# Patient Record
Sex: FEMALE | Race: WHITE | NOT HISPANIC OR LATINO | Employment: OTHER | URBAN - METROPOLITAN AREA
[De-identification: names, ages, dates, MRNs, and addresses within clinical notes are randomized per-mention and may not be internally consistent; named-entity substitution may affect disease eponyms.]

---

## 2018-03-06 ENCOUNTER — HOSPITAL ENCOUNTER (EMERGENCY)
Facility: HOSPITAL | Age: 58
Discharge: HOME | End: 2018-03-07
Attending: EMERGENCY MEDICINE
Payer: COMMERCIAL

## 2018-03-06 DIAGNOSIS — R13.10 DYSPHAGIA, UNSPECIFIED TYPE: Primary | ICD-10-CM

## 2018-03-06 PROCEDURE — 25000003 PHARM REV CODE 250: Performed by: EMERGENCY MEDICINE

## 2018-03-06 PROCEDURE — 99283 EMERGENCY DEPT VISIT LOW MDM: CPT

## 2018-03-06 RX ORDER — DIPHENHYDRAMINE HCL 50 MG
50 CAPSULE ORAL EVERY 6 HOURS PRN
COMMUNITY
End: 2022-02-03

## 2018-03-06 RX ADMIN — LIDOCAINE HYDROCHLORIDE 50 ML: 20 SOLUTION ORAL; TOPICAL at 11:03

## 2018-03-07 VITALS
SYSTOLIC BLOOD PRESSURE: 134 MMHG | RESPIRATION RATE: 20 BRPM | BODY MASS INDEX: 19.39 KG/M2 | OXYGEN SATURATION: 99 % | DIASTOLIC BLOOD PRESSURE: 85 MMHG | TEMPERATURE: 99 F | WEIGHT: 109.44 LBS | HEIGHT: 63 IN | HEART RATE: 65 BPM

## 2018-03-07 PROCEDURE — 25000003 PHARM REV CODE 250: Performed by: EMERGENCY MEDICINE

## 2018-03-07 RX ORDER — OXYMETAZOLINE HCL 0.05 %
1 SPRAY, NON-AEROSOL (ML) NASAL
Status: COMPLETED | OUTPATIENT
Start: 2018-03-07 | End: 2018-03-07

## 2018-03-07 RX ADMIN — OXYMETAZOLINE HYDROCHLORIDE 1 SPRAY: 5 SPRAY NASAL at 12:03

## 2018-03-07 NOTE — ED PROVIDER NOTES
SCRIBE #1 NOTE: I, Tara Cara, am scribing for, and in the presence of, Girish Mcqueen MD. I have scribed the entire note.      History      Chief Complaint   Patient presents with    Dysphagia     onset 2030. also reports dizziness and tremors.       Review of patient's allergies indicates:  Allergies not on file     HPI   HPI    3/6/2018, 10:45 PM   History obtained from the patient      History of Present Illness: Yvonne Schmidt is a 57 y.o. female patient who presents to the Emergency Department for dysphagia which onset suddenly at 2100 this PM. Pt states she was eating a tootsie roll and began choking. She states she tried to drink water after this and was unable to swallow it.  Symptoms are constant and moderate in severity. No mitigating or exacerbating factors reported. No other associated sxs reported. Patient denies any SOB, tongue swelling, cough, emesis, sore throat, voice change, and all other sxs at this time. No further complaints or concerns at this time.       Arrival mode: Personal vehicle      PCP: Merrick Palencia MD       Past Medical History:  Nothing remarkable except that she has always had some difficulty swallowing    Past Surgical History:  History reviewed. No pertinent surgical history.    Family History:  History reviewed. No pertinent family history.    Social History:  Social History     Social History Main Topics    Smoking status: unknown    Smokeless tobacco: unknown    Alcohol use unknown    Drug use: Unknown    Sexual activity: unknown       ROS   Review of Systems   Constitutional: Negative for fever.   HENT: Positive for trouble swallowing. Negative for sore throat and voice change.    Respiratory: Positive for choking. Negative for cough and shortness of breath.    Cardiovascular: Negative for chest pain.   Gastrointestinal: Negative for nausea and vomiting.   Genitourinary: Negative for dysuria.   Musculoskeletal: Negative for back pain.   Skin: Negative  "for rash.   Neurological: Negative for weakness.   Hematological: Does not bruise/bleed easily.   All other systems reviewed and are negative.    Physical Exam      Initial Vitals [03/06/18 2237]   BP Pulse Resp Temp SpO2   (!) 190/84 65 18 99.2 °F (37.3 °C) 98 %      MAP       119.33          Physical Exam  Nursing Notes and Vital Signs Reviewed.  Constitutional: Patient is in no acute distress. Well-developed and well-nourished.  Head: Atraumatic. Normocephalic.  Eyes: PERRL. EOM intact. Conjunctivae are not pale. No scleral icterus.  Ears: Right TM normal. Left TM normal. No erythema. No bulging. No effusion or air-fluid levels. No perforation.   Nose: Patent nares. Turbinates are normal. No drainage.   Throat: Moist mucous membranes. Posterior oropharynx is symmetric without erythema. Tonsillar exudate is not present. No trismus. Pt is handling secretions. No stridor. No oral swelling.   Neck: Supple. Full ROM. No lymphadenopathy. No swelling.   Cardiovascular: Regular rate. Regular rhythm. No murmurs, rubs, or gallops. Distal pulses are 2+ and symmetric.  Pulmonary/Chest: No respiratory distress. Clear to auscultation bilaterally. No wheezing or rales. No stridor.   Musculoskeletal: Moves all extremities. No obvious deformities. No edema.  Skin: Warm and dry.  Neurological:  Alert, awake, and appropriate.  Normal speech.  No acute focal neurological deficits are appreciated.  Psychiatric: Normal affect. Good eye contact. Appropriate in content.    ED Course    Procedures  ED Vital Signs:  Vitals:    03/06/18 2237 03/06/18 2253 03/06/18 2332 03/07/18 0131   BP: (!) 190/84 (!) 171/80 (!) 162/85 134/85   Pulse: 65 67 64 65   Resp: 18 17 20 20   Temp: 99.2 °F (37.3 °C)      TempSrc: Oral      SpO2: 98% 96% 100% 99%   Weight: 49.6 kg (109 lb 7.3 oz)      Height: 5' 3" (1.6 m)                 The Emergency Provider reviewed the vital signs and test results, which are outlined above.    ED Discussion     12:50 AM: Per " RN, pt is half way finished with the GI cocktail. She states pt is also c/o nasal congestion.    1:04 AM: Per RN, pt was able to finish GI cocktail at this time.    1:36 AM: Reassessed pt at this time. Pt was able to tolerate water and pudding PO. Pt reports no difficulty swallowing after the GI cocktail. Pt states her condition has improved at this time. Discussed with pt all pertinent ED information and results. Discussed pt dx and plan of tx. Advised pt to f/u with GI for an EGD. Gave pt all f/u and return to the ED instructions. All questions and concerns were addressed at this time. Pt expresses understanding of information and instructions, and is comfortable with plan to discharge. Pt is stable for discharge.        ED Medication(s):  Medications   GI cocktail (mylanta 30 mL, lidocaine 2 % viscous 10 mL, dicyclomine 10 mL) 50 mL (50 mLs Oral Given 3/6/18 5017)   oxymetazoline 0.05 % nasal spray 1 spray (1 spray Each Nare Given 3/7/18 0056)       Discharge Medication List as of 3/7/2018  1:47 AM          Follow-up Information     Merrick Palencia MD. Schedule an appointment as soon as possible for a visit in 2 days.    Specialty:  Family Medicine  Why:  Can return to the ER, If symptoms worsen.  Need to get set up with Gastroenterology for an upper GI scope  Contact information:  [address] [facility] [place] [address]  [phone]                     Medical Decision Making              Scribe Attestation:   Scribe #1: I performed the above scribed service and the documentation accurately describes the services I performed. I attest to the accuracy of the note.    Attending:   Physician Attestation Statement for Scribe #1: I, Girish Mcqueen MD, personally performed the services described in this documentation, as scribed by Tara Marroquin, in my presence, and it is both accurate and complete.          Clinical Impression       ICD-10-CM ICD-9-CM   1. Dysphagia, unspecified type R13.10 787.20        Disposition:   Disposition: Discharged  Condition: Stable         Girish Mcqueen MD  03/07/18 0208

## 2018-03-07 NOTE — ED NOTES
"Patient not drinking GI cocktail, keeps holding it in hand and putting bottle to lips without taking any gulps.  Multiple attempts made to encourage patient to drink, without success.  Patient keeps stating "I'm trying" while holding bottle in lap.  Dr. Mcqueen notified.  "

## 2018-03-07 NOTE — ED NOTES
Patient has tolerated a few bites of pudding and sips of water.  No vomiting noted.  Patient states she is able to control her secretions better.

## 2018-03-07 NOTE — ED NOTES
"Patient has completed GI cocktail.  Dr. Mcqueen states to PO challenge patient.  Patient given ice water and pudding.  Patient states she's "too full to eat or drink anything".  Instructed patient that she does not have to eat or drink much, only a small amount to test if she is able to swallow.  Patient states she will try later and that "she needs some time to rest".  Encouraged patient to eat/drink without success.  MD notified.  "

## 2019-09-10 ENCOUNTER — HOSPITAL ENCOUNTER (EMERGENCY)
Facility: HOSPITAL | Age: 59
Discharge: HOME | End: 2019-09-10
Attending: EMERGENCY MEDICINE
Payer: COMMERCIAL

## 2019-09-10 VITALS
DIASTOLIC BLOOD PRESSURE: 69 MMHG | RESPIRATION RATE: 18 BRPM | HEIGHT: 63 IN | HEART RATE: 54 BPM | WEIGHT: 87.38 LBS | TEMPERATURE: 99 F | OXYGEN SATURATION: 99 % | BODY MASS INDEX: 15.48 KG/M2 | SYSTOLIC BLOOD PRESSURE: 109 MMHG

## 2019-09-10 DIAGNOSIS — R07.89 CHEST DISCOMFORT: Primary | ICD-10-CM

## 2019-09-10 DIAGNOSIS — K59.00 CONSTIPATION, UNSPECIFIED CONSTIPATION TYPE: ICD-10-CM

## 2019-09-10 DIAGNOSIS — K59.00 CONSTIPATION: ICD-10-CM

## 2019-09-10 DIAGNOSIS — R00.1 SINUS BRADYCARDIA: ICD-10-CM

## 2019-09-10 DIAGNOSIS — Z72.0 TOBACCO ABUSE DISORDER: ICD-10-CM

## 2019-09-10 DIAGNOSIS — R07.9 CHEST PAIN: ICD-10-CM

## 2019-09-10 LAB
ALBUMIN SERPL BCP-MCNC: 4.1 G/DL (ref 3.5–5.2)
ALP SERPL-CCNC: 49 U/L (ref 55–135)
ALT SERPL W/O P-5'-P-CCNC: 11 U/L (ref 10–44)
ANION GAP SERPL CALC-SCNC: 9 MMOL/L (ref 8–16)
APTT BLDCRRT: 27.4 SEC (ref 21–32)
AST SERPL-CCNC: 15 U/L (ref 10–40)
BASOPHILS # BLD AUTO: 0.02 K/UL (ref 0–0.2)
BASOPHILS NFR BLD: 0.3 % (ref 0–1.9)
BILIRUB SERPL-MCNC: 0.5 MG/DL (ref 0.1–1)
BUN SERPL-MCNC: 14 MG/DL (ref 6–20)
CALCIUM SERPL-MCNC: 9.5 MG/DL (ref 8.7–10.5)
CHLORIDE SERPL-SCNC: 106 MMOL/L (ref 95–110)
CO2 SERPL-SCNC: 26 MMOL/L (ref 23–29)
CREAT SERPL-MCNC: 0.9 MG/DL (ref 0.5–1.4)
DIFFERENTIAL METHOD: ABNORMAL
EOSINOPHIL # BLD AUTO: 0.1 K/UL (ref 0–0.5)
EOSINOPHIL NFR BLD: 0.8 % (ref 0–8)
ERYTHROCYTE [DISTWIDTH] IN BLOOD BY AUTOMATED COUNT: 12.7 % (ref 11.5–14.5)
EST. GFR  (AFRICAN AMERICAN): >60 ML/MIN/1.73 M^2
EST. GFR  (NON AFRICAN AMERICAN): >60 ML/MIN/1.73 M^2
GLUCOSE SERPL-MCNC: 115 MG/DL (ref 70–110)
HCT VFR BLD AUTO: 38 % (ref 37–48.5)
HGB BLD-MCNC: 12.9 G/DL (ref 12–16)
INR PPP: 1.1 (ref 0.8–1.2)
LIPASE SERPL-CCNC: 17 U/L (ref 4–60)
LYMPHOCYTES # BLD AUTO: 1.2 K/UL (ref 1–4.8)
LYMPHOCYTES NFR BLD: 14.9 % (ref 18–48)
MCH RBC QN AUTO: 29.9 PG (ref 27–31)
MCHC RBC AUTO-ENTMCNC: 33.9 G/DL (ref 32–36)
MCV RBC AUTO: 88 FL (ref 82–98)
MONOCYTES # BLD AUTO: 0.7 K/UL (ref 0.3–1)
MONOCYTES NFR BLD: 8.6 % (ref 4–15)
NEUTROPHILS # BLD AUTO: 5.9 K/UL (ref 1.8–7.7)
NEUTROPHILS NFR BLD: 75.5 % (ref 38–73)
PLATELET # BLD AUTO: 210 K/UL (ref 150–350)
PMV BLD AUTO: 10.8 FL (ref 9.2–12.9)
POTASSIUM SERPL-SCNC: 3.9 MMOL/L (ref 3.5–5.1)
PROT SERPL-MCNC: 7.2 G/DL (ref 6–8.4)
PROTHROMBIN TIME: 11.4 SEC (ref 9–12.5)
RBC # BLD AUTO: 4.32 M/UL (ref 4–5.4)
SODIUM SERPL-SCNC: 141 MMOL/L (ref 136–145)
TROPONIN I SERPL DL<=0.01 NG/ML-MCNC: 0.02 NG/ML (ref 0–0.03)
TROPONIN I SERPL DL<=0.01 NG/ML-MCNC: 0.03 NG/ML (ref 0–0.03)
WBC # BLD AUTO: 7.76 K/UL (ref 3.9–12.7)

## 2019-09-10 PROCEDURE — 83690 ASSAY OF LIPASE: CPT

## 2019-09-10 PROCEDURE — 84484 ASSAY OF TROPONIN QUANT: CPT

## 2019-09-10 PROCEDURE — 93005 ELECTROCARDIOGRAM TRACING: CPT

## 2019-09-10 PROCEDURE — 99285 EMERGENCY DEPT VISIT HI MDM: CPT | Mod: 25

## 2019-09-10 PROCEDURE — 36415 COLL VENOUS BLD VENIPUNCTURE: CPT

## 2019-09-10 PROCEDURE — 80053 COMPREHEN METABOLIC PANEL: CPT

## 2019-09-10 PROCEDURE — 25000003 PHARM REV CODE 250: Performed by: EMERGENCY MEDICINE

## 2019-09-10 PROCEDURE — 63600175 PHARM REV CODE 636 W HCPCS: Performed by: EMERGENCY MEDICINE

## 2019-09-10 PROCEDURE — 93010 EKG 12-LEAD: ICD-10-PCS | Mod: ,,, | Performed by: INTERNAL MEDICINE

## 2019-09-10 PROCEDURE — 85730 THROMBOPLASTIN TIME PARTIAL: CPT

## 2019-09-10 PROCEDURE — 93010 ELECTROCARDIOGRAM REPORT: CPT | Mod: ,,, | Performed by: INTERNAL MEDICINE

## 2019-09-10 PROCEDURE — 85025 COMPLETE CBC W/AUTO DIFF WBC: CPT

## 2019-09-10 PROCEDURE — 85610 PROTHROMBIN TIME: CPT

## 2019-09-10 RX ORDER — SUCRALFATE 1 G/10ML
1 SUSPENSION ORAL
Status: COMPLETED | OUTPATIENT
Start: 2019-09-10 | End: 2019-09-10

## 2019-09-10 RX ORDER — ALPRAZOLAM 0.5 MG/1
0.5 TABLET ORAL 2 TIMES DAILY PRN
COMMUNITY
Start: 2019-08-16 | End: 2022-02-03 | Stop reason: SDUPTHER

## 2019-09-10 RX ORDER — ASPIRIN 325 MG
325 TABLET ORAL
Status: DISCONTINUED | OUTPATIENT
Start: 2019-09-10 | End: 2019-09-10

## 2019-09-10 RX ORDER — ESOMEPRAZOLE MAGNESIUM 10 MG/1
GRANULE, FOR SUSPENSION, EXTENDED RELEASE ORAL
Status: ON HOLD | COMMUNITY
End: 2019-09-22 | Stop reason: HOSPADM

## 2019-09-10 RX ORDER — OXYMETAZOLINE HCL 0.05 %
2 SPRAY, NON-AEROSOL (ML) NASAL 2 TIMES DAILY
Status: ON HOLD | COMMUNITY
End: 2019-09-22 | Stop reason: HOSPADM

## 2019-09-10 RX ORDER — NITROGLYCERIN 0.4 MG/1
0.4 TABLET SUBLINGUAL
Status: COMPLETED | OUTPATIENT
Start: 2019-09-10 | End: 2019-09-10

## 2019-09-10 RX ORDER — MULTIVITAMIN
1 TABLET ORAL DAILY
COMMUNITY

## 2019-09-10 RX ADMIN — SUCRALFATE 1 G: 1 SUSPENSION ORAL at 12:09

## 2019-09-10 RX ADMIN — NITROGLYCERIN 0.4 MG: 0.4 TABLET, ORALLY DISINTEGRATING SUBLINGUAL at 01:09

## 2019-09-10 NOTE — ED PROVIDER NOTES
"SCRIBE #1 NOTE: I, Jaswant Akbar/Corinne Mack, am scribing for, and in the presence of, Dianna Mcqueen MD. I have scribed the entire note.       History     Chief Complaint   Patient presents with    Chest Pain     "crushing" CP from urgent care/ +nausea +anxiety. received ASA  @     Review of patient's allergies indicates:   Allergen Reactions    Penicillins          History of Present Illness     HPI    9/10/2019, 12:12 PM  History obtained from the patient      History of Present Illness: Yvonne Schmidt is a 58 y.o. female patient with a PMHx of anxiety who presents to the Emergency Department for evaluation of CP which onset gradually 5 days ago. Pt presented to a clinic today for her CP and was referred to the ED for further evaluation. Pt states that she was instructed to start Nexium yesterday for heartburn, but her sxs have persisted. Symptoms are constant and moderate in severity. No mitigating or exacerbating factors reported. Associated sxs include SOB, anxiety, nausea, and constipation. Pt states she was instructed to take mag citrate yesterday for her constipation, but has not done so yet. Patient denies any fever, chills, diaphoresis, V/D, abd pain, back pain, neck pain, HA, dizziness, and all other sxs at this time. Prior Tx includes Zofran given en route with some relief. No further complaints or concerns at this time.       Arrival mode: Ambulatory service    PCP: Merrick Palencia MD        Past Medical History:  Past Medical History:   Diagnosis Date    Anxiety     GERD (gastroesophageal reflux disease)        Past Surgical History:  History reviewed. No pertinent surgical history.      Family History:  History reviewed. No pertinent family history.    Social History:  Social History     Tobacco Use    Smoking status: Current Every Day Smoker     Packs/day: 0.50     Types: Cigarettes   Substance and Sexual Activity    Alcohol use: Never     Frequency: Never    Drug use: " Never    Sexual activity: Unknown        Review of Systems     Review of Systems   Constitutional: Negative for chills, diaphoresis and fever.   Respiratory: Positive for shortness of breath. Negative for cough.    Cardiovascular: Positive for chest pain. Negative for leg swelling.   Gastrointestinal: Positive for constipation and nausea. Negative for abdominal pain, diarrhea and vomiting.   Musculoskeletal: Negative for back pain, neck pain and neck stiffness.   Skin: Negative for rash and wound.   Neurological: Negative for dizziness, light-headedness, numbness and headaches.   Psychiatric/Behavioral: Negative for confusion. The patient is nervous/anxious.    All other systems reviewed and are negative.     Physical Exam     Initial Vitals   BP Pulse Resp Temp SpO2   09/10/19 1206 09/10/19 1206 09/10/19 1206 09/10/19 1211 09/10/19 1206   134/81 (!) 48 18 98.5 °F (36.9 °C) 99 %      MAP       --                 Physical Exam  Nursing Notes and Vital Signs Reviewed.  Constitutional: Patient is in no acute distress. Well-developed and well-nourished.  Head: Atraumatic. Normocephalic.  Eyes: PERRL. EOM intact. Conjunctivae are not pale. No scleral icterus.  ENT: Mucous membranes are moist. Oropharynx is clear and symmetric.    Neck: Supple. Full ROM. No lymphadenopathy.  Cardiovascular: Bradycardic. Regular rhythm. No murmurs, rubs, or gallops. Distal pulses are 2+ and symmetric.  Pulmonary/Chest: No respiratory distress. Clear to auscultation bilaterally. No wheezing or rales.  Abdominal: Soft and non-distended.  There is no tenderness.  No rebound, guarding, or rigidity.   Musculoskeletal: Moves all extremities. No obvious deformities. No edema.   Skin: Warm and dry.  Neurological:  Alert, awake, and appropriate.  Normal speech.  No acute focal neurological deficits are appreciated.  Psychiatric: Normal affect. Good eye contact. Appropriate in content.     ED Course   Procedures  ED Vital Signs:  Vitals:     "09/10/19 1206 09/10/19 1211 09/10/19 1224 09/10/19 1226   BP: 134/81 (!) 155/73 131/80 135/85   Pulse: (!) 48 (!) 54 (!) 51 (!) 54   Resp: 18 16 18 18   Temp:  98.5 °F (36.9 °C)     TempSrc:  Oral     SpO2: 99% 100% 100% 100%   Weight:  39.5 kg (87 lb)     Height:  5' 3" (1.6 m)      09/10/19 1227 09/10/19 1301 09/10/19 1400 09/10/19 1432   BP: 138/85 137/79 123/75 121/60   Pulse: (!) 55 (!) 48 (!) 55 (!) 52   Resp: 18 18 18 18   Temp:       TempSrc:       SpO2: 99% 98% 100% 100%   Weight: 39.6 kg (87 lb 6.4 oz)      Height:        09/10/19 1501 09/10/19 1601   BP: 101/69 109/69   Pulse: (!) 57 (!) 54   Resp: 18 18   Temp:     TempSrc:     SpO2: 98% 99%   Weight:     Height:         Abnormal Lab Results:  Labs Reviewed   CBC W/ AUTO DIFFERENTIAL - Abnormal; Notable for the following components:       Result Value    Gran% 75.5 (*)     Lymph% 14.9 (*)     All other components within normal limits   COMPREHENSIVE METABOLIC PANEL - Abnormal; Notable for the following components:    Glucose 115 (*)     Alkaline Phosphatase 49 (*)     All other components within normal limits   TROPONIN I - Abnormal; Notable for the following components:    Troponin I 0.034 (*)     All other components within normal limits   PROTIME-INR   APTT   LIPASE   TROPONIN I        All Lab Results:  Results for orders placed or performed during the hospital encounter of 09/10/19   CBC auto differential   Result Value Ref Range    WBC 7.76 3.90 - 12.70 K/uL    RBC 4.32 4.00 - 5.40 M/uL    Hemoglobin 12.9 12.0 - 16.0 g/dL    Hematocrit 38.0 37.0 - 48.5 %    Mean Corpuscular Volume 88 82 - 98 fL    Mean Corpuscular Hemoglobin 29.9 27.0 - 31.0 pg    Mean Corpuscular Hemoglobin Conc 33.9 32.0 - 36.0 g/dL    RDW 12.7 11.5 - 14.5 %    Platelets 210 150 - 350 K/uL    MPV 10.8 9.2 - 12.9 fL    Gran # (ANC) 5.9 1.8 - 7.7 K/uL    Lymph # 1.2 1.0 - 4.8 K/uL    Mono # 0.7 0.3 - 1.0 K/uL    Eos # 0.1 0.0 - 0.5 K/uL    Baso # 0.02 0.00 - 0.20 K/uL    Gran% 75.5 " (H) 38.0 - 73.0 %    Lymph% 14.9 (L) 18.0 - 48.0 %    Mono% 8.6 4.0 - 15.0 %    Eosinophil% 0.8 0.0 - 8.0 %    Basophil% 0.3 0.0 - 1.9 %    Differential Method Automated    Comprehensive metabolic panel   Result Value Ref Range    Sodium 141 136 - 145 mmol/L    Potassium 3.9 3.5 - 5.1 mmol/L    Chloride 106 95 - 110 mmol/L    CO2 26 23 - 29 mmol/L    Glucose 115 (H) 70 - 110 mg/dL    BUN, Bld 14 6 - 20 mg/dL    Creatinine 0.9 0.5 - 1.4 mg/dL    Calcium 9.5 8.7 - 10.5 mg/dL    Total Protein 7.2 6.0 - 8.4 g/dL    Albumin 4.1 3.5 - 5.2 g/dL    Total Bilirubin 0.5 0.1 - 1.0 mg/dL    Alkaline Phosphatase 49 (L) 55 - 135 U/L    AST 15 10 - 40 U/L    ALT 11 10 - 44 U/L    Anion Gap 9 8 - 16 mmol/L    eGFR if African American >60 >60 mL/min/1.73 m^2    eGFR if non African American >60 >60 mL/min/1.73 m^2   Troponin I #1   Result Value Ref Range    Troponin I 0.034 (H) 0.000 - 0.026 ng/mL   Protime-INR   Result Value Ref Range    Prothrombin Time 11.4 9.0 - 12.5 sec    INR 1.1 0.8 - 1.2   APTT   Result Value Ref Range    aPTT 27.4 21.0 - 32.0 sec   Lipase   Result Value Ref Range    Lipase 17 4 - 60 U/L   Troponin I   Result Value Ref Range    Troponin I 0.016 0.000 - 0.026 ng/mL       Imaging Results:  Imaging Results          X-Ray Abdomen Flat And Erect (Final result)  Result time 09/10/19 13:18:16    Final result by Aime Ervin MD (09/10/19 13:18:16)                 Impression:      Nonobstructive bowel gas pattern.  Mild constipation..      Electronically signed by: Aime Ervin MD  Date:    09/10/2019  Time:    13:18             Narrative:    EXAMINATION:  XR ABDOMEN FLAT AND ERECT    CLINICAL HISTORY:  Constipation, unspecified    COMPARISON:  None    FINDINGS:  Nonobstructive bowel gas pattern is noted.  Mild constipation.  No radiopaque kidney calculus is identified.  There are multiple calcifications in the pelvis most consistent with phleboliths.  No evidence of organomegaly.  The bones demonstrate mild  degenerative changes within the lower lumbar region.  The bones are otherwise intact.                               X-Ray Chest PA And Lateral (Final result)  Result time 09/10/19 13:18:49    Final result by Aime Ervin MD (09/10/19 13:18:49)                 Impression:      No acute process seen.      Electronically signed by: Aime Ervin MD  Date:    09/10/2019  Time:    13:18             Narrative:    EXAMINATION:  XR CHEST PA AND LATERAL    CLINICAL HISTORY:  Chest Pain;    COMPARISON:  None    FINDINGS:  Cardiac silhouette is normal. Aorta demonstrates atherosclerotic disease. The lungs demonstrate no evidence of active disease.  No evidence of pleural effusion or pneumothorax.  Bones appear intact.   Mild constipation.                                       The EKG was ordered, reviewed, and independently interpreted by the ED provider.  Interpretation time: 1213  Rate: 49 BPM  Rhythm: sinus bradycardia  Interpretation: Biatrial enlargement. No STEMI.    The EKG was ordered, reviewed, and independently interpreted by the ED provider.  Interpretation time: 1414  Rate: 49 BPM  Rhythm: Marked sinus bradycardia.  Interpretation: Biatrial enlargemnt. RSR' or QR pattern in V1 suggests right ventricular conduction delay. No STEMI.  When compared to EKG performed on 9/10, there are no significant changes.           The Emergency Provider reviewed the vital signs and test results, which are outlined above.     ED Discussion       2:20 PM: Re-evaluated pt. Pt is resting comfortably and is in no acute distress.  Pt states she had no relief with the carafate and that the NTG made her CP worse.  D/w pt all pertinent results. D/w pt any concerns expressed at this time. Answered all questions. Pt expresses understanding at this time.    4:14 PM: Reassessed pt at this time. 2 sets of negative enzymes, no ischemic changes on ECG, needs outpatient stress test but do not think this is ACS but stress related. Pt is awake,  alert, and in no distress. Discussed with pt all pertinent ED information and results. Discussed pt dx and plan of tx. Gave pt all f/u and return to the ED instructions. All questions and concerns were addressed at this time. Pt expresses understanding of information and instructions, and is comfortable with plan to discharge. Pt is stable for discharge.    I discussed with patient and/or family/caretaker that evaluation in the ED does not suggest any emergent or life threatening medical conditions requiring immediate intervention beyond what was provided in the ED, and I believe patient is safe for discharge.  Regardless, an unremarkable evaluation in the ED does not preclude the development or presence of a serious of life threatening condition. As such, patient was instructed to return immediately for any worsening or change in current symptoms.    I have discussed with patient and/or family/caretaker chest pain precautions, specifically to return for worsening chest pain, shortness of breath, fever, or any concern.  I have low suspicion for cardiopulmonary, vascular, infectious, respiratory, or other emergent medical condition based on my evaluation in the ED.         Medical Decision Making:   Clinical Tests:   Lab Tests: Ordered and Reviewed  Radiological Study: Ordered and Reviewed  Medical Tests: Ordered and Reviewed     Additional MDM:   Smoking Cessation: The patient is a smoker. The patient was counseled on smoking cessation for: 3 minutes. The patient was counseled on tobacco related  health complications.        ED Medication(s):  Medications   sucralfate 100 mg/mL suspension 1 g (1 g Oral Given 9/10/19 3524)   nitroGLYCERIN SL tablet 0.4 mg (0.4 mg Sublingual Given 9/10/19 0489)       New Prescriptions    No medications on file       Follow-up Information     Merrick Palencia MD. Schedule an appointment as soon as possible for a visit in 1 day.    Specialty:  Family Medicine  Why:  Return to the  Emergency Room, If symptoms worsen  Contact information:  [address]  [place] [address]  [phone]             [facility]. Schedule an appointment as soon as possible for a visit in 1 day.    Specialty:  Cardiology  Contact information:  [address] [facility] [id number] [phone]  Additional information:  3rd Floor - From [address], take the [facility] exit ([address]). Enter the facility from the [address]                     Scribe Attestation:   Scribe #1: I performed the above scribed service and the documentation accurately describes the services I performed. I attest to the accuracy of the note.     Attending:   Physician Attestation Statement for Scribe #1: I, Dianna Mcqueen MD, personally performed the services described in this documentation, as scribed by Jaswant Akbar/Corinne Mack, in my presence, and it is both accurate and complete.           Clinical Impression       ICD-10-CM ICD-9-CM   1. Chest discomfort R07.89 786.59   2. Chest pain R07.9 786.50   3. Constipation K59.00 564.00   4. Tobacco abuse disorder Z72.0 305.1   5. Sinus bradycardia R00.1 427.89   6. Constipation, unspecified constipation type K59.00 564.00       Disposition:   Disposition: Discharged  Condition: Stable         Dianna Mcqueen MD  09/10/19 2007

## 2019-09-10 NOTE — ED NOTES
Patient identifiers verified and correct for Yvonne BlankenshipMegan.    LOC: The patient is awake, alert and aware of environment with an appropriate affect, the patient is oriented x 3 and speaking appropriately. Pt appears anxious  APPEARANCE: Patient resting comfortably and in no acute distress, patient is clean and well groomed, patient's clothing is properly fastened.  SKIN: The skin is warm and dry, color consistent with ethnicity, patient has normal skin turgor and moist mucus membranes, skin intact, no breakdown or bruising noted.  MUSCULOSKELETAL: Patient moving all extremities spontaneously, no obvious swelling or deformities noted.  RESPIRATORY: Airway is open and patent, respirations are spontaneous, patient has a normal effort and rate, no accessory muscle use noted, bilateral breath sounds clear. Pt reports SOB  CARDIAC: Patient has a normal rate and regular rhythm, no periphreal edema noted, capillary refill < 3 seconds. Pt reports crushing CP  ABDOMEN: Soft and non tender to palpation, no distention noted, normoactive bowel sounds present in all four quadrants. Pt reports constipation and nausea  NEUROLOGIC: PERRL, **mm bilaterally, eyes open spontaneously, behavior appropriate to situation, follows commands, facial expression symmetrical, bilateral hand grasp equal and even, purposeful motor response noted, normal sensation in all extremities when touched with a finger.

## 2019-09-11 ENCOUNTER — LAB VISIT (OUTPATIENT)
Dept: LAB | Facility: HOSPITAL | Age: 59
End: 2019-09-11
Attending: INTERNAL MEDICINE
Payer: COMMERCIAL

## 2019-09-11 ENCOUNTER — OFFICE VISIT (OUTPATIENT)
Dept: CARDIOLOGY | Facility: CLINIC | Age: 59
End: 2019-09-11
Payer: COMMERCIAL

## 2019-09-11 VITALS
SYSTOLIC BLOOD PRESSURE: 117 MMHG | DIASTOLIC BLOOD PRESSURE: 77 MMHG | BODY MASS INDEX: 16.4 KG/M2 | HEIGHT: 63 IN | WEIGHT: 92.56 LBS | HEART RATE: 52 BPM

## 2019-09-11 DIAGNOSIS — R00.2 PALPITATIONS: ICD-10-CM

## 2019-09-11 DIAGNOSIS — R00.1 SINUS BRADYCARDIA: ICD-10-CM

## 2019-09-11 DIAGNOSIS — K21.9 GASTROESOPHAGEAL REFLUX DISEASE, ESOPHAGITIS PRESENCE NOT SPECIFIED: ICD-10-CM

## 2019-09-11 DIAGNOSIS — R07.9 CHEST PAIN, UNSPECIFIED TYPE: Primary | ICD-10-CM

## 2019-09-11 DIAGNOSIS — R07.9 CHEST PAIN, UNSPECIFIED TYPE: ICD-10-CM

## 2019-09-11 DIAGNOSIS — Z72.0 TOBACCO USE: ICD-10-CM

## 2019-09-11 PROCEDURE — 84443 ASSAY THYROID STIM HORMONE: CPT

## 2019-09-11 PROCEDURE — 3008F BODY MASS INDEX DOCD: CPT | Mod: CPTII,S$GLB,, | Performed by: INTERNAL MEDICINE

## 2019-09-11 PROCEDURE — 99204 PR OFFICE/OUTPT VISIT, NEW, LEVL IV, 45-59 MIN: ICD-10-PCS | Mod: S$GLB,,, | Performed by: INTERNAL MEDICINE

## 2019-09-11 PROCEDURE — 84439 ASSAY OF FREE THYROXINE: CPT

## 2019-09-11 PROCEDURE — 36415 COLL VENOUS BLD VENIPUNCTURE: CPT

## 2019-09-11 PROCEDURE — 99999 PR PBB SHADOW E&M-EST. PATIENT-LVL III: CPT | Mod: PBBFAC,,, | Performed by: INTERNAL MEDICINE

## 2019-09-11 PROCEDURE — 99999 PR PBB SHADOW E&M-EST. PATIENT-LVL III: ICD-10-PCS | Mod: PBBFAC,,, | Performed by: INTERNAL MEDICINE

## 2019-09-11 PROCEDURE — 99204 OFFICE O/P NEW MOD 45 MIN: CPT | Mod: S$GLB,,, | Performed by: INTERNAL MEDICINE

## 2019-09-11 PROCEDURE — 3008F PR BODY MASS INDEX (BMI) DOCUMENTED: ICD-10-PCS | Mod: CPTII,S$GLB,, | Performed by: INTERNAL MEDICINE

## 2019-09-11 RX ORDER — SODIUM, POTASSIUM,MAG SULFATES 17.5-3.13G
SOLUTION, RECONSTITUTED, ORAL ORAL
Status: ON HOLD | COMMUNITY
End: 2019-09-22 | Stop reason: HOSPADM

## 2019-09-11 NOTE — PATIENT INSTRUCTIONS
Noncardiac Chest Pain    Based on your visit today, the healthcare provider doesnt know what is causing your chest pain. In most cases, people who come to the emergency department with chest pain dont have a problem with their heart. Instead, the pain is caused by other conditions. It's important for the healthcare team to be sure you are not having a life threatening cause for chest pain such as a heart attack, blood clot in the lungs, collapsed lung, ruptured esophagus, or tearing of the aorta. Once these major causes have been ruled out, you may have further evaluation for non-heart causes of chest pain. These may be problems with the lungs, muscles, bones, digestive tract, nerves, or mental health.  Lung problems  · Inflammation around the lungs (pleurisy)  · Collapsed lung (pneumothorax)  · Fluid around the lungs (pleural effusion)  · Lung cancer (a rare cause of chest pain)  Muscle or bone problems  · Inflamed cartilage between the ribs (costochondritis)  · Fibromyalgia  · Rheumatoid arthritis  · Chest wall strain  Digestive system problems  · Reflux  · Stomach ulcer  · Spasms of the esophagus  · Gall stones  · Gallbladder inflammation  Mental health conditions  · Panic or anxiety attacks  · Emotional distress  Your condition doesnt seem serious and your pain doesnt appear to be coming from your heart. But sometimes the signs of a serious problem take more time to appear. Watch for the warning signs listed below.  Home care  Follow these guidelines when caring for yourself at home:  · Rest today and avoid strenuous activity.  · Take any prescribed medicine as directed.  Follow-up care  Follow up with your healthcare provider, or as advised, if you dont start to feel better within 24 hours.  When to seek medical advice  Call your healthcare provider right away if any of these occur:  · A change in the type of pain. Call if it feels different, becomes more serious, lasts longer, or begins to spread into  your shoulder, arm, neck, jaw, or back.  · Shortness of breath  · You feel more pain when you breathe  · Cough with dark-colored mucus or blood  · Weakness, dizziness, or fainting  · Fever of 100.4ºF (38ºC) or higher, or as directed by your healthcare provider  · Swelling, pain, or redness in one leg  Date Last Reviewed: 12/1/2016  © 9040-1331 [employer]. [address] [place] [address], [place] [address]. All rights reserved. This information is not intended as a substitute for professional medical care. Always follow your healthcare professional's instructions.          Understanding Bradycardia    Your heart has an electrical system that sends signals to control the heartbeat. Any abnormal change in the speed or pattern of the heartbeat is called an arrhythmia. An arrhythmia that causes the heart to beat slower than normal is called bradycardia. There are many types of bradycardia. In healthy children and adults, bradycardia is often normal, particularly during sleep. Sometimes bradycardia is caused by failure of the hearts natural timer or failure of the electrical pathways within the heart. Depending on the type you have and how severe it is, you may need treatment.  What causes bradycardia?  Many things can cause bradycardia, including:   · Natural aging  process  · Coronary artery disease  · Heart attack  · Heart muscle disease  · Problems with the SA node. This is the hearts natural pacemaker that starts each heartbeat.  · Problems with the electrical pathways in the heart  · Problems with the structure of the heart that you are born with  · Infection  · Use of certain medicines  · Electrolyte imbalance  · Underactive thyroid   · Sleep apnea  · Increased pressure in the brain or stroke   Well-conditioned athletes often have a naturally slow heart rate.  What are the symptoms of bradycardia?  Bradycardia can cause a slow or irregular heartbeat. It can also make it harder for the heart to pump blood to  the body. This may cause symptoms such as:  · Tiredness  · Weakness  · Loss of ability to exercise  · Shortness of breath  · Dizziness or fainting  · Chest pain  · Heart failure  Some people with bradycardia have no symptoms at all.  How is bradycardia treated?  Treatment for bradycardia depends on the cause. It also depends on the type you have and how severe your symptoms are. If you need treatment, your options may include:  · Treatment of the underlying cause. For instance, if a medicine is causing bradycardia, stopping the medicine, under your doctors guidance, may correct the problem. Or if a condition such as an underactive thyroid is the cause, treating the thyroid may keep bradycardia from coming back.  · Medicines. Medicines may be used to treat conditions that cause bradycardia.  Some medicines can also be used in the short-term to increase the heart rate. These are often not long-term solutions.   · Temporary pacing. Pacing is used to help regulate your heartbeat.When the heart beats too slowly, the device sends signals to keep the heart beating at the right pace. A temporary pacemaker may be connected to the heart using wires guided through a blood vessel in your neck or leg to the heart. This is also sometimes done using special pads placed on the chest. This may be used in an emergency, as a bridge to permanent pacing, when pacing is only needed short-term, or to further evaluate your condition.  · Pacemaker. This is a device that is placed permanently under the skin in your chest and connected to your heart. When the heart beats too slowly, the device sends signals to keep the heart beating at the right pace.  What are the complications of bradycardia?  These can include:  · Development of other types of arrhythmias  · Heart failure. This problem occurs when the heart weakens so much that it no longer pumps blood well.  · Sudden cardiac arrest. This is when the heart suddenly stops beating.  When  should I call my healthcare provider?  Call your healthcare provider right away if you have any of these:  · Symptoms that dont get better with treatment, or get worse  · New symptoms   Date Last Reviewed: 5/1/2016  © 3424-4938 [employer]. [address] [place] [address], [place] [address]. All rights reserved. This information is not intended as a substitute for professional medical care. Always follow your healthcare professional's instructions.

## 2019-09-11 NOTE — PROGRESS NOTES
Subjective:   Patient ID:  Yvonne Avila is a 58 y.o. female who presents for cardiac consult of Chest Pain ([facility] Follow Up)      HPI  The patient came in today for cardiac consult of Chest Pain ([facility] Follow [facility])    9/11/19  Yvonne Avila is a 58 y.o. female pt with GERD, Anxiety, tobacco use presents for initial CV eval for chest pain/SOB.    Pt presented to the ER yesterday for CP. ECG with sinus julian V rate 49. Enzymes neg x 2, NTG caused worsening of CP.   She has been having intermittent chest pain for 2 weeks. Chest pain can lasts for a few sec, took BC which helped. She just started Nexium as well.   She does get lightheaded and feels like she may pass out.     Patient feels no leg swelling, no PND,  no syncope, no CNS symptoms.    Patient has fairly good exercise tolerance.    Patient is compliant with medications.    FH - mother - MI s/p stents    9/10/19 ECG  sinus bradycardia- V rate 49  Biatrial enlargement    Past Medical History:   Diagnosis Date    Anxiety     GERD (gastroesophageal reflux disease)        History reviewed. No pertinent surgical history.    Social History     Tobacco Use    Smoking status: Current Every Day Smoker     Packs/day: 0.50     Types: Cigarettes   Substance Use Topics    Alcohol use: Never     Frequency: Never    Drug use: Never       History reviewed. No pertinent family history.    Patient's Medications   New Prescriptions    No medications on file   Previous Medications    ALPRAZOLAM (XANAX) 0.5 MG TABLET    Take 0.5 mg by mouth 2 (two) times daily as needed.    ASPIRIN/CAFFEINE (BC ORAL)    Take by mouth.    DIPHENHYDRAMINE (BENADRYL) 50 MG CAPSULE    Take 50 mg by mouth every 6 (six) hours as needed for Itching.    ESOMEPRAZOLE MAGNESIUM (NEXIUM) 10 MG GRPS    Take by mouth.    MULTIVITAMIN (THERAGRAN) PER TABLET    Take 1 tablet by mouth once daily.    OXYMETAZOLINE (AFRIN) 0.05 % NASAL SPRAY    2 sprays by Nasal route 2 (two) times  "daily.    SODIUM,POTASSIUM,MAG SULFATES (SUPREP BOWEL PREP KIT) 17.5-3.13-1.6 GRAM SOLR    Suprep Bowel Prep Kit 17.5 gram-3.13 gram-1.6 gram oral solution   Modified Medications    No medications on file   Discontinued Medications    No medications on file       Review of Systems   Constitutional: Negative.    HENT: Negative.    Eyes: Negative.    Respiratory: Positive for shortness of breath.    Cardiovascular: Positive for chest pain.   Gastrointestinal: Negative.    Genitourinary: Negative.    Musculoskeletal: Negative.    Skin: Negative.    Neurological: Positive for dizziness.   Endo/Heme/Allergies: Negative.    Psychiatric/Behavioral: The patient is nervous/anxious.    All 12 systems otherwise negative.      Wt Readings from Last 3 Encounters:   09/11/19 42 kg (92 lb 9.5 oz)   09/10/19 39.6 kg (87 lb 6.4 oz)   03/06/18 49.6 kg (109 lb 7.3 oz)     Temp Readings from Last 3 Encounters:   09/10/19 98.5 °F (36.9 °C) (Oral)   03/06/18 99.2 °F (37.3 °C) (Oral)     BP Readings from Last 3 Encounters:   09/11/19 117/77   09/10/19 109/69   03/07/18 134/85     Pulse Readings from Last 3 Encounters:   09/11/19 (!) 52   09/10/19 (!) 54   03/07/18 65       /77   Pulse (!) 52   Ht 5' 3" (1.6 m)   Wt 42 kg (92 lb 9.5 oz)   BMI 16.40 kg/m²     Objective:   Physical Exam   Constitutional: She is oriented to person, place, and time. She appears well-developed and well-nourished. No distress.   HENT:   Head: Normocephalic and atraumatic.   Nose: Nose normal.   Mouth/Throat: Oropharynx is clear and moist.   Eyes: Conjunctivae and EOM are normal. No scleral icterus.   Neck: Normal range of motion. Neck supple. No JVD present. No thyromegaly present.   Cardiovascular: Regular rhythm, S1 normal and S2 normal. Bradycardia present. Exam reveals no gallop, no S3, no S4 and no friction rub.   No murmur heard.  Pulmonary/Chest: Effort normal and breath sounds normal. No stridor. No respiratory distress. She has no wheezes. She " has no rales. She exhibits no tenderness.   Abdominal: Soft. Bowel sounds are normal. She exhibits no distension and no mass. There is no tenderness. There is no rebound.   Genitourinary:   Genitourinary Comments: Deferred   Musculoskeletal: Normal range of motion. She exhibits no edema, tenderness or deformity.   Lymphadenopathy:     She has no cervical adenopathy.   Neurological: She is alert and oriented to person, place, and time. She exhibits normal muscle tone. Coordination normal.   Skin: Skin is warm and dry. No rash noted. She is not diaphoretic. No erythema. No pallor.   Psychiatric: She has a normal mood and affect. Her behavior is normal. Judgment and thought content normal.   Nursing note and vitals reviewed.      Lab Results   Component Value Date     09/10/2019    K 3.9 09/10/2019     09/10/2019    CO2 26 09/10/2019    BUN 14 09/10/2019    CREATININE 0.9 09/10/2019     (H) 09/10/2019    AST 15 09/10/2019    ALT 11 09/10/2019    ALBUMIN 4.1 09/10/2019    PROT 7.2 09/10/2019    BILITOT 0.5 09/10/2019    WBC 7.76 09/10/2019    HGB 12.9 09/10/2019    HCT 38.0 09/10/2019    MCV 88 09/10/2019     09/10/2019    INR 1.1 09/10/2019     Assessment:      1. Chest pain, unspecified type    2. Gastroesophageal reflux disease, esophagitis presence not specified    3. Sinus bradycardia    4. Tobacco use    5. Palpitations        Plan:   1. Chest pain  - recent r/o ACS in ER  - check ETT and ECHO  - discussed non cardiac etiologies    2. GERD  - cont PPI  - f/u GI    3. Sinus bradycardia with occ palpitations   - check Holter  - TSH, T4    4. Tobacco use  - rec smoking cessation    Thank you for allowing me to participate in this patient's care. Please do not hesitate to contact me with any questions or concerns. Consult note has been forwarded to the referral physician.

## 2019-09-12 LAB
T4 FREE SERPL-MCNC: 0.81 NG/DL (ref 0.71–1.51)
TSH SERPL DL<=0.005 MIU/L-ACNC: 2.3 UIU/ML (ref 0.4–4)

## 2019-09-13 ENCOUNTER — TELEPHONE (OUTPATIENT)
Dept: CARDIOLOGY | Facility: CLINIC | Age: 59
End: 2019-09-13

## 2019-09-19 ENCOUNTER — HOSPITAL ENCOUNTER (INPATIENT)
Facility: HOSPITAL | Age: 59
LOS: 2 days | Discharge: HOME | DRG: 309 | End: 2019-09-22
Attending: EMERGENCY MEDICINE | Admitting: INTERNAL MEDICINE
Payer: COMMERCIAL

## 2019-09-19 DIAGNOSIS — R00.1 BRADYCARDIA: Primary | ICD-10-CM

## 2019-09-19 DIAGNOSIS — R06.02 SOB (SHORTNESS OF BREATH): ICD-10-CM

## 2019-09-19 DIAGNOSIS — R00.1 BRADYCARDIA, SINUS: ICD-10-CM

## 2019-09-19 DIAGNOSIS — Z72.0 TOBACCO ABUSE: ICD-10-CM

## 2019-09-19 DIAGNOSIS — R79.89 ELEVATED TROPONIN: ICD-10-CM

## 2019-09-19 DIAGNOSIS — R42 DIZZINESS: ICD-10-CM

## 2019-09-19 PROBLEM — R13.19 OTHER DYSPHAGIA: Status: ACTIVE | Noted: 2018-11-07

## 2019-09-19 LAB
ALBUMIN SERPL BCP-MCNC: 4.3 G/DL (ref 3.5–5.2)
ALP SERPL-CCNC: 50 U/L (ref 55–135)
ALT SERPL W/O P-5'-P-CCNC: 10 U/L (ref 10–44)
AMPHET+METHAMPHET UR QL: NEGATIVE
ANION GAP SERPL CALC-SCNC: 11 MMOL/L (ref 8–16)
AST SERPL-CCNC: 14 U/L (ref 10–40)
BARBITURATES UR QL SCN>200 NG/ML: NEGATIVE
BASOPHILS # BLD AUTO: 0.02 K/UL (ref 0–0.2)
BASOPHILS NFR BLD: 0.2 % (ref 0–1.9)
BENZODIAZ UR QL SCN>200 NG/ML: NORMAL
BILIRUB SERPL-MCNC: 0.6 MG/DL (ref 0.1–1)
BILIRUB UR QL STRIP: NEGATIVE
BNP SERPL-MCNC: 35 PG/ML (ref 0–99)
BUN SERPL-MCNC: 14 MG/DL (ref 6–20)
BZE UR QL SCN: NEGATIVE
CALCIUM SERPL-MCNC: 10 MG/DL (ref 8.7–10.5)
CANNABINOIDS UR QL SCN: NEGATIVE
CHLORIDE SERPL-SCNC: 103 MMOL/L (ref 95–110)
CK SERPL-CCNC: 58 U/L (ref 20–180)
CLARITY UR: CLEAR
CO2 SERPL-SCNC: 28 MMOL/L (ref 23–29)
COLOR UR: YELLOW
CREAT SERPL-MCNC: 1 MG/DL (ref 0.5–1.4)
CREAT UR-MCNC: 159.7 MG/DL (ref 15–325)
DIFFERENTIAL METHOD: NORMAL
EOSINOPHIL # BLD AUTO: 0.1 K/UL (ref 0–0.5)
EOSINOPHIL NFR BLD: 0.8 % (ref 0–8)
ERYTHROCYTE [DISTWIDTH] IN BLOOD BY AUTOMATED COUNT: 12.6 % (ref 11.5–14.5)
EST. GFR  (AFRICAN AMERICAN): >60 ML/MIN/1.73 M^2
EST. GFR  (NON AFRICAN AMERICAN): >60 ML/MIN/1.73 M^2
GLUCOSE SERPL-MCNC: 115 MG/DL (ref 70–110)
GLUCOSE UR QL STRIP: NEGATIVE
HCT VFR BLD AUTO: 39.7 % (ref 37–48.5)
HGB BLD-MCNC: 13.5 G/DL (ref 12–16)
HGB UR QL STRIP: NEGATIVE
KETONES UR QL STRIP: NEGATIVE
LEUKOCYTE ESTERASE UR QL STRIP: NEGATIVE
LYMPHOCYTES # BLD AUTO: 1.7 K/UL (ref 1–4.8)
LYMPHOCYTES NFR BLD: 19.3 % (ref 18–48)
MAGNESIUM SERPL-MCNC: 2.2 MG/DL (ref 1.6–2.6)
MCH RBC QN AUTO: 29.6 PG (ref 27–31)
MCHC RBC AUTO-ENTMCNC: 34 G/DL (ref 32–36)
MCV RBC AUTO: 87 FL (ref 82–98)
METHADONE UR QL SCN>300 NG/ML: NEGATIVE
MONOCYTES # BLD AUTO: 0.7 K/UL (ref 0.3–1)
MONOCYTES NFR BLD: 8.3 % (ref 4–15)
NEUTROPHILS # BLD AUTO: 6.2 K/UL (ref 1.8–7.7)
NEUTROPHILS NFR BLD: 71.5 % (ref 38–73)
NITRITE UR QL STRIP: NEGATIVE
OPIATES UR QL SCN: NEGATIVE
PCP UR QL SCN>25 NG/ML: NEGATIVE
PH UR STRIP: 7 [PH] (ref 5–8)
PLATELET # BLD AUTO: 233 K/UL (ref 150–350)
PMV BLD AUTO: 10.7 FL (ref 9.2–12.9)
POTASSIUM SERPL-SCNC: 4 MMOL/L (ref 3.5–5.1)
PROT SERPL-MCNC: 7.7 G/DL (ref 6–8.4)
PROT UR QL STRIP: NEGATIVE
RBC # BLD AUTO: 4.56 M/UL (ref 4–5.4)
SODIUM SERPL-SCNC: 142 MMOL/L (ref 136–145)
SP GR UR STRIP: 1.01 (ref 1–1.03)
TOXICOLOGY INFORMATION: NORMAL
TROPONIN I SERPL DL<=0.01 NG/ML-MCNC: 0.03 NG/ML (ref 0–0.03)
TSH SERPL DL<=0.005 MIU/L-ACNC: 2.37 UIU/ML (ref 0.4–4)
URN SPEC COLLECT METH UR: NORMAL
UROBILINOGEN UR STRIP-ACNC: 1 EU/DL
WBC # BLD AUTO: 8.64 K/UL (ref 3.9–12.7)

## 2019-09-19 PROCEDURE — 83880 ASSAY OF NATRIURETIC PEPTIDE: CPT

## 2019-09-19 PROCEDURE — 84484 ASSAY OF TROPONIN QUANT: CPT | Mod: 91

## 2019-09-19 PROCEDURE — 93010 EKG 12-LEAD: ICD-10-PCS | Mod: 76,,, | Performed by: INTERNAL MEDICINE

## 2019-09-19 PROCEDURE — 63600175 PHARM REV CODE 636 W HCPCS: Performed by: PHYSICIAN ASSISTANT

## 2019-09-19 PROCEDURE — 96374 THER/PROPH/DIAG INJ IV PUSH: CPT

## 2019-09-19 PROCEDURE — 83735 ASSAY OF MAGNESIUM: CPT

## 2019-09-19 PROCEDURE — 81003 URINALYSIS AUTO W/O SCOPE: CPT | Mod: 59

## 2019-09-19 PROCEDURE — 80053 COMPREHEN METABOLIC PANEL: CPT

## 2019-09-19 PROCEDURE — 93005 ELECTROCARDIOGRAM TRACING: CPT

## 2019-09-19 PROCEDURE — 25000003 PHARM REV CODE 250: Performed by: EMERGENCY MEDICINE

## 2019-09-19 PROCEDURE — 84484 ASSAY OF TROPONIN QUANT: CPT

## 2019-09-19 PROCEDURE — 96361 HYDRATE IV INFUSION ADD-ON: CPT

## 2019-09-19 PROCEDURE — 63600175 PHARM REV CODE 636 W HCPCS: Performed by: EMERGENCY MEDICINE

## 2019-09-19 PROCEDURE — 93010 ELECTROCARDIOGRAM REPORT: CPT | Mod: ,,, | Performed by: INTERNAL MEDICINE

## 2019-09-19 PROCEDURE — 82550 ASSAY OF CK (CPK): CPT

## 2019-09-19 PROCEDURE — 85025 COMPLETE CBC W/AUTO DIFF WBC: CPT

## 2019-09-19 PROCEDURE — G0378 HOSPITAL OBSERVATION PER HR: HCPCS

## 2019-09-19 PROCEDURE — 96376 TX/PRO/DX INJ SAME DRUG ADON: CPT

## 2019-09-19 PROCEDURE — 36415 COLL VENOUS BLD VENIPUNCTURE: CPT

## 2019-09-19 PROCEDURE — 84443 ASSAY THYROID STIM HORMONE: CPT

## 2019-09-19 PROCEDURE — 93010 ELECTROCARDIOGRAM REPORT: CPT | Mod: 76,,, | Performed by: INTERNAL MEDICINE

## 2019-09-19 PROCEDURE — 63600175 PHARM REV CODE 636 W HCPCS: Performed by: NURSE PRACTITIONER

## 2019-09-19 PROCEDURE — 80307 DRUG TEST PRSMV CHEM ANLYZR: CPT

## 2019-09-19 PROCEDURE — 99291 CRITICAL CARE FIRST HOUR: CPT | Mod: 25

## 2019-09-19 RX ORDER — NAPROXEN SODIUM 220 MG/1
81 TABLET, FILM COATED ORAL
Status: COMPLETED | OUTPATIENT
Start: 2019-09-19 | End: 2019-09-19

## 2019-09-19 RX ORDER — PANTOPRAZOLE SODIUM 40 MG/1
40 TABLET, DELAYED RELEASE ORAL DAILY
Status: DISCONTINUED | OUTPATIENT
Start: 2019-09-20 | End: 2019-09-22 | Stop reason: HOSPADM

## 2019-09-19 RX ORDER — ONDANSETRON 2 MG/ML
4 INJECTION INTRAMUSCULAR; INTRAVENOUS ONCE
Status: COMPLETED | OUTPATIENT
Start: 2019-09-19 | End: 2019-09-19

## 2019-09-19 RX ORDER — AMITRIPTYLINE HYDROCHLORIDE 10 MG/1
TABLET, FILM COATED ORAL
Status: ON HOLD | COMMUNITY
End: 2019-09-22 | Stop reason: HOSPADM

## 2019-09-19 RX ORDER — ESCITALOPRAM OXALATE 10 MG/1
10 TABLET ORAL
Status: ON HOLD | COMMUNITY
Start: 2019-09-17 | End: 2019-09-22 | Stop reason: HOSPADM

## 2019-09-19 RX ORDER — ONDANSETRON 2 MG/ML
4 INJECTION INTRAMUSCULAR; INTRAVENOUS EVERY 6 HOURS PRN
Status: DISCONTINUED | OUTPATIENT
Start: 2019-09-19 | End: 2019-09-22 | Stop reason: HOSPADM

## 2019-09-19 RX ORDER — OMEPRAZOLE 40 MG/1
40 CAPSULE, DELAYED RELEASE ORAL
COMMUNITY
Start: 2019-09-17 | End: 2023-11-16

## 2019-09-19 RX ORDER — ACETAMINOPHEN 325 MG/1
650 TABLET ORAL EVERY 6 HOURS PRN
Status: DISCONTINUED | OUTPATIENT
Start: 2019-09-19 | End: 2019-09-22 | Stop reason: HOSPADM

## 2019-09-19 RX ORDER — ALPRAZOLAM 0.25 MG/1
0.25 TABLET ORAL
Status: COMPLETED | OUTPATIENT
Start: 2019-09-19 | End: 2019-09-19

## 2019-09-19 RX ORDER — CLONAZEPAM 0.5 MG/1
TABLET ORAL
Status: ON HOLD | COMMUNITY
End: 2019-09-22 | Stop reason: HOSPADM

## 2019-09-19 RX ADMIN — ONDANSETRON 4 MG: 2 INJECTION INTRAMUSCULAR; INTRAVENOUS at 07:09

## 2019-09-19 RX ADMIN — ASPIRIN 81 MG 81 MG: 81 TABLET ORAL at 07:09

## 2019-09-19 RX ADMIN — SODIUM CHLORIDE 1000 ML: 0.9 INJECTION, SOLUTION INTRAVENOUS at 05:09

## 2019-09-19 RX ADMIN — ALPRAZOLAM 0.25 MG: 0.25 TABLET ORAL at 07:09

## 2019-09-19 RX ADMIN — ONDANSETRON 4 MG: 2 INJECTION INTRAMUSCULAR; INTRAVENOUS at 10:09

## 2019-09-19 NOTE — ED PROVIDER NOTES
Per CHUCK Varela:  5:01 PM: SOB, generalized weakness, nausea.  Pt was placed in treatment lounge. I explained to pt that due to lack of available rooms pt was seen by myself to begin the workup. Pt understands they will be seen and dispositioned by the next available physician. Pt voices understanding and agrees with plan. Pt also understands the longer than normal wait time. I am removing myself from the care of pt and pt will now be assigned to the next available physician.           SCRIBE #1 NOTE: I, Mahendra Duran, am scribing for, and in the presence of, Neda Avina DO. I have scribed the entire note.       History     Chief Complaint   Patient presents with    Shortness of Breath     shortness of breath, nausea, weakness, dizziness. states she started taking lexapro tuesday which worsened symptoms. symptoms have been present for 1 week.      Review of patient's allergies indicates:   Allergen Reactions    Penicillins          History of Present Illness     HPI    9/19/2019, 6:44 PM  History obtained from the spouse and patient      History of Present Illness: Yvonne Avila is a 58 y.o. female patient who presents to the Emergency Department for evaluation of CP which onset gradually PTA, but has been intermittent for a few days. Pt was seen here earlier this week for similar sxs. Symptoms are intermittent and moderate in severity. No mitigating or exacerbating factors reported. Associated sxs include SOB, dizziness, weakness, constipation, and nausea. Patient denies any vomiting, diarrhea, dysuria, hematuria, abd pain, back pain, blood in stool, leg swelling, numbness, and all other sxs at this time. Prior tx includes Lexapro, which made sx worse. No further complaints or concerns at this time.         Arrival mode: Personal vehicle    PCP: Merrick Palencia MD        Past Medical History:  Past Medical History:   Diagnosis Date    Anxiety     GERD (gastroesophageal reflux disease)         Past Surgical History:  History reviewed. No pertinent surgical history.      Family History:  History reviewed. No pertinent family history.    Social History:  Social History     Tobacco Use    Smoking status: Current Every Day Smoker     Packs/day: 0.50     Types: Cigarettes   Substance and Sexual Activity    Alcohol use: Never     Frequency: Never    Drug use: Never    Sexual activity: Unknown        Review of Systems     Review of Systems   Constitutional: Negative for fever.   HENT: Negative for sore throat.    Respiratory: Positive for shortness of breath.    Cardiovascular: Positive for chest pain. Negative for leg swelling.   Gastrointestinal: Positive for constipation and nausea. Negative for abdominal pain, blood in stool, diarrhea and vomiting.   Genitourinary: Negative for dysuria and hematuria.   Musculoskeletal: Negative for back pain.   Skin: Negative for rash.   Neurological: Positive for dizziness and weakness. Negative for numbness.   Hematological: Does not bruise/bleed easily.   All other systems reviewed and are negative.       Physical Exam     Initial Vitals [09/19/19 1658]   BP Pulse Resp Temp SpO2   128/69 (!) 55 16 99.2 °F (37.3 °C) 96 %      MAP       --          Physical Exam  Nursing Notes and Vital Signs Reviewed.  Constitutional: Patient is in no acute distress.  Thin appearing.  Head: Atraumatic. Normocephalic.  Eyes: PERRL. EOM intact. Conjunctivae are not pale. No scleral icterus.  ENT: Mucous membranes are moist. Oropharynx is clear and symmetric.    Neck: Supple. Full ROM. No lymphadenopathy.  Cardiovascular: Bradycardic. Regular rhythm. No murmurs, rubs, or gallops. Distal pulses are 2+ and symmetric.  Pulmonary/Chest: No respiratory distress. Clear to auscultation bilaterally. No wheezing or rales.  Abdominal: Soft and non-distended.  There is no tenderness.  No rebound, guarding, or rigidity. Good bowel sounds.  Genitourinary: No CVA tenderness  Musculoskeletal:  "Moves all extremities. No obvious deformities. No edema. No calf tenderness.  Skin: Warm and dry.  Neurological:  Cranial nerves 2-12 intact. Alert, awake, and appropriate.  Normal speech.  No acute focal neurological deficits are appreciated.  Psychiatric: Normal affect. Good eye contact. Appropriate in content.     ED Course   Critical Care  Date/Time: 9/19/2019 7:25 PM  Performed by: Neda Avina DO  Authorized by: Manuel Huber MD   Direct patient critical care time: 10 minutes  Additional history critical care time: 10 minutes  Ordering / reviewing critical care time: 5 minutes  Documentation critical care time: 5 minutes  Consulting other physicians critical care time: 5 minutes  Total critical care time (exclusive of procedural time) : 35 minutes  Critical care time was exclusive of separately billable procedures and treating other patients and teaching time.  Critical care was necessary to treat or prevent imminent or life-threatening deterioration of the following conditions: bradycardia.  Critical care was time spent personally by me on the following activities: blood draw for specimens, development of treatment plan with patient or surrogate, discussions with consultants, interpretation of cardiac output measurements, evaluation of patient's response to treatment, examination of patient, obtaining history from patient or surrogate, ordering and performing treatments and interventions, ordering and review of laboratory studies, ordering and review of radiographic studies, pulse oximetry, re-evaluation of patient's condition and review of old charts.        ED Vital Signs:  Vitals:    09/19/19 1658 09/19/19 1715 09/19/19 1832 09/19/19 1901   BP: 128/69 134/75 (!) 161/71 (!) 166/77   Pulse: (!) 55 (!) 49 (!) 39 (!) 38   Resp: 16 20 20 (!) 30   Temp: 99.2 °F (37.3 °C)      TempSrc: Oral      SpO2: 96% 97% 99% 99%   Weight: 41.4 kg (91 lb 4.3 oz)      Height: 5' 3" (1.6 m)          Abnormal Lab Results:  Labs " Reviewed   COMPREHENSIVE METABOLIC PANEL - Abnormal; Notable for the following components:       Result Value    Glucose 115 (*)     Alkaline Phosphatase 50 (*)     All other components within normal limits   TROPONIN I - Abnormal; Notable for the following components:    Troponin I 0.027 (*)     All other components within normal limits   CBC W/ AUTO DIFFERENTIAL   DRUG SCREEN PANEL, URINE EMERGENCY   B-TYPE NATRIURETIC PEPTIDE   URINALYSIS, REFLEX TO URINE CULTURE    Narrative:     Preferred Collection Type->Urine, Clean Catch   MAGNESIUM   TSH   MAGNESIUM   TSH        All Lab Results:  Results for orders placed or performed during the hospital encounter of 09/19/19   CBC auto differential   Result Value Ref Range    WBC 8.64 3.90 - 12.70 K/uL    RBC 4.56 4.00 - 5.40 M/uL    Hemoglobin 13.5 12.0 - 16.0 g/dL    Hematocrit 39.7 37.0 - 48.5 %    Mean Corpuscular Volume 87 82 - 98 fL    Mean Corpuscular Hemoglobin 29.6 27.0 - 31.0 pg    Mean Corpuscular Hemoglobin Conc 34.0 32.0 - 36.0 g/dL    RDW 12.6 11.5 - 14.5 %    Platelets 233 150 - 350 K/uL    MPV 10.7 9.2 - 12.9 fL    Gran # (ANC) 6.2 1.8 - 7.7 K/uL    Lymph # 1.7 1.0 - 4.8 K/uL    Mono # 0.7 0.3 - 1.0 K/uL    Eos # 0.1 0.0 - 0.5 K/uL    Baso # 0.02 0.00 - 0.20 K/uL    Gran% 71.5 38.0 - 73.0 %    Lymph% 19.3 18.0 - 48.0 %    Mono% 8.3 4.0 - 15.0 %    Eosinophil% 0.8 0.0 - 8.0 %    Basophil% 0.2 0.0 - 1.9 %    Differential Method Automated    Comprehensive metabolic panel   Result Value Ref Range    Sodium 142 136 - 145 mmol/L    Potassium 4.0 3.5 - 5.1 mmol/L    Chloride 103 95 - 110 mmol/L    CO2 28 23 - 29 mmol/L    Glucose 115 (H) 70 - 110 mg/dL    BUN, Bld 14 6 - 20 mg/dL    Creatinine 1.0 0.5 - 1.4 mg/dL    Calcium 10.0 8.7 - 10.5 mg/dL    Total Protein 7.7 6.0 - 8.4 g/dL    Albumin 4.3 3.5 - 5.2 g/dL    Total Bilirubin 0.6 0.1 - 1.0 mg/dL    Alkaline Phosphatase 50 (L) 55 - 135 U/L    AST 14 10 - 40 U/L    ALT 10 10 - 44 U/L    Anion Gap 11 8 - 16 mmol/L     eGFR if African American >60 >60 mL/min/1.73 m^2    eGFR if non African American >60 >60 mL/min/1.73 m^2   Drug screen panel, emergency   Result Value Ref Range    Benzodiazepines Presumptive Positive     Methadone metabolites Negative     Cocaine (Metab.) Negative     Opiate Scrn, Ur Negative     Barbiturate Screen, Ur Negative     Amphetamine Screen, Ur Negative     THC Negative     Phencyclidine Negative     Creatinine, Random Ur 159.7 15.0 - 325.0 mg/dL    Toxicology Information SEE COMMENT    Brain natriuretic peptide   Result Value Ref Range    BNP 35 0 - 99 pg/mL   Troponin I   Result Value Ref Range    Troponin I 0.027 (H) 0.000 - 0.026 ng/mL   Urinalysis, Reflex to Urine Culture Urine, Clean Catch   Result Value Ref Range    Specimen UA Urine, Clean Catch     Color, UA Yellow Yellow, Straw, Kendy    Appearance, UA Clear Clear    pH, UA 7.0 5.0 - 8.0    Specific Gravity, UA 1.015 1.005 - 1.030    Protein, UA Negative Negative    Glucose, UA Negative Negative    Ketones, UA Negative Negative    Bilirubin (UA) Negative Negative    Occult Blood UA Negative Negative    Nitrite, UA Negative Negative    Urobilinogen, UA 1.0 <2.0 EU/dL    Leukocytes, UA Negative Negative         Imaging Results:  Imaging Results          X-Ray Chest AP Portable (Final result)  Result time 09/19/19 17:38:03    Final result by Aime Ervin MD (09/19/19 17:38:03)                 Impression:      No acute process seen.      Electronically signed by: Aime Ervin MD  Date:    09/19/2019  Time:    17:38             Narrative:    EXAMINATION:  XR CHEST AP PORTABLE    CLINICAL HISTORY:  sob;    FINDINGS:  Single view of the chest.  Aorta demonstrates atherosclerotic disease.    Cardiac silhouette is normal.  The lungs demonstrate no evidence of active disease.  No evidence of pleural effusion or pneumothorax.  Bones demonstrate moderate degenerative changes.                                 The EKG was ordered, reviewed, and  independently interpreted by the ED provider.  Interpretation time: 1724  Rate: 45 BPM  Rhythm: sinus bradycardia  Interpretation: Right atrial enlargement. No STEMI.    The EKG was ordered, reviewed, and independently interpreted by the ED provider.  Interpretation time: 1904  Rate: 37 BPM  Rhythm: sinus bradycardia  Interpretation: Possible left atrial enlargement. Incomplete RBBB. No STEMI.             The Emergency Provider reviewed the vital signs and test results, which are outlined above.     ED Discussion       7:17 PM: Discussed case with Shanda Vaughn NP ([facility]). Dr. Bonner agrees with current care and management of pt and accepts admission.   Admitting Service: [facility]  Admitting Physician: Dr. Bonner  Admit to: Obs tele    7:20 PM: Re-evaluated pt. I have discussed test results, shared treatment plan, and the need for admission with patient and family at bedside. Pt and family express understanding at this time and agree with all information. All questions answered. Pt and family have no further questions or concerns at this time. Pt is ready for admit.          ED Course as of Sep 19 2114   Thu Sep 19, 2019   2114 Results for TOÑO PERKINS EIMLY (MRN [id number]) as of 9/19/2019 21:14    9/11/2019 16:06  TSH: 2.303  Free T4: 0.81      [LB]      ED Course User Index  [LB] Neda Avina DO     Medical Decision Making:   Initial Assessment:   Patient is a 58-year-old smoker presenting to emergency department with feelings of lightheadedness, fatigue, weakness, nausea.  Patient was noted to have bradycardia on the monitor.  No beta-blocker use or history of chronic kidney disease.  This is the 2nd visit to the emergency department for similar symptoms.  Patient has not had prior cardiac workup or echocardiogram performed.  They are attempting to do this as an outpatient.  Differential Diagnosis:   Acute renal failure, congestive heart failure, heart block, arrhythmia,  medication reaction  Clinical Tests:   Lab Tests: Ordered and Reviewed  Radiological Study: Ordered and Reviewed  Medical Tests: Ordered and Reviewed  ED Management:  Patient placed on monitor.  Patient was noted to become severely bradycardic though with heart rate of 39.  Patient was complaining of chest discomfort.  Patient was given 1 baby aspirin to chew.  Patient's troponin was mildly elevated.  Review of old records do not demonstrate any previous cardiac workup.  Patient did have a recent normal thyroid TSH.  As patient is symptomatic, we will place patient in hospital for further workup.  Case was discussed with the Kiley Vaughn NP recommended observation           ED Medication(s):  Medications   ondansetron injection 4 mg (has no administration in time range)   aspirin chewable tablet 81 mg (has no administration in time range)   ALPRAZolam tablet 0.25 mg (has no administration in time range)   sodium chloride 0.9% bolus 1,000 mL (1,000 mLs Intravenous New Bag 9/19/19 3128)       New Prescriptions    No medications on file               Scribe Attestation:   Scribe #1: I performed the above scribed service and the documentation accurately describes the services I performed. I attest to the accuracy of the note.     Attending:   Physician Attestation Statement for Scribe #1: I, Neda Avina DO, personally performed the services described in this documentation, as scribed by Mahendra Duran, in my presence, and it is both accurate and complete.           Clinical Impression       ICD-10-CM ICD-9-CM   1. Bradycardia R00.1 427.89   2. SOB (shortness of breath) R06.02 786.05   3. Dizziness R42 780.4   4. Elevated troponin R74.8 790.6       Disposition:   Disposition: Placed in Observation  Condition: Fair             Neda Avina DO  09/19/19 7577

## 2019-09-20 PROBLEM — R00.1 BRADYCARDIA: Status: ACTIVE | Noted: 2019-09-20

## 2019-09-20 LAB
DIASTOLIC DYSFUNCTION: NO
ESTIMATED PA SYSTOLIC PRESSURE: 25.09
GLOBAL PERICARDIAL EFFUSION: NORMAL
MITRAL VALVE MOBILITY: NORMAL
PHOSPHATE SERPL-MCNC: 4 MG/DL (ref 2.7–4.5)
RETIRED EF AND QEF - SEE NOTES: 60 (ref 55–65)
TRICUSPID VALVE REGURGITATION: NORMAL
TROPONIN I SERPL DL<=0.01 NG/ML-MCNC: 0.01 NG/ML (ref 0–0.03)
TROPONIN I SERPL DL<=0.01 NG/ML-MCNC: 0.01 NG/ML (ref 0–0.03)

## 2019-09-20 PROCEDURE — 25000003 PHARM REV CODE 250: Performed by: NURSE PRACTITIONER

## 2019-09-20 PROCEDURE — 21400001 HC TELEMETRY ROOM

## 2019-09-20 PROCEDURE — 36415 COLL VENOUS BLD VENIPUNCTURE: CPT

## 2019-09-20 PROCEDURE — 84484 ASSAY OF TROPONIN QUANT: CPT

## 2019-09-20 PROCEDURE — 93306 TTE W/DOPPLER COMPLETE: CPT

## 2019-09-20 PROCEDURE — 93005 ELECTROCARDIOGRAM TRACING: CPT

## 2019-09-20 PROCEDURE — 84100 ASSAY OF PHOSPHORUS: CPT

## 2019-09-20 PROCEDURE — 63600175 PHARM REV CODE 636 W HCPCS: Performed by: NURSE PRACTITIONER

## 2019-09-20 PROCEDURE — 99254 PR INITIAL INPATIENT CONSULT,LEVL IV: ICD-10-PCS | Mod: 25,,, | Performed by: INTERNAL MEDICINE

## 2019-09-20 PROCEDURE — 93010 ELECTROCARDIOGRAM REPORT: CPT | Mod: ,,, | Performed by: INTERNAL MEDICINE

## 2019-09-20 PROCEDURE — 93306 2D ECHO WITH COLOR FLOW DOPPLER: ICD-10-PCS | Mod: 26,,, | Performed by: INTERNAL MEDICINE

## 2019-09-20 PROCEDURE — 93010 EKG 12-LEAD: ICD-10-PCS | Mod: ,,, | Performed by: INTERNAL MEDICINE

## 2019-09-20 PROCEDURE — 99254 IP/OBS CNSLTJ NEW/EST MOD 60: CPT | Mod: 25,,, | Performed by: INTERNAL MEDICINE

## 2019-09-20 PROCEDURE — 93306 TTE W/DOPPLER COMPLETE: CPT | Mod: 26,,, | Performed by: INTERNAL MEDICINE

## 2019-09-20 PROCEDURE — 96376 TX/PRO/DX INJ SAME DRUG ADON: CPT

## 2019-09-20 PROCEDURE — 25000003 PHARM REV CODE 250: Performed by: FAMILY MEDICINE

## 2019-09-20 RX ORDER — ALPRAZOLAM 0.25 MG/1
0.25 TABLET ORAL 2 TIMES DAILY PRN
Status: DISCONTINUED | OUTPATIENT
Start: 2019-09-20 | End: 2019-09-22 | Stop reason: HOSPADM

## 2019-09-20 RX ADMIN — ALPRAZOLAM 0.25 MG: 0.25 TABLET ORAL at 03:09

## 2019-09-20 RX ADMIN — PANTOPRAZOLE SODIUM 40 MG: 40 TABLET, DELAYED RELEASE ORAL at 08:09

## 2019-09-20 RX ADMIN — ONDANSETRON 4 MG: 2 INJECTION INTRAMUSCULAR; INTRAVENOUS at 05:09

## 2019-09-20 RX ADMIN — ACETAMINOPHEN 650 MG: 325 TABLET ORAL at 11:09

## 2019-09-20 RX ADMIN — ONDANSETRON 4 MG: 2 INJECTION INTRAMUSCULAR; INTRAVENOUS at 04:09

## 2019-09-20 NOTE — SUBJECTIVE & OBJECTIVE
Interval History: Reports having difficulty sleeping overnight. States she normally takes xanax at home. She reports sometimes doubling up on her xanax when her anxiety is severe. She typically takes 0.5mg bid. Requesting xanax to be restarted. She reports that her symptoms may have started after starting lexapro. She has since stopped the meds.     Review of Systems   Constitutional: Positive for fatigue. Negative for appetite change and fever.   Respiratory: Negative for shortness of breath.    Cardiovascular: Negative for chest pain.   Gastrointestinal: Negative for nausea and vomiting.   Skin: Negative for rash.   Neurological: Positive for dizziness and light-headedness.   Psychiatric/Behavioral: Positive for sleep disturbance. The patient is nervous/anxious.      Objective:     Vital Signs (Most Recent):  Temp: 98.4 °F (36.9 °C) (09/20/19 1103)  Pulse: (!) 52 (09/20/19 1103)  Resp: 18 (09/20/19 1103)  BP: 113/69 (09/20/19 1103)  SpO2: 98 % (09/20/19 1103) Vital Signs (24h Range):  Temp:  [98 °F (36.7 °C)-99.2 °F (37.3 °C)] 98.4 °F (36.9 °C)  Pulse:  [38-55] 52  Resp:  [16-30] 18  SpO2:  [96 %-99 %] 98 %  BP: (110-166)/(61-77) 113/69     Weight: 41.4 kg (91 lb 4.3 oz)  Body mass index is 16.17 kg/m².    Intake/Output Summary (Last 24 hours) at 9/20/2019 1117  Last data filed at 9/20/2019 0600  Gross per 24 hour   Intake 1300 ml   Output --   Net 1300 ml      Physical Exam   Constitutional: She is oriented to person, place, and time. No distress.   Thin    HENT:   Head: Normocephalic and atraumatic.   Cardiovascular: Exam reveals no gallop and no friction rub.   No murmur heard.  bradycardic   Pulmonary/Chest: Effort normal and breath sounds normal. No stridor. No respiratory distress. She has no wheezes.   Abdominal: Soft. Bowel sounds are normal. She exhibits no distension and no mass. There is no tenderness. There is no rebound and no guarding.   Neurological: She is alert and oriented to person, place, and  time.   Skin: She is not diaphoretic.   Psychiatric: She has a normal mood and affect. Her behavior is normal. Judgment and thought content normal.       Significant Labs:   CBC:   Recent Labs   Lab 09/19/19  1721   WBC 8.64   HGB 13.5   HCT 39.7        CMP:   Recent Labs   Lab 09/19/19  1721      K 4.0      CO2 28   *   BUN 14   CREATININE 1.0   CALCIUM 10.0   PROT 7.7   ALBUMIN 4.3   BILITOT 0.6   ALKPHOS 50*   AST 14   ALT 10   ANIONGAP 11   EGFRNONAA >60     Cardiac Markers:   Recent Labs   Lab 09/19/19  1721   BNP 35     Magnesium:   Recent Labs   Lab 09/19/19  1721   MG 2.2       Significant Imaging: I have reviewed all pertinent imaging results/findings within the past 24 hours.

## 2019-09-20 NOTE — PROGRESS NOTES
"  [facility] -   Adult Nutrition  Consult Note    SUMMARY     Recommendations    Recommendation:  1. Consult with SLP to evaluate dyphagia / difficulty swallowing  2. Continue Regular diet  w/texture per SLP.  3. Add Boost strawberry, TID.  4. Will continue to monitor  Intervention.  1. Coordination of care.  2. Nutrition supplement therapy  Goals: Meet >85% EEN/EPN  Nutrition Goal Status: new  Communication of RD Recs: (Plan of Care, and MD dumont note)    Reason for Assessment    Reason For Assessment: low BMI 16.2  Diagnosis: Sinus Bradycardia  Relevant Medical History: GERD  General Information Comments: Pt states consuming 50% of tray this morning.  Pt. Has had poor appetite for an extended time, as well as weight loss.  UBW is 102 lbs, pt has a -10.7% decrease.  Pt. State she has difficulty swallowing due to weak throat muscles. This is why she has had a difficult time eating and weight loss. Patient states she has difficulty consuming solid foods, but can eat thin liquids.  Will rec consult with SLP.  Performed NFPE on 9/20/19, found moderate subcutaneous fat loss and severe muscle wasting.    Nutrition Discharge Planning: Regular diet    Nutrition Risk Screen    Nutrition Risk Screen: other (see comments)(Underweight)    Nutrition/Diet History    Spiritual, Cultural Beliefs, Taoism Practices, Values that Affect Care: yes    Anthropometrics    Temp: 98.1 °F (36.7 °C)  Height Method: Stated  Height: 5' 2.99" (160 cm)  Height (inches): 62.99 in  Weight Method: Standard Scale  Weight: 41.4 kg (91 lb 4.3 oz)  Weight (lb): 91.27 lb  Ideal Body Weight (IBW), Female: 114.95 lb  % Ideal Body Weight, Female (lb): 79.4 lb  BMI (Calculated): 16.2  BMI Grade: 16 - 16.9 protein-energy malnutrition grade II       Lab/Procedures/Meds    Pertinent Labs Reviewed: reviewed  Pertinent Labs Comments: Glu 115, Alkaline Phosphate 50, Troponin I 0.027  Pertinent Medications Reviewed: reviewed    Physical " Findings/Assessment     No wounds present    Estimated/Assessed Needs    Weight Used For Calorie Calculations: 41.3 kg (91 lb 0.8 oz)  Energy Calorie Requirements (kcal): 1215( x 1.25)  Energy Need Method: Evans-St Jeor  Protein Requirements: 50-83g(1.2-2.0)  Weight Used For Protein Calculations: 41.3 kg (91 lb 0.8 oz)     Estimated Fluid Requirement Method: RDA Method(or per MD)  RDA Method (mL): 1215         Nutrition Prescription Ordered    Current Diet Order: Regular    Evaluation of Received Nutrient/Fluid Intake       % Intake of Estimated Energy Needs: 50 - 75 %  % Meal Intake: 50 - 75 %    Nutrition Risk    2 x weekly    Assessment and Plan    Severe Malnutrition in the context of Chronic Illness/Injury    Related to (etiology):  Inadequate energy intake     Signs and Symptoms (as evidenced by):  Energy Intake: <50% of estimated energy requirement for over 1 month  Body Fat Depletion: moderate depletion of orbitals, triceps and thoracic and lumbar region   Muscle Mass Depletion: severe depletion of temples, clavicle region and lower extremities   Weight Loss: -10.7% in 6 months      Interventions/Recommendations (treatment strategy):  See above    Nutrition Diagnosis Status:  New     Monitor and Evaluation    Food and Nutrient Intake: energy intake, food and beverage intake  Food and Nutrient Adminstration: diet order  Anthropometric Measurements: weight, weight change  Biochemical Data, Medical Tests and Procedures: electrolyte and renal panel, glucose/endocrine profile  Nutrition-Focused Physical Findings: overall appearance, skin     Malnutrition Assessment  Malnutrition Type: chronic illness  Energy Intake: severe energy intake          Weight Loss (Malnutrition): greater than 10% in 6 months  Energy Intake (Malnutrition): less than or equal to 50% for greater than or equal to 1 month  Subcutaneous Fat (Malnutrition): moderate depletion  Muscle Mass (Malnutrition): severe depletion   Orbital Region  (Subcutaneous Fat Loss): moderate depletion  Upper Arm Region (Subcutaneous Fat Loss): mild depletion  Thoracic and Lumbar Region: moderate depletion   [place] Region (Muscle Loss): severe depletion  Clavicle Bone Region (Muscle Loss): severe depletion  Clavicle and Acromion Bone Region (Muscle Loss): severe depletion  Posterior Calf Region (Muscle Loss): severe depletion       Subcutaneous Fat Loss (Final Summary): moderate protein-calorie malnutrition  Muscle Loss Evaluation (Final Summary): severe protein-calorie malnutrition    Severe Weight Loss (Malnutrition): greater than 10% in 6 months    Nutrition Follow-Up    RD Follow-up?: Yes

## 2019-09-20 NOTE — PLAN OF CARE
Problem: Adult Inpatient Plan of Care  Goal: Plan of Care Review  Outcome: Ongoing (interventions implemented as appropriate)  Discussed Plan of Care with patient and verbalized understanding - Patient remains AAOx4 - remains free of falls, accidents and trauma during the day shift. Bed is in the low position and the call light is within reach. 2D Echo completed - EKG shows Sinus Peterson. Will continue to monitor

## 2019-09-20 NOTE — ASSESSMENT & PLAN NOTE
"- BMI of 16.17.  Patient reports she has been "very tiny" her whole life.  Denies current or h/o eating disorder.  - Nutrition consult.  "

## 2019-09-20 NOTE — PROGRESS NOTES
[facility] Hospital Medicine  Progress Note    Patient Name: Yvonne Avila  MRN: [id number]  Patient Class: IP- Inpatient   Admission Date: 9/19/2019  Length of Stay: 0 days  Attending Physician: Manuel Huber MD  Primary Care Provider: Merrick Palencia MD        Subjective:     Principal Problem:Sinus bradycardia        HPI:  Ms. Pattie Avila is a 58 y.o. female  with a PMHx of anxiety, GERD and tobacco use who presented to the ED with c/o SOB x 1 week.  Associated dizziness, chest discomfort, nausea, and generalized weakness.  No alleviating factors.  She reports symptoms worsened after starting Lexapro 2 days ago.  She states symptoms are intermittent, waxing and waning over the past 1-2 weeks.  She was seen in the ED on 9/10, AMI was ruled out and she was diagnosed with sinus bradycardia.  She saw Dr. Hua (Cardiology) on 9/11 and was scheduled for 2D echo and Holter monitor next week.  Patient denies any palpitations, HA, AMS, lightheadedness, syncope, diaphoresis, visual disturbance, orthopnea, PND, edema, ABD pain, vomiting, diarrhea, dysuria, hematuria, back or neck pain, fatigue, numbness/tingling, fever or chills.  Upon arrival to the ED, EKG revealed marked sinus bradycardia with HR 37 bpm, incomplete RBBB, no acute ischemic ST-T changes.  Patient remained hemodynamically stable.  Initial work-up was unremarkable except for minimally elevated troponin of 0.027.  Hospital Medicine was called for admission.  Patient noted to be significantly underweight with BMI of 16.17, but denies any current or h/o eating disorder.      Overview/Hospital Course:  No notes on file    Interval History: Reports having difficulty sleeping overnight. States she normally takes xanax at home. She reports sometimes doubling up on her xanax when her anxiety is severe. She typically takes 0.5mg bid. Requesting xanax to be restarted. She reports that her symptoms may have started after starting lexapro. She has  since stopped the meds.     Review of Systems   Constitutional: Positive for fatigue. Negative for appetite change and fever.   Respiratory: Negative for shortness of breath.    Cardiovascular: Negative for chest pain.   Gastrointestinal: Negative for nausea and vomiting.   Skin: Negative for rash.   Neurological: Positive for dizziness and light-headedness.   Psychiatric/Behavioral: Positive for sleep disturbance. The patient is nervous/anxious.      Objective:     Vital Signs (Most Recent):  Temp: 98.4 °F (36.9 °C) (09/20/19 1103)  Pulse: (!) 52 (09/20/19 1103)  Resp: 18 (09/20/19 1103)  BP: 113/69 (09/20/19 1103)  SpO2: 98 % (09/20/19 1103) Vital Signs (24h Range):  Temp:  [98 °F (36.7 °C)-99.2 °F (37.3 °C)] 98.4 °F (36.9 °C)  Pulse:  [38-55] 52  Resp:  [16-30] 18  SpO2:  [96 %-99 %] 98 %  BP: (110-166)/(61-77) 113/69     Weight: 41.4 kg (91 lb 4.3 oz)  Body mass index is 16.17 kg/m².    Intake/Output Summary (Last 24 hours) at 9/20/2019 1117  Last data filed at 9/20/2019 0600  Gross per 24 hour   Intake 1300 ml   Output --   Net 1300 ml      Physical Exam   Constitutional: She is oriented to person, place, and time. No distress.   Thin    HENT:   Head: Normocephalic and atraumatic.   Cardiovascular: Exam reveals no gallop and no friction rub.   No murmur heard.  bradycardic   Pulmonary/Chest: Effort normal and breath sounds normal. No stridor. No respiratory distress. She has no wheezes.   Abdominal: Soft. Bowel sounds are normal. She exhibits no distension and no mass. There is no tenderness. There is no rebound and no guarding.   Neurological: She is alert and oriented to person, place, and time.   Skin: She is not diaphoretic.   Psychiatric: She has a normal mood and affect. Her behavior is normal. Judgment and thought content normal.       Significant Labs:   CBC:   Recent Labs   Lab 09/19/19  1721   WBC 8.64   HGB 13.5   HCT 39.7        CMP:   Recent Labs   Lab 09/19/19  1721      K 4.0   CL  "103   CO2 28   *   BUN 14   CREATININE 1.0   CALCIUM 10.0   PROT 7.7   ALBUMIN 4.3   BILITOT 0.6   ALKPHOS 50*   AST 14   ALT 10   ANIONGAP 11   EGFRNONAA >60     Cardiac Markers:   Recent Labs   Lab 09/19/19  1721   BNP 35     Magnesium:   Recent Labs   Lab 09/19/19  1721   MG 2.2       Significant Imaging: I have reviewed all pertinent imaging results/findings within the past 24 hours.      Assessment/Plan:      * Symptomatic sinus bradycardia  - Possibly secondary to medications or questionable anorexia.  - HR in the low 40s on admission.  Remains hemodynamically stable.  Monitor telemetry.  - TSH normal.  - Hold home amitriptyline and escitalopram.  - Patient denies anorexia, but is significantly underweight.  Will consult Nutritionist.  - Check 2D echo.  - Cardiology consult.    Bradycardia  9/20:  Sinus julian on EKG  Cardio consulted  Troponin elevated  Echo pending       Body mass index (BMI) 19.9 or less, adult  - BMI of 16.17.  Patient reports she has been "very tiny" her whole life.  Denies current or h/o eating disorder.  - Nutrition consult.    9/20:  Will follow dietary recommendations  Need to check phosphorus  Other electrolytes normal     Nonspecific troponin release  - Troponin minimally elevated at 0.027.  No ischemic changes on EKG.  Chest pain free on admission.  - Trend serial cardiac enzymes.  - Further recs pending clinical course.    Tobacco use  - Counseled on cessation.      VTE Risk Mitigation (From admission, onward)        Ordered     IP VTE LOW RISK PATIENT  Once      09/19/19 2204     Place sequential compression device  Until discontinued      09/19/19 2204                Manuel Huber MD  Department of Hospital Medicine   [facility]  "

## 2019-09-20 NOTE — ASSESSMENT & PLAN NOTE
-Patient presented with generalized weakness, nausea, vomiting, SOB, and atypical chest pain after starting Lexapro  -Symptoms have resolved  -HR improved; may have had partial vagal response from n/v episodes  -No indication for PPM at present time  -2D echo pending  -OP Holter monitor

## 2019-09-20 NOTE — PROGRESS NOTES
Called to patients room for C/O dizziness - she stated that she was in the bathroom cleaning up when she got dizzy and almost fell - she was laying in the bed when I arrived in the room - vital signs taken - she was no longer dizzy laying in the bed - Dr Huber notified via secure chat.

## 2019-09-20 NOTE — ASSESSMENT & PLAN NOTE
- Possibly secondary to medications or questionable anorexia.  - HR in the low 40s on admission.  Remains hemodynamically stable.  Monitor telemetry.  - TSH normal.  - Hold home amitriptyline and escitalopram.  - Patient denies anorexia, but is significantly underweight.  Will consult Nutritionist.  - Check 2D echo.  - Cardiology consult.

## 2019-09-20 NOTE — CONSULTS
[facility]  Cardiology  Consult Note    Patient Name: Yvonne Avila  MRN: [id number]  Admission Date: 9/19/2019  Hospital Length of Stay: 0 days  Code Status: Full Code   Attending Provider: Manuel Huber MD   Consulting Provider: Mel Lacy PA-C  Primary Care Physician: Merrick Palencia MD  Principal Problem:Sinus bradycardia    Patient information was obtained from patient, past medical records and ER records.     Inpatient consult to Cardiology  Consult performed by: Mel Lacy PA-C  Consult ordered by: Shanda Vaughn NP        Subjective:     Chief Complaint:  Weakness    HPI:   Ms. Blankenship is a 58 year old female patient whose current medical conditions include anxiety, GERD, and tobacco abuse who presented to [facility] ED yesterday with a chief complaint of SOB over the past week. Associated symptoms included dizziness, nausea, vomiting, generalized weakness, and mild chest pain. Patient denied any palpitations, near syncope, or syncope. Reported symptoms onset after taking newly prescribed Lexapro. Initial workup in ED revealed marked sinus bradycardia and patient subsequently admitted for further evaluation and treatment. Cardiology consulted to assist with management. Patient seen and examined today, resting in bed. Feeling better. HR improved, now in 50's. States SOB and weakness have resolved. No chest pain. She reports compliance with her medications. BMI notably low, denies any history of eating disorder. Chart reviewed. Echo pending.    Past Medical History:   Diagnosis Date    Anxiety     Bradycardia 9/19/2019    GERD (gastroesophageal reflux disease)        History reviewed. No pertinent surgical history.    Review of patient's allergies indicates:   Allergen Reactions    Penicillins        No current facility-administered medications on file prior to encounter.      Current Outpatient Medications on File Prior to Encounter   Medication Sig    ALPRAZolam (XANAX)  0.5 MG tablet Take 0.5 mg by mouth 2 (two) times daily as needed.    amitriptyline (ELAVIL) 10 MG tablet amitriptyline 10 mg tablet    clonazePAM (KLONOPIN) 0.5 MG tablet clonazepam 0.5 mg tablet    escitalopram oxalate (LEXAPRO) 10 MG tablet Take 10 mg by mouth.    esomeprazole magnesium (NEXIUM) 10 mg GrPS Take by mouth.    oxymetazoline (AFRIN) 0.05 % nasal spray 2 sprays by Nasal route 2 (two) times daily.    ASPIRIN/CAFFEINE (BC ORAL) Take by mouth.    diphenhydrAMINE (BENADRYL) 50 MG capsule Take 50 mg by mouth every 6 (six) hours as needed for Itching.    multivitamin (THERAGRAN) per tablet Take 1 tablet by mouth once daily.    omeprazole (PRILOSEC) 40 MG capsule Take 40 mg by mouth.    ranitidine (ZANTAC) 300 MG tablet Zantac 300 mg tablet   Take 1 tablet every day by oral route at bedtime for 30 days.    sodium,potassium,mag sulfates (SUPREP BOWEL PREP KIT) 17.5-3.13-1.6 gram SolR Suprep Bowel Prep Kit 17.5 gram-3.13 gram-1.6 gram oral solution     Family History     None        Tobacco Use    Smoking status: Current Every Day Smoker     Packs/day: 0.50     Types: Cigarettes   Substance and Sexual Activity    Alcohol use: Never     Frequency: Never    Drug use: Never    Sexual activity: Not on file     Review of Systems   Constitution: Positive for malaise/fatigue.   HENT: Negative.    Eyes: Negative.    Cardiovascular: Positive for chest pain.   Respiratory: Negative.    Endocrine: Negative.    Hematologic/Lymphatic: Negative.    Skin: Negative.    Musculoskeletal: Negative.    Gastrointestinal: Positive for nausea and vomiting.   Genitourinary: Negative.    Neurological: Positive for dizziness, light-headedness and weakness.   Psychiatric/Behavioral: Negative.    Allergic/Immunologic: Negative.      Objective:     Vital Signs (Most Recent):  Temp: 98.1 °F (36.7 °C) (09/20/19 0800)  Pulse: (!) 53 (09/20/19 0800)  Resp: 18 (09/20/19 0800)  BP: 110/61 (09/20/19 0800)  SpO2: 99 % (09/20/19  0800) Vital Signs (24h Range):  Temp:  [98 °F (36.7 °C)-99.2 °F (37.3 °C)] 98.1 °F (36.7 °C)  Pulse:  [38-55] 53  Resp:  [16-30] 18  SpO2:  [96 %-99 %] 99 %  BP: (110-166)/(61-77) 110/61     Weight: 41.4 kg (91 lb 4.3 oz)  Body mass index is 16.17 kg/m².    SpO2: 99 %         Intake/Output Summary (Last 24 hours) at 9/20/2019 1015  Last data filed at 9/20/2019 0600  Gross per 24 hour   Intake 1300 ml   Output --   Net 1300 ml       Lines/Drains/Airways     Peripheral Intravenous Line                 Peripheral IV - Single Lumen 09/19/19 1720 20 G Left Antecubital less than 1 day                Physical Exam   Constitutional: She is oriented to person, place, and time. She appears well-developed and well-nourished. No distress.   Thin-appearing   HENT:   Head: Normocephalic and atraumatic.   Eyes: Pupils are equal, round, and reactive to light. Right eye exhibits no discharge. Left eye exhibits no discharge.   Neck: Neck supple. No JVD present.   Cardiovascular: Regular rhythm, S1 normal, S2 normal and normal heart sounds. Bradycardia present.   No murmur heard.  Pulmonary/Chest: Effort normal and breath sounds normal. No respiratory distress. She has no wheezes. She has no rales.   Abdominal: Soft. She exhibits no distension.   Musculoskeletal: She exhibits no edema.   Neurological: She is alert and oriented to person, place, and time.   Skin: Skin is warm and dry. She is not diaphoretic. No erythema.   Psychiatric: She has a normal mood and affect. Her behavior is normal. Thought content normal.   Nursing note and vitals reviewed.      Significant Labs:   CMP   Recent Labs   Lab 09/19/19  1721      K 4.0      CO2 28   *   BUN 14   CREATININE 1.0   CALCIUM 10.0   PROT 7.7   ALBUMIN 4.3   BILITOT 0.6   ALKPHOS 50*   AST 14   ALT 10   ANIONGAP 11   ESTGFRAFRICA >60   EGFRNONAA >60   , CBC   Recent Labs   Lab 09/19/19  1721   WBC 8.64   HGB 13.5   HCT 39.7      , Troponin   Recent Labs   Lab  09/19/19  1721 09/19/19  2313 09/20/19  0502   TROPONINI 0.027* 0.014 0.011    and All pertinent lab results from the last 24 hours have been reviewed.    Significant Imaging: Echocardiogram:   2D echo with color flow doppler:   Results for orders placed or performed during the hospital encounter of 09/19/19   2D echo with color flow doppler    Narrative    This study is in progress....   , EKG: Reviewed and X-Ray: CXR: X-Ray Chest 1 View (CXR): No results found for this visit on 09/19/19. and X-Ray Chest PA and Lateral (CXR): No results found for this visit on 09/19/19.    Assessment and Plan:   Patient who presents with generalized weakness, n/v, SOB, chest pain. Seems related to/side effects of Lexapro. Symptoms and HR improved. No indication for PPM. Check echo. OP holter.     * Symptomatic sinus bradycardia  -Patient presented with generalized weakness, nausea, vomiting, SOB, and atypical chest pain after starting Lexapro  -Symptoms have resolved  -HR improved; may have had partial vagal response from n/v episodes  -No indication for PPM at present time  -2D echo pending  -OP Holter monitor    Body mass index (BMI) 19.9 or less, adult  -Denies history of eating disorder  -Recommended high calorie, protein rich meals/supplements    Nonspecific troponin release  -Mildly bumped troponin (0.027), has since normalized  -Secondary to demand ischemia  -EKG without acute ischemic changes  -Continue ASA  -Follow-up in clinic    Tobacco use  -Smoking cessation advised        VTE Risk Mitigation (From admission, onward)        Ordered     IP VTE LOW RISK PATIENT  Once      09/19/19 2204     Place sequential compression device  Until discontinued      09/19/19 2204          Thank you for your consult. I will follow-up with patient. Please contact us if you have any additional questions.    Mel Lacy PA-C  Cardiology   [facility]

## 2019-09-20 NOTE — ED NOTES
Pt lying quietly on stretcher -  at bedside.  Pt states just feeling fatigued and dizzy when sits up. States has hx of bradycardia 2 weeks ago and was admitted and had appt with cardiologist.  SR up x 2 with call light in reach. HL patent to [facility]. Awaiting disposition.

## 2019-09-20 NOTE — SUBJECTIVE & OBJECTIVE
Past Medical History:   Diagnosis Date    Anxiety     Bradycardia 9/19/2019    GERD (gastroesophageal reflux disease)        History reviewed. No pertinent surgical history.    Review of patient's allergies indicates:   Allergen Reactions    Penicillins        No current facility-administered medications on file prior to encounter.      Current Outpatient Medications on File Prior to Encounter   Medication Sig    ALPRAZolam (XANAX) 0.5 MG tablet Take 0.5 mg by mouth 2 (two) times daily as needed.    amitriptyline (ELAVIL) 10 MG tablet amitriptyline 10 mg tablet    clonazePAM (KLONOPIN) 0.5 MG tablet clonazepam 0.5 mg tablet    escitalopram oxalate (LEXAPRO) 10 MG tablet Take 10 mg by mouth.    esomeprazole magnesium (NEXIUM) 10 mg GrPS Take by mouth.    oxymetazoline (AFRIN) 0.05 % nasal spray 2 sprays by Nasal route 2 (two) times daily.    ASPIRIN/CAFFEINE (BC ORAL) Take by mouth.    diphenhydrAMINE (BENADRYL) 50 MG capsule Take 50 mg by mouth every 6 (six) hours as needed for Itching.    multivitamin (THERAGRAN) per tablet Take 1 tablet by mouth once daily.    omeprazole (PRILOSEC) 40 MG capsule Take 40 mg by mouth.    ranitidine (ZANTAC) 300 MG tablet Zantac 300 mg tablet   Take 1 tablet every day by oral route at bedtime for 30 days.    sodium,potassium,mag sulfates (SUPREP BOWEL PREP KIT) 17.5-3.13-1.6 gram SolR Suprep Bowel Prep Kit 17.5 gram-3.13 gram-1.6 gram oral solution     Family History     None        Tobacco Use    Smoking status: Current Every Day Smoker     Packs/day: 0.50     Types: Cigarettes   Substance and Sexual Activity    Alcohol use: Never     Frequency: Never    Drug use: Never    Sexual activity: Not on file     Review of Systems   Constitution: Positive for malaise/fatigue.   HENT: Negative.    Eyes: Negative.    Cardiovascular: Positive for chest pain.   Respiratory: Negative.    Endocrine: Negative.    Hematologic/Lymphatic: Negative.    Skin: Negative.     Musculoskeletal: Negative.    Gastrointestinal: Positive for nausea and vomiting.   Genitourinary: Negative.    Neurological: Positive for dizziness, light-headedness and weakness.   Psychiatric/Behavioral: Negative.    Allergic/Immunologic: Negative.      Objective:     Vital Signs (Most Recent):  Temp: 98.1 °F (36.7 °C) (09/20/19 0800)  Pulse: (!) 53 (09/20/19 0800)  Resp: 18 (09/20/19 0800)  BP: 110/61 (09/20/19 0800)  SpO2: 99 % (09/20/19 0800) Vital Signs (24h Range):  Temp:  [98 °F (36.7 °C)-99.2 °F (37.3 °C)] 98.1 °F (36.7 °C)  Pulse:  [38-55] 53  Resp:  [16-30] 18  SpO2:  [96 %-99 %] 99 %  BP: (110-166)/(61-77) 110/61     Weight: 41.4 kg (91 lb 4.3 oz)  Body mass index is 16.17 kg/m².    SpO2: 99 %         Intake/Output Summary (Last 24 hours) at 9/20/2019 1015  Last data filed at 9/20/2019 0600  Gross per 24 hour   Intake 1300 ml   Output --   Net 1300 ml       Lines/Drains/Airways     Peripheral Intravenous Line                 Peripheral IV - Single Lumen 09/19/19 1720 20 G Left Antecubital less than 1 day                Physical Exam   Constitutional: She is oriented to person, place, and time. She appears well-developed and well-nourished. No distress.   Thin-appearing   HENT:   Head: Normocephalic and atraumatic.   Eyes: Pupils are equal, round, and reactive to light. Right eye exhibits no discharge. Left eye exhibits no discharge.   Neck: Neck supple. No JVD present.   Cardiovascular: Regular rhythm, S1 normal, S2 normal and normal heart sounds. Bradycardia present.   No murmur heard.  Pulmonary/Chest: Effort normal and breath sounds normal. No respiratory distress. She has no wheezes. She has no rales.   Abdominal: Soft. She exhibits no distension.   Musculoskeletal: She exhibits no edema.   Neurological: She is alert and oriented to person, place, and time.   Skin: Skin is warm and dry. She is not diaphoretic. No erythema.   Psychiatric: She has a normal mood and affect. Her behavior is normal.  Thought content normal.   Nursing note and vitals reviewed.      Significant Labs:   CMP   Recent Labs   Lab 09/19/19  1721      K 4.0      CO2 28   *   BUN 14   CREATININE 1.0   CALCIUM 10.0   PROT 7.7   ALBUMIN 4.3   BILITOT 0.6   ALKPHOS 50*   AST 14   ALT 10   ANIONGAP 11   ESTGFRAFRICA >60   EGFRNONAA >60   , CBC   Recent Labs   Lab 09/19/19  1721   WBC 8.64   HGB 13.5   HCT 39.7      , Troponin   Recent Labs   Lab 09/19/19  1721 09/19/19  2313 09/20/19  0502   TROPONINI 0.027* 0.014 0.011    and All pertinent lab results from the last 24 hours have been reviewed.    Significant Imaging: Echocardiogram:   2D echo with color flow doppler:   Results for orders placed or performed during the hospital encounter of 09/19/19   2D echo with color flow doppler    Narrative    This study is in progress....   , EKG: Reviewed and X-Ray: CXR: X-Ray Chest 1 View (CXR): No results found for this visit on 09/19/19. and X-Ray Chest PA and Lateral (CXR): No results found for this visit on 09/19/19.

## 2019-09-20 NOTE — SUBJECTIVE & OBJECTIVE
Past Medical History:   Diagnosis Date    Anxiety     GERD (gastroesophageal reflux disease)        History reviewed. No pertinent surgical history.    Review of patient's allergies indicates:   Allergen Reactions    Penicillins        No current facility-administered medications on file prior to encounter.      Current Outpatient Medications on File Prior to Encounter   Medication Sig    ALPRAZolam (XANAX) 0.5 MG tablet Take 0.5 mg by mouth 2 (two) times daily as needed.    amitriptyline (ELAVIL) 10 MG tablet amitriptyline 10 mg tablet    clonazePAM (KLONOPIN) 0.5 MG tablet clonazepam 0.5 mg tablet    escitalopram oxalate (LEXAPRO) 10 MG tablet Take 10 mg by mouth.    esomeprazole magnesium (NEXIUM) 10 mg GrPS Take by mouth.    oxymetazoline (AFRIN) 0.05 % nasal spray 2 sprays by Nasal route 2 (two) times daily.    ASPIRIN/CAFFEINE (BC ORAL) Take by mouth.    diphenhydrAMINE (BENADRYL) 50 MG capsule Take 50 mg by mouth every 6 (six) hours as needed for Itching.    multivitamin (THERAGRAN) per tablet Take 1 tablet by mouth once daily.    omeprazole (PRILOSEC) 40 MG capsule Take 40 mg by mouth.    ranitidine (ZANTAC) 300 MG tablet Zantac 300 mg tablet   Take 1 tablet every day by oral route at bedtime for 30 days.    sodium,potassium,mag sulfates (SUPREP BOWEL PREP KIT) 17.5-3.13-1.6 gram SolR Suprep Bowel Prep Kit 17.5 gram-3.13 gram-1.6 gram oral solution     Family History     Reviewed and not pertinent.         Tobacco Use    Smoking status: Current Every Day Smoker     Packs/day: 0.50     Types: Cigarettes   Substance and Sexual Activity    Alcohol use: Never     Frequency: Never    Drug use: Never    Sexual activity: Not on file     Review of Systems   Constitutional: Negative for appetite change, chills, diaphoresis, fatigue, fever and unexpected weight change.   HENT: Negative for congestion and sore throat.    Eyes: Negative for visual disturbance.   Respiratory: Positive for shortness of  breath. Negative for cough and wheezing.    Cardiovascular: Positive for chest pain. Negative for palpitations and leg swelling.   Gastrointestinal: Positive for nausea. Negative for abdominal distention, abdominal pain, blood in stool, constipation, diarrhea and vomiting.   Genitourinary: Negative for decreased urine volume, dysuria, frequency, hematuria and urgency.   Musculoskeletal: Negative for arthralgias, back pain, myalgias and neck pain.   Skin: Negative for pallor, rash and wound.   Neurological: Positive for dizziness and weakness (Generalized). Negative for syncope, speech difficulty, light-headedness, numbness and headaches.   Psychiatric/Behavioral: Negative for confusion. The patient is nervous/anxious.    All other systems reviewed and are negative.    Objective:     Vital Signs (Most Recent):  Temp: 98.9 °F (37.2 °C) (09/19/19 2053)  Pulse: (!) 46 (09/19/19 2053)  Resp: 19 (09/19/19 2053)  BP: 130/72 (09/19/19 2053)  SpO2: 96 % (09/19/19 2053) Vital Signs (24h Range):  Temp:  [98.9 °F (37.2 °C)-99.2 °F (37.3 °C)] 98.9 °F (37.2 °C)  Pulse:  [38-55] 46  Resp:  [16-30] 19  SpO2:  [96 %-99 %] 96 %  BP: (128-166)/(69-77) 130/72     Weight: 41.4 kg (91 lb 4.3 oz)  Body mass index is 16.17 kg/m².    Physical Exam   Constitutional: She is oriented to person, place, and time. She appears well-developed. No distress.   Thin, significantly underweight.   HENT:   Head: Normocephalic and atraumatic.   Eyes: Conjunctivae are normal.   PERRL; EOM intact.   Neck: Normal range of motion. Neck supple.   Cardiovascular: Regular rhythm, S1 normal, S2 normal and intact distal pulses.  No extrasystoles are present. Bradycardia present. Exam reveals no gallop and no friction rub.   No murmur heard.  Pulses:       Radial pulses are 2+ on the right side, and 2+ on the left side.        Dorsalis pedis pulses are 2+ on the right side, and 2+ on the left side.        Posterior tibial pulses are 2+ on the right side, and 2+ on  the left side.   Pulmonary/Chest: Effort normal and breath sounds normal. No accessory muscle usage. No tachypnea. No respiratory distress. She has no wheezes. She has no rhonchi. She has no rales.   Abdominal: Soft. Bowel sounds are normal. She exhibits no distension. There is no tenderness. There is no rebound, no guarding and no CVA tenderness.   Musculoskeletal: Normal range of motion. She exhibits no edema, tenderness or deformity.   Neurological: She is alert and oriented to person, place, and time. She has normal strength. No cranial nerve deficit or sensory deficit. Coordination and gait normal. GCS eye subscore is 4. GCS verbal subscore is 5. GCS motor subscore is 6.   Skin: Skin is warm, dry and intact. Capillary refill takes less than 2 seconds. No rash noted. She is not diaphoretic. No cyanosis or erythema.   Psychiatric: Her speech is normal and behavior is normal. Her mood appears anxious. Cognition and memory are normal.   Nursing note and vitals reviewed.          Significant Labs:  Results for orders placed or performed during the hospital encounter of 09/19/19   CBC auto differential   Result Value Ref Range    WBC 8.64 3.90 - 12.70 K/uL    RBC 4.56 4.00 - 5.40 M/uL    Hemoglobin 13.5 12.0 - 16.0 g/dL    Hematocrit 39.7 37.0 - 48.5 %    Mean Corpuscular Volume 87 82 - 98 fL    Mean Corpuscular Hemoglobin 29.6 27.0 - 31.0 pg    Mean Corpuscular Hemoglobin Conc 34.0 32.0 - 36.0 g/dL    RDW 12.6 11.5 - 14.5 %    Platelets 233 150 - 350 K/uL    MPV 10.7 9.2 - 12.9 fL    Gran # (ANC) 6.2 1.8 - 7.7 K/uL    Lymph # 1.7 1.0 - 4.8 K/uL    Mono # 0.7 0.3 - 1.0 K/uL    Eos # 0.1 0.0 - 0.5 K/uL    Baso # 0.02 0.00 - 0.20 K/uL    Gran% 71.5 38.0 - 73.0 %    Lymph% 19.3 18.0 - 48.0 %    Mono% 8.3 4.0 - 15.0 %    Eosinophil% 0.8 0.0 - 8.0 %    Basophil% 0.2 0.0 - 1.9 %    Differential Method Automated    Comprehensive metabolic panel   Result Value Ref Range    Sodium 142 136 - 145 mmol/L    Potassium 4.0 3.5 -  5.1 mmol/L    Chloride 103 95 - 110 mmol/L    CO2 28 23 - 29 mmol/L    Glucose 115 (H) 70 - 110 mg/dL    BUN, Bld 14 6 - 20 mg/dL    Creatinine 1.0 0.5 - 1.4 mg/dL    Calcium 10.0 8.7 - 10.5 mg/dL    Total Protein 7.7 6.0 - 8.4 g/dL    Albumin 4.3 3.5 - 5.2 g/dL    Total Bilirubin 0.6 0.1 - 1.0 mg/dL    Alkaline Phosphatase 50 (L) 55 - 135 U/L    AST 14 10 - 40 U/L    ALT 10 10 - 44 U/L    Anion Gap 11 8 - 16 mmol/L    eGFR if African American >60 >60 mL/min/1.73 m^2    eGFR if non African American >60 >60 mL/min/1.73 m^2   Drug screen panel, emergency   Result Value Ref Range    Benzodiazepines Presumptive Positive     Methadone metabolites Negative     Cocaine (Metab.) Negative     Opiate Scrn, Ur Negative     Barbiturate Screen, Ur Negative     Amphetamine Screen, Ur Negative     THC Negative     Phencyclidine Negative     Creatinine, Random Ur 159.7 15.0 - 325.0 mg/dL    Toxicology Information SEE COMMENT    Brain natriuretic peptide   Result Value Ref Range    BNP 35 0 - 99 pg/mL   Troponin I   Result Value Ref Range    Troponin I 0.027 (H) 0.000 - 0.026 ng/mL   Urinalysis, Reflex to Urine Culture Urine, Clean Catch   Result Value Ref Range    Specimen UA Urine, Clean Catch     Color, UA Yellow Yellow, Straw, Kendy    Appearance, UA Clear Clear    pH, UA 7.0 5.0 - 8.0    Specific Gravity, UA 1.015 1.005 - 1.030    Protein, UA Negative Negative    Glucose, UA Negative Negative    Ketones, UA Negative Negative    Bilirubin (UA) Negative Negative    Occult Blood UA Negative Negative    Nitrite, UA Negative Negative    Urobilinogen, UA 1.0 <2.0 EU/dL    Leukocytes, UA Negative Negative   Magnesium   Result Value Ref Range    Magnesium 2.2 1.6 - 2.6 mg/dL   TSH   Result Value Ref Range    TSH 2.365 0.400 - 4.000 uIU/mL      All pertinent labs within the past 24 hours have been reviewed.    Significant Imaging:  Imaging Results          X-Ray Chest AP Portable (Final result)  Result time 09/19/19 17:38:03    Final  result by Aime Ervin MD (09/19/19 17:38:03)                 Impression:      No acute process seen.      Electronically signed by: Aime Ervin MD  Date:    09/19/2019  Time:    17:38             Narrative:    EXAMINATION:  XR CHEST AP PORTABLE    CLINICAL HISTORY:  sob;    FINDINGS:  Single view of the chest.  Aorta demonstrates atherosclerotic disease.    Cardiac silhouette is normal.  The lungs demonstrate no evidence of active disease.  No evidence of pleural effusion or pneumothorax.  Bones demonstrate moderate degenerative changes.                               I have reviewed all pertinent imaging results/findings within the past 24 hours.     EKG: (personally reviewed)  Marked sinus bradycardia, possible LAE, incomplete RBBB, no acute ischemic ST-T changes from previous tracings.

## 2019-09-20 NOTE — HPI
Ms. Pattie Avila is a 58 y.o. female with a PMHx of anxiety, GERD and tobacco use who presented to the ED with c/o SOB x 1 week.  Associated dizziness, chest discomfort, nausea, and generalized weakness.  No alleviating factors.  She reports symptoms worsened after starting Lexapro 2 days ago.  She states symptoms are intermittent, waxing and waning over the past 1-2 weeks.  She was seen in the ED on 9/10, AMI was ruled out and she was diagnosed with sinus bradycardia.  She saw Dr. Hua (Cardiology) on 9/11 and was scheduled for 2D echo and Holter monitor next week.  Patient denies any palpitations, HA, AMS, lightheadedness, syncope, diaphoresis, visual disturbance, orthopnea, PND, edema, ABD pain, vomiting, diarrhea, dysuria, hematuria, back or neck pain, fatigue, numbness/tingling, fever or chills.  Upon arrival to the ED, EKG revealed marked sinus bradycardia with HR 37 bpm, incomplete RBBB, no acute ischemic ST-T changes.  Patient remained hemodynamically stable.  Initial work-up was unremarkable except for minimally elevated troponin of 0.027.  Hospital Medicine was called for admission.  Patient noted to be significantly underweight with BMI of 16.17, but denies any current or h/o eating disorder.

## 2019-09-20 NOTE — PLAN OF CARE
Problem: Adult Inpatient Plan of Care  Goal: Plan of Care Review  Outcome: Ongoing (interventions implemented as appropriate)  Recommendations     Recommendation:  1. Consult with SLP to evaluate dyphagia / difficulty swallowing  2. Continue Regular diet  w/texture per SLP.  3. Add Boost strawberry, TID.  4. Will continue to monitor  Intervention.  1. Coordination of care.  2. Nutrition supplement therapy  Goals: Meet >85% EEN/EPN  Nutrition Goal Status: new  Communication of RD Recs: (Plan of Care, and MD sticky note)

## 2019-09-20 NOTE — ASSESSMENT & PLAN NOTE
- Troponin minimally elevated at 0.027.  No ischemic changes on EKG.  Chest pain free on admission.  - Trend serial cardiac enzymes.  - Further recs pending clinical course.

## 2019-09-20 NOTE — H&P
[facility] Hospital Medicine  History & Physical    Patient Name: Yvonne Avila  MRN: [id number]  Admission Date: 9/19/2019  Attending Physician: Manuel Huber MD   Primary Care Provider: Merrick Palencia MD         Patient information was obtained from patient, spouse/SO, relative(s), past medical records and ER records.     Subjective:     Principal Problem:Sinus bradycardia    Chief Complaint:   Chief Complaint   Patient presents with    Shortness of Breath     shortness of breath, nausea, weakness, dizziness. states she started taking lexapro tuesday which worsened symptoms. symptoms have been present for 1 week.         HPI: Ms. Pattie Avila is a 58 y.o. female  with a PMHx of anxiety, GERD and tobacco use who presented to the ED with c/o SOB x 1 week.  Associated dizziness, chest discomfort, nausea, and generalized weakness.  No alleviating factors.  She reports symptoms worsened after starting Lexapro 2 days ago.  She states symptoms are intermittent, waxing and waning over the past 1-2 weeks.  She was seen in the ED on 9/10, AMI was ruled out and she was diagnosed with sinus bradycardia.  She saw Dr. Hua (Cardiology) on 9/11 and was scheduled for 2D echo and Holter monitor next week.  Patient denies any palpitations, HA, AMS, lightheadedness, syncope, diaphoresis, visual disturbance, orthopnea, PND, edema, ABD pain, vomiting, diarrhea, dysuria, hematuria, back or neck pain, fatigue, numbness/tingling, fever or chills.  Upon arrival to the ED, EKG revealed marked sinus bradycardia with HR 37 bpm, incomplete RBBB, no acute ischemic ST-T changes.  Patient remained hemodynamically stable.  Initial work-up was unremarkable except for minimally elevated troponin of 0.027.  Hospital Medicine was called for admission.  Patient noted to be significantly underweight with BMI of 16.17, but denies any current or h/o eating disorder.      Past Medical History:   Diagnosis Date    Anxiety     GERD  (gastroesophageal reflux disease)        History reviewed. No pertinent surgical history.    Review of patient's allergies indicates:   Allergen Reactions    Penicillins        No current facility-administered medications on file prior to encounter.      Current Outpatient Medications on File Prior to Encounter   Medication Sig    ALPRAZolam (XANAX) 0.5 MG tablet Take 0.5 mg by mouth 2 (two) times daily as needed.    amitriptyline (ELAVIL) 10 MG tablet amitriptyline 10 mg tablet    clonazePAM (KLONOPIN) 0.5 MG tablet clonazepam 0.5 mg tablet    escitalopram oxalate (LEXAPRO) 10 MG tablet Take 10 mg by mouth.    esomeprazole magnesium (NEXIUM) 10 mg GrPS Take by mouth.    oxymetazoline (AFRIN) 0.05 % nasal spray 2 sprays by Nasal route 2 (two) times daily.    ASPIRIN/CAFFEINE (BC ORAL) Take by mouth.    diphenhydrAMINE (BENADRYL) 50 MG capsule Take 50 mg by mouth every 6 (six) hours as needed for Itching.    multivitamin (THERAGRAN) per tablet Take 1 tablet by mouth once daily.    omeprazole (PRILOSEC) 40 MG capsule Take 40 mg by mouth.    ranitidine (ZANTAC) 300 MG tablet Zantac 300 mg tablet   Take 1 tablet every day by oral route at bedtime for 30 days.    sodium,potassium,mag sulfates (SUPREP BOWEL PREP KIT) 17.5-3.13-1.6 gram SolR Suprep Bowel Prep Kit 17.5 gram-3.13 gram-1.6 gram oral solution     Family History     Reviewed and not pertinent.         Tobacco Use    Smoking status: Current Every Day Smoker     Packs/day: 0.50     Types: Cigarettes   Substance and Sexual Activity    Alcohol use: Never     Frequency: Never    Drug use: Never    Sexual activity: Not on file     Review of Systems   Constitutional: Negative for appetite change, chills, diaphoresis, fatigue, fever and unexpected weight change.   HENT: Negative for congestion and sore throat.    Eyes: Negative for visual disturbance.   Respiratory: Positive for shortness of breath. Negative for cough and wheezing.    Cardiovascular:  Positive for chest pain. Negative for palpitations and leg swelling.   Gastrointestinal: Positive for nausea. Negative for abdominal distention, abdominal pain, blood in stool, constipation, diarrhea and vomiting.   Genitourinary: Negative for decreased urine volume, dysuria, frequency, hematuria and urgency.   Musculoskeletal: Negative for arthralgias, back pain, myalgias and neck pain.   Skin: Negative for pallor, rash and wound.   Neurological: Positive for dizziness and weakness (Generalized). Negative for syncope, speech difficulty, light-headedness, numbness and headaches.   Psychiatric/Behavioral: Negative for confusion. The patient is nervous/anxious.    All other systems reviewed and are negative.    Objective:     Vital Signs (Most Recent):  Temp: 98.9 °F (37.2 °C) (09/19/19 2053)  Pulse: (!) 46 (09/19/19 2053)  Resp: 19 (09/19/19 2053)  BP: 130/72 (09/19/19 2053)  SpO2: 96 % (09/19/19 2053) Vital Signs (24h Range):  Temp:  [98.9 °F (37.2 °C)-99.2 °F (37.3 °C)] 98.9 °F (37.2 °C)  Pulse:  [38-55] 46  Resp:  [16-30] 19  SpO2:  [96 %-99 %] 96 %  BP: (128-166)/(69-77) 130/72     Weight: 41.4 kg (91 lb 4.3 oz)  Body mass index is 16.17 kg/m².    Physical Exam   Constitutional: She is oriented to person, place, and time. She appears well-developed. No distress.   Thin, significantly underweight.   HENT:   Head: Normocephalic and atraumatic.   Eyes: Conjunctivae are normal.   PERRL; EOM intact.   Neck: Normal range of motion. Neck supple.   Cardiovascular: Regular rhythm, S1 normal, S2 normal and intact distal pulses.  No extrasystoles are present. Bradycardia present. Exam reveals no gallop and no friction rub.   No murmur heard.  Pulses:       Radial pulses are 2+ on the right side, and 2+ on the left side.        Dorsalis pedis pulses are 2+ on the right side, and 2+ on the left side.        Posterior tibial pulses are 2+ on the right side, and 2+ on the left side.   Pulmonary/Chest: Effort normal and breath  sounds normal. No accessory muscle usage. No tachypnea. No respiratory distress. She has no wheezes. She has no rhonchi. She has no rales.   Abdominal: Soft. Bowel sounds are normal. She exhibits no distension. There is no tenderness. There is no rebound, no guarding and no CVA tenderness.   Musculoskeletal: Normal range of motion. She exhibits no edema, tenderness or deformity.   Neurological: She is alert and oriented to person, place, and time. She has normal strength. No cranial nerve deficit or sensory deficit. Coordination and gait normal. GCS eye subscore is 4. GCS verbal subscore is 5. GCS motor subscore is 6.   Skin: Skin is warm, dry and intact. Capillary refill takes less than 2 seconds. No rash noted. She is not diaphoretic. No cyanosis or erythema.   Psychiatric: Her speech is normal and behavior is normal. Her mood appears anxious. Cognition and memory are normal.   Nursing note and vitals reviewed.          Significant Labs:  Results for orders placed or performed during the hospital encounter of 09/19/19   CBC auto differential   Result Value Ref Range    WBC 8.64 3.90 - 12.70 K/uL    RBC 4.56 4.00 - 5.40 M/uL    Hemoglobin 13.5 12.0 - 16.0 g/dL    Hematocrit 39.7 37.0 - 48.5 %    Mean Corpuscular Volume 87 82 - 98 fL    Mean Corpuscular Hemoglobin 29.6 27.0 - 31.0 pg    Mean Corpuscular Hemoglobin Conc 34.0 32.0 - 36.0 g/dL    RDW 12.6 11.5 - 14.5 %    Platelets 233 150 - 350 K/uL    MPV 10.7 9.2 - 12.9 fL    Gran # (ANC) 6.2 1.8 - 7.7 K/uL    Lymph # 1.7 1.0 - 4.8 K/uL    Mono # 0.7 0.3 - 1.0 K/uL    Eos # 0.1 0.0 - 0.5 K/uL    Baso # 0.02 0.00 - 0.20 K/uL    Gran% 71.5 38.0 - 73.0 %    Lymph% 19.3 18.0 - 48.0 %    Mono% 8.3 4.0 - 15.0 %    Eosinophil% 0.8 0.0 - 8.0 %    Basophil% 0.2 0.0 - 1.9 %    Differential Method Automated    Comprehensive metabolic panel   Result Value Ref Range    Sodium 142 136 - 145 mmol/L    Potassium 4.0 3.5 - 5.1 mmol/L    Chloride 103 95 - 110 mmol/L    CO2 28 23 - 29  mmol/L    Glucose 115 (H) 70 - 110 mg/dL    BUN, Bld 14 6 - 20 mg/dL    Creatinine 1.0 0.5 - 1.4 mg/dL    Calcium 10.0 8.7 - 10.5 mg/dL    Total Protein 7.7 6.0 - 8.4 g/dL    Albumin 4.3 3.5 - 5.2 g/dL    Total Bilirubin 0.6 0.1 - 1.0 mg/dL    Alkaline Phosphatase 50 (L) 55 - 135 U/L    AST 14 10 - 40 U/L    ALT 10 10 - 44 U/L    Anion Gap 11 8 - 16 mmol/L    eGFR if African American >60 >60 mL/min/1.73 m^2    eGFR if non African American >60 >60 mL/min/1.73 m^2   Drug screen panel, emergency   Result Value Ref Range    Benzodiazepines Presumptive Positive     Methadone metabolites Negative     Cocaine (Metab.) Negative     Opiate Scrn, Ur Negative     Barbiturate Screen, Ur Negative     Amphetamine Screen, Ur Negative     THC Negative     Phencyclidine Negative     Creatinine, Random Ur 159.7 15.0 - 325.0 mg/dL    Toxicology Information SEE COMMENT    Brain natriuretic peptide   Result Value Ref Range    BNP 35 0 - 99 pg/mL   Troponin I   Result Value Ref Range    Troponin I 0.027 (H) 0.000 - 0.026 ng/mL   Urinalysis, Reflex to Urine Culture Urine, Clean Catch   Result Value Ref Range    Specimen UA Urine, Clean Catch     Color, UA Yellow Yellow, Straw, Kendy    Appearance, UA Clear Clear    pH, UA 7.0 5.0 - 8.0    Specific Gravity, UA 1.015 1.005 - 1.030    Protein, UA Negative Negative    Glucose, UA Negative Negative    Ketones, UA Negative Negative    Bilirubin (UA) Negative Negative    Occult Blood UA Negative Negative    Nitrite, UA Negative Negative    Urobilinogen, UA 1.0 <2.0 EU/dL    Leukocytes, UA Negative Negative   Magnesium   Result Value Ref Range    Magnesium 2.2 1.6 - 2.6 mg/dL   TSH   Result Value Ref Range    TSH 2.365 0.400 - 4.000 uIU/mL      All pertinent labs within the past 24 hours have been reviewed.    Significant Imaging:  Imaging Results          X-Ray Chest AP Portable (Final result)  Result time 09/19/19 17:38:03    Final result by Aime Ervin MD (09/19/19 17:38:03)               "   Impression:      No acute process seen.      Electronically signed by: Aime Ervin MD  Date:    09/19/2019  Time:    17:38             Narrative:    EXAMINATION:  XR CHEST AP PORTABLE    CLINICAL HISTORY:  sob;    FINDINGS:  Single view of the chest.  Aorta demonstrates atherosclerotic disease.    Cardiac silhouette is normal.  The lungs demonstrate no evidence of active disease.  No evidence of pleural effusion or pneumothorax.  Bones demonstrate moderate degenerative changes.                               I have reviewed all pertinent imaging results/findings within the past 24 hours.     EKG: (personally reviewed)  Marked sinus bradycardia, possible LAE, incomplete RBBB, no acute ischemic ST-T changes from previous tracings.                Assessment/Plan:     * Symptomatic sinus bradycardia  - Possibly secondary to medications or questionable anorexia.  - HR in the low 40s on admission.  Remains hemodynamically stable.  Monitor telemetry.  - TSH normal.  - Hold home amitriptyline and escitalopram.  - Patient denies anorexia, but is significantly underweight.  Will consult Nutritionist.  - Check 2D echo.  - Cardiology consult.    Nonspecific troponin release  - Troponin minimally elevated at 0.027.  No ischemic changes on EKG.  Chest pain free on admission.  - Trend serial cardiac enzymes.  - Further recs pending clinical course.    Body mass index (BMI) 19.9 or less, adult  - BMI of 16.17.  Patient reports she has been "very tiny" her whole life.  Denies current or h/o eating disorder.  - Nutrition consult.    Tobacco use  - Counseled on cessation.      VTE Risk Mitigation (From admission, onward)        Ordered     IP VTE LOW RISK PATIENT  Once      09/19/19 2204     Place sequential compression device  Until discontinued      09/19/19 2204             Shanda Vaughn NP  Department of Hospital Medicine   [facility]  "

## 2019-09-20 NOTE — PLAN OF CARE
Problem: Adult Inpatient Plan of Care  Goal: Readiness for Transition of Care    Intervention: Mutually Develop Transition Plan     09/20/19 0990   Discharge Needs Assessment   Equipment Currently Used at Home none   Transportation Anticipated family or friend will provide   Social Work Plan   Patient/Family in Agreement with Plan yes   Living Environment   Able to Return to Prior Arrangements yes   OTHER   Communicated expected length of stay with patient/caregiver yes   Is patient able to care for self after discharge? Yes   Who are your caregiver(s) and their phone number(s)? Will (spouse) [phone]   (RETIRED) Social Work Plan   Patient's perception of discharge disposition home or selfcare

## 2019-09-20 NOTE — ASSESSMENT & PLAN NOTE
-Mildly bumped troponin (0.027), has since normalized  -Secondary to demand ischemia  -EKG without acute ischemic changes  -Continue ASA  -Follow-up in clinic

## 2019-09-20 NOTE — PLAN OF CARE
CM spoke to patient who is awake, alert, and able to make needs known. Patient's  is by the bedside and answers questions as needed. Patient states that before hospitalization she was not using any equipment at home or oxygen. Patient and family agree that there are no services she needs set up when its time to discharge.  CM provided a transitional care folder, information on advanced directives, information on pharmacy bedside delivery, and discharge planning begins on admission with contact information for any needs/questions.    D/C Plan:  Home   PCP: Dr. Palencia   Preferred Pharmacy: [facility]   Discharge transportation: Family   [facility]: pending   Pharmacy Bedside Delivery: declined          09/20/19 0933   Discharge Assessment   Assessment Type Discharge Planning Assessment   Confirmed/corrected address and phone number on facesheet? Yes   Assessment information obtained from? Patient   Communicated expected length of stay with patient/caregiver yes   Prior to hospitilization cognitive status: Alert/Oriented   Prior to hospitalization functional status: Independent   Current cognitive status: Alert/Oriented   Current Functional Status: Independent   Facility Arrived From: Home    Lives With spouse   Able to Return to Prior Arrangements yes   Is patient able to care for self after discharge? Yes   Who are your caregiver(s) and their phone number(s)? Will (spouse) [phone]   Patient's perception of discharge disposition home or selfcare   Patient currently being followed by outpatient case management? No   Patient currently receives any other outside agency services? No   Equipment Currently Used at Home none   Do you have any problems affording any of your prescribed medications? No   Is the patient taking medications as prescribed? yes   Does the patient have transportation home? Yes   Transportation Anticipated family or friend will provide   Does the patient receive services at the  [facility]? No   Discharge Plan A Home with family   DME Needed Upon Discharge  none   Patient/Family in Agreement with Plan yes

## 2019-09-20 NOTE — HPI
Ms. Blankenship is a 58 year old female patient whose current medical conditions include anxiety, GERD, and tobacco abuse who presented to [facility] ED yesterday with a chief complaint of SOB over the past week. Associated symptoms included dizziness, nausea, vomiting, generalized weakness, and mild chest pain. Patient denied any palpitations, near syncope, or syncope. Reported symptoms onset after taking newly prescribed Lexapro. Initial workup in ED revealed marked sinus bradycardia and patient subsequently admitted for further evaluation and treatment. Cardiology consulted to assist with management. Patient seen and examined today, resting in bed. Feeling better. HR improved, now in 50's. States SOB and weakness have resolved. No chest pain. She reports compliance with her medications. BMI notably low, denies any history of eating disorder. Chart reviewed. Echo pending.

## 2019-09-20 NOTE — ASSESSMENT & PLAN NOTE
"- BMI of 16.17.  Patient reports she has been "very tiny" her whole life.  Denies current or h/o eating disorder.  - Nutrition consult.    9/20:  Will follow dietary recommendations  Need to check phosphorus  Other electrolytes normal   "

## 2019-09-21 PROBLEM — R42 DIZZINESS: Status: ACTIVE | Noted: 2019-09-21

## 2019-09-21 PROCEDURE — 97165 OT EVAL LOW COMPLEX 30 MIN: CPT

## 2019-09-21 PROCEDURE — 99233 SBSQ HOSP IP/OBS HIGH 50: CPT | Mod: ,,, | Performed by: INTERNAL MEDICINE

## 2019-09-21 PROCEDURE — 63600175 PHARM REV CODE 636 W HCPCS: Performed by: NURSE PRACTITIONER

## 2019-09-21 PROCEDURE — 97530 THERAPEUTIC ACTIVITIES: CPT

## 2019-09-21 PROCEDURE — 99900037 HC PT THERAPY SCREENING (STAT)

## 2019-09-21 PROCEDURE — 25000003 PHARM REV CODE 250: Performed by: NURSE PRACTITIONER

## 2019-09-21 PROCEDURE — 99233 PR SUBSEQUENT HOSPITAL CARE,LEVL III: ICD-10-PCS | Mod: ,,, | Performed by: INTERNAL MEDICINE

## 2019-09-21 PROCEDURE — 21400001 HC TELEMETRY ROOM

## 2019-09-21 PROCEDURE — 25000003 PHARM REV CODE 250: Performed by: FAMILY MEDICINE

## 2019-09-21 RX ORDER — CALCIUM CARBONATE 200(500)MG
1000 TABLET,CHEWABLE ORAL EVERY 6 HOURS PRN
Status: DISCONTINUED | OUTPATIENT
Start: 2019-09-21 | End: 2019-09-22 | Stop reason: HOSPADM

## 2019-09-21 RX ADMIN — ALPRAZOLAM 0.25 MG: 0.25 TABLET ORAL at 06:09

## 2019-09-21 RX ADMIN — PANTOPRAZOLE SODIUM 40 MG: 40 TABLET, DELAYED RELEASE ORAL at 09:09

## 2019-09-21 RX ADMIN — ALPRAZOLAM 0.25 MG: 0.25 TABLET ORAL at 11:09

## 2019-09-21 RX ADMIN — ONDANSETRON 4 MG: 2 INJECTION INTRAMUSCULAR; INTRAVENOUS at 06:09

## 2019-09-21 NOTE — PROGRESS NOTES
[facility]  Cardiology  Progress Note    Patient Name: Yvonne Avila  MRN: [id number]  Admission Date: 9/19/2019  Hospital Length of Stay: 1 days  Code Status: Full Code   Attending Physician: Manuel Huber MD   Primary Care Physician: Merrick Palencia MD  Expected Discharge Date:   Principal Problem:Sinus bradycardia    Subjective:   Brief HPI:  Ms. Blankenship is a 58 year old female patient whose current medical conditions include anxiety, GERD, and tobacco abuse who presented to [facility] ED yesterday with a chief complaint of SOB over the past week. Associated symptoms included dizziness, nausea, vomiting, generalized weakness, and mild chest pain. Patient denied any palpitations, near syncope, or syncope. Reported symptoms onset after taking newly prescribed Lexapro. Initial workup in ED revealed marked sinus bradycardia and patient subsequently admitted for further evaluation and treatment. Cardiology consulted to assist with management. Patient seen and examined today, resting in bed. Feeling better. HR improved, now in 50's. States SOB and weakness have resolved. No chest pain. She reports compliance with her medications. BMI notably low, denies any history of eating disorder. Chart reviewed. Echo pending.    Hospital Course:   9/21/19- Patient dizzy this morning while brushing her teeth. No syncope. HR in the 40's-50's  on tele this morning. Has no chest pain. Echo shows normal LVF 60% with no WMA.         Review of Systems   Constitution: Positive for malaise/fatigue. Negative for diaphoresis, weight gain and weight loss.   HENT: Negative for congestion and nosebleeds.    Cardiovascular: Negative for chest pain, claudication, cyanosis, dyspnea on exertion, irregular heartbeat, leg swelling, near-syncope, orthopnea, palpitations, paroxysmal nocturnal dyspnea and syncope.   Respiratory: Negative for cough, hemoptysis, shortness of breath, sleep disturbances due to breathing, snoring, sputum  production and wheezing.    Hematologic/Lymphatic: Negative for bleeding problem. Does not bruise/bleed easily.   Skin: Negative for rash.   Musculoskeletal: Negative for arthritis, back pain, falls, joint pain, muscle cramps and muscle weakness.   Gastrointestinal: Positive for heartburn. Negative for abdominal pain, constipation, diarrhea, hematemesis, hematochezia, melena, nausea and vomiting.   Genitourinary: Negative for dysuria, hematuria and nocturia.   Neurological: Positive for dizziness and light-headedness. Negative for excessive daytime sleepiness, headaches, loss of balance, numbness, vertigo and weakness.     Objective:     Vital Signs (Most Recent):  Temp: 98.8 °F (37.1 °C) (09/21/19 1121)  Pulse: (!) 48 (09/21/19 1121)  Resp: 18 (09/21/19 1121)  BP: 133/79 (09/21/19 1121)  SpO2: 98 % (09/21/19 1121) Vital Signs (24h Range):  Temp:  [97 °F (36.1 °C)-98.8 °F (37.1 °C)] 98.8 °F (37.1 °C)  Pulse:  [44-54] 48  Resp:  [18] 18  SpO2:  [97 %-99 %] 98 %  BP: (104-134)/(58-79) 133/79     Weight: 41.4 kg (91 lb 4.3 oz)  Body mass index is 16.17 kg/m².     SpO2: 98 %  O2 Device (Oxygen Therapy): room air      Intake/Output Summary (Last 24 hours) at 9/21/2019 1141  Last data filed at 9/21/2019 0600  Gross per 24 hour   Intake 270 ml   Output 1100 ml   Net -830 ml       Lines/Drains/Airways     Peripheral Intravenous Line                 Peripheral IV - Single Lumen 09/19/19 1720 20 G Left Antecubital 1 day                Physical Exam   Constitutional: She is oriented to person, place, and time. She appears well-developed and well-nourished. No distress.   Thin-appearing   HENT:   Head: Normocephalic and atraumatic.   Eyes: Pupils are equal, round, and reactive to light. Right eye exhibits no discharge. Left eye exhibits no discharge.   Neck: Neck supple. No JVD present.   Cardiovascular: Regular rhythm, S1 normal, S2 normal and normal heart sounds. Bradycardia present.   No murmur heard.  Pulmonary/Chest:  Effort normal and breath sounds normal. No respiratory distress. She has no wheezes. She has no rales.   Abdominal: Soft. She exhibits no distension.   Musculoskeletal: She exhibits no edema.   Neurological: She is alert and oriented to person, place, and time.   Skin: Skin is warm and dry. She is not diaphoretic. No erythema.   Psychiatric: She has a normal mood and affect. Her behavior is normal. Thought content normal.   Nursing note and vitals reviewed.      Significant Labs:   All pertinent lab results from the last 24 hours have been reviewed. and   Recent Lab Results     None          Significant Imaging: Echocardiogram:   2D echo with color flow doppler:   Results for orders placed or performed during the hospital encounter of 09/19/19   2D echo with color flow doppler   Result Value Ref Range    QEF 60 55 - 65    Diastolic Dysfunction No     Est. PA Systolic Pressure 25.09     Pericardial Effusion TRIVIAL     Mitral Valve Mobility NORMAL     Tricuspid Valve Regurgitation MILD     Narrative    Date of Procedure: 09/20/2019        TEST DESCRIPTION   Technical Quality: This is a technically adequate study.     General: The patient was bradycardic throughout the study.    Aorta: The aortic root is normal in size, measuring 2.4 cm at sinotubular junction and 2.7 cm at Sinuses of Valsalva. The proximal ascending aorta is normal in size, measuring 2.3 cm across.     Left Atrium: The left atrial volume index is normal, measuring 6.93 cc/m2.     Left Ventricle: The left ventricle is normal in size, with an end-diastolic diameter of 3.5 cm, and an end-systolic diameter of 2.4 cm. LV wall thickness is normal, with the septum and the posterior wall each measuring 1.0 cm across. Relative wall   thickness was increased at 0.57, and the LV mass index was 83.2 g/m2 consistent with concentric remodeling. There are no regional wall motion abnormalities. Left ventricular systolic function appears normal. Visually estimated  ejection fraction is   60-65%. The LV Doppler derived stroke volume equals 55.0 ccs.     Diastolic indices: E wave velocity 0.8 m/s, E/A ratio 1.2,  msec., E/e' ratio(avg) 8. Diastolic function is normal.     Right Atrium: The right atrium is normal in size, measuring 3.4 cm in length and 1.5 cm in width in the apical view.     Right Ventricle: The right ventricle is normal in size. Global right ventricular systolic function appears normal. Tricuspid annular plane systolic excursion (TAPSE) is 1.5 cm. The estimated PA systolic pressure is 25 mmHg.     Aortic Valve:  The aortic valve is normal in structure with normal leaflet mobility. The mean gradient obtained across the aortic valve is 5 mmHg.     Mitral Valve:  The mitral valve is normal in structure with normal leaflet mobility. The pressure half time is 59 msec. The calculated mitral valve area is 3.73 cm2.     Tricuspid Valve:  The tricuspid valve is normal in structure with normal leaflet mobility. There is mild tricuspid regurgitation.     Pulmonary Valve:  The pulmonic valve is normal in structure with normal leaflet mobility.     Pericardium: There is evidence of a trivial pericardial effusion.     IVC: IVC is normal in size and collapses > 50% with a sniff, suggesting normal right atrial pressure of 3 mmHg.     Intracavitary: There is no evidence of intracavity mass, thrombi, or vegetation.         CONCLUSIONS     1 - Normal left ventricular systolic function (EF 60-65%).     2 - Normal left ventricular diastolic function.     3 - Normal right ventricular systolic function .     4 - The estimated PA systolic pressure is 25 mmHg.     5 - Mild tricuspid regurgitation.     6 - Concentric remodeling.     7 - No wall motion abnormalities.     8 - Trivial pericardial effusion.             This document has been electronically    SIGNED BY: Pablo Hua MD On: 09/20/2019 10:42     Assessment and Plan:       * Symptomatic sinus bradycardia  -Patient  presented with generalized weakness, nausea, vomiting, SOB, and atypical chest pain after starting Lexapro  -Symptoms have resolved  -HR improved; may have had partial vagal response from n/v episodes  -No indication for PPM at present time  -2D echo pending  -OP Holter monitor    9/21/19  See plan for bradycardia     Bradycardia  Continue current medical therapy for now  Recommend ambulating in [place] to assess HR response   If HR < 60 bpm will continue to monitor for now  Could be having be vagal response from her N/V     Body mass index (BMI) 19.9 or less, adult  -Denies history of eating disorder  -Recommended high calorie, protein rich meals/supplements    Nonspecific troponin release  -Mildly bumped troponin (0.027), has since normalized  -Secondary to demand ischemia  -EKG without acute ischemic changes  -Continue ASA  -Follow-up in clinic    Tobacco use  -Smoking cessation advised        VTE Risk Mitigation (From admission, onward)        Ordered     IP VTE LOW RISK PATIENT  Once      09/19/19 2204     Place sequential compression device  Until discontinued      09/19/19 2204        Chart reviewed. Patient examined by Dr. Hua and agrees with plan that has been outlined.     Maricarmen Lopez, GUILLERMOP  Cardiology  [facility]

## 2019-09-21 NOTE — PT/OT/SLP EVAL
Occupational Therapy   Evaluation and Discharge Note    Name: Yvonne Avila  MRN: [id number]  Admitting Diagnosis:  Sinus bradycardia      Recommendations:     Discharge Recommendations: home  Discharge Equipment Recommendations:     Barriers to discharge:  None    Assessment:     Yvonne Avila is a 58 y.o. female with a medical diagnosis of Sinus bradycardia. At this time, patient is functioning at their prior level of function and does not require further acute OT services.     Plan:     During this hospitalization, patient does not require further acute OT services.  Please re-consult if situation changes.    · Plan of Care Reviewed with: patient, spouse   · Pt placed on people 's program    Subjective     Chief Complaint:   Patient/Family Comments/goals:     Occupational Profile:  Living Environment: lives with spouse in 1 story house with 2 steps for sunken den  Previous level of function: (I) with adl's and functional mobility. Pt reports still driving  Roles and Routines: occupational therapy  Equipment Used at home:  none  Assistance upon Discharge:     Pain/Comfort:  · Pain Rating 1: 0/10    Patients cultural, spiritual, Congregation conflicts given the current situation:      Objective:     Communicated with: nurse and epic chart review prior to session.  Patient found with bed in chair position with telemetry upon OT entry to room.    General Precautions: Standard, fall   Orthopedic Precautions:N/A   Braces: N/A     Occupational Performance:    Bed Mobility:    · Patient completed Rolling/Turning to Left with  independence  · Patient completed Scooting/Bridging with independence  · Patient completed Supine to Sit with independence    Functional Mobility/Transfers:  · Patient completed Sit <> Stand Transfer with supervision  with  no assistive device   · Functional Mobility: pt ambulated 25 feet with s and no ae      Activities of Daily Living:  · Lower Body Dressing: supervision  .  · Toileting: modified independence .    Cognitive/Visual Perceptual:  Cognitive/Psychosocial Skills:     -       Oriented to: Person, Place, Time and Situation   -       Follows Commands/attention:Follows multistep  commands  -       Communication: clear/fluent  -       Memory: No Deficits noted  -       Safety awareness/insight to disability: intact   Visual/Perceptual:      -Intact .    Physical Exam:  Upper Extremity Range of Motion:     -       Right Upper Extremity: WFL  -       Left Upper Extremity: WFL  Upper Extremity Strength:    -       Right Upper Extremity: WFL  -       Left Upper Extremity: WFL   Strength:    -       Right Upper Extremity: WFL  -       Left Upper Extremity: WFL    AMPAC 6 Click ADL:  AMPAC Total Score: 24    Treatment & Education:    Education:    Patient left with bed in chair position with all lines intact, call button in reach and spouse present    GOALS:   Multidisciplinary Problems     Occupational Therapy Goals     Not on file                History:     Past Medical History:   Diagnosis Date    Anxiety     Bradycardia 9/19/2019    GERD (gastroesophageal reflux disease)        History reviewed. No pertinent surgical history.    Time Tracking:     OT Date of Treatment: 09/21/19  OT Start Time: 1427  OT Stop Time: 1450  OT Total Time (min): 23 min    Billable Minutes:Evaluation 10 minutes  Therapeutic Activity 13 minutes    Kecia Watson OT  9/21/2019

## 2019-09-21 NOTE — HOSPITAL COURSE
9/21:  Patient still reported dizziness when she stands, cardiology on case. Carotid u/s today     9/22/19-  Dizziness improved this morning. HR up to 60's with ambulation. GERD symptoms improved with TUMS. BP stable .  Resting HR ranges 45 to 55 with  sinus rhythm   Carotid U/S done and revealed No evidence of a hemodynamically significant carotid bifurcation stenosis.  Stenosis is 0-20% bilaterally.  Echo showed    1 - Normal left ventricular systolic function (EF 60-65%).     2 - Normal left ventricular diastolic function.     3 - Normal right ventricular systolic function .     4 - The estimated PA systolic pressure is 25 mmHg.     5 - Mild tricuspid regurgitation.     6 - Concentric remodeling.     7 - No wall motion abnormalities.     8 - Trivial pericardial effusion    At this point pt is deemed medically stable to be discharged home. She was advised to hold off on Amitriptyline and Lexapro. May take Alprazolam as needed for anxiety . She was advised to follow up with her PCP for further  evaluation of anti anxiety treatment. Buspar could be an option for generalized anxiety disorder.     Pt will follow up with Cardiology in one to two weeks  for discharge follow up.

## 2019-09-21 NOTE — SUBJECTIVE & OBJECTIVE
Interval History: Still reports dizziness whenever she stands. Reports having some nausea. Denies any other issues.     Review of Systems   Constitutional: Positive for fatigue. Negative for appetite change and fever.   Respiratory: Negative for shortness of breath.    Cardiovascular: Negative for chest pain.   Gastrointestinal: Positive for nausea. Negative for vomiting.   Skin: Negative for rash.   Neurological: Positive for dizziness and light-headedness.   Psychiatric/Behavioral: Positive for sleep disturbance. The patient is nervous/anxious.      Objective:     Vital Signs (Most Recent):  Temp: 98.8 °F (37.1 °C) (09/21/19 1121)  Pulse: (!) 48 (09/21/19 1121)  Resp: 18 (09/21/19 1121)  BP: 133/79 (09/21/19 1121)  SpO2: 98 % (09/21/19 1121) Vital Signs (24h Range):  Temp:  [97 °F (36.1 °C)-98.8 °F (37.1 °C)] 98.8 °F (37.1 °C)  Pulse:  [44-54] 48  Resp:  [18] 18  SpO2:  [97 %-99 %] 98 %  BP: (104-134)/(58-79) 133/79     Weight: 41.4 kg (91 lb 4.3 oz)  Body mass index is 16.17 kg/m².    Intake/Output Summary (Last 24 hours) at 9/21/2019 1144  Last data filed at 9/21/2019 0600  Gross per 24 hour   Intake 270 ml   Output 1100 ml   Net -830 ml      Physical Exam   Constitutional: She is oriented to person, place, and time. No distress.   Thin    HENT:   Head: Normocephalic and atraumatic.   Cardiovascular: Exam reveals no gallop and no friction rub.   No murmur heard.  bradycardic   Pulmonary/Chest: Effort normal and breath sounds normal. No stridor. No respiratory distress. She has no wheezes.   Abdominal: Soft. Bowel sounds are normal. She exhibits no distension and no mass. There is no tenderness. There is no rebound and no guarding.   Neurological: She is alert and oriented to person, place, and time.   Skin: She is not diaphoretic.   Psychiatric: She has a normal mood and affect. Her behavior is normal. Judgment and thought content normal.       Significant Labs:   BMP:   Recent Labs   Lab 09/19/19  1721   GLU  115*      K 4.0      CO2 28   BUN 14   CREATININE 1.0   CALCIUM 10.0   MG 2.2     CBC:   Recent Labs   Lab 09/19/19  1721   WBC 8.64   HGB 13.5   HCT 39.7        Troponin:   Recent Labs   Lab 09/19/19  1721 09/19/19  2313 09/20/19  0502   TROPONINI 0.027* 0.014 0.011       Significant Imaging: I have reviewed all pertinent imaging results/findings within the past 24 hours.

## 2019-09-21 NOTE — ASSESSMENT & PLAN NOTE
9/20:  Sinus julian on EKG  Cardio consulted  Troponin elevated  Echo pending     9/21:  Discuss case with cardio  Will obtain ambulatory HR  Continue to monitor

## 2019-09-21 NOTE — PROGRESS NOTES
[facility] Hospital Medicine  Progress Note    Patient Name: Yvonne Avila  MRN: [id number]  Patient Class: IP- Inpatient   Admission Date: 9/19/2019  Length of Stay: 1 days  Attending Physician: Manuel Huber MD  Primary Care Provider: Merrick Palencia MD        Subjective:     Principal Problem:Sinus bradycardia        HPI:  Ms. Pattie Avila is a 58 y.o. female  with a PMHx of anxiety, GERD and tobacco use who presented to the ED with c/o SOB x 1 week.  Associated dizziness, chest discomfort, nausea, and generalized weakness.  No alleviating factors.  She reports symptoms worsened after starting Lexapro 2 days ago.  She states symptoms are intermittent, waxing and waning over the past 1-2 weeks.  She was seen in the ED on 9/10, AMI was ruled out and she was diagnosed with sinus bradycardia.  She saw Dr. Hua (Cardiology) on 9/11 and was scheduled for 2D echo and Holter monitor next week.  Patient denies any palpitations, HA, AMS, lightheadedness, syncope, diaphoresis, visual disturbance, orthopnea, PND, edema, ABD pain, vomiting, diarrhea, dysuria, hematuria, back or neck pain, fatigue, numbness/tingling, fever or chills.  Upon arrival to the ED, EKG revealed marked sinus bradycardia with HR 37 bpm, incomplete RBBB, no acute ischemic ST-T changes.  Patient remained hemodynamically stable.  Initial work-up was unremarkable except for minimally elevated troponin of 0.027.  Hospital Medicine was called for admission.  Patient noted to be significantly underweight with BMI of 16.17, but denies any current or h/o eating disorder.      Overview/Hospital Course:  9/21:  Patient still reported dizziness when she stands, cardiology on case. Carotid u/s today     Interval History: Still reports dizziness whenever she stands. Reports having some nausea. Denies any other issues.     Review of Systems   Constitutional: Positive for fatigue. Negative for appetite change and fever.   Respiratory: Negative for  shortness of breath.    Cardiovascular: Negative for chest pain.   Gastrointestinal: Positive for nausea. Negative for vomiting.   Skin: Negative for rash.   Neurological: Positive for dizziness and light-headedness.   Psychiatric/Behavioral: Positive for sleep disturbance. The patient is nervous/anxious.      Objective:     Vital Signs (Most Recent):  Temp: 98.8 °F (37.1 °C) (09/21/19 1121)  Pulse: (!) 48 (09/21/19 1121)  Resp: 18 (09/21/19 1121)  BP: 133/79 (09/21/19 1121)  SpO2: 98 % (09/21/19 1121) Vital Signs (24h Range):  Temp:  [97 °F (36.1 °C)-98.8 °F (37.1 °C)] 98.8 °F (37.1 °C)  Pulse:  [44-54] 48  Resp:  [18] 18  SpO2:  [97 %-99 %] 98 %  BP: (104-134)/(58-79) 133/79     Weight: 41.4 kg (91 lb 4.3 oz)  Body mass index is 16.17 kg/m².    Intake/Output Summary (Last 24 hours) at 9/21/2019 1144  Last data filed at 9/21/2019 0600  Gross per 24 hour   Intake 270 ml   Output 1100 ml   Net -830 ml      Physical Exam   Constitutional: She is oriented to person, place, and time. No distress.   Thin    HENT:   Head: Normocephalic and atraumatic.   Cardiovascular: Exam reveals no gallop and no friction rub.   No murmur heard.  bradycardic   Pulmonary/Chest: Effort normal and breath sounds normal. No stridor. No respiratory distress. She has no wheezes.   Abdominal: Soft. Bowel sounds are normal. She exhibits no distension and no mass. There is no tenderness. There is no rebound and no guarding.   Neurological: She is alert and oriented to person, place, and time.   Skin: She is not diaphoretic.   Psychiatric: She has a normal mood and affect. Her behavior is normal. Judgment and thought content normal.       Significant Labs:   BMP:   Recent Labs   Lab 09/19/19  1721   *      K 4.0      CO2 28   BUN 14   CREATININE 1.0   CALCIUM 10.0   MG 2.2     CBC:   Recent Labs   Lab 09/19/19  1721   WBC 8.64   HGB 13.5   HCT 39.7        Troponin:   Recent Labs   Lab 09/19/19  1721 09/19/19  2312  "09/20/19  0502   TROPONINI 0.027* 0.014 0.011       Significant Imaging: I have reviewed all pertinent imaging results/findings within the past 24 hours.      Assessment/Plan:      * Symptomatic sinus bradycardia  - Possibly secondary to medications or questionable anorexia.  - HR in the low 40s on admission.  Remains hemodynamically stable.  Monitor telemetry.  - TSH normal.  - Hold home amitriptyline and escitalopram.  - Patient denies anorexia, but is significantly underweight.  Will consult Nutritionist.  - Check 2D echo.  - Cardiology consult.    Dizziness  9/21:  Reports dizziness upon standing   Will order orthostatics and carotid duplex     Bradycardia  9/20:  Sinus julian on EKG  Cardio consulted  Troponin elevated  Echo pending     9/21:  Discuss case with cardio  Will obtain ambulatory HR  Continue to monitor     Body mass index (BMI) 19.9 or less, adult  - BMI of 16.17.  Patient reports she has been "very tiny" her whole life.  Denies current or h/o eating disorder.  - Nutrition consult.    9/20:  Will follow dietary recommendations  Need to check phosphorus  Other electrolytes normal     Nonspecific troponin release  - Troponin minimally elevated at 0.027.  No ischemic changes on EKG.  Chest pain free on admission.  - Trend serial cardiac enzymes.  - Further recs pending clinical course.    Tobacco use  - Counseled on cessation.      VTE Risk Mitigation (From admission, onward)        Ordered     IP VTE LOW RISK PATIENT  Once      09/19/19 2204     Place sequential compression device  Until discontinued      09/19/19 2204                Manuel Huber MD  Department of Hospital Medicine   [facility]  "

## 2019-09-21 NOTE — PT/OT/SLP PROGRESS
Physical Therapy      Patient Name:  Yvonne Avila   MRN:  [id number]    Patient not seen today secondary to unavailable due to several tests being done. Will follow-up on next sesison.    Herb Wise, PT

## 2019-09-21 NOTE — PLAN OF CARE
Problem: Adult Inpatient Plan of Care  Goal: Plan of Care Review  Discussed Plan of Care with patient and verbalized understanding - Patient remains AAOx4 - remains free of falls, accidents and trauma during the day shift. Bed is in the low position and the call light is within reach. Bilateral Carotid US completed. Will continue to monitor

## 2019-09-21 NOTE — ASSESSMENT & PLAN NOTE
-Patient presented with generalized weakness, nausea, vomiting, SOB, and atypical chest pain after starting Lexapro  -Symptoms have resolved  -HR improved; may have had partial vagal response from n/v episodes  -No indication for PPM at present time  -2D echo pending  -OP Holter monitor    9/21/19  See plan for bradycardia

## 2019-09-21 NOTE — SUBJECTIVE & OBJECTIVE
Review of Systems   Constitution: Positive for malaise/fatigue. Negative for diaphoresis, weight gain and weight loss.   HENT: Negative for congestion and nosebleeds.    Cardiovascular: Negative for chest pain, claudication, cyanosis, dyspnea on exertion, irregular heartbeat, leg swelling, near-syncope, orthopnea, palpitations, paroxysmal nocturnal dyspnea and syncope.   Respiratory: Negative for cough, hemoptysis, shortness of breath, sleep disturbances due to breathing, snoring, sputum production and wheezing.    Hematologic/Lymphatic: Negative for bleeding problem. Does not bruise/bleed easily.   Skin: Negative for rash.   Musculoskeletal: Negative for arthritis, back pain, falls, joint pain, muscle cramps and muscle weakness.   Gastrointestinal: Positive for heartburn. Negative for abdominal pain, constipation, diarrhea, hematemesis, hematochezia, melena, nausea and vomiting.   Genitourinary: Negative for dysuria, hematuria and nocturia.   Neurological: Positive for dizziness and light-headedness. Negative for excessive daytime sleepiness, headaches, loss of balance, numbness, vertigo and weakness.     Objective:     Vital Signs (Most Recent):  Temp: 98.8 °F (37.1 °C) (09/21/19 1121)  Pulse: (!) 48 (09/21/19 1121)  Resp: 18 (09/21/19 1121)  BP: 133/79 (09/21/19 1121)  SpO2: 98 % (09/21/19 1121) Vital Signs (24h Range):  Temp:  [97 °F (36.1 °C)-98.8 °F (37.1 °C)] 98.8 °F (37.1 °C)  Pulse:  [44-54] 48  Resp:  [18] 18  SpO2:  [97 %-99 %] 98 %  BP: (104-134)/(58-79) 133/79     Weight: 41.4 kg (91 lb 4.3 oz)  Body mass index is 16.17 kg/m².     SpO2: 98 %  O2 Device (Oxygen Therapy): room air      Intake/Output Summary (Last 24 hours) at 9/21/2019 1141  Last data filed at 9/21/2019 0600  Gross per 24 hour   Intake 270 ml   Output 1100 ml   Net -830 ml       Lines/Drains/Airways     Peripheral Intravenous Line                 Peripheral IV - Single Lumen 09/19/19 1720 20 G Left Antecubital 1 day                 Physical Exam   Constitutional: She is oriented to person, place, and time. She appears well-developed and well-nourished. No distress.   Thin-appearing   HENT:   Head: Normocephalic and atraumatic.   Eyes: Pupils are equal, round, and reactive to light. Right eye exhibits no discharge. Left eye exhibits no discharge.   Neck: Neck supple. No JVD present.   Cardiovascular: Regular rhythm, S1 normal, S2 normal and normal heart sounds. Bradycardia present.   No murmur heard.  Pulmonary/Chest: Effort normal and breath sounds normal. No respiratory distress. She has no wheezes. She has no rales.   Abdominal: Soft. She exhibits no distension.   Musculoskeletal: She exhibits no edema.   Neurological: She is alert and oriented to person, place, and time.   Skin: Skin is warm and dry. She is not diaphoretic. No erythema.   Psychiatric: She has a normal mood and affect. Her behavior is normal. Thought content normal.   Nursing note and vitals reviewed.      Significant Labs:   All pertinent lab results from the last 24 hours have been reviewed. and   Recent Lab Results     None          Significant Imaging: Echocardiogram:   2D echo with color flow doppler:   Results for orders placed or performed during the hospital encounter of 09/19/19   2D echo with color flow doppler   Result Value Ref Range    QEF 60 55 - 65    Diastolic Dysfunction No     Est. PA Systolic Pressure 25.09     Pericardial Effusion TRIVIAL     Mitral Valve Mobility NORMAL     Tricuspid Valve Regurgitation MILD     Narrative    Date of Procedure: 09/20/2019        TEST DESCRIPTION   Technical Quality: This is a technically adequate study.     General: The patient was bradycardic throughout the study.    Aorta: The aortic root is normal in size, measuring 2.4 cm at sinotubular junction and 2.7 cm at Sinuses of Valsalva. The proximal ascending aorta is normal in size, measuring 2.3 cm across.     Left Atrium: The left atrial volume index is normal,  measuring 6.93 cc/m2.     Left Ventricle: The left ventricle is normal in size, with an end-diastolic diameter of 3.5 cm, and an end-systolic diameter of 2.4 cm. LV wall thickness is normal, with the septum and the posterior wall each measuring 1.0 cm across. Relative wall   thickness was increased at 0.57, and the LV mass index was 83.2 g/m2 consistent with concentric remodeling. There are no regional wall motion abnormalities. Left ventricular systolic function appears normal. Visually estimated ejection fraction is   60-65%. The LV Doppler derived stroke volume equals 55.0 ccs.     Diastolic indices: E wave velocity 0.8 m/s, E/A ratio 1.2,  msec., E/e' ratio(avg) 8. Diastolic function is normal.     Right Atrium: The right atrium is normal in size, measuring 3.4 cm in length and 1.5 cm in width in the apical view.     Right Ventricle: The right ventricle is normal in size. Global right ventricular systolic function appears normal. Tricuspid annular plane systolic excursion (TAPSE) is 1.5 cm. The estimated PA systolic pressure is 25 mmHg.     Aortic Valve:  The aortic valve is normal in structure with normal leaflet mobility. The mean gradient obtained across the aortic valve is 5 mmHg.     Mitral Valve:  The mitral valve is normal in structure with normal leaflet mobility. The pressure half time is 59 msec. The calculated mitral valve area is 3.73 cm2.     Tricuspid Valve:  The tricuspid valve is normal in structure with normal leaflet mobility. There is mild tricuspid regurgitation.     Pulmonary Valve:  The pulmonic valve is normal in structure with normal leaflet mobility.     Pericardium: There is evidence of a trivial pericardial effusion.     IVC: IVC is normal in size and collapses > 50% with a sniff, suggesting normal right atrial pressure of 3 mmHg.     Intracavitary: There is no evidence of intracavity mass, thrombi, or vegetation.         CONCLUSIONS     1 - Normal left ventricular systolic  function (EF 60-65%).     2 - Normal left ventricular diastolic function.     3 - Normal right ventricular systolic function .     4 - The estimated PA systolic pressure is 25 mmHg.     5 - Mild tricuspid regurgitation.     6 - Concentric remodeling.     7 - No wall motion abnormalities.     8 - Trivial pericardial effusion.             This document has been electronically    SIGNED BY: Pablo Hua MD On: 09/20/2019 10:42

## 2019-09-21 NOTE — ASSESSMENT & PLAN NOTE
Continue current medical therapy for now  Recommend ambulating in [place] to assess HR response   If HR < 60 bpm will continue to monitor for now  Could be having be vagal response from her N/V

## 2019-09-21 NOTE — HOSPITAL COURSE
9/21/19- Patient dizzy this morning while brushing her teeth. No syncope. HR in the 40's-50's  on tele this morning. Has no chest pain. Echo shows normal LVF 60% with no WMA.     9/22/19- dizziness improved this morning. HR up to 60's with ambulation. GERD symptoms improved with TUMS. BP stable

## 2019-09-22 VITALS
OXYGEN SATURATION: 99 % | RESPIRATION RATE: 18 BRPM | WEIGHT: 96.56 LBS | BODY MASS INDEX: 17.11 KG/M2 | HEART RATE: 50 BPM | HEIGHT: 63 IN | TEMPERATURE: 98 F | SYSTOLIC BLOOD PRESSURE: 123 MMHG | DIASTOLIC BLOOD PRESSURE: 76 MMHG

## 2019-09-22 PROBLEM — R79.89 ELEVATED TROPONIN: Status: RESOLVED | Noted: 2019-09-19 | Resolved: 2019-09-22

## 2019-09-22 PROBLEM — R00.1 SINUS BRADYCARDIA: Status: RESOLVED | Noted: 2019-09-11 | Resolved: 2019-09-22

## 2019-09-22 PROBLEM — R42 DIZZINESS: Status: RESOLVED | Noted: 2019-09-21 | Resolved: 2019-09-22

## 2019-09-22 LAB
ANION GAP SERPL CALC-SCNC: 10 MMOL/L (ref 8–16)
BASOPHILS # BLD AUTO: 0.01 K/UL (ref 0–0.2)
BASOPHILS NFR BLD: 0.1 % (ref 0–1.9)
BUN SERPL-MCNC: 16 MG/DL (ref 6–20)
CALCIUM SERPL-MCNC: 10 MG/DL (ref 8.7–10.5)
CHLORIDE SERPL-SCNC: 104 MMOL/L (ref 95–110)
CO2 SERPL-SCNC: 29 MMOL/L (ref 23–29)
CREAT SERPL-MCNC: 1 MG/DL (ref 0.5–1.4)
DIFFERENTIAL METHOD: ABNORMAL
EOSINOPHIL # BLD AUTO: 0.2 K/UL (ref 0–0.5)
EOSINOPHIL NFR BLD: 2.8 % (ref 0–8)
ERYTHROCYTE [DISTWIDTH] IN BLOOD BY AUTOMATED COUNT: 12.6 % (ref 11.5–14.5)
EST. GFR  (AFRICAN AMERICAN): >60 ML/MIN/1.73 M^2
EST. GFR  (NON AFRICAN AMERICAN): >60 ML/MIN/1.73 M^2
GLUCOSE SERPL-MCNC: 116 MG/DL (ref 70–110)
HCT VFR BLD AUTO: 36.6 % (ref 37–48.5)
HGB BLD-MCNC: 12.3 G/DL (ref 12–16)
LYMPHOCYTES # BLD AUTO: 2.2 K/UL (ref 1–4.8)
LYMPHOCYTES NFR BLD: 27.3 % (ref 18–48)
MCH RBC QN AUTO: 29.6 PG (ref 27–31)
MCHC RBC AUTO-ENTMCNC: 33.6 G/DL (ref 32–36)
MCV RBC AUTO: 88 FL (ref 82–98)
MONOCYTES # BLD AUTO: 0.8 K/UL (ref 0.3–1)
MONOCYTES NFR BLD: 10.2 % (ref 4–15)
NEUTROPHILS # BLD AUTO: 4.7 K/UL (ref 1.8–7.7)
NEUTROPHILS NFR BLD: 59.9 % (ref 38–73)
PLATELET # BLD AUTO: 182 K/UL (ref 150–350)
PMV BLD AUTO: 10.6 FL (ref 9.2–12.9)
POTASSIUM SERPL-SCNC: 4.2 MMOL/L (ref 3.5–5.1)
RBC # BLD AUTO: 4.16 M/UL (ref 4–5.4)
SODIUM SERPL-SCNC: 143 MMOL/L (ref 136–145)
WBC # BLD AUTO: 7.87 K/UL (ref 3.9–12.7)

## 2019-09-22 PROCEDURE — 97161 PT EVAL LOW COMPLEX 20 MIN: CPT

## 2019-09-22 PROCEDURE — 99233 SBSQ HOSP IP/OBS HIGH 50: CPT | Mod: ,,, | Performed by: INTERNAL MEDICINE

## 2019-09-22 PROCEDURE — 99233 PR SUBSEQUENT HOSPITAL CARE,LEVL III: ICD-10-PCS | Mod: ,,, | Performed by: INTERNAL MEDICINE

## 2019-09-22 PROCEDURE — 25000003 PHARM REV CODE 250: Performed by: NURSE PRACTITIONER

## 2019-09-22 PROCEDURE — 85025 COMPLETE CBC W/AUTO DIFF WBC: CPT

## 2019-09-22 PROCEDURE — 80048 BASIC METABOLIC PNL TOTAL CA: CPT

## 2019-09-22 PROCEDURE — 36415 COLL VENOUS BLD VENIPUNCTURE: CPT

## 2019-09-22 PROCEDURE — 25000003 PHARM REV CODE 250: Performed by: FAMILY MEDICINE

## 2019-09-22 RX ORDER — POLYETHYLENE GLYCOL 3350 17 G/17G
17 POWDER, FOR SOLUTION ORAL DAILY
Qty: 30 PACKET | Refills: 0 | Status: SHIPPED | OUTPATIENT
Start: 2019-09-22 | End: 2019-10-22

## 2019-09-22 RX ADMIN — PANTOPRAZOLE SODIUM 40 MG: 40 TABLET, DELAYED RELEASE ORAL at 10:09

## 2019-09-22 RX ADMIN — CALCIUM CARBONATE (ANTACID) CHEW TAB 500 MG 1000 MG: 500 CHEW TAB at 02:09

## 2019-09-22 RX ADMIN — ALPRAZOLAM 0.25 MG: 0.25 TABLET ORAL at 07:09

## 2019-09-22 NOTE — PT/OT/SLP EVAL
Physical Therapy Evaluation and Discharge Note    Patient Name:  Yvonne Avila   MRN:  [id number]    Recommendations:     Discharge Recommendations:  home   Discharge Equipment Recommendations: none   Barriers to discharge: None    Assessment:     Yvonne Avila is a 58 y.o. female admitted with a medical diagnosis of Sinus bradycardia. .  At this time, patient is functioning at their prior level of function and does not require further acute PT services.     Recent Surgery: * No surgery found *      Plan:     During this hospitalization, patient does not require further acute PT services.  Please re-consult if situation changes.      Subjective     Chief Complaint: occasional dizziness  Patient/Family Comments/goals: to go home  Pain/Comfort:  · Pain Rating 1: 0/10    Patients cultural, spiritual, Advent conflicts given the current situation:      Living Environment:  Lives with ; no steps to enter  Prior to admission, patients level of function was indep.  Equipment used at home: none.  DME owned (not currently used): none.  Upon discharge, patient will have assistance from .    Objective:     Communicated with STAN Kaye prior to session.  Patient found HOB elevated with telemetry upon PT entry to room.    General Precautions: Standard,     Orthopedic Precautions:N/A   Braces: N/A     Exams:  · B LE rom/strength wfl    Functional Mobility:  · All mobility sup or less no LOB; amb in halls >150ft    AM-PAC 6 CLICK MOBILITY  Total Score:21       Therapeutic Activities and Exercises:   PT educated patient on POC, role of PT, le exs to do in bed/chair and safety/fall precautions with mobility.    AM-PAC 6 CLICK MOBILITY  Total Score:21     Patient left HOB elevated with all lines intact, call button in reach, RN notified and  present.    GOALS:   Multidisciplinary Problems     Physical Therapy Goals     Not on file                History:     Past Medical History:   Diagnosis Date     Anxiety     Bradycardia 9/19/2019    GERD (gastroesophageal reflux disease)        History reviewed. No pertinent surgical history.    Time Tracking:     PT Received On: 09/21/19  PT Start Time: 0915     PT Stop Time: 0925  PT Total Time (min): 10 min     Billable Minutes: Evaluation 10      Herb Wise, PT  09/22/2019

## 2019-09-22 NOTE — SUBJECTIVE & OBJECTIVE
Review of Systems   Constitution: Negative for diaphoresis, malaise/fatigue, weight gain and weight loss.   HENT: Negative for congestion and nosebleeds.    Cardiovascular: Negative for chest pain, claudication, cyanosis, dyspnea on exertion, irregular heartbeat, leg swelling, near-syncope, orthopnea, palpitations, paroxysmal nocturnal dyspnea and syncope.   Respiratory: Negative for cough, hemoptysis, shortness of breath, sleep disturbances due to breathing, snoring, sputum production and wheezing.    Hematologic/Lymphatic: Negative for bleeding problem. Does not bruise/bleed easily.   Skin: Negative for rash.   Musculoskeletal: Negative for arthritis, back pain, falls, joint pain, muscle cramps and muscle weakness.   Gastrointestinal: Negative for abdominal pain, constipation, diarrhea, heartburn, hematemesis, hematochezia, melena, nausea and vomiting.   Genitourinary: Negative for dysuria, hematuria and nocturia.   Neurological: Negative for excessive daytime sleepiness, dizziness, headaches, light-headedness, loss of balance, numbness, vertigo and weakness.     Objective:     Vital Signs (Most Recent):  Temp: 98.2 °F (36.8 °C) (09/22/19 1112)  Pulse: (!) 54 (09/22/19 1112)  Resp: 18 (09/22/19 1112)  BP: 111/61 (09/22/19 1112)  SpO2: 99 % (09/22/19 1112) Vital Signs (24h Range):  Temp:  [97 °F (36.1 °C)-98.2 °F (36.8 °C)] 98.2 °F (36.8 °C)  Pulse:  [47-63] 54  Resp:  [18-20] 18  SpO2:  [97 %-100 %] 99 %  BP: (107-127)/(57-75) 111/61     Weight: 43.8 kg (96 lb 9 oz)  Body mass index is 17.11 kg/m².     SpO2: 99 %  O2 Device (Oxygen Therapy): room air      Intake/Output Summary (Last 24 hours) at 9/22/2019 1209  Last data filed at 9/22/2019 0500  Gross per 24 hour   Intake 480 ml   Output 100 ml   Net 380 ml       Lines/Drains/Airways     Peripheral Intravenous Line                 Peripheral IV - Single Lumen 09/19/19 1720 20 G Left Antecubital 2 days                Physical Exam   Constitutional: She is  oriented to person, place, and time. She appears well-developed and well-nourished. No distress.   Thin-appearing   HENT:   Head: Normocephalic and atraumatic.   Eyes: Pupils are equal, round, and reactive to light. Right eye exhibits no discharge. Left eye exhibits no discharge.   Neck: Neck supple. No JVD present.   Cardiovascular: Regular rhythm, S1 normal, S2 normal and normal heart sounds. Bradycardia present.   No murmur heard.  Pulmonary/Chest: Effort normal and breath sounds normal. No respiratory distress. She has no wheezes. She has no rales.   Abdominal: Soft. She exhibits no distension.   Musculoskeletal: She exhibits no edema.   Neurological: She is alert and oriented to person, place, and time.   Skin: Skin is warm and dry. She is not diaphoretic. No erythema.   Psychiatric: She has a normal mood and affect. Her behavior is normal. Thought content normal.   Nursing note and vitals reviewed.      Significant Labs:   All pertinent lab results from the last 24 hours have been reviewed. and   Recent Lab Results       09/22/19  0434        Anion Gap 10     Baso # 0.01     Basophil% 0.1     BUN, Bld 16     Calcium 10.0     Chloride 104     CO2 29     Creatinine 1.0     Differential Method Automated     eGFR if  >60     eGFR if non  >60  Comment:  Calculation used to obtain the estimated glomerular filtration  rate (eGFR) is the CKD-EPI equation.        Eos # 0.2     Eosinophil% 2.8     Glucose 116     Gran # (ANC) 4.7     Gran% 59.9     Hematocrit 36.6     Hemoglobin 12.3     Lymph # 2.2     Lymph% 27.3     MCH 29.6     MCHC 33.6     MCV 88     Mono # 0.8     Mono% 10.2     MPV 10.6     Platelets 182     Potassium 4.2     RBC 4.16     RDW 12.6     Sodium 143     WBC 7.87           Significant Imaging: Echocardiogram:   2D echo with color flow doppler:   Results for orders placed or performed during the hospital encounter of 09/19/19   2D echo with color flow doppler   Result  Value Ref Range    QEF 60 55 - 65    Diastolic Dysfunction No     Est. PA Systolic Pressure 25.09     Pericardial Effusion TRIVIAL     Mitral Valve Mobility NORMAL     Tricuspid Valve Regurgitation MILD     Narrative    Date of Procedure: 09/20/2019        TEST DESCRIPTION   Technical Quality: This is a technically adequate study.     General: The patient was bradycardic throughout the study.    Aorta: The aortic root is normal in size, measuring 2.4 cm at sinotubular junction and 2.7 cm at Sinuses of Valsalva. The proximal ascending aorta is normal in size, measuring 2.3 cm across.     Left Atrium: The left atrial volume index is normal, measuring 6.93 cc/m2.     Left Ventricle: The left ventricle is normal in size, with an end-diastolic diameter of 3.5 cm, and an end-systolic diameter of 2.4 cm. LV wall thickness is normal, with the septum and the posterior wall each measuring 1.0 cm across. Relative wall   thickness was increased at 0.57, and the LV mass index was 83.2 g/m2 consistent with concentric remodeling. There are no regional wall motion abnormalities. Left ventricular systolic function appears normal. Visually estimated ejection fraction is   60-65%. The LV Doppler derived stroke volume equals 55.0 ccs.     Diastolic indices: E wave velocity 0.8 m/s, E/A ratio 1.2,  msec., E/e' ratio(avg) 8. Diastolic function is normal.     Right Atrium: The right atrium is normal in size, measuring 3.4 cm in length and 1.5 cm in width in the apical view.     Right Ventricle: The right ventricle is normal in size. Global right ventricular systolic function appears normal. Tricuspid annular plane systolic excursion (TAPSE) is 1.5 cm. The estimated PA systolic pressure is 25 mmHg.     Aortic Valve:  The aortic valve is normal in structure with normal leaflet mobility. The mean gradient obtained across the aortic valve is 5 mmHg.     Mitral Valve:  The mitral valve is normal in structure with normal leaflet mobility.  The pressure half time is 59 msec. The calculated mitral valve area is 3.73 cm2.     Tricuspid Valve:  The tricuspid valve is normal in structure with normal leaflet mobility. There is mild tricuspid regurgitation.     Pulmonary Valve:  The pulmonic valve is normal in structure with normal leaflet mobility.     Pericardium: There is evidence of a trivial pericardial effusion.     IVC: IVC is normal in size and collapses > 50% with a sniff, suggesting normal right atrial pressure of 3 mmHg.     Intracavitary: There is no evidence of intracavity mass, thrombi, or vegetation.         CONCLUSIONS     1 - Normal left ventricular systolic function (EF 60-65%).     2 - Normal left ventricular diastolic function.     3 - Normal right ventricular systolic function .     4 - The estimated PA systolic pressure is 25 mmHg.     5 - Mild tricuspid regurgitation.     6 - Concentric remodeling.     7 - No wall motion abnormalities.     8 - Trivial pericardial effusion.             This document has been electronically    SIGNED BY: Pablo Hua MD On: 09/20/2019 10:42

## 2019-09-22 NOTE — PROGRESS NOTES
[facility]  Cardiology  Progress Note    Patient Name: Yvonne Avila  MRN: [id number]  Admission Date: 9/19/2019  Hospital Length of Stay: 2 days  Code Status: Full Code   Attending Physician: Vandana Schaefer MD   Primary Care Physician: Merrick Palencia MD  Expected Discharge Date:   Principal Problem:Sinus bradycardia    Subjective:   Brief HPI:  Ms. Blankenship is a 58 year old female patient whose current medical conditions include anxiety, GERD, and tobacco abuse who presented to [facility] ED yesterday with a chief complaint of SOB over the past week. Associated symptoms included dizziness, nausea, vomiting, generalized weakness, and mild chest pain. Patient denied any palpitations, near syncope, or syncope. Reported symptoms onset after taking newly prescribed Lexapro. Initial workup in ED revealed marked sinus bradycardia and patient subsequently admitted for further evaluation and treatment. Cardiology consulted to assist with management. Patient seen and examined today, resting in bed. Feeling better. HR improved, now in 50's. States SOB and weakness have resolved. No chest pain. She reports compliance with her medications. BMI notably low, denies any history of eating disorder. Chart reviewed. Echo pending.    Hospital Course:   9/21/19- Patient dizzy this morning while brushing her teeth. No syncope. HR in the 40's-50's  on tele this morning. Has no chest pain. Echo shows normal LVF 60% with no WMA.     9/22/19- dizziness improved this morning. HR up to 60's with ambulation. GERD symptoms improved with TUMS. BP stable         Review of Systems   Constitution: Negative for diaphoresis, malaise/fatigue, weight gain and weight loss.   HENT: Negative for congestion and nosebleeds.    Cardiovascular: Negative for chest pain, claudication, cyanosis, dyspnea on exertion, irregular heartbeat, leg swelling, near-syncope, orthopnea, palpitations, paroxysmal nocturnal dyspnea and syncope.    Respiratory: Negative for cough, hemoptysis, shortness of breath, sleep disturbances due to breathing, snoring, sputum production and wheezing.    Hematologic/Lymphatic: Negative for bleeding problem. Does not bruise/bleed easily.   Skin: Negative for rash.   Musculoskeletal: Negative for arthritis, back pain, falls, joint pain, muscle cramps and muscle weakness.   Gastrointestinal: Negative for abdominal pain, constipation, diarrhea, heartburn, hematemesis, hematochezia, melena, nausea and vomiting.   Genitourinary: Negative for dysuria, hematuria and nocturia.   Neurological: Negative for excessive daytime sleepiness, dizziness, headaches, light-headedness, loss of balance, numbness, vertigo and weakness.     Objective:     Vital Signs (Most Recent):  Temp: 98.2 °F (36.8 °C) (09/22/19 1112)  Pulse: (!) 54 (09/22/19 1112)  Resp: 18 (09/22/19 1112)  BP: 111/61 (09/22/19 1112)  SpO2: 99 % (09/22/19 1112) Vital Signs (24h Range):  Temp:  [97 °F (36.1 °C)-98.2 °F (36.8 °C)] 98.2 °F (36.8 °C)  Pulse:  [47-63] 54  Resp:  [18-20] 18  SpO2:  [97 %-100 %] 99 %  BP: (107-127)/(57-75) 111/61     Weight: 43.8 kg (96 lb 9 oz)  Body mass index is 17.11 kg/m².     SpO2: 99 %  O2 Device (Oxygen Therapy): room air      Intake/Output Summary (Last 24 hours) at 9/22/2019 1209  Last data filed at 9/22/2019 0500  Gross per 24 hour   Intake 480 ml   Output 100 ml   Net 380 ml       Lines/Drains/Airways     Peripheral Intravenous Line                 Peripheral IV - Single Lumen 09/19/19 1720 20 G Left Antecubital 2 days                Physical Exam   Constitutional: She is oriented to person, place, and time. She appears well-developed and well-nourished. No distress.   Thin-appearing   HENT:   Head: Normocephalic and atraumatic.   Eyes: Pupils are equal, round, and reactive to light. Right eye exhibits no discharge. Left eye exhibits no discharge.   Neck: Neck supple. No JVD present.   Cardiovascular: Regular rhythm, S1 normal, S2  normal and normal heart sounds. Bradycardia present.   No murmur heard.  Pulmonary/Chest: Effort normal and breath sounds normal. No respiratory distress. She has no wheezes. She has no rales.   Abdominal: Soft. She exhibits no distension.   Musculoskeletal: She exhibits no edema.   Neurological: She is alert and oriented to person, place, and time.   Skin: Skin is warm and dry. She is not diaphoretic. No erythema.   Psychiatric: She has a normal mood and affect. Her behavior is normal. Thought content normal.   Nursing note and vitals reviewed.      Significant Labs:   All pertinent lab results from the last 24 hours have been reviewed. and   Recent Lab Results       09/22/19  0434        Anion Gap 10     Baso # 0.01     Basophil% 0.1     BUN, Bld 16     Calcium 10.0     Chloride 104     CO2 29     Creatinine 1.0     Differential Method Automated     eGFR if  >60     eGFR if non  >60  Comment:  Calculation used to obtain the estimated glomerular filtration  rate (eGFR) is the CKD-EPI equation.        Eos # 0.2     Eosinophil% 2.8     Glucose 116     Gran # (ANC) 4.7     Gran% 59.9     Hematocrit 36.6     Hemoglobin 12.3     Lymph # 2.2     Lymph% 27.3     MCH 29.6     MCHC 33.6     MCV 88     Mono # 0.8     Mono% 10.2     MPV 10.6     Platelets 182     Potassium 4.2     RBC 4.16     RDW 12.6     Sodium 143     WBC 7.87           Significant Imaging: Echocardiogram:   2D echo with color flow doppler:   Results for orders placed or performed during the hospital encounter of 09/19/19   2D echo with color flow doppler   Result Value Ref Range    QEF 60 55 - 65    Diastolic Dysfunction No     Est. PA Systolic Pressure 25.09     Pericardial Effusion TRIVIAL     Mitral Valve Mobility NORMAL     Tricuspid Valve Regurgitation MILD     Narrative    Date of Procedure: 09/20/2019        TEST DESCRIPTION   Technical Quality: This is a technically adequate study.     General: The patient was  bradycardic throughout the study.    Aorta: The aortic root is normal in size, measuring 2.4 cm at sinotubular junction and 2.7 cm at Sinuses of Valsalva. The proximal ascending aorta is normal in size, measuring 2.3 cm across.     Left Atrium: The left atrial volume index is normal, measuring 6.93 cc/m2.     Left Ventricle: The left ventricle is normal in size, with an end-diastolic diameter of 3.5 cm, and an end-systolic diameter of 2.4 cm. LV wall thickness is normal, with the septum and the posterior wall each measuring 1.0 cm across. Relative wall   thickness was increased at 0.57, and the LV mass index was 83.2 g/m2 consistent with concentric remodeling. There are no regional wall motion abnormalities. Left ventricular systolic function appears normal. Visually estimated ejection fraction is   60-65%. The LV Doppler derived stroke volume equals 55.0 ccs.     Diastolic indices: E wave velocity 0.8 m/s, E/A ratio 1.2,  msec., E/e' ratio(avg) 8. Diastolic function is normal.     Right Atrium: The right atrium is normal in size, measuring 3.4 cm in length and 1.5 cm in width in the apical view.     Right Ventricle: The right ventricle is normal in size. Global right ventricular systolic function appears normal. Tricuspid annular plane systolic excursion (TAPSE) is 1.5 cm. The estimated PA systolic pressure is 25 mmHg.     Aortic Valve:  The aortic valve is normal in structure with normal leaflet mobility. The mean gradient obtained across the aortic valve is 5 mmHg.     Mitral Valve:  The mitral valve is normal in structure with normal leaflet mobility. The pressure half time is 59 msec. The calculated mitral valve area is 3.73 cm2.     Tricuspid Valve:  The tricuspid valve is normal in structure with normal leaflet mobility. There is mild tricuspid regurgitation.     Pulmonary Valve:  The pulmonic valve is normal in structure with normal leaflet mobility.     Pericardium: There is evidence of a trivial  pericardial effusion.     IVC: IVC is normal in size and collapses > 50% with a sniff, suggesting normal right atrial pressure of 3 mmHg.     Intracavitary: There is no evidence of intracavity mass, thrombi, or vegetation.         CONCLUSIONS     1 - Normal left ventricular systolic function (EF 60-65%).     2 - Normal left ventricular diastolic function.     3 - Normal right ventricular systolic function .     4 - The estimated PA systolic pressure is 25 mmHg.     5 - Mild tricuspid regurgitation.     6 - Concentric remodeling.     7 - No wall motion abnormalities.     8 - Trivial pericardial effusion.             This document has been electronically    SIGNED BY: Pablo Hua MD On: 09/20/2019 10:42     Assessment and Plan:       * Symptomatic sinus bradycardia  -Patient presented with generalized weakness, nausea, vomiting, SOB, and atypical chest pain after starting Lexapro  -Symptoms have resolved  -HR improved; may have had partial vagal response from n/v episodes  -No indication for PPM at present time  -2D echo pending  -OP Holter monitor    9/21/19  See plan for bradycardia     Bradycardia  Continue current medical therapy for now  Recommend ambulating in [place] to assess HR response   If HR < 60 bpm will continue to monitor for now  Could be having be vagal response from her N/V     9/22/19  HR up to 60's with ambulation around nurses station   Dizziness improved.   Patient has been examined and is clear for DC from Cardiology standpoint  Will need to follow up in clinic in 1-2 weeks.   Clinic will call to arrange appt.     Body mass index (BMI) 19.9 or less, adult  -Denies history of eating disorder  -Recommended high calorie, protein rich meals/supplements    Nonspecific troponin release  -Mildly bumped troponin (0.027), has since normalized  -Secondary to demand ischemia  -EKG without acute ischemic changes  -Continue ASA  -Follow-up in clinic    Tobacco use  -Smoking cessation advised        VTE Risk  Mitigation (From admission, onward)         Ordered     IP VTE LOW RISK PATIENT  Once      09/19/19 2204     Place sequential compression device  Until discontinued      09/19/19 2204              Chart reviewed. Patient examined by Dr. Hua and agrees with plan that has been outlined.     Maricarmen Lopez, GUILLERMOP  Cardiology  [facility]

## 2019-09-22 NOTE — NURSING
Pt was given discharge instructions and verbalized understanding. Pt was educated on the importance of keeping all follow up appointments. Pt's IV and heart monitor was removed. Monitor was returned to monitor tech. Pt dressed herself and was wheeled down to front exit with .

## 2019-09-22 NOTE — PLAN OF CARE
Pt laying in bed comfortably. AAOx4, follows commands, VSS, SB on the monitor.   Lowest heart rate noted overnight is 46 bpm.  Pt experiences some mild dizziness with ambulation.  Ambulates with spouse in halls. Pt and spouse both educated regarding ambulation with bradycardia and dizziness. Pt states that dizziness is better than admit. Pt also instructed not to strain during bowel movements or with activity.  HR increased with ambulation to 60's.  No n/v/d noted.  Orthostatic vital signs (-)  Plan of care reviewed with pt and pts spouse.  Both verbalize understanding.  Bed in low position, side rails up x2, wheels locked, frequent weight shifting encouraged.  Call bell within reach. Encouraged/instructed pt to call for asssitance.

## 2019-09-22 NOTE — DISCHARGE SUMMARY
[facility] Hospital Medicine  Discharge Summary      Patient Name: Yvonne Avila  MRN: [id number]  Admission Date: 9/19/2019  Hospital Length of Stay: 2 days  Discharge Date and Time:  09/22/2019 4:26 PM  Attending Physician: Vandana Schaefer MD   Discharging Provider: Vandana Schaefer MD  Primary Care Provider: Merrick Palencia MD      HPI:   Ms. Pattie Avila is a 58 y.o. female  with a PMHx of anxiety, GERD and tobacco use who presented to the ED with c/o SOB x 1 week.  Associated dizziness, chest discomfort, nausea, and generalized weakness.  No alleviating factors.  She reports symptoms worsened after starting Lexapro 2 days ago.  She states symptoms are intermittent, waxing and waning over the past 1-2 weeks.  She was seen in the ED on 9/10, AMI was ruled out and she was diagnosed with sinus bradycardia.  She saw Dr. Hua (Cardiology) on 9/11 and was scheduled for 2D echo and Holter monitor next week.  Patient denies any palpitations, HA, AMS, lightheadedness, syncope, diaphoresis, visual disturbance, orthopnea, PND, edema, ABD pain, vomiting, diarrhea, dysuria, hematuria, back or neck pain, fatigue, numbness/tingling, fever or chills.  Upon arrival to the ED, EKG revealed marked sinus bradycardia with HR 37 bpm, incomplete RBBB, no acute ischemic ST-T changes.  Patient remained hemodynamically stable.  Initial work-up was unremarkable except for minimally elevated troponin of 0.027.  Hospital Medicine was called for admission.  Patient noted to be significantly underweight with BMI of 16.17, but denies any current or h/o eating disorder.      * No surgery found *      Hospital Course:   9/21:  Patient still reported dizziness when she stands, cardiology on case. Carotid u/s today     9/22/19-  Dizziness improved this morning. HR up to 60's with ambulation. GERD symptoms improved with TUMS. BP stable  .  Resting HR ranges 45 to 55 with  sinus rhythm   Carotid U/S done and revealed No  evidence of a hemodynamically significant carotid bifurcation stenosis.  Stenosis is 0-20% bilaterally.  Echo showed    1 - Normal left ventricular systolic function (EF 60-65%).     2 - Normal left ventricular diastolic function.     3 - Normal right ventricular systolic function .     4 - The estimated PA systolic pressure is 25 mmHg.     5 - Mild tricuspid regurgitation.     6 - Concentric remodeling.     7 - No wall motion abnormalities.     8 - Trivial pericardial effusion    At this point pt is deemed medically stable to be discharged home. She was advised to hold off on Amitriptyline and Lexapro. May take Alprazolam as needed for anxiety . She was advised to follow up with her PCP for further  evaluation of anti anxiety treatment. Buspar could be an option for generalized anxiety disorder.     Pt will follow up with Cardiology in one to two weeks  for discharge follow up.      Consults:   Consults (From admission, onward)        Status Ordering Provider     Inpatient consult to Cardiology  Once     Provider:  Pablo Hua MD    Completed SABINO CADENA     Inpatient consult to Registered Dietitian/Nutritionist  Once     Provider:  (Not yet assigned)    Completed SABINO CADENA          No new Assessment & Plan notes have been filed under this hospital service since the last note was generated.  Service: Hospital Medicine    Final Active Diagnoses:    Diagnosis Date Noted POA    Bradycardia [R00.1] 09/20/2019 Yes    Body mass index (BMI) 19.9 or less, adult [Z68.1] 09/19/2019 Yes     Chronic    Tobacco use [Z72.0] 09/11/2019 Yes     Chronic      Problems Resolved During this Admission:    Diagnosis Date Noted Date Resolved POA    PRINCIPAL PROBLEM:  Symptomatic sinus bradycardia [R00.1] 09/11/2019 09/22/2019 Yes    Dizziness [R42] 09/21/2019 09/22/2019 Yes    Nonspecific troponin release [R74.8] 09/19/2019 09/22/2019 Yes       Discharged Condition: stable    Disposition: Home or Self Care    Follow  Up:  Follow-up Information     Merrick Palencia MD. Schedule an appointment as soon as possible for a visit in 3 days.    Specialty:  Family Medicine  Why:  discharge follow up   Contact information:  [address] [facility]  [place] [address] [phone]             ERIN Block. Schedule an appointment as soon as possible for a visit in 1 week.    Specialty:  Cardiology  Why:  discharge follow up   Contact information:  [id number] [address] [facility]  [place] [id number] [phone]                 Patient Instructions:      Diet Adult Regular     Activity as tolerated       Significant Diagnostic Studies: Labs:   BMP:   Recent Labs   Lab 09/22/19 0434   *      K 4.2      CO2 29   BUN 16   CREATININE 1.0   CALCIUM 10.0   , CMP   Recent Labs   Lab 09/22/19 0434      K 4.2      CO2 29   *   BUN 16   CREATININE 1.0   CALCIUM 10.0   ANIONGAP 10   ESTGFRAFRICA >60   EGFRNONAA >60    and CBC   Recent Labs   Lab 09/22/19 0434   WBC 7.87   HGB 12.3   HCT 36.6*          Pending Diagnostic Studies:     None         Medications:  Reconciled Home Medications:      Medication List      START taking these medications    polyethylene glycol 17 gram Pwpk  Commonly known as:  GLYCOLAX  Take 17 g by mouth once daily. For constipation . Mix with one glass of water        CONTINUE taking these medications    ALPRAZolam 0.5 MG tablet  Commonly known as:  XANAX  Take 0.5 mg by mouth 2 (two) times daily as needed.     diphenhydrAMINE 50 MG capsule  Commonly known as:  BENADRYL  Take 50 mg by mouth every 6 (six) hours as needed for Itching.     multivitamin per tablet  Commonly known as:  THERAGRAN  Take 1 tablet by mouth once daily.     omeprazole 40 MG capsule  Commonly known as:  PRILOSEC  Take 40 mg by mouth.        STOP taking these medications    amitriptyline 10 MG tablet  Commonly known as:  ELAVIL     BC ORAL     clonazePAM 0.5 MG tablet  Commonly known as:  KLONOPIN      escitalopram oxalate 10 MG tablet  Commonly known as:  LEXAPRO     esomeprazole magnesium 10 mg Grps  Commonly known as:  NEXIUM     oxymetazoline 0.05 % nasal spray  Commonly known as:  AFRIN     SUPREP BOWEL PREP KIT 17.5-3.13-1.6 gram Solr  Generic drug:  sodium,potassium,mag sulfates     ZANTAC 300 MG tablet  Generic drug:  ranitidine            Indwelling Lines/Drains at time of discharge:   Lines/Drains/Airways     None                 Time spent on the discharge of patient: 30  minutes  Patient was seen and examined on the date of discharge and determined to be suitable for discharge.         Vandana Schaefer MD  Department of Hospital Medicine  [facility]

## 2019-09-24 ENCOUNTER — PATIENT OUTREACH (OUTPATIENT)
Dept: ADMINISTRATIVE | Facility: CLINIC | Age: 59
End: 2019-09-24

## 2019-09-24 NOTE — PROGRESS NOTES
Neda Erazo RN attempted to contact patient. No answer. The following message was left for the patient to return the call:  Good afternoon I am a nurse calling on behalf of [facility] from the [facility].  This is a Transitional Care Call for Yvonne Avila. When you have a moment please contact us at [phone] or [phone] Monday through Friday, between the hours of 8 am to 4 pm. We look forward to speaking with you. On behalf of [facility] have a nice day.    The patient does not have a scheduled HOSFU appointment within 7-14 days post hospital discharge date 9/22/19. Unable to route message to PCP staff to assist with HOSFU appointment scheduling / PCP listed is a non [facility] provider.

## 2019-09-25 NOTE — PATIENT INSTRUCTIONS
Bradycardia    When your heart rate is slow, less than 60 beats per minute, it is called bradycardia. Bradycardia can be normal, caused by medicines, or a sign of a disease. The slow heart rate may not be constant; it can come and go. It is a concern when it is very low, or you have symptoms.  Signs and symptoms  The following are signs and symptoms of bradycardia:  · Heart rate less than 60 per minute  · Dizziness or feeling lightheaded  · Weakness  · Trouble breathing  · Fainting  · Sleepiness  · More trouble exercising than usual because of fatigue  · Confusion or trouble concentrating  Causes  There are many causes of bradycardia. Some can be related to your heart, but some may be related to other factors.  Non-heart-related causes:  · Advanced age  · Side effect of certain medicines (such as beta-blockers, calcium channel blockers, digitalis, antiarrhythmic medicines like amiodarone, clonidine, lithium)  · Medical conditions such as hypoglycemia (low blood sugar), hypothyroidism (low thyroid), electrolyte disorder,  hypothermia, sleep apnea  · Athletes, especially long-distance runners, may have a slow heart rate. This can be normal.  · Sleep apnea  · Brain injury such as stroke or bleeding inside the brain  Heart-related causes:  · Coronary artery disease (angina or prior heart attack, also known as acute myocardial infarction, or AMI)  · Heart valve disease  · Heart muscle disease (cardiomyopathy)  · Congestive heart failure  · Sick sinus syndrome, which is when your heart's natural pacemaker is no longer working properly  · Diseases that infiltrate the heart such as sarcoid  · Heart infections  Sometimes the cause for the arrhythmia cannot be found.  Bradycardia that causes symptoms is sometimes reversible, and can be treated with medicines. When more severe bradycardia persists, a pacemaker is generally recommended. When the bradycardia does not cause symptoms, your doctor may decide to evaluate it in his  or her office.  Home care  The following will help you care for yourself at home:  · Resume your usual activities when you are feeling back to normal.  · If you develop any of the symptoms below during exertion, then you should not exert yourself until evaluated further by your doctor.  · Work with your doctor on any needed lifestyle changes, such as changing your diet, stopping smoking if you are a smoker, and a planned exercise program.  Follow-up care  Follow up with your doctor, or as advised.  Call 911  Call 911 if any of the following occur:  · Chest pain  · Trouble breathing  · Slow heart rate with dizziness or lightheadedness  · Fainting or loss of consciousness  · Chest, shoulder, arm, neck, or back pain  · Slow heart rate (under 50 beats per minute) if associated with symptoms  When to seek medical advice  Get prompt medical attention if any of the following occur:  · Occasional weakness, dizziness, or lightheadedness  Date Last Reviewed: 4/25/2016  © 7826-9652 [employer]. [address] [place] [address], [place] [address]. All rights reserved. This information is not intended as a substitute for professional medical care. Always follow your healthcare professional's instructions.

## 2019-09-26 ENCOUNTER — CLINICAL SUPPORT (OUTPATIENT)
Dept: CARDIOLOGY | Facility: CLINIC | Age: 59
End: 2019-09-26
Attending: INTERNAL MEDICINE
Payer: COMMERCIAL

## 2019-09-26 DIAGNOSIS — R07.9 CHEST PAIN, UNSPECIFIED TYPE: ICD-10-CM

## 2019-09-26 DIAGNOSIS — R00.2 PALPITATIONS: ICD-10-CM

## 2019-09-26 PROCEDURE — 93224 XTRNL ECG REC UP TO 48 HRS: CPT | Mod: S$GLB,,, | Performed by: INTERNAL MEDICINE

## 2019-09-26 PROCEDURE — 93224 HOLTER MONITOR - 48 HOUR: ICD-10-PCS | Mod: S$GLB,,, | Performed by: INTERNAL MEDICINE

## 2019-10-01 ENCOUNTER — TELEPHONE (OUTPATIENT)
Dept: CARDIOLOGY | Facility: CLINIC | Age: 59
End: 2019-10-01

## 2019-10-01 NOTE — TELEPHONE ENCOUNTER
Spoke with patient patient regarding normal result of heart monitor. Patient states understanding

## 2019-10-01 NOTE — TELEPHONE ENCOUNTER
----- Message from Pablo Hua MD sent at 10/1/2019  8:38 AM CDT -----  Please call patient regarding normal result of heart monitor. If he/she has any questions please let me know or have him/her schedule an appt to see me soon to discuss. Thank you

## 2019-10-10 ENCOUNTER — CLINICAL SUPPORT (OUTPATIENT)
Dept: CARDIOLOGY | Facility: CLINIC | Age: 59
End: 2019-10-10
Attending: INTERNAL MEDICINE
Payer: COMMERCIAL

## 2019-10-10 DIAGNOSIS — R07.9 CHEST PAIN, UNSPECIFIED TYPE: ICD-10-CM

## 2019-10-10 LAB — DIASTOLIC DYSFUNCTION: NO

## 2019-10-10 PROCEDURE — 93015 CARDIAC TREADMILL STRESS TEST: ICD-10-PCS | Mod: S$GLB,,, | Performed by: INTERNAL MEDICINE

## 2019-10-10 PROCEDURE — 93015 CV STRESS TEST SUPVJ I&R: CPT | Mod: S$GLB,,, | Performed by: INTERNAL MEDICINE

## 2019-10-11 ENCOUNTER — TELEPHONE (OUTPATIENT)
Dept: CARDIOLOGY | Facility: CLINIC | Age: 59
End: 2019-10-11

## 2019-10-11 DIAGNOSIS — R00.2 PALPITATIONS: Primary | ICD-10-CM

## 2019-10-11 RX ORDER — METOPROLOL SUCCINATE 25 MG/1
25 TABLET, EXTENDED RELEASE ORAL DAILY
Qty: 30 TABLET | Refills: 1 | Status: SHIPPED | OUTPATIENT
Start: 2019-10-11 | End: 2019-10-17

## 2019-10-11 NOTE — TELEPHONE ENCOUNTER
The patient has been notified of this information and all questions answered.      Pt stated that she had walked for about 6 minutes yesterday and when she checked her Pulse it was at 124. She then sat down and checked her pulse and it was fluctuating from 90-80-60.   Please Advise.

## 2019-10-11 NOTE — TELEPHONE ENCOUNTER
Patient states is having muscle weakness in throat and cannot swallow metoprolol succinate tablet--patient would like to have in capsule form--

## 2019-10-11 NOTE — TELEPHONE ENCOUNTER
----- Message from Pablo Hua MD sent at 10/10/2019  1:42 PM CDT -----  Please call patient regarding overall normal result of ECG stress test.  If he/she has any questions please let me know or have him/her schedule an appt to see me soon to discuss. Thank you

## 2019-10-11 NOTE — TELEPHONE ENCOUNTER
Pt would like to know If the metoprolol will interfere with her new medication sluoxetine 10 mg daily and xanex 0.5 mg.   Please advise.

## 2019-10-11 NOTE — TELEPHONE ENCOUNTER
The patient has been notified of this information and all questions answered. Pt verbalized understanding and will call back with any further questions.

## 2019-10-11 NOTE — TELEPHONE ENCOUNTER
Called to patient and informed that Dr. Hua has sent a new prescription to pharmacy for metoprolol succinate 25 mg capsule one capsule by mouth daily--all questions answered--patient verbalizes understanding of all information

## 2019-10-11 NOTE — TELEPHONE ENCOUNTER
----- Message from Hayley Johnson sent at 10/11/2019  2:59 PM CDT -----  Contact: pt   Patient would like a call back in reference to medication. She can be reached at [phone]            Thanks,  Hayley Johnson

## 2019-10-14 ENCOUNTER — TELEPHONE (OUTPATIENT)
Dept: CARDIOLOGY | Facility: CLINIC | Age: 59
End: 2019-10-14

## 2019-10-14 NOTE — TELEPHONE ENCOUNTER
----- Message from Nisa Cordova sent at 10/14/2019  9:58 AM CDT -----  Contact: LANCE CORCORAN/VERNON  CALLING CONCERNING IF THE METOPROLOL CAPSULE CAN BE CHANGED TO METOPROLOL ER TABLET WHICH IS MORE AFFORDABLE. PLEASE CALL LANCE @ [phone]. THANKS, LINDSAY

## 2019-10-14 NOTE — TELEPHONE ENCOUNTER
Returned call to Cam at pharmacy--states was making sure provider intended to send prescription for metoprolol xl capsules instead of tablets--informed that patient requested capsules due to problems swallowing the tablets--Cam verbalizes understanding

## 2019-10-17 ENCOUNTER — OFFICE VISIT (OUTPATIENT)
Dept: CARDIOLOGY | Facility: CLINIC | Age: 59
End: 2019-10-17
Payer: COMMERCIAL

## 2019-10-17 VITALS
BODY MASS INDEX: 16.41 KG/M2 | HEART RATE: 54 BPM | WEIGHT: 92.56 LBS | DIASTOLIC BLOOD PRESSURE: 70 MMHG | SYSTOLIC BLOOD PRESSURE: 100 MMHG

## 2019-10-17 DIAGNOSIS — R07.89 OTHER CHEST PAIN: Primary | ICD-10-CM

## 2019-10-17 DIAGNOSIS — Z72.0 TOBACCO USE: Chronic | ICD-10-CM

## 2019-10-17 DIAGNOSIS — R00.2 PALPITATIONS: ICD-10-CM

## 2019-10-17 DIAGNOSIS — R00.1 BRADYCARDIA: ICD-10-CM

## 2019-10-17 DIAGNOSIS — R13.19 OTHER DYSPHAGIA: ICD-10-CM

## 2019-10-17 DIAGNOSIS — F41.9 ANXIETY: ICD-10-CM

## 2019-10-17 PROCEDURE — 3008F BODY MASS INDEX DOCD: CPT | Mod: CPTII,S$GLB,, | Performed by: INTERNAL MEDICINE

## 2019-10-17 PROCEDURE — 3008F PR BODY MASS INDEX (BMI) DOCUMENTED: ICD-10-PCS | Mod: CPTII,S$GLB,, | Performed by: INTERNAL MEDICINE

## 2019-10-17 PROCEDURE — 99999 PR PBB SHADOW E&M-EST. PATIENT-LVL III: ICD-10-PCS | Mod: PBBFAC,,, | Performed by: INTERNAL MEDICINE

## 2019-10-17 PROCEDURE — 99999 PR PBB SHADOW E&M-EST. PATIENT-LVL III: CPT | Mod: PBBFAC,,, | Performed by: INTERNAL MEDICINE

## 2019-10-17 PROCEDURE — 99214 PR OFFICE/OUTPT VISIT, EST, LEVL IV, 30-39 MIN: ICD-10-PCS | Mod: S$GLB,,, | Performed by: INTERNAL MEDICINE

## 2019-10-17 PROCEDURE — 99214 OFFICE O/P EST MOD 30 MIN: CPT | Mod: S$GLB,,, | Performed by: INTERNAL MEDICINE

## 2019-10-17 RX ORDER — FLUOXETINE 10 MG/1
10 TABLET ORAL DAILY
Refills: 5 | COMMUNITY
Start: 2019-09-20 | End: 2020-02-17

## 2019-10-17 NOTE — PROGRESS NOTES
Subjective:   Patient ID:  Yvonne Avila is a 59 y.o. female who presents for cardiac consult of Dizziness (5 week f/u) and Shortness of Breath      HPI  The patient came in today for cardiac consult of Dizziness (5 week f/u) and Shortness of Breath      Yvonne Avila is a 59 y.o. female pt with GERD, Anxiety, tobacco use presents for follow up CV eval for chest pain/SOB.    9/11/19  Pt presented to the ER yesterday for CP. ECG with sinus julian V rate 49. Enzymes neg x 2, NTG caused worsening of CP.   She has been having intermittent chest pain for 2 weeks. Chest pain can lasts for a few sec, took BC which helped. She just started Nexium as well.   She does get lightheaded and feels like she may pass out.     10/17/19  ETT normal, Holter normal. Normal 2D ECHO. She still has some atypical CP and MORRIS. Has a lot of anxiety will refer to psych.    Patient feels no leg swelling, no PND,  no syncope, no CNS symptoms.    Patient has fairly good exercise tolerance.    Patient is compliant with medications.    FH - mother - MI s/p stents    9/10/19 ECG  sinus bradycardia- V rate 49  Biatrial enlargement    CONCLUSIONS     1 - Normal left ventricular systolic function (EF 60-65%).     2 - Normal left ventricular diastolic function.     3 - Normal right ventricular systolic function .     4 - The estimated PA systolic pressure is 25 mmHg.     5 - Mild tricuspid regurgitation.     6 - Concentric remodeling.     7 - No wall motion abnormalities.     8 - Trivial pericardial effusion.     TEST DESCRIPTION   PREDOMINANT RHYTHM  1. Sinus rhythm with heart rates varying between 47 and 103 bpm with an average of 61 bpm.   VENTRICULAR ARRHYTHMIAS  1. There were no PVCs. There was no ventricular ectopic activity.  SUPRA VENTRICULAR ARRHYTHMIAS  1. There were very rare PACs totalling 59 and averaging 1 per hour.  There were 4 monomorphic couplets.  2. There were no episodes of sustained supraventricular tachycardia.    EKG  Conclusions:    1. The EKG portion of this study is negative for ischemia at a moderate workload, and peak heart rate of 89 bpm (57% of predicted).   2. Exercise capacity is average.   3. Blood pressure response to exercise was normal (Presenting BP: 150/86 Peak BP: 144/91).   4. No significant arrhythmias were present.   5. There were no symptoms of chest discomfort or significant dyspnea throughout the protocol.   6. The Duke treadmill score was 6 suggesting a low probability for future cardiovascular events.    This document has been electronically    SIGNED BY: Pablo Hua MD On: 10/10/2019 12:59    Past Medical History:   Diagnosis Date    Anxiety     Bradycardia 9/19/2019    GERD (gastroesophageal reflux disease)        History reviewed. No pertinent surgical history.    Social History     Tobacco Use    Smoking status: Former Smoker     Packs/day: 0.50     Types: Cigarettes    Smokeless tobacco: Former User    Tobacco comment: 2 months ago    Substance Use Topics    Alcohol use: Never     Frequency: Never    Drug use: Never       Family History   Problem Relation Age of Onset    Heart attack Mother     Hypertension Mother     Heart disease Father     Heart attack Maternal Aunt     Heart attack Maternal Uncle        Patient's Medications   New Prescriptions    No medications on file   Previous Medications    ALPRAZOLAM (XANAX) 0.5 MG TABLET    Take 0.5 mg by mouth 2 (two) times daily as needed.    BUDESONIDE 0.6 MG/NORMAL SALINE 50 ML NASAL IRRIGATION    Patient to irrigate each nostril with 25ml twice daily.    DIPHENHYDRAMINE (BENADRYL) 50 MG CAPSULE    Take 50 mg by mouth every 6 (six) hours as needed for Insomnia.     FLUOXETINE 10 MG TAB    Take 10 mg by mouth once daily.    MULTIVITAMIN (THERAGRAN) PER TABLET    Take 1 tablet by mouth once daily.    OMEPRAZOLE (PRILOSEC) 40 MG CAPSULE    Take 40 mg by mouth.    POLYETHYLENE GLYCOL (GLYCOLAX) 17 GRAM PWPK    Take 17 g by mouth once daily.  For constipation . Mix with one glass of water   Modified Medications    No medications on file   Discontinued Medications    METOPROLOL SUCCINATE (TOPROL-XL) 25 MG 24 HR TABLET    Take 1 tablet (25 mg total) by mouth once daily.    METOPROLOL SUCCINATE 25 MG CSPX    Take 25 mg by mouth once daily.       Review of Systems   Constitutional: Negative.    HENT: Negative.    Eyes: Negative.    Respiratory: Positive for shortness of breath.    Cardiovascular: Positive for chest pain.   Gastrointestinal: Negative.    Genitourinary: Negative.    Musculoskeletal: Negative.    Skin: Negative.    Neurological: Positive for dizziness.   Endo/Heme/Allergies: Negative.    Psychiatric/Behavioral: The patient is nervous/anxious.    All 12 systems otherwise negative.      Wt Readings from Last 3 Encounters:   10/17/19 42 kg (92 lb 9.5 oz)   09/22/19 43.8 kg (96 lb 9 oz)   09/11/19 42 kg (92 lb 9.5 oz)     Temp Readings from Last 3 Encounters:   09/22/19 98.2 °F (36.8 °C) (Oral)   09/10/19 98.5 °F (36.9 °C) (Oral)   03/06/18 99.2 °F (37.3 °C) (Oral)     BP Readings from Last 3 Encounters:   10/17/19 100/70   09/22/19 123/76   09/11/19 117/77     Pulse Readings from Last 3 Encounters:   10/17/19 (!) 54   09/22/19 (!) 50   09/11/19 (!) 52       /70 (BP Location: Left arm, Patient Position: Sitting, BP Method: Small (Manual))   Pulse (!) 54   Wt 42 kg (92 lb 9.5 oz)   BMI 16.41 kg/m²     Objective:   Physical Exam   Constitutional: She is oriented to person, place, and time. She appears well-developed and well-nourished. No distress.   HENT:   Head: Normocephalic and atraumatic.   Nose: Nose normal.   Mouth/Throat: Oropharynx is clear and moist.   Eyes: Conjunctivae and EOM are normal. No scleral icterus.   Neck: Normal range of motion. Neck supple. No JVD present. No thyromegaly present.   Cardiovascular: Regular rhythm, S1 normal and S2 normal. Bradycardia present. Exam reveals no gallop, no S3, no S4 and no friction rub.    No murmur heard.  Pulmonary/Chest: Effort normal and breath sounds normal. No stridor. No respiratory distress. She has no wheezes. She has no rales. She exhibits no tenderness.   Abdominal: Soft. Bowel sounds are normal. She exhibits no distension and no mass. There is no tenderness. There is no rebound.   Genitourinary:   Genitourinary Comments: Deferred   Musculoskeletal: Normal range of motion. She exhibits no edema, tenderness or deformity.   Lymphadenopathy:     She has no cervical adenopathy.   Neurological: She is alert and oriented to person, place, and time. She exhibits normal muscle tone. Coordination normal.   Skin: Skin is warm and dry. No rash noted. She is not diaphoretic. No erythema. No pallor.   Psychiatric: She has a normal mood and affect. Her behavior is normal. Judgment and thought content normal.   Nursing note and vitals reviewed.      Lab Results   Component Value Date     09/22/2019    K 4.2 09/22/2019     09/22/2019    CO2 29 09/22/2019    BUN 16 09/22/2019    CREATININE 1.0 09/22/2019     (H) 09/22/2019    MG 2.2 09/19/2019    AST 14 09/19/2019    ALT 10 09/19/2019    ALBUMIN 4.3 09/19/2019    PROT 7.7 09/19/2019    BILITOT 0.6 09/19/2019    WBC 7.87 09/22/2019    HGB 12.3 09/22/2019    HCT 36.6 (L) 09/22/2019    MCV 88 09/22/2019     09/22/2019    INR 1.1 09/10/2019    TSH 2.365 09/19/2019    BNP 35 09/19/2019     Assessment:      1. Other chest pain    2. Palpitations    3. Bradycardia    4. Tobacco use    5. Other dysphagia    6. Anxiety        Plan:   1. Chest pain  - recent r/o ACS in ER  - ETT and ECHO - negative  - discussed non cardiac etiologies    2. GERD  - cont PPI  - f/u GI    3. Sinus bradycardia with occ palpitations   - check Holter - negative  - TSH, T4 - normal   - hold BB    4. Tobacco use  - has quit smoking     5. Anxiety  - refer to psych    Thank you for allowing me to participate in this patient's care. Please do not hesitate to contact  me with any questions or concerns. Consult note has been forwarded to the referral physician.

## 2019-11-11 ENCOUNTER — INITIAL CONSULT (OUTPATIENT)
Dept: PSYCHIATRY | Facility: CLINIC | Age: 59
End: 2019-11-11
Payer: COMMERCIAL

## 2019-11-11 DIAGNOSIS — F40.01 PANIC DISORDER WITH AGORAPHOBIA: Primary | ICD-10-CM

## 2019-11-11 PROCEDURE — 90791 PR PSYCHIATRIC DIAGNOSTIC EVALUATION: ICD-10-PCS | Mod: S$GLB,,, | Performed by: SOCIAL WORKER

## 2019-11-11 PROCEDURE — 90791 PSYCH DIAGNOSTIC EVALUATION: CPT | Mod: S$GLB,,, | Performed by: SOCIAL WORKER
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
## 2019-11-18 ENCOUNTER — OFFICE VISIT (OUTPATIENT)
Dept: PSYCHIATRY | Facility: CLINIC | Age: 59
End: 2019-11-18
Payer: COMMERCIAL

## 2019-11-18 DIAGNOSIS — F40.01 PANIC DISORDER WITH AGORAPHOBIA: Primary | ICD-10-CM

## 2019-11-18 PROCEDURE — 90834 PR PSYCHOTHERAPY W/PATIENT, 45 MIN: ICD-10-PCS | Mod: S$GLB,,, | Performed by: SOCIAL WORKER

## 2019-11-18 PROCEDURE — 90834 PSYTX W PT 45 MINUTES: CPT | Mod: S$GLB,,, | Performed by: SOCIAL WORKER

## 2019-11-18 NOTE — PROGRESS NOTES
Individual Psychotherapy (PhD/LCSW)    11/18/2019    Site:  [facility]         Therapeutic Intervention: Met with patient.  Outpatient - Insight oriented psychotherapy 45 min - CPT code 77389    Chief complaint/reason for encounter: anxiety     Interval history and content of current session: Pt presents to first follow-up session with blunted affect and anxious mood. Continued to gather history and establish rapport. She reports she had two panic attacks since her first visit one week ago. She reports that seeing her heart rate stats from her fitness monitor triggered one of them. She feels her HR getting lower, which causes her to feel more anxious. She checks her Fitbit several times per day to monitor her HR and step count. She is awake for about two hours during the night before going back to sleep. Explored pt's current stressors and level of insight. She identified some rigidity in her thinking, perfectionism, and lack of assertiveness. 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 Discussed goals, benefits and possible risks of therapy. Educated pt re: the anxiety cycle and how CBT is used.    Treatment plan:  · Target symptoms: anxiety   · Why chosen therapy is appropriate versus another modality: relevant to diagnosis, evidence based practice  · Outcome monitoring methods: self-report, observation  · Therapeutic intervention type: insight oriented psychotherapy    Risk parameters:  Patient reports no suicidal ideation  Patient reports no homicidal ideation  Patient reports no self-injurious behavior  Patient reports no violent behavior    Verbal deficits: None    Patient's response to intervention:  The patient's response to intervention is accepting.    Progress toward goals and other mental status changes:  The patient's progress toward goals is fair .    Diagnosis:     ICD-10-CM ICD-9-CM   1. Panic disorder  with agoraphobia F40.01 300.21       Plan:  individual psychotherapy and medication management by physician    Return to clinic: 2 weeks    Length of Service (minutes): 45

## 2019-12-16 ENCOUNTER — OFFICE VISIT (OUTPATIENT)
Dept: PSYCHIATRY | Facility: CLINIC | Age: 59
End: 2019-12-16
Payer: COMMERCIAL

## 2019-12-16 DIAGNOSIS — F33.0 MAJOR DEPRESSIVE DISORDER, RECURRENT EPISODE, MILD: ICD-10-CM

## 2019-12-16 DIAGNOSIS — F40.01 PANIC DISORDER WITH AGORAPHOBIA: Primary | ICD-10-CM

## 2019-12-16 DIAGNOSIS — F41.1 GENERALIZED ANXIETY DISORDER: ICD-10-CM

## 2019-12-16 PROCEDURE — 90834 PSYTX W PT 45 MINUTES: CPT | Mod: S$GLB,,, | Performed by: SOCIAL WORKER

## 2019-12-16 PROCEDURE — 90834 PR PSYCHOTHERAPY W/PATIENT, 45 MIN: ICD-10-PCS | Mod: S$GLB,,, | Performed by: SOCIAL WORKER

## 2019-12-16 NOTE — PROGRESS NOTES
"Saumya Oliveros, LORRAINE  Outpatient Psychiatry  [facility] [place]               Individual Psychotherapy Progress Note (LCSW)     Outpatient - Insight oriented psychotherapy 45 min - CPT code 97900    12/16/2019  MRN: [id number]  Primary Care Provider: MINH Ryan    Yvonne Avila is a 59 y.o. female who presents today for follow-up of anxiety. Met with patient and spouse.        Subjective:       Patient report: "I'm about the same, still having panic attacks at least once a day."  Per , pt has only left the house once (other than doctor visits) in the past five months. Describes her as "very jittery" and much more irritable. She has not driven in the past five months. Pt has no interest in sex, doesn't want to do anything with family for the holidays. Still not sleeping well (awakens during the night, will turn on the TV and be up for an hour or two).     Current symptoms:   · Depression: depressed mood, diminished interest, insomnia, fatigue, altered libido and social isolation  · Anxiety: decreased memory, excessive anxiety/worry, restlessness/keyed up, irritability, muscle tension and panic attacks  · Substance abuse: denied  · Cognitive functioning: denied  · Lyndsay: none noted  · Psychosis: none noted    Psychosocial stressors and concerns discussed: relationships difficulties, work stress, difficulty managing affect in interpersonal relationships, building skills sets for symptom management, symptom recognition, financial stressors, legal stressors, sleep hygiene, referral to psychiatrist for medication eval      Objective:       Mental Status Evaluation  Appearance: age appropriate, thin & gaunt looking  Behavior: cooperative, restless and fidgety, eye contact minimal  Speech: normal tone, normal rate, normal volume, soft  Mood: anxious, depressed  Affect: flat  Thought Process: normal and logical  Thought Content: " normal, no suicidality, no homicidality, delusions, or paranoia  Sensorium: grossly intact  Cognition: grossly intact  Insight: fair  Judgment: adequate to circumstances    Risk parameters:  Patient reports no suicidal ideation  Patient reports no homicidal ideation  Patient reports no self-injurious behavior  Patient reports no violent behavior      Assessment & Plan:       Therapeutic interventions used: The reduction of overarousal and unnecessary avoidance were emphasized as treatment targets  Discussed examples of how unrealistic worry typically overestimates a probability of threats  Pt was taught about sleep hygiene practices to help establish a consistent sleep-wake cycle  Assessed pt's current and past mood episodes, including the features, frequency, intensity and duration  Assessed pt's level of insight toward the presenting problems  Monitored the effectiveness and side effects of antidepressant medication prescribed by physician/NP/MP  Assessed pt's risk for suicide  Assessed the severity of pt's impairment in social, relational, vocational and occupational endeavors    The patient's response to the interventions is accepting    The patient's progress toward treatment goals is fair     Homework assigned: read and implement tips from sleep hygiene handout, start yoga practice with hsb, engage in one pleasurable activity per day     Treatment plan:   A. Target symptoms: Depression, Anxiety and Poor Coping Skills   B. Therapeutic modalities: insight oriented psychotherapy, behavior modifying psychotherapy, supportive psychotherapy  C. Why chosen therapy is appropriate versus another modality: relevant to diagnosis, evidence based practice   D. Outcome monitoring methods: self-report, observation     Visit Diagnosis:   1. Panic disorder with agoraphobia    2. Generalized anxiety disorder    3. Major depressive disorder, recurrent episode, mild        Follow-up: individual psychotherapy and consult  psychiatrist for medication evaluation    Return to Clinic: 2 weeks    Length of Service (minutes): 45

## 2019-12-23 ENCOUNTER — OFFICE VISIT (OUTPATIENT)
Dept: CARDIOLOGY | Facility: CLINIC | Age: 59
End: 2019-12-23
Payer: COMMERCIAL

## 2019-12-23 VITALS
WEIGHT: 101.44 LBS | HEART RATE: 56 BPM | SYSTOLIC BLOOD PRESSURE: 110 MMHG | DIASTOLIC BLOOD PRESSURE: 80 MMHG | BODY MASS INDEX: 17.97 KG/M2

## 2019-12-23 DIAGNOSIS — Z72.0 TOBACCO USE: Chronic | ICD-10-CM

## 2019-12-23 DIAGNOSIS — K21.9 GASTROESOPHAGEAL REFLUX DISEASE, ESOPHAGITIS PRESENCE NOT SPECIFIED: ICD-10-CM

## 2019-12-23 DIAGNOSIS — R00.1 BRADYCARDIA: ICD-10-CM

## 2019-12-23 DIAGNOSIS — R00.2 PALPITATIONS: ICD-10-CM

## 2019-12-23 DIAGNOSIS — R07.89 OTHER CHEST PAIN: Primary | ICD-10-CM

## 2019-12-23 PROCEDURE — 3008F BODY MASS INDEX DOCD: CPT | Mod: CPTII,S$GLB,, | Performed by: INTERNAL MEDICINE

## 2019-12-23 PROCEDURE — 99999 PR PBB SHADOW E&M-EST. PATIENT-LVL III: ICD-10-PCS | Mod: PBBFAC,,, | Performed by: INTERNAL MEDICINE

## 2019-12-23 PROCEDURE — 99214 OFFICE O/P EST MOD 30 MIN: CPT | Mod: S$GLB,,, | Performed by: INTERNAL MEDICINE

## 2019-12-23 PROCEDURE — 99214 PR OFFICE/OUTPT VISIT, EST, LEVL IV, 30-39 MIN: ICD-10-PCS | Mod: S$GLB,,, | Performed by: INTERNAL MEDICINE

## 2019-12-23 PROCEDURE — 3008F PR BODY MASS INDEX (BMI) DOCUMENTED: ICD-10-PCS | Mod: CPTII,S$GLB,, | Performed by: INTERNAL MEDICINE

## 2019-12-23 PROCEDURE — 99999 PR PBB SHADOW E&M-EST. PATIENT-LVL III: CPT | Mod: PBBFAC,,, | Performed by: INTERNAL MEDICINE

## 2019-12-23 NOTE — PROGRESS NOTES
Subjective:   Patient ID:  Yvonne Avila is a 59 y.o. female who presents for cardiac consult of Shortness of Breath (2 month f/u)      Shortness of Breath   Associated symptoms include chest pain.     The patient came in today for cardiac consult of Shortness of Breath (2 month f/u)      Yvonne Avila is a 59 y.o. female pt with GERD, Anxiety, tobacco use presents for follow up CV eval for chest pain/SOB.    9/11/19  Pt presented to the ER yesterday for CP. ECG with sinus julian V rate 49. Enzymes neg x 2, NTG caused worsening of CP.   She has been having intermittent chest pain for 2 weeks. Chest pain can lasts for a few sec, took BC which helped. She just started Nexium as well.   She does get lightheaded and feels like she may pass out.     10/17/19  ETT normal, Holter normal. Normal 2D ECHO. She still has some atypical CP and MORRIS. Has a lot of anxiety will refer to psych.    12/23/19  She has been gaining weight, now > 101 lbs. She has mild MORRIS only with significant exertional. She still has occ panic attacks, saw psychologist. She has CP relief with PPI.     Patient feels no leg swelling, no PND,  no syncope, no CNS symptoms.    Patient has fairly good exercise tolerance.    Patient is compliant with medications.    FH - mother - MI s/p stents    9/10/19 ECG  sinus bradycardia- V rate 49  Biatrial enlargement    CONCLUSIONS     1 - Normal left ventricular systolic function (EF 60-65%).     2 - Normal left ventricular diastolic function.     3 - Normal right ventricular systolic function .     4 - The estimated PA systolic pressure is 25 mmHg.     5 - Mild tricuspid regurgitation.     6 - Concentric remodeling.     7 - No wall motion abnormalities.     8 - Trivial pericardial effusion.     TEST DESCRIPTION   PREDOMINANT RHYTHM  1. Sinus rhythm with heart rates varying between 47 and 103 bpm with an average of 61 bpm.   VENTRICULAR ARRHYTHMIAS  1. There were no PVCs. There was no ventricular ectopic  activity.  SUPRA VENTRICULAR ARRHYTHMIAS  1. There were very rare PACs totalling 59 and averaging 1 per hour.  There were 4 monomorphic couplets.  2. There were no episodes of sustained supraventricular tachycardia.    EKG Conclusions:    1. The EKG portion of this study is negative for ischemia at a moderate workload, and peak heart rate of 89 bpm (57% of predicted).   2. Exercise capacity is average.   3. Blood pressure response to exercise was normal (Presenting BP: 150/86 Peak BP: 144/91).   4. No significant arrhythmias were present.   5. There were no symptoms of chest discomfort or significant dyspnea throughout the protocol.   6. The Duke treadmill score was 6 suggesting a low probability for future cardiovascular events.    This document has been electronically    SIGNED BY: Pablo Hua MD On: 10/10/2019 12:59    Past Medical History:   Diagnosis Date    Anxiety     Bradycardia 9/19/2019    GERD (gastroesophageal reflux disease)        History reviewed. No pertinent surgical history.    Social History     Tobacco Use    Smoking status: Former Smoker     Packs/day: 0.50     Types: Cigarettes    Smokeless tobacco: Former User    Tobacco comment: 2 months ago    Substance Use Topics    Alcohol use: Never     Frequency: Never    Drug use: Never       Family History   Problem Relation Age of Onset    Heart attack Mother     Hypertension Mother     Heart disease Father     Heart attack Maternal Aunt     Heart attack Maternal Uncle        Patient's Medications   New Prescriptions    No medications on file   Previous Medications    ALPRAZOLAM (XANAX) 0.5 MG TABLET    Take 0.5 mg by mouth 2 (two) times daily as needed.    BUDESONIDE 0.6 MG/NORMAL SALINE 50 ML NASAL IRRIGATION    Patient to irrigate each nostril with 25ml twice daily.    DIPHENHYDRAMINE (BENADRYL) 50 MG CAPSULE    Take 50 mg by mouth every 6 (six) hours as needed for Insomnia.     FLUOXETINE 10 MG TAB    Take 10 mg by mouth once daily.     MULTIVITAMIN (THERAGRAN) PER TABLET    Take 1 tablet by mouth once daily.    OMEPRAZOLE (PRILOSEC) 40 MG CAPSULE    Take 40 mg by mouth.   Modified Medications    No medications on file   Discontinued Medications    No medications on file       Review of Systems   Constitutional: Negative.    HENT: Negative.    Eyes: Negative.    Respiratory: Positive for shortness of breath.    Cardiovascular: Positive for chest pain.   Gastrointestinal: Negative.    Genitourinary: Negative.    Musculoskeletal: Negative.    Skin: Negative.    Neurological: Positive for dizziness.   Endo/Heme/Allergies: Negative.    Psychiatric/Behavioral: The patient is nervous/anxious.    All 12 systems otherwise negative.      Wt Readings from Last 3 Encounters:   12/23/19 46 kg (101 lb 6.6 oz)   10/17/19 42 kg (92 lb 9.5 oz)   09/22/19 43.8 kg (96 lb 9 oz)     Temp Readings from Last 3 Encounters:   09/22/19 98.2 °F (36.8 °C) (Oral)   09/10/19 98.5 °F (36.9 °C) (Oral)   03/06/18 99.2 °F (37.3 °C) (Oral)     BP Readings from Last 3 Encounters:   12/23/19 110/80   10/17/19 100/70   09/22/19 123/76     Pulse Readings from Last 3 Encounters:   12/23/19 (!) 56   10/17/19 (!) 54   09/22/19 (!) 50       /80 (BP Location: Right arm, Patient Position: Sitting, BP Method: Medium (Manual))   Pulse (!) 56   Wt 46 kg (101 lb 6.6 oz)   BMI 17.97 kg/m²     Objective:   Physical Exam   Constitutional: She is oriented to person, place, and time. She appears well-developed and well-nourished. No distress.   HENT:   Head: Normocephalic and atraumatic.   Nose: Nose normal.   Mouth/Throat: Oropharynx is clear and moist.   Eyes: Conjunctivae and EOM are normal. No scleral icterus.   Neck: Normal range of motion. Neck supple. No JVD present. No thyromegaly present.   Cardiovascular: Regular rhythm, S1 normal and S2 normal. Bradycardia present. Exam reveals no gallop, no S3, no S4 and no friction rub.   No murmur heard.  Pulmonary/Chest: Effort normal and  breath sounds normal. No stridor. No respiratory distress. She has no wheezes. She has no rales. She exhibits no tenderness.   Abdominal: Soft. Bowel sounds are normal. She exhibits no distension and no mass. There is no tenderness. There is no rebound.   Genitourinary:   Genitourinary Comments: Deferred   Musculoskeletal: Normal range of motion. She exhibits no edema, tenderness or deformity.   Lymphadenopathy:     She has no cervical adenopathy.   Neurological: She is alert and oriented to person, place, and time. She exhibits normal muscle tone. Coordination normal.   Skin: Skin is warm and dry. No rash noted. She is not diaphoretic. No erythema. No pallor.   Psychiatric: She has a normal mood and affect. Her behavior is normal. Judgment and thought content normal.   Nursing note and vitals reviewed.      Lab Results   Component Value Date     09/22/2019    K 4.2 09/22/2019     09/22/2019    CO2 29 09/22/2019    BUN 16 09/22/2019    CREATININE 1.0 09/22/2019     (H) 09/22/2019    MG 2.2 09/19/2019    AST 14 09/19/2019    ALT 10 09/19/2019    ALBUMIN 4.3 09/19/2019    PROT 7.7 09/19/2019    BILITOT 0.6 09/19/2019    WBC 7.87 09/22/2019    HGB 12.3 09/22/2019    HCT 36.6 (L) 09/22/2019    MCV 88 09/22/2019     09/22/2019    INR 1.1 09/10/2019    TSH 2.365 09/19/2019    BNP 35 09/19/2019     Assessment:      1. Other chest pain    2. Tobacco use    3. Gastroesophageal reflux disease, esophagitis presence not specified    4. Palpitations    5. Bradycardia        Plan:   1. Chest pain  - ETT and ECHO - negative  - discussed non cardiac etiologies    2. GERD  - cont PPI  - f/u GI    3. Sinus bradycardia with occ palpitations   - Holter - negative  - TSH, T4 - normal   - hold BB    4. Tobacco use  - has quit smoking     5. Anxiety  - referred to psych    Thank you for allowing me to participate in this patient's care. Please do not hesitate to contact me with any questions or concerns. Consult  note has been forwarded to the referral physician.

## 2020-01-13 ENCOUNTER — OFFICE VISIT (OUTPATIENT)
Dept: PSYCHIATRY | Facility: CLINIC | Age: 60
End: 2020-01-13
Payer: COMMERCIAL

## 2020-01-13 DIAGNOSIS — F40.01 PANIC DISORDER WITH AGORAPHOBIA: Primary | ICD-10-CM

## 2020-01-13 DIAGNOSIS — F41.1 GENERALIZED ANXIETY DISORDER: ICD-10-CM

## 2020-01-13 DIAGNOSIS — F33.0 MAJOR DEPRESSIVE DISORDER, RECURRENT EPISODE, MILD: ICD-10-CM

## 2020-01-13 PROCEDURE — 90834 PSYTX W PT 45 MINUTES: CPT | Mod: S$GLB,,, | Performed by: SOCIAL WORKER

## 2020-01-13 PROCEDURE — 90834 PR PSYCHOTHERAPY W/PATIENT, 45 MIN: ICD-10-PCS | Mod: S$GLB,,, | Performed by: SOCIAL WORKER

## 2020-01-13 NOTE — PROGRESS NOTES
"  Saumya Oliveros, MSW, LCSW  Outpatient Psychiatry  [facility]  [id number] [place] [id number]  [phone]            Individual Psychotherapy Progress Note (PhD/LCSW)     Outpatient - Insight oriented psychotherapy 45 min - CPT code 11438    1/13/2020  MRN: [id number]  Primary Care Provider: MINH Ryan    Yvonne Avila is a 59 y.o. female who presents today for follow-up of depression and anxiety. Met with patient.        Subjective:       Patient report: "I'm still having at least three panic attacks everyday. I try to take my medicine when I feel it coming on. Still has not driven in about 6 months. Sometimes wakes up for a couple of hours during the night."     Current symptoms:   · Depression: depressed mood, insomnia, altered libido and social isolation  · Anxiety: excessive anxiety/worry, restlessness/keyed up, muscle tension, panic attacks and agoraphobia  · Substance abuse: denied  · Cognitive functioning: denied  · Lyndsay: none noted  · Psychosis: none noted    Psychosocial stressors and topics discussed: identifying feelings, symptom recognition/mgmt, self care, treatment progress, goals, coping strategies, interpersonal issues, past trauma, managing anxiety/panic/stress, identifying triggers and identifying barriers to progress      Objective:       Mental Status Evaluation  Appearance: unremarkable, age appropriate, thin & gaunt looking  Behavior: normal, cooperative  Speech: normal tone, normal rate, normal pitch, normal volume  Mood: anxious, depressed  Affect: congruent and appropriate, blunted  Thought Process: normal and logical  Thought Content: normal, no suicidality, no homicidality, delusions, or paranoia  Sensorium: grossly intact  Cognition: grossly intact  Insight: good  Judgment: adequate to circumstances    Risk parameters:  Patient reports no suicidal ideation  Patient reports no homicidal ideation  Patient reports no " self-injurious behavior  Patient reports no violent behavior      Assessment & Plan:       Therapeutic interventions used: Pt was taught relaxation skills  Pt was taught progressive muscle relaxation and slow diaphragmatic breathing  Educated pt re: mindfulness strategies to manage anxious thoughts and feelings  Pt was taught how to apply relaxation skills to specific situations  Assigned pt to practice relaxation on a daily basis  Assigned pt to create a coping card on which specific coping strategies are listed  Pt was taught about sleep hygiene practices to help establish a consistent sleep-wake cycle  Assessed pt's current and past mood episodes, including the features, frequency, intensity and duration  Taught pt assertiveness skills, exercise planning and social involvement    The patient's response to the interventions is accepting    The patient's progress toward treatment goals is good    Homework assigned: practice relaxation skills daily, implement sleep hygiene routine and create a coping card     Treatment plan:   A. Target symptoms: Depression, Anxiety and Poor Coping Skills   B. Therapeutic modalities: insight oriented psychotherapy, supportive psychotherapy  C. Why chosen therapy is appropriate versus another modality: relevant to diagnosis, evidence based practice   D. Outcome monitoring methods: self-report, observation     Visit Diagnosis:   1. Panic disorder with agoraphobia    2. Generalized anxiety disorder    3. Major depressive disorder, recurrent episode, mild        Follow-up: individual psychotherapy and consult psychiatrist for medication evaluation    Return to Clinic: 2 weeks    Length of Service (minutes): 45

## 2020-01-27 ENCOUNTER — OFFICE VISIT (OUTPATIENT)
Dept: PSYCHIATRY | Facility: CLINIC | Age: 60
End: 2020-01-27
Payer: COMMERCIAL

## 2020-01-27 DIAGNOSIS — F41.1 GENERALIZED ANXIETY DISORDER: ICD-10-CM

## 2020-01-27 DIAGNOSIS — F40.01 PANIC DISORDER WITH AGORAPHOBIA: Primary | ICD-10-CM

## 2020-01-27 DIAGNOSIS — F33.0 MAJOR DEPRESSIVE DISORDER, RECURRENT EPISODE, MILD: ICD-10-CM

## 2020-01-27 PROCEDURE — 90834 PR PSYCHOTHERAPY W/PATIENT, 45 MIN: ICD-10-PCS | Mod: S$GLB,,, | Performed by: SOCIAL WORKER

## 2020-01-27 PROCEDURE — 90834 PSYTX W PT 45 MINUTES: CPT | Mod: S$GLB,,, | Performed by: SOCIAL WORKER

## 2020-01-27 NOTE — PROGRESS NOTES
"  Saumya Oliveros, MSW, LCSW  Outpatient Psychiatry  [facility]  [id number] [place] [id number]  [phone]            Individual Psychotherapy Progress Note (PhD/LCSW)     Outpatient - Insight oriented psychotherapy 45 min - CPT code 03922    1/27/2020  MRN: [id number]  Primary Care Provider: MINH Ryan    Yvonne Avila is a 59 y.o. female who presents today for follow-up of depression and anxiety. Met with patient.        Subjective:       Patient report: "I'm about the same, kind of tense today." Still awakens during the night. Needs a nap daily due to nighttime insomnia. Tried guided meditation cris but reports it irritated her. Prefers listening to Bible verses, which she finds relaxing. She walks 10-15 minutes per hour from 9am to 7pm. Having panic attacks daily, feels like she can't breathe, is lightheaded, very anxious. Still to fearful to drive, worries she will have a panic attack. Has gone inside grocery store a couple of times with hsb. Feels she is improving gradually.     Current symptoms:   · Depression: depressed mood, insomnia, altered libido and social isolation  · Anxiety: excessive anxiety/worry, restlessness/keyed up, muscle tension, panic attacks and agoraphobia  · Substance abuse: denied  · Cognitive functioning: denied  · Lyndsay: none noted  · Psychosis: none noted    Psychosocial stressors and topics discussed: identifying feelings, symptom recognition/mgmt, self care, treatment progress, goals, coping strategies, interpersonal issues, past trauma, managing anxiety/panic/stress, identifying triggers and identifying barriers to progress      Objective:       Mental Status Evaluation  Appearance: unremarkable, age appropriate, thin & gaunt looking  Behavior: normal, cooperative  Speech: normal tone, normal rate, normal pitch, normal volume  Mood: anxious, depressed  Affect: congruent and appropriate, blunted  Thought Process: normal " and logical  Thought Content: normal, no suicidality, no homicidality, delusions, or paranoia  Sensorium: grossly intact  Cognition: grossly intact  Insight: good  Judgment: adequate to circumstances    Risk parameters:  Patient reports no suicidal ideation  Patient reports no homicidal ideation  Patient reports no self-injurious behavior  Patient reports no violent behavior      Assessment & Plan:       Therapeutic interventions used: Educated pt re: how anxious fears are maintained by a cycle of unwarranted fear and avoidance that precludes positive, corrective experiences with the feared object/situation  Pt was taught relaxation skills  Pt was taught progressive muscle relaxation and slow diaphragmatic breathing  Educated pt re: mindfulness strategies to manage anxious thoughts and feelings  Pt was taught how to apply relaxation skills to specific situations  Assigned pt to practice relaxation on a daily basis  Assessed pt's current and past mood episodes, including the features, frequency, intensity and duration  Monitored the effectiveness and side effects of antidepressant medication prescribed by physician/NP/MP  Encouraged pt to commit time and effort to activities that are consistent with personally meaningful values    The patient's response to the interventions is accepting    The patient's progress toward treatment goals is good    Homework assigned: practice relaxation skills daily, implement sleep hygiene routine and create a coping card     Treatment plan:   A. Target symptoms: Depression, Anxiety and Poor Coping Skills   B. Therapeutic modalities: insight oriented psychotherapy, supportive psychotherapy  C. Why chosen therapy is appropriate versus another modality: relevant to diagnosis, evidence based practice   D. Outcome monitoring methods: self-report, observation     Visit Diagnosis:   1. Panic disorder with agoraphobia    2. Generalized anxiety disorder    3. Major depressive disorder,  recurrent episode, mild        Follow-up: individual psychotherapy and consult psychiatrist for medication evaluation    Return to Clinic: 2 weeks    Length of Service (minutes): 45

## 2020-02-17 ENCOUNTER — OFFICE VISIT (OUTPATIENT)
Dept: PSYCHIATRY | Facility: CLINIC | Age: 60
End: 2020-02-17
Payer: COMMERCIAL

## 2020-02-17 ENCOUNTER — INITIAL CONSULT (OUTPATIENT)
Dept: PSYCHIATRY | Facility: CLINIC | Age: 60
End: 2020-02-17
Payer: COMMERCIAL

## 2020-02-17 VITALS
SYSTOLIC BLOOD PRESSURE: 140 MMHG | DIASTOLIC BLOOD PRESSURE: 90 MMHG | BODY MASS INDEX: 18.79 KG/M2 | HEART RATE: 51 BPM | WEIGHT: 106.06 LBS

## 2020-02-17 DIAGNOSIS — R00.1 BRADYCARDIA: ICD-10-CM

## 2020-02-17 DIAGNOSIS — F41.1 GENERALIZED ANXIETY DISORDER WITH PANIC ATTACKS: Primary | ICD-10-CM

## 2020-02-17 DIAGNOSIS — F41.0 PANIC ATTACKS: Primary | ICD-10-CM

## 2020-02-17 DIAGNOSIS — F41.9 ANXIETY DISORDER, UNSPECIFIED TYPE: ICD-10-CM

## 2020-02-17 DIAGNOSIS — F41.0 GENERALIZED ANXIETY DISORDER WITH PANIC ATTACKS: Primary | ICD-10-CM

## 2020-02-17 DIAGNOSIS — F33.1 DEPRESSION, MAJOR, RECURRENT, MODERATE: ICD-10-CM

## 2020-02-17 DIAGNOSIS — Z72.0 TOBACCO USE: Chronic | ICD-10-CM

## 2020-02-17 PROCEDURE — 90792 PSYCH DIAG EVAL W/MED SRVCS: CPT | Mod: S$GLB,,, | Performed by: PSYCHIATRY & NEUROLOGY

## 2020-02-17 PROCEDURE — 90834 PR PSYCHOTHERAPY W/PATIENT, 45 MIN: ICD-10-PCS | Mod: S$GLB,,, | Performed by: SOCIAL WORKER

## 2020-02-17 PROCEDURE — 90792 PR PSYCHIATRIC DIAGNOSTIC EVALUATION W/MEDICAL SERVICES: ICD-10-PCS | Mod: S$GLB,,, | Performed by: PSYCHIATRY & NEUROLOGY

## 2020-02-17 PROCEDURE — 99999 PR PBB SHADOW E&M-EST. PATIENT-LVL II: CPT | Mod: PBBFAC,,, | Performed by: PSYCHIATRY & NEUROLOGY

## 2020-02-17 PROCEDURE — 90834 PSYTX W PT 45 MINUTES: CPT | Mod: S$GLB,,, | Performed by: SOCIAL WORKER

## 2020-02-17 PROCEDURE — 99999 PR PBB SHADOW E&M-EST. PATIENT-LVL II: ICD-10-PCS | Mod: PBBFAC,,, | Performed by: PSYCHIATRY & NEUROLOGY

## 2020-02-17 RX ORDER — FLUOXETINE HYDROCHLORIDE 20 MG/1
20 CAPSULE ORAL DAILY
COMMUNITY
End: 2020-12-14

## 2020-02-17 NOTE — PATIENT INSTRUCTIONS
Assessment:   Encounter Diagnoses   Name Primary?    Anxiety disorder, unspecified type     Panic attacks Yes    Depression, major, recurrent, moderate     Bradycardia     Tobacco use (says stopped Sept 2019)         PLAN    RTC   4 weeks to [facility]    RTC CHRISTINA Skelton LCSW today and then as arranged.    Meds: remains on Xanax and Prozac (which was just recently advanced to 20 mg by Harley KHAN; also pt says that pt says she has been in communication Harley KHAN and pt assures does not need refill  of Xanax from me today; aware 'going forward'  I will  writing her behavioral health meds.     Note: Discuss risks / benefits of medication: read information accommodating medication (ie, package insert); IF any questions, contact [facility]    Labs: reviewed in Epic; says she had a recent thyroid lab elevation; tho I do not easliy see in Epic tho she is following up with such p[elidavider soon; I asked IF she could get copy and bring in; other CBC and metabolic reviewed     References: (reviewed with her)   Anxiety &  phobia workbook (used copies: about $5-7)  Relaxation stress reduction workbook; (used copies: about $5-7)  Feeling Good Website by Aime Pablo MD / [email or web address] website (free)   [place]: Path to Better Sleep : https://www.veterantraining.[place].gov/insomnia/ (free)   and other resources as per counsleor    Pt expressed appreciation for the visit today and did not have further question at this time though pt  was still informed to:     Call / Walk In if problems.    Call Report Side Effects to Juan M DE [facility]     Encouraged to follow up with primary care / Gen Med MD for continued monitoring of general health and wellness.    Call 911  Or go to ER if Acute Concerns (especially if any thoughts of harm to self or other).     Understanding was expressed; and no further concerns nor questions were raised at this time.
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
## 2020-02-18 NOTE — PROGRESS NOTES
Saumya Oliveros, MSW, LCSW  Outpatient Psychiatry  [facility]  [id number] [place] [id number]  [phone]            Individual Psychotherapy Progress Note (PhD/LCSW)     Outpatient - Insight oriented psychotherapy 45 min - CPT code 28306    2/17/2020  MRN: [id number]  Primary Care Provider: MINH Ryan    Yvonne Avila is a 59 y.o. female who presents today for follow-up of depression and anxiety. Met with patient.        Subjective:       Patient report: Still anxious and having daily (morning) panic attacks, but they have decreased in intensity and duration. She is more talkative, more hopeful about the future. Didn't do well on vacation [place] [facility] last month but anticipates she will be less anxious by her next vacation with [facility]sb in May. Sleep is still disrupted. Fluoxetine was recently increased. Best friend's elderly mother passed over the weekend.     Current symptoms:   · Depression: depressed mood, insomnia, altered libido and social isolation  · Anxiety: excessive anxiety/worry, restlessness/keyed up, muscle tension, panic attacks and agoraphobia  · Substance abuse: denied  · Cognitive functioning: denied  · Lyndsay: none noted  · Psychosis: none noted    Psychosocial stressors and topics discussed: identifying feelings, symptom recognition/mgmt, self care, treatment progress, goals, coping strategies, interpersonal issues, past trauma, managing anxiety/panic/stress, identifying triggers and identifying barriers to progress, grief      Objective:       Mental Status Evaluation  Appearance: unremarkable, age appropriate, thin & gaunt looking  Behavior: normal, cooperative  Speech: normal tone, normal rate, normal pitch, normal volume  Mood: anxious, depressed  Affect: congruent and appropriate, blunted  Thought Process: normal and logical  Thought Content: normal, no suicidality, no homicidality, delusions, or paranoia  Sensorium: grossly  intact  Cognition: grossly intact  Insight: good  Judgment: adequate to circumstances    Risk parameters:  Patient reports no suicidal ideation  Patient reports no homicidal ideation  Patient reports no self-injurious behavior  Patient reports no violent behavior      Assessment & Plan:       Therapeutic interventions used: Pt was taught progressive muscle relaxation and slow diaphragmatic breathing  Educated pt re: mindfulness strategies to manage anxious thoughts and feelings  Pt was taught how to apply relaxation skills to specific situations  Assigned pt to practice relaxation on a daily basis  Helped pt to identify fearful self-talk and create reality-based alternatives  Assessed pt's current and past mood episodes, including the features, frequency, intensity and duration  Monitored the effectiveness and side effects of antidepressant medication prescribed by physician/NP/MP    The patient's response to the interventions is accepting    The patient's progress toward treatment goals is good    Homework assigned: daily journaling, practice relaxation skills daily, review/complete handouts re: anxiety triggers, progressive muscle relaxation and implement sleep hygiene routine     Treatment plan:   A. Target symptoms: Depression, Anxiety and Poor Coping Skills   B. Therapeutic modalities: insight oriented psychotherapy, supportive psychotherapy  C. Why chosen therapy is appropriate versus another modality: relevant to diagnosis, evidence based practice   D. Outcome monitoring methods: self-report, observation     Visit Diagnosis:   1. Generalized anxiety disorder with panic attacks        Follow-up: individual psychotherapy and consult psychiatrist for medication evaluation    Return to Clinic: 2 weeks    Length of Service (minutes): 45

## 2020-11-08 ENCOUNTER — HOSPITAL ENCOUNTER (EMERGENCY)
Facility: HOSPITAL | Age: 60
Discharge: HOME | End: 2020-11-08
Attending: EMERGENCY MEDICINE
Payer: COMMERCIAL

## 2020-11-08 VITALS
SYSTOLIC BLOOD PRESSURE: 153 MMHG | TEMPERATURE: 98 F | WEIGHT: 107.81 LBS | HEIGHT: 63 IN | DIASTOLIC BLOOD PRESSURE: 81 MMHG | BODY MASS INDEX: 19.1 KG/M2 | OXYGEN SATURATION: 100 % | RESPIRATION RATE: 20 BRPM | HEART RATE: 55 BPM

## 2020-11-08 DIAGNOSIS — R07.9 CHEST PAIN: ICD-10-CM

## 2020-11-08 LAB
ALBUMIN SERPL BCP-MCNC: 4.3 G/DL (ref 3.5–5.2)
ALP SERPL-CCNC: 77 U/L (ref 55–135)
ALT SERPL W/O P-5'-P-CCNC: 12 U/L (ref 10–44)
ANION GAP SERPL CALC-SCNC: 8 MMOL/L (ref 8–16)
AST SERPL-CCNC: 18 U/L (ref 10–40)
BASOPHILS # BLD AUTO: 0.03 K/UL (ref 0–0.2)
BASOPHILS NFR BLD: 0.3 % (ref 0–1.9)
BILIRUB SERPL-MCNC: 0.4 MG/DL (ref 0.1–1)
BILIRUB UR QL STRIP: NEGATIVE
BNP SERPL-MCNC: 35 PG/ML (ref 0–99)
BUN SERPL-MCNC: 11 MG/DL (ref 6–20)
CALCIUM SERPL-MCNC: 10.2 MG/DL (ref 8.7–10.5)
CHLORIDE SERPL-SCNC: 105 MMOL/L (ref 95–110)
CK SERPL-CCNC: 144 U/L (ref 20–180)
CLARITY UR: CLEAR
CO2 SERPL-SCNC: 28 MMOL/L (ref 23–29)
COLOR UR: YELLOW
CREAT SERPL-MCNC: 0.8 MG/DL (ref 0.5–1.4)
DIFFERENTIAL METHOD: ABNORMAL
EOSINOPHIL # BLD AUTO: 0.1 K/UL (ref 0–0.5)
EOSINOPHIL NFR BLD: 0.7 % (ref 0–8)
ERYTHROCYTE [DISTWIDTH] IN BLOOD BY AUTOMATED COUNT: 12.4 % (ref 11.5–14.5)
EST. GFR  (AFRICAN AMERICAN): >60 ML/MIN/1.73 M^2
EST. GFR  (NON AFRICAN AMERICAN): >60 ML/MIN/1.73 M^2
GLUCOSE SERPL-MCNC: 104 MG/DL (ref 70–110)
GLUCOSE UR QL STRIP: NEGATIVE
HCT VFR BLD AUTO: 38.1 % (ref 37–48.5)
HGB BLD-MCNC: 12.1 G/DL (ref 12–16)
HGB UR QL STRIP: NEGATIVE
IMM GRANULOCYTES # BLD AUTO: 0.04 K/UL (ref 0–0.04)
IMM GRANULOCYTES NFR BLD AUTO: 0.4 % (ref 0–0.5)
KETONES UR QL STRIP: NEGATIVE
LEUKOCYTE ESTERASE UR QL STRIP: NEGATIVE
LIPASE SERPL-CCNC: 17 U/L (ref 4–60)
LYMPHOCYTES # BLD AUTO: 1.2 K/UL (ref 1–4.8)
LYMPHOCYTES NFR BLD: 13 % (ref 18–48)
MCH RBC QN AUTO: 27.8 PG (ref 27–31)
MCHC RBC AUTO-ENTMCNC: 31.8 G/DL (ref 32–36)
MCV RBC AUTO: 87 FL (ref 82–98)
MONOCYTES # BLD AUTO: 0.7 K/UL (ref 0.3–1)
MONOCYTES NFR BLD: 7.6 % (ref 4–15)
NEUTROPHILS # BLD AUTO: 6.9 K/UL (ref 1.8–7.7)
NEUTROPHILS NFR BLD: 78 % (ref 38–73)
NITRITE UR QL STRIP: NEGATIVE
NRBC BLD-RTO: 0 /100 WBC
PH UR STRIP: 7 [PH] (ref 5–8)
PLATELET # BLD AUTO: 252 K/UL (ref 150–350)
PMV BLD AUTO: 10.6 FL (ref 9.2–12.9)
POTASSIUM SERPL-SCNC: 3.9 MMOL/L (ref 3.5–5.1)
PROT SERPL-MCNC: 8.2 G/DL (ref 6–8.4)
PROT UR QL STRIP: NEGATIVE
RBC # BLD AUTO: 4.36 M/UL (ref 4–5.4)
SODIUM SERPL-SCNC: 141 MMOL/L (ref 136–145)
SP GR UR STRIP: <=1.005 (ref 1–1.03)
TROPONIN I SERPL DL<=0.01 NG/ML-MCNC: 0.02 NG/ML (ref 0–0.03)
URN SPEC COLLECT METH UR: ABNORMAL
UROBILINOGEN UR STRIP-ACNC: NEGATIVE EU/DL
WBC # BLD AUTO: 8.91 K/UL (ref 3.9–12.7)

## 2020-11-08 PROCEDURE — 81003 URINALYSIS AUTO W/O SCOPE: CPT

## 2020-11-08 PROCEDURE — 85025 COMPLETE CBC W/AUTO DIFF WBC: CPT

## 2020-11-08 PROCEDURE — 93005 ELECTROCARDIOGRAM TRACING: CPT

## 2020-11-08 PROCEDURE — 84484 ASSAY OF TROPONIN QUANT: CPT

## 2020-11-08 PROCEDURE — 82550 ASSAY OF CK (CPK): CPT

## 2020-11-08 PROCEDURE — 99285 EMERGENCY DEPT VISIT HI MDM: CPT | Mod: 25

## 2020-11-08 PROCEDURE — 25000003 PHARM REV CODE 250: Performed by: EMERGENCY MEDICINE

## 2020-11-08 PROCEDURE — 83880 ASSAY OF NATRIURETIC PEPTIDE: CPT

## 2020-11-08 PROCEDURE — 83690 ASSAY OF LIPASE: CPT

## 2020-11-08 PROCEDURE — 63600175 PHARM REV CODE 636 W HCPCS: Performed by: EMERGENCY MEDICINE

## 2020-11-08 PROCEDURE — 36415 COLL VENOUS BLD VENIPUNCTURE: CPT

## 2020-11-08 PROCEDURE — 96374 THER/PROPH/DIAG INJ IV PUSH: CPT

## 2020-11-08 PROCEDURE — 96375 TX/PRO/DX INJ NEW DRUG ADDON: CPT

## 2020-11-08 PROCEDURE — 93010 ELECTROCARDIOGRAM REPORT: CPT | Mod: ,,, | Performed by: INTERNAL MEDICINE

## 2020-11-08 PROCEDURE — 80053 COMPREHEN METABOLIC PANEL: CPT

## 2020-11-08 PROCEDURE — 93010 EKG 12-LEAD: ICD-10-PCS | Mod: ,,, | Performed by: INTERNAL MEDICINE

## 2020-11-08 RX ORDER — ONDANSETRON 2 MG/ML
4 INJECTION INTRAMUSCULAR; INTRAVENOUS
Status: COMPLETED | OUTPATIENT
Start: 2020-11-08 | End: 2020-11-08

## 2020-11-08 RX ORDER — FAMOTIDINE 10 MG/ML
40 INJECTION INTRAVENOUS
Status: COMPLETED | OUTPATIENT
Start: 2020-11-08 | End: 2020-11-08

## 2020-11-08 RX ORDER — MORPHINE SULFATE 4 MG/ML
4 INJECTION, SOLUTION INTRAMUSCULAR; INTRAVENOUS
Status: COMPLETED | OUTPATIENT
Start: 2020-11-08 | End: 2020-11-08

## 2020-11-08 RX ORDER — SUCRALFATE 1 G/10ML
1 SUSPENSION ORAL
Status: COMPLETED | OUTPATIENT
Start: 2020-11-08 | End: 2020-11-08

## 2020-11-08 RX ADMIN — ONDANSETRON 4 MG: 2 INJECTION INTRAMUSCULAR; INTRAVENOUS at 01:11

## 2020-11-08 RX ADMIN — FAMOTIDINE 40 MG: 10 INJECTION INTRAVENOUS at 01:11

## 2020-11-08 RX ADMIN — MORPHINE SULFATE 4 MG: 4 INJECTION INTRAVENOUS at 01:11

## 2020-11-08 RX ADMIN — SUCRALFATE 1 G: 1 SUSPENSION ORAL at 01:11

## 2020-11-08 NOTE — DISCHARGE INSTRUCTIONS
Continue home medications as before.  Add Pepcid 20 mg orally twice daily for the next 2 weeks.  This is over-the-counter.  Use Tums for breakthrough symptoms.  See your doctor in 1-2 days for re-evaluation.  Return as needed for any worsening symptoms, problems, questions or concerns.

## 2020-11-08 NOTE — ED PROVIDER NOTES
Patient reports left sided chest pain x 3-4 days which she states has been worse today.  Patient states that she went to  earlier today and had no relief after a GI cocktail.        Pt was placed back in lobby. I explained to pt that due to lack of available rooms pt was seen by myself to begin the workup. Pt understands they will be seen and dispositioned by the next available physician. Pt voices understanding and agrees with plan. Pt also understands the longer than normal wait time. I am removing myself from the care of pt and pt will now be assigned to the next available physician.    Clarice Curry PA-C    SCRIBE #1 NOTE: I, Tricia Rubio, am scribing for, and in the presence of, Will Taylor Jr., MD. I have scribed the entire note.       History     Chief Complaint   Patient presents with    Chest Pain     L chest pain x several days worsened this AM seen at urgent care today     Review of patient's allergies indicates:   Allergen Reactions    Penicillins          History of Present Illness     HPI    11/8/2020, 1:29 PM  History obtained from the patient      History of Present Illness: Yvonne Avila is a 60 y.o. female patient with a PMHx of anxiety, bradycardia, and GERD who presents to the Emergency Department for evaluation of CP which onset gradually 2 days ago with acute worsening this morning. Pt states pain is present when she wakes up in the morning. Pt is taking Prilosec daily with no relief to sxs. Pt states she received a GI cocktail at [facility] just PTA with no relief. Symptoms are constant and moderate in severity.  Associated sxs not reported. Patient denies any fever, chills, diaphoresis, n/v, cough, SOB, leg pain/swelling, palpitations, extremity weakness/numbness, HA, dizziness, abd pain, and all other sxs at this time. No further complaints or concerns at this time.       Arrival mode: Personal vehicle     PCP: [facility]        Past Medical History:  Past Medical History:    Diagnosis Date    Anxiety     Bradycardia 9/19/2019    GERD (gastroesophageal reflux disease)        Past Surgical History:  History reviewed. No pertinent surgical history.       Family History:  Family History   Problem Relation Age of Onset    Heart attack Mother     Hypertension Mother     Heart disease Father     Heart attack Maternal Aunt     Heart attack Maternal Uncle        Social History:  Social History     Tobacco Use    Smoking status: Former Smoker     Packs/day: 0.50     Types: Cigarettes    Smokeless tobacco: Former User    Tobacco comment: 2 months ago    Substance and Sexual Activity    Alcohol use: Never     Frequency: Never    Drug use: Never    Sexual activity: Unknown        Review of Systems     Review of Systems   Constitutional: Negative for chills, diaphoresis and fever.   HENT: Negative for sore throat.    Respiratory: Negative for cough and shortness of breath.    Cardiovascular: Positive for chest pain. Negative for palpitations and leg swelling.   Gastrointestinal: Negative for abdominal pain, nausea and vomiting.   Genitourinary: Negative for dysuria.   Musculoskeletal: Negative for back pain.   Skin: Negative for rash.   Neurological: Negative for dizziness, weakness, numbness and headaches.   Hematological: Does not bruise/bleed easily.   All other systems reviewed and are negative.       Physical Exam     Initial Vitals [11/08/20 1200]   BP Pulse Resp Temp SpO2   (!) 175/82 (!) 57 18 98.4 °F (36.9 °C) 99 %      MAP       --          Physical Exam  Nursing Notes and Vital Signs Reviewed.  Constitutional: Patient is in no acute distress. Well-developed and well-nourished.  Head: Atraumatic. Normocephalic.  Eyes: EOM intact. Conjunctivae are not pale. No scleral icterus.  ENT: Mucous membranes are moist.   Neck:. Full ROM.  Trachea midline no stridor.  Cardiovascular: Regular rate. Regular rhythm. No murmurs, rubs, or gallops. Distal pulses are 2+ and  "symmetric.  Pulmonary/Chest: No respiratory distress. Clear to auscultation bilaterally. No wheezing or rales.  Abdominal: Soft and non-distended.  There is mild epigastric tenderness that reproduces symptoms.  No rebound, guarding, or rigidity. Good bowel sounds.  Genitourinary: No CVA tenderness.  No suprapubic tenderness  Musculoskeletal: Moves all extremities. No obvious deformities. No edema. No calf tenderness.  Skin: Warm and dry.  Neurological:  Alert, awake, and appropriate.  Normal speech.  No acute focal neurological deficits are appreciated.  Psychiatric: Normal affect. Good eye contact. Appropriate in content.     ED Course   Procedures  ED Vital Signs:  Vitals:    11/08/20 1200 11/08/20 1336 11/08/20 1352   BP: (!) 175/82     Pulse: (!) 57 (!) 59    Resp: 18  18   Temp: 98.4 °F (36.9 °C)     TempSrc: Oral     SpO2: 99%     Weight: 48.9 kg (107 lb 12.9 oz)     Height: 5' 3" (1.6 m)         Abnormal Lab Results:  Labs Reviewed   CBC W/ AUTO DIFFERENTIAL - Abnormal; Notable for the following components:       Result Value    MCHC 31.8 (*)     Gran % 78.0 (*)     Lymph % 13.0 (*)     All other components within normal limits   URINALYSIS, REFLEX TO URINE CULTURE - Abnormal; Notable for the following components:    Specific Gravity, UA <=1.005 (*)     All other components within normal limits    Narrative:     Specimen Source->Urine   COMPREHENSIVE METABOLIC PANEL   B-TYPE NATRIURETIC PEPTIDE   TROPONIN I   CK   LIPASE   LIPASE        All Lab Results:  Results for orders placed or performed during the hospital encounter of 11/08/20   CBC auto differential   Result Value Ref Range    WBC 8.91 3.90 - 12.70 K/uL    RBC 4.36 4.00 - 5.40 M/uL    Hemoglobin 12.1 12.0 - 16.0 g/dL    Hematocrit 38.1 37.0 - 48.5 %    MCV 87 82 - 98 fL    MCH 27.8 27.0 - 31.0 pg    MCHC 31.8 (L) 32.0 - 36.0 g/dL    RDW 12.4 11.5 - 14.5 %    Platelets 252 150 - 350 K/uL    MPV 10.6 9.2 - 12.9 fL    Immature Granulocytes 0.4 0.0 - 0.5 " %    Gran # (ANC) 6.9 1.8 - 7.7 K/uL    Immature Grans (Abs) 0.04 0.00 - 0.04 K/uL    Lymph # 1.2 1.0 - 4.8 K/uL    Mono # 0.7 0.3 - 1.0 K/uL    Eos # 0.1 0.0 - 0.5 K/uL    Baso # 0.03 0.00 - 0.20 K/uL    nRBC 0 0 /100 WBC    Gran % 78.0 (H) 38.0 - 73.0 %    Lymph % 13.0 (L) 18.0 - 48.0 %    Mono % 7.6 4.0 - 15.0 %    Eosinophil % 0.7 0.0 - 8.0 %    Basophil % 0.3 0.0 - 1.9 %    Differential Method Automated    Comprehensive metabolic panel   Result Value Ref Range    Sodium 141 136 - 145 mmol/L    Potassium 3.9 3.5 - 5.1 mmol/L    Chloride 105 95 - 110 mmol/L    CO2 28 23 - 29 mmol/L    Glucose 104 70 - 110 mg/dL    BUN 11 6 - 20 mg/dL    Creatinine 0.8 0.5 - 1.4 mg/dL    Calcium 10.2 8.7 - 10.5 mg/dL    Total Protein 8.2 6.0 - 8.4 g/dL    Albumin 4.3 3.5 - 5.2 g/dL    Total Bilirubin 0.4 0.1 - 1.0 mg/dL    Alkaline Phosphatase 77 55 - 135 U/L    AST 18 10 - 40 U/L    ALT 12 10 - 44 U/L    Anion Gap 8 8 - 16 mmol/L    eGFR if African American >60 >60 mL/min/1.73 m^2    eGFR if non African American >60 >60 mL/min/1.73 m^2   Brain natriuretic peptide   Result Value Ref Range    BNP 35 0 - 99 pg/mL   Troponin I   Result Value Ref Range    Troponin I 0.016 0.000 - 0.026 ng/mL   CK   Result Value Ref Range     20 - 180 U/L   Urinalysis, Reflex to Urine Culture Urine, Clean Catch    Specimen: Urine   Result Value Ref Range    Specimen UA Urine, Clean Catch     Color, UA Yellow Yellow, Straw, Kendy    Appearance, UA Clear Clear    pH, UA 7.0 5.0 - 8.0    Specific Gravity, UA <=1.005 (A) 1.005 - 1.030    Protein, UA Negative Negative    Glucose, UA Negative Negative    Ketones, UA Negative Negative    Bilirubin (UA) Negative Negative    Occult Blood UA Negative Negative    Nitrite, UA Negative Negative    Urobilinogen, UA Negative <2.0 EU/dL    Leukocytes, UA Negative Negative   Lipase   Result Value Ref Range    Lipase 17 4 - 60 U/L         Imaging Results:  Imaging Results          X-Ray Chest PA And Lateral  (Final result)  Result time 11/08/20 12:54:13    Final result by NATHALY Snyder Sr., MD (11/08/20 12:54:13)                 Impression:      There is no evidence of an acute pulmonary process. .      Electronically signed by: Merrick Snyder MD  Date:    11/08/2020  Time:    12:54             Narrative:    EXAMINATION:  XR CHEST PA AND LATERAL    CLINICAL HISTORY:  Chest pain, unspecified    COMPARISON:  09/19/2019    FINDINGS:  The size and contour of the heart are normal.  There is no evidence of an acute pulmonary process.  There is no pneumothorax or pleural effusion.                                 The EKG was ordered, reviewed, and independently interpreted by the ED provider.  Interpretation time: 1207  Rate: 57 BPM  Rhythm: sinus bradycardia  Interpretation: Biatrial enlargement. No STEMI.             The Emergency Provider reviewed the vital signs and test results, which are outlined above.     ED Discussion     2:43 PM: Reassessed pt at this time.  Discussed with pt all pertinent ED information and results. Discussed pt dx and plan of tx. Gave pt all f/u and return to the ED instructions. All questions and concerns were addressed at this time. Pt expresses understanding of information and instructions, and is comfortable with plan to discharge. Pt is stable for discharge.    I discussed with patient and/or family/caretaker that evaluation in the ED does not suggest any emergent or life threatening medical conditions requiring immediate intervention beyond what was provided in the ED, and I believe patient is safe for discharge.  Regardless, an unremarkable evaluation in the ED does not preclude the development or presence of a serious of life threatening condition. As such, patient was instructed to return immediately for any worsening or change in current symptoms.     2:52 PM  Patient is stable nontoxic.  Patient's cardiac workup is negative with prolonged symptoms.  Patient has a history of reflux.   She presented to urgent care with same symptoms without resolution with a single dose of Mylanta.  She was referred to the ED for further workup.  Patient's pain is now well controlled with GI medications and this appears to be reflux.  Symptoms are worse in the morning and at night with lying down.  There is associated water brash.  The patient is now status feeling much better, she will be discharged home on continue proton pump inhibitor.  I will add Pepcid twice daily a scheduled dose for 2 weeks and Tums for breakthrough symptoms.  The patient verbalized understanding agreement with all instructions seems reliable.  She is safe for discharge in my opinion.    Medical Decision Making:   Clinical Tests:   Lab Tests: Ordered and Reviewed  Radiological Study: Ordered and Reviewed  Medical Tests: Ordered and Reviewed           ED Medication(s):  Medications   famotidine (PF) injection 40 mg (40 mg Intravenous Given 11/8/20 1352)   sucralfate 100 mg/mL suspension 1 g (1 g Oral Given 11/8/20 1352)   morphine injection 4 mg (4 mg Intravenous Given 11/8/20 1352)   ondansetron injection 4 mg (4 mg Intravenous Given 11/8/20 1352)       New Prescriptions    No medications on file       Follow-up Information     [facility] In 2 days.    Specialty: Family Medicine  Contact information:  [address] [facility] [id number] [phone]                       Scribe Attestation:   Scribe #1: I performed the above scribed service and the documentation accurately describes the services I performed. I attest to the accuracy of the note.     Attending:   Physician Attestation Statement for Scribe #1: I, Will Taylor Jr., MD, personally performed the services described in this documentation, as scribed by Tricia Rubio, in my presence, and it is both accurate and complete.           Clinical Impression       ICD-10-CM ICD-9-CM   1. Chest pain  R07.9 786.50       Disposition:    Disposition: Discharged  Condition: Stable         Will Taylor Jr., MD  11/08/20 7703

## 2020-11-09 ENCOUNTER — HOSPITAL ENCOUNTER (EMERGENCY)
Facility: HOSPITAL | Age: 60
Discharge: HOME | End: 2020-11-09
Attending: EMERGENCY MEDICINE
Payer: COMMERCIAL

## 2020-11-09 VITALS
DIASTOLIC BLOOD PRESSURE: 72 MMHG | BODY MASS INDEX: 19.26 KG/M2 | HEIGHT: 63 IN | OXYGEN SATURATION: 98 % | RESPIRATION RATE: 15 BRPM | TEMPERATURE: 98 F | HEART RATE: 62 BPM | SYSTOLIC BLOOD PRESSURE: 159 MMHG | WEIGHT: 108.69 LBS

## 2020-11-09 DIAGNOSIS — J90 PLEURAL EFFUSION: Primary | ICD-10-CM

## 2020-11-09 DIAGNOSIS — R07.9 CHEST PAIN: ICD-10-CM

## 2020-11-09 LAB
ALBUMIN SERPL BCP-MCNC: 4.1 G/DL (ref 3.5–5.2)
ALP SERPL-CCNC: 77 U/L (ref 55–135)
ALT SERPL W/O P-5'-P-CCNC: 10 U/L (ref 10–44)
ANION GAP SERPL CALC-SCNC: 10 MMOL/L (ref 8–16)
AST SERPL-CCNC: 17 U/L (ref 10–40)
BASOPHILS # BLD AUTO: 0.02 K/UL (ref 0–0.2)
BASOPHILS NFR BLD: 0.3 % (ref 0–1.9)
BILIRUB SERPL-MCNC: 0.5 MG/DL (ref 0.1–1)
BUN SERPL-MCNC: 11 MG/DL (ref 6–20)
CALCIUM SERPL-MCNC: 9.8 MG/DL (ref 8.7–10.5)
CHLORIDE SERPL-SCNC: 104 MMOL/L (ref 95–110)
CO2 SERPL-SCNC: 27 MMOL/L (ref 23–29)
CREAT SERPL-MCNC: 1 MG/DL (ref 0.5–1.4)
DIFFERENTIAL METHOD: ABNORMAL
EOSINOPHIL # BLD AUTO: 0.1 K/UL (ref 0–0.5)
EOSINOPHIL NFR BLD: 1.1 % (ref 0–8)
ERYTHROCYTE [DISTWIDTH] IN BLOOD BY AUTOMATED COUNT: 12.6 % (ref 11.5–14.5)
EST. GFR  (AFRICAN AMERICAN): >60 ML/MIN/1.73 M^2
EST. GFR  (NON AFRICAN AMERICAN): >60 ML/MIN/1.73 M^2
GLUCOSE SERPL-MCNC: 100 MG/DL (ref 70–110)
HCT VFR BLD AUTO: 37.4 % (ref 37–48.5)
HGB BLD-MCNC: 11.8 G/DL (ref 12–16)
IMM GRANULOCYTES # BLD AUTO: 0.03 K/UL (ref 0–0.04)
IMM GRANULOCYTES NFR BLD AUTO: 0.4 % (ref 0–0.5)
LYMPHOCYTES # BLD AUTO: 1.1 K/UL (ref 1–4.8)
LYMPHOCYTES NFR BLD: 16 % (ref 18–48)
MCH RBC QN AUTO: 28 PG (ref 27–31)
MCHC RBC AUTO-ENTMCNC: 31.6 G/DL (ref 32–36)
MCV RBC AUTO: 89 FL (ref 82–98)
MONOCYTES # BLD AUTO: 0.7 K/UL (ref 0.3–1)
MONOCYTES NFR BLD: 9.5 % (ref 4–15)
NEUTROPHILS # BLD AUTO: 5.2 K/UL (ref 1.8–7.7)
NEUTROPHILS NFR BLD: 72.7 % (ref 38–73)
NRBC BLD-RTO: 0 /100 WBC
PLATELET # BLD AUTO: 235 K/UL (ref 150–350)
PMV BLD AUTO: 10.4 FL (ref 9.2–12.9)
POTASSIUM SERPL-SCNC: 3.8 MMOL/L (ref 3.5–5.1)
PROT SERPL-MCNC: 7.8 G/DL (ref 6–8.4)
RBC # BLD AUTO: 4.21 M/UL (ref 4–5.4)
SODIUM SERPL-SCNC: 141 MMOL/L (ref 136–145)
TROPONIN I SERPL DL<=0.01 NG/ML-MCNC: 0.01 NG/ML (ref 0–0.03)
WBC # BLD AUTO: 7.08 K/UL (ref 3.9–12.7)

## 2020-11-09 PROCEDURE — 36415 COLL VENOUS BLD VENIPUNCTURE: CPT

## 2020-11-09 PROCEDURE — 80053 COMPREHEN METABOLIC PANEL: CPT

## 2020-11-09 PROCEDURE — 25500020 PHARM REV CODE 255: Performed by: EMERGENCY MEDICINE

## 2020-11-09 PROCEDURE — 93005 ELECTROCARDIOGRAM TRACING: CPT

## 2020-11-09 PROCEDURE — 85025 COMPLETE CBC W/AUTO DIFF WBC: CPT

## 2020-11-09 PROCEDURE — 99285 EMERGENCY DEPT VISIT HI MDM: CPT | Mod: 25

## 2020-11-09 PROCEDURE — 93010 EKG 12-LEAD: ICD-10-PCS | Mod: ,,, | Performed by: INTERNAL MEDICINE

## 2020-11-09 PROCEDURE — 84484 ASSAY OF TROPONIN QUANT: CPT

## 2020-11-09 PROCEDURE — 93010 ELECTROCARDIOGRAM REPORT: CPT | Mod: ,,, | Performed by: INTERNAL MEDICINE

## 2020-11-09 RX ORDER — HYDROCODONE BITARTRATE AND ACETAMINOPHEN 5; 325 MG/1; MG/1
1 TABLET ORAL EVERY 4 HOURS PRN
Qty: 11 TABLET | Refills: 0 | Status: SHIPPED | OUTPATIENT
Start: 2020-11-09 | End: 2020-11-14

## 2020-11-09 RX ADMIN — IOHEXOL 100 ML: 350 INJECTION, SOLUTION INTRAVENOUS at 09:11

## 2020-11-09 NOTE — ED PROVIDER NOTES
SCRIBE #1 NOTE: I, Chito Ho, am scribing for, and in the presence of, Mark Sheehan MD. I have scribed the entire note.      History    No chief complaint on file.      Review of patient's allergies indicates:   Allergen Reactions    Penicillins         HPI   HPI    11/9/2020, 6:11 AM   History obtained from the patient      History of Present Illness: Yvonne Avila is a 60 y.o. female patient who presents to the Emergency Department for chest pain, onset several days PTA. Pt presented to the ED yesterday for similar sxs, and was discharged after a negative workup. Symptoms are constant and moderate in severity. Pain is worse when taking deep breaths. No associated sxs reported. Patient denies any fever, chills, n/v/d, SOB, weakness, numbness, dizziness, headache, and all other sxs at this time. No further complaints or concerns at this time.     Arrival mode: Personal vehicle    PCP: [facility]       Past Medical History:  Past Medical History:   Diagnosis Date    Anxiety     Bradycardia 9/19/2019    GERD (gastroesophageal reflux disease)        Past Surgical History:  No past surgical history on file.      Family History:  Family History   Problem Relation Age of Onset    Heart attack Mother     Hypertension Mother     Heart disease Father     Heart attack Maternal Aunt     Heart attack Maternal Uncle        Social History:  Social History     Tobacco Use    Smoking status: Former Smoker     Packs/day: 0.50     Types: Cigarettes    Smokeless tobacco: Former User    Tobacco comment: 2 months ago    Substance and Sexual Activity    Alcohol use: Never     Frequency: Never    Drug use: Never    Sexual activity: Not on file       ROS   Review of Systems   Constitutional: Negative for chills and fever.   HENT: Negative for sore throat.    Respiratory: Negative for shortness of breath.    Cardiovascular: Positive for chest pain.   Gastrointestinal: Negative for diarrhea, nausea and  "vomiting.   Genitourinary: Negative for dysuria.   Musculoskeletal: Negative for back pain.   Skin: Negative for rash.   Neurological: Negative for dizziness, seizures, weakness, light-headedness, numbness and headaches.   Hematological: Does not bruise/bleed easily.   All other systems reviewed and are negative.    Physical Exam      Initial Vitals [11/09/20 0559]   BP Pulse Resp Temp SpO2   133/73 (!) 59 20 97.7 °F (36.5 °C) 99 %      MAP       --          Physical Exam  Nursing Notes and Vital Signs Reviewed.  Constitutional: Patient is in no acute distress. Well-developed and well-nourished.  Head: Atraumatic. Normocephalic.  Eyes: PERRL. EOM intact. Conjunctivae are not pale. No scleral icterus.  ENT: Mucous membranes are moist. Oropharynx is clear and symmetric.    Neck: Supple. Full ROM. No lymphadenopathy.  Cardiovascular: Regular rate. Regular rhythm. No murmurs, rubs, or gallops. Distal pulses are 2+ and symmetric.  Pulmonary/Chest: No respiratory distress. Clear to auscultation bilaterally. No wheezing or rales.  Abdominal: Soft and non-distended.  There is no tenderness.  No rebound, guarding, or rigidity.   Musculoskeletal: Moves all extremities. No obvious deformities. No edema.  Skin: Warm and dry.  Neurological:  Alert, awake, and appropriate.  Normal speech.  No acute focal neurological deficits are appreciated.  Psychiatric: Normal affect. Good eye contact. Appropriate in content.    ED Course    Procedures  ED Vital Signs:  Vitals:    11/09/20 0559 11/09/20 0628 11/09/20 0700 11/09/20 0800   BP: 133/73  130/72 125/73   Pulse: (!) 59 (!) 55 (!) 54 (!) 53   Resp: 20  17 13   Temp: 97.7 °F (36.5 °C)      TempSrc: Oral      SpO2: 99%  97% 97%   Weight: 49.3 kg (108 lb 11 oz)      Height: 5' 3" (1.6 m)       11/09/20 0830 11/09/20 0930 11/09/20 1000   BP: 130/73 (!) 146/70 (!) 159/72   Pulse: (!) 58  62   Resp: 15  15   Temp: 97.9 °F (36.6 °C)     TempSrc: Oral  Oral   SpO2: 99%  98%   Weight:    "   Height:          Abnormal Lab Results:  Labs Reviewed   CBC W/ AUTO DIFFERENTIAL - Abnormal; Notable for the following components:       Result Value    Hemoglobin 11.8 (*)     MCHC 31.6 (*)     Lymph % 16.0 (*)     All other components within normal limits   COMPREHENSIVE METABOLIC PANEL   TROPONIN I        All Lab Results:  Results for orders placed or performed during the hospital encounter of 11/09/20   CBC Auto Differential   Result Value Ref Range    WBC 7.08 3.90 - 12.70 K/uL    RBC 4.21 4.00 - 5.40 M/uL    Hemoglobin 11.8 (L) 12.0 - 16.0 g/dL    Hematocrit 37.4 37.0 - 48.5 %    MCV 89 82 - 98 fL    MCH 28.0 27.0 - 31.0 pg    MCHC 31.6 (L) 32.0 - 36.0 g/dL    RDW 12.6 11.5 - 14.5 %    Platelets 235 150 - 350 K/uL    MPV 10.4 9.2 - 12.9 fL    Immature Granulocytes 0.4 0.0 - 0.5 %    Gran # (ANC) 5.2 1.8 - 7.7 K/uL    Immature Grans (Abs) 0.03 0.00 - 0.04 K/uL    Lymph # 1.1 1.0 - 4.8 K/uL    Mono # 0.7 0.3 - 1.0 K/uL    Eos # 0.1 0.0 - 0.5 K/uL    Baso # 0.02 0.00 - 0.20 K/uL    nRBC 0 0 /100 WBC    Gran % 72.7 38.0 - 73.0 %    Lymph % 16.0 (L) 18.0 - 48.0 %    Mono % 9.5 4.0 - 15.0 %    Eosinophil % 1.1 0.0 - 8.0 %    Basophil % 0.3 0.0 - 1.9 %    Differential Method Automated    Comprehensive Metabolic Panel   Result Value Ref Range    Sodium 141 136 - 145 mmol/L    Potassium 3.8 3.5 - 5.1 mmol/L    Chloride 104 95 - 110 mmol/L    CO2 27 23 - 29 mmol/L    Glucose 100 70 - 110 mg/dL    BUN 11 6 - 20 mg/dL    Creatinine 1.0 0.5 - 1.4 mg/dL    Calcium 9.8 8.7 - 10.5 mg/dL    Total Protein 7.8 6.0 - 8.4 g/dL    Albumin 4.1 3.5 - 5.2 g/dL    Total Bilirubin 0.5 0.1 - 1.0 mg/dL    Alkaline Phosphatase 77 55 - 135 U/L    AST 17 10 - 40 U/L    ALT 10 10 - 44 U/L    Anion Gap 10 8 - 16 mmol/L    eGFR if African American >60 >60 mL/min/1.73 m^2    eGFR if non African American >60 >60 mL/min/1.73 m^2   Troponin I   Result Value Ref Range    Troponin I 0.007 0.000 - 0.026 ng/mL     Imaging Results:  Imaging Results           CTA Chest Non-Coronary (Final result)  Result time 11/09/20 09:47:50    Final result by Aime Ervin MD (11/09/20 09:47:50)                 Impression:      No evidence of pulmonary embolism.    Cardiomegaly.  Reflux into the IVC and hepatic veins suggest some component of right heart dysfunction.  Consider cardiac echo    Small left-sided pleural effusion with left lower lobe atelectasis.    See additional findings above    All CT scans at this facility use dose modulation, iterative reconstruction and/or weight based dosing when appropriate to reduce radiation dose to as low as reasonably achievable.      Electronically signed by: Aime Ervin MD  Date:    11/09/2020  Time:    09:47             Narrative:    EXAMINATION:  CTA CHEST NON CORONARY    CLINICAL HISTORY:  Chest pain and shortness of breath}    TECHNIQUE:  After the intravenous administration of 100 cc of Omni 35 nonionic contrast using CT pulmonary angio technique, 1.25  Mm axial images were acquired using helical CT technique from the lung apices through costophrenic sulci.  Sagittal coronal and oblique MIPS were also submitted for interpretation.    COMPARISON:  Chest x-ray dated 11/09/2020    FINDINGS:  -Pulmonary arteries: Pulmonary arteries are well opacified.  No evidence of pulmonary embolism.  No evidence of pulmonary hypertension.  No right heart strain is identified with RV/LV ratio > 0.9.    -Lungs: Atelectasis seen at the left lung base associated with small left pleural effusion.  No nodules or infiltrates.  Apical pleural thickening noted.  Benign granuloma left lower lobe.    -Pleura: Small left pleural effusion.    -Mediastinum/Christine:No significant adenopathy    -Axilla: No adenopathy.    -Thyroid: Normal lower gland.    -Heart/Aorta: Heart size is enlarged consistent with cardiomegaly.  Reflux into the IVC and hepatic veins suggest some component of right heart dysfunction.  Mild coronary artery disease.  Small pericardial  effusion. Aorta normal caliber.    -Bones/Chest Wall: Intact with mild degenerative changes .  Dense breast tissue noted.    -Upper Abdomen: Moderate constipation.  Stomach is largely collapsed which limits evaluation.  Mild rugal fold thickening not entirely excluded which could reflect gastritis.                               X-Ray Chest AP Portable (Final result)  Result time 11/09/20 07:47:15    Final result by Aime Ervin MD (11/09/20 07:47:15)                 Impression:      No acute process seen.      Electronically signed by: Aime Ervin MD  Date:    11/09/2020  Time:    07:47             Narrative:    EXAMINATION:  XR CHEST AP PORTABLE    CLINICAL HISTORY:  chest pain;    FINDINGS:  Single view of the chest.  11/08/2020 comparison.    Cardiac silhouette is normal.  The lungs demonstrate no evidence of active disease.  Mild emphysematous changes suspected within the upper lobes.  No evidence of pleural effusion or pneumothorax.  Bones demonstrate mild degenerative changes including AC joints.                               The EKG was ordered, reviewed, and independently interpreted by the ED provider.  Interpretation time: 06:08  Rate: 58 BPM  Rhythm: sinus bradycardia  Interpretation: Possible left atrial enlargement. RSR or QR pattern in V1 suggests right ventricular conduction delay. No STEMI.         The Emergency Provider reviewed the vital signs and test results, which are outlined above.    ED Discussion     9:55 AM: Reassessed pt at this time. Discussed with pt all pertinent ED information and results. Discussed pt dx and plan of tx. Gave pt all f/u and return to the ED instructions. Pt states that she will follow with Cardiology later this week. All questions and concerns were addressed at this time. Pt expresses understanding of information and instructions, and is comfortable with plan to discharge. Pt is stable for discharge.    I have discussed with patient and/or family/caretaker chest  pain precautions, specifically to return for worsening chest pain, shortness of breath, fever, or any concern.  I have low suspicion for cardiopulmonary, vascular, infectious, respiratory, or other emergent medical condition based on my evaluation in the ED.    I discussed with patient and/or family/caretaker that evaluation in the ED does not suggest any emergent or life threatening medical conditions requiring immediate intervention beyond what was provided in the ED, and I believe patient is safe for discharge.  Regardless, an unremarkable evaluation in the ED does not preclude the development or presence of a serious of life threatening condition. As such, patient was instructed to return immediately for any worsening or change in current symptoms.         ED Medication(s):  Medications   iohexoL (OMNIPAQUE 350) injection 100 mL (100 mLs Intravenous Given 11/9/20 0901)       Follow-up Information     [facility].    Specialty: Family Medicine  Contact information:  [address] [facility] [address]  [phone]                  Discharge Medication List as of 11/9/2020  9:56 AM      START taking these medications    Details   HYDROcodone-acetaminophen (NORCO) 5-325 mg per tablet Take 1 tablet by mouth every 4 (four) hours as needed., Starting Mon 11/9/2020, Until Sat 11/14/2020, Normal               Medical Decision Making    Medical Decision Making:   Clinical Tests:   Lab Tests: Ordered and Reviewed  Radiological Study: Ordered and Reviewed  Medical Tests: Ordered and Reviewed           Scribe Attestation:   Scribe #1: I performed the above scribed service and the documentation accurately describes the services I performed. I attest to the accuracy of the note.    Attending:   Physician Attestation Statement for Scribe #1: I, Mark Sheehan MD, personally performed the services described in this documentation, as scribed by Chito Ho, in my presence,  and it is both accurate and complete.          Clinical Impression       ICD-10-CM ICD-9-CM   1. Pleural effusion  J90 511.9   2. Chest pain  R07.9 786.50       Disposition:   Disposition: Discharged  Condition: Stable         Mark Sheehan MD  11/09/20 1053

## 2020-11-10 ENCOUNTER — HOSPITAL ENCOUNTER (OUTPATIENT)
Dept: CARDIOLOGY | Facility: HOSPITAL | Age: 60
Discharge: HOME | End: 2020-11-10
Attending: INTERNAL MEDICINE
Payer: COMMERCIAL

## 2020-11-10 ENCOUNTER — OFFICE VISIT (OUTPATIENT)
Dept: CARDIOLOGY | Facility: CLINIC | Age: 60
End: 2020-11-10
Payer: COMMERCIAL

## 2020-11-10 ENCOUNTER — TELEPHONE (OUTPATIENT)
Dept: CARDIOLOGY | Facility: CLINIC | Age: 60
End: 2020-11-10

## 2020-11-10 VITALS
DIASTOLIC BLOOD PRESSURE: 88 MMHG | HEART RATE: 53 BPM | HEIGHT: 63 IN | BODY MASS INDEX: 18.43 KG/M2 | SYSTOLIC BLOOD PRESSURE: 140 MMHG | WEIGHT: 104 LBS

## 2020-11-10 VITALS
DIASTOLIC BLOOD PRESSURE: 88 MMHG | OXYGEN SATURATION: 97 % | WEIGHT: 104.94 LBS | SYSTOLIC BLOOD PRESSURE: 140 MMHG | HEIGHT: 63 IN | HEART RATE: 66 BPM | BODY MASS INDEX: 18.59 KG/M2

## 2020-11-10 DIAGNOSIS — R07.89 OTHER CHEST PAIN: Primary | ICD-10-CM

## 2020-11-10 DIAGNOSIS — J90 PLEURAL EFFUSION: ICD-10-CM

## 2020-11-10 DIAGNOSIS — R00.2 PALPITATIONS: ICD-10-CM

## 2020-11-10 DIAGNOSIS — K21.9 GASTROESOPHAGEAL REFLUX DISEASE, UNSPECIFIED WHETHER ESOPHAGITIS PRESENT: ICD-10-CM

## 2020-11-10 DIAGNOSIS — M77.9 INFLAMMATION AROUND JOINT: Primary | ICD-10-CM

## 2020-11-10 DIAGNOSIS — R07.89 OTHER CHEST PAIN: ICD-10-CM

## 2020-11-10 DIAGNOSIS — R00.1 BRADYCARDIA: ICD-10-CM

## 2020-11-10 LAB
ASCENDING AORTA: 2.87 CM
AV INDEX (PROSTH): 0.78
AV MEAN GRADIENT: 4 MMHG
AV PEAK GRADIENT: 9 MMHG
AV VALVE AREA: 2.39 CM2
AV VELOCITY RATIO: 0.82
BSA FOR ECHO PROCEDURE: 1.45 M2
CV ECHO LV RWT: 0.51 CM
DOP CALC AO PEAK VEL: 1.49 M/S
DOP CALC AO VTI: 31.84 CM
DOP CALC LVOT AREA: 3 CM2
DOP CALC LVOT DIAMETER: 1.97 CM
DOP CALC LVOT PEAK VEL: 1.22 M/S
DOP CALC LVOT STROKE VOLUME: 76.1 CM3
DOP CALC RVOT PEAK VEL: 0.81 M/S
DOP CALC RVOT VTI: 16.77 CM
DOP CALCLVOT PEAK VEL VTI: 24.98 CM
E WAVE DECELERATION TIME: 173.43 MSEC
E/A RATIO: 1.43
E/E' RATIO: 10.42 M/S
ECHO LV POSTERIOR WALL: 0.99 CM (ref 0.6–1.1)
FRACTIONAL SHORTENING: 37 % (ref 28–44)
INTERVENTRICULAR SEPTUM: 0.86 CM (ref 0.6–1.1)
IVRT: 97.05 MSEC
LA MAJOR: 3.53 CM
LA MINOR: 3.67 CM
LA WIDTH: 2.74 CM
LEFT ATRIUM SIZE: 2.6 CM
LEFT ATRIUM VOLUME INDEX: 14.9 ML/M2
LEFT ATRIUM VOLUME: 21.79 CM3
LEFT INTERNAL DIMENSION IN SYSTOLE: 2.44 CM (ref 2.1–4)
LEFT VENTRICLE DIASTOLIC VOLUME INDEX: 44.2 ML/M2
LEFT VENTRICLE DIASTOLIC VOLUME: 64.73 ML
LEFT VENTRICLE MASS INDEX: 74 G/M2
LEFT VENTRICLE SYSTOLIC VOLUME INDEX: 14.4 ML/M2
LEFT VENTRICLE SYSTOLIC VOLUME: 21.06 ML
LEFT VENTRICULAR INTERNAL DIMENSION IN DIASTOLE: 3.87 CM (ref 3.5–6)
LEFT VENTRICULAR MASS: 108.08 G
LV LATERAL E/E' RATIO: 9 M/S
LV SEPTAL E/E' RATIO: 12.38 M/S
MV PEAK A VEL: 0.69 M/S
MV PEAK E VEL: 0.99 M/S
MV STENOSIS PRESSURE HALF TIME: 50.29 MS
MV VALVE AREA P 1/2 METHOD: 4.37 CM2
PISA TR MAX VEL: 2.35 M/S
PULM VEIN S/D RATIO: 1.26
PV MEAN GRADIENT: 1.37 MMHG
PV PEAK D VEL: 0.57 M/S
PV PEAK S VEL: 0.72 M/S
PV PEAK VELOCITY: 0.96 CM/S
RA MAJOR: 3.66 CM
RA PRESSURE: 3 MMHG
RA WIDTH: 2.69 CM
RIGHT VENTRICULAR END-DIASTOLIC DIMENSION: 2.54 CM
SINUS: 2.61 CM
STJ: 2.41 CM
TDI LATERAL: 0.11 M/S
TDI SEPTAL: 0.08 M/S
TDI: 0.1 M/S
TR MAX PG: 22 MMHG
TV REST PULMONARY ARTERY PRESSURE: 25 MMHG

## 2020-11-10 PROCEDURE — 3008F BODY MASS INDEX DOCD: CPT | Mod: CPTII,S$GLB,, | Performed by: INTERNAL MEDICINE

## 2020-11-10 PROCEDURE — 99999 PR PBB SHADOW E&M-EST. PATIENT-LVL IV: CPT | Mod: PBBFAC,,, | Performed by: INTERNAL MEDICINE

## 2020-11-10 PROCEDURE — 93306 ECHO (CUPID ONLY): ICD-10-PCS | Mod: 26,,, | Performed by: INTERNAL MEDICINE

## 2020-11-10 PROCEDURE — 99214 PR OFFICE/OUTPT VISIT, EST, LEVL IV, 30-39 MIN: ICD-10-PCS | Mod: S$GLB,,, | Performed by: INTERNAL MEDICINE

## 2020-11-10 PROCEDURE — 93306 TTE W/DOPPLER COMPLETE: CPT

## 2020-11-10 PROCEDURE — 99999 PR PBB SHADOW E&M-EST. PATIENT-LVL IV: ICD-10-PCS | Mod: PBBFAC,,, | Performed by: INTERNAL MEDICINE

## 2020-11-10 PROCEDURE — 3008F PR BODY MASS INDEX (BMI) DOCUMENTED: ICD-10-PCS | Mod: CPTII,S$GLB,, | Performed by: INTERNAL MEDICINE

## 2020-11-10 PROCEDURE — 99214 OFFICE O/P EST MOD 30 MIN: CPT | Mod: S$GLB,,, | Performed by: INTERNAL MEDICINE

## 2020-11-10 PROCEDURE — 93306 TTE W/DOPPLER COMPLETE: CPT | Mod: 26,,, | Performed by: INTERNAL MEDICINE

## 2020-11-10 RX ORDER — ONDANSETRON 8 MG/1
8 TABLET, ORALLY DISINTEGRATING ORAL EVERY 8 HOURS PRN
Qty: 20 TABLET | Refills: 1 | Status: SHIPPED | OUTPATIENT
Start: 2020-11-10 | End: 2021-09-23 | Stop reason: SDUPTHER

## 2020-11-10 RX ORDER — COLCHICINE 0.6 MG/1
0.6 TABLET ORAL 2 TIMES DAILY
Qty: 60 TABLET | Refills: 0 | Status: SHIPPED | OUTPATIENT
Start: 2020-11-10 | End: 2020-12-17 | Stop reason: SDUPTHER

## 2020-11-10 RX ORDER — IBUPROFEN 600 MG/1
600 TABLET ORAL 3 TIMES DAILY
Qty: 42 TABLET | Refills: 0 | Status: SHIPPED | OUTPATIENT
Start: 2020-11-10 | End: 2020-11-24

## 2020-11-10 NOTE — PROGRESS NOTES
Subjective:   Patient ID:  Yvonne Avila is a 60 y.o. female who presents for cardiac consult of Other chest pain      Shortness of Breath  Associated symptoms include chest pain.     The patient came in today for cardiac consult of Other chest pain      Yvonne Avila is a 60 y.o. female pt with GERD, Anxiety, tobacco use presents for follow up CV eval.     9/11/19  Pt presented to the ER yesterday for CP. ECG with sinus julian V rate 49. Enzymes neg x 2, NTG caused worsening of CP.   She has been having intermittent chest pain for 2 weeks. Chest pain can lasts for a few sec, took BC which helped. She just started Nexium as well.   She does get lightheaded and feels like she may pass out.     10/17/19  ETT normal, Holter normal. Normal 2D ECHO. She still has some atypical CP and MORRIS. Has a lot of anxiety will refer to psych.    12/23/19  She has been gaining weight, now > 101 lbs. She has mild MORRIS only with significant exertional. She still has occ panic attacks, saw psychologist. She has CP relief with PPI.     11/20/20 - ER follow up  She went to Emergency Department for chest pain, onset several days PTA. Pt presented to the ED yesterday for similar sxs, and was discharged after a negative workup. Symptoms are constant and moderate in severity. Pain is worse when taking deep breaths. No associated sxs reported. Patient denies any fever, chills, n/v/d, SOB, weakness, numbness, dizziness, headache, and all other sxs at this time.     Labwork neg Trop, ECG CXR neg, CT chest with L sided pleural effusion and possible right heart dysfunction.     She has been having constant chest pain for 6-7 days. She was doing a lot of painting and inhaled fumes. She had GI cocktail as well.     Patient feels no leg swelling, no PND,  no syncope, no CNS symptoms.    Patient has fairly good exercise tolerance.    Patient is compliant with medications.    FH - mother - MI s/p stents    11/9/20 CT chest  Impression:    No  evidence of pulmonary embolism.     Cardiomegaly.  Reflux into the IVC and hepatic veins suggest some component of right heart dysfunction.  Consider cardiac echo     Small left-sided pleural effusion with left lower lobe atelectasis.    9/10/19 ECG  sinus bradycardia- V rate 49  Biatrial enlargement    CONCLUSIONS     1 - Normal left ventricular systolic function (EF 60-65%).     2 - Normal left ventricular diastolic function.     3 - Normal right ventricular systolic function .     4 - The estimated PA systolic pressure is 25 mmHg.     5 - Mild tricuspid regurgitation.     6 - Concentric remodeling.     7 - No wall motion abnormalities.     8 - Trivial pericardial effusion.     TEST DESCRIPTION   PREDOMINANT RHYTHM  1. Sinus rhythm with heart rates varying between 47 and 103 bpm with an average of 61 bpm.   VENTRICULAR ARRHYTHMIAS  1. There were no PVCs. There was no ventricular ectopic activity.  SUPRA VENTRICULAR ARRHYTHMIAS  1. There were very rare PACs totalling 59 and averaging 1 per hour.  There were 4 monomorphic couplets.  2. There were no episodes of sustained supraventricular tachycardia.    EKG Conclusions:    1. The EKG portion of this study is negative for ischemia at a moderate workload, and peak heart rate of 89 bpm (57% of predicted).   2. Exercise capacity is average.   3. Blood pressure response to exercise was normal (Presenting BP: 150/86 Peak BP: 144/91).   4. No significant arrhythmias were present.   5. There were no symptoms of chest discomfort or significant dyspnea throughout the protocol.   6. The Duke treadmill score was 6 suggesting a low probability for future cardiovascular events.    This document has been electronically    SIGNED BY: Pablo Hua MD On: 10/10/2019 12:59    Past Medical History:   Diagnosis Date    Anxiety     Bradycardia 9/19/2019    GERD (gastroesophageal reflux disease)        History reviewed. No pertinent surgical history.    Social History     Tobacco Use     Smoking status: Former Smoker     Packs/day: 0.50     Types: Cigarettes    Smokeless tobacco: Former User    Tobacco comment: 2 months ago    Substance Use Topics    Alcohol use: Never     Frequency: Never    Drug use: Never       Family History   Problem Relation Age of Onset    Heart attack Mother     Hypertension Mother     Heart disease Father     Heart attack Maternal Aunt     Heart attack Maternal Uncle        Patient's Medications   New Prescriptions    ONDANSETRON (ZOFRAN-ODT) 8 MG TBDL    Take 1 tablet (8 mg total) by mouth every 8 (eight) hours as needed.   Previous Medications    ALPRAZOLAM (XANAX) 0.5 MG TABLET    Take 0.5 mg by mouth 2 (two) times daily as needed.    BUDESONIDE 0.6 MG/NORMAL SALINE 50 ML NASAL IRRIGATION    Patient to irrigate each nostril with 25ml twice daily.    DIPHENHYDRAMINE (BENADRYL) 50 MG CAPSULE    Take 50 mg by mouth every 6 (six) hours as needed for Insomnia.     FLUOXETINE 20 MG CAPSULE    Take 20 mg by mouth once daily.    HYDROCODONE-ACETAMINOPHEN (NORCO) 5-325 MG PER TABLET    Take 1 tablet by mouth every 4 (four) hours as needed.    MULTIVITAMIN (THERAGRAN) PER TABLET    Take 1 tablet by mouth once daily.    OMEPRAZOLE (PRILOSEC) 40 MG CAPSULE    Take 40 mg by mouth.   Modified Medications    No medications on file   Discontinued Medications    No medications on file       Review of Systems   Constitutional: Negative.    HENT: Negative.    Eyes: Negative.    Respiratory: Positive for shortness of breath.    Cardiovascular: Positive for chest pain.   Gastrointestinal: Negative.    Genitourinary: Negative.    Musculoskeletal: Negative.    Skin: Negative.    Neurological: Positive for dizziness.   Endo/Heme/Allergies: Negative.    Psychiatric/Behavioral: The patient is nervous/anxious.    All 12 systems otherwise negative.      Wt Readings from Last 3 Encounters:   11/10/20 47.6 kg (104 lb 15 oz)   11/09/20 49.3 kg (108 lb 11 oz)   11/08/20 48.9 kg (107 lb  "12.9 oz)     Temp Readings from Last 3 Encounters:   11/09/20 97.9 °F (36.6 °C) (Oral)   11/08/20 98.4 °F (36.9 °C) (Oral)   09/22/19 98.2 °F (36.8 °C) (Oral)     BP Readings from Last 3 Encounters:   11/10/20 (!) 140/88   11/09/20 (!) 159/72   11/08/20 (!) 153/81     Pulse Readings from Last 3 Encounters:   11/10/20 66   11/09/20 62   11/08/20 (!) 55       BP (!) 140/88 (BP Location: Right arm, Patient Position: Sitting, BP Method: Medium (Manual))   Pulse 66   Ht 5' 3" (1.6 m)   Wt 47.6 kg (104 lb 15 oz)   SpO2 97%   BMI 18.59 kg/m²     Objective:   Physical Exam   Constitutional: She is oriented to person, place, and time. She appears well-developed and well-nourished. No distress.   HENT:   Head: Normocephalic and atraumatic.   Nose: Nose normal.   Mouth/Throat: Oropharynx is clear and moist.   Eyes: Conjunctivae and EOM are normal. No scleral icterus.   Neck: Normal range of motion. Neck supple. No JVD present. No thyromegaly present.   Cardiovascular: Regular rhythm, S1 normal and S2 normal. Bradycardia present. Exam reveals no gallop, no S3, no S4 and no friction rub.   No murmur heard.  Pulmonary/Chest: Effort normal and breath sounds normal. No stridor. No respiratory distress. She has no wheezes. She has no rales. She exhibits no tenderness.   Abdominal: Soft. Bowel sounds are normal. She exhibits no distension and no mass. There is no abdominal tenderness. There is no rebound.   Genitourinary:    Genitourinary Comments: Deferred     Musculoskeletal: Normal range of motion.         General: No tenderness, deformity or edema.   Lymphadenopathy:     She has no cervical adenopathy.   Neurological: She is alert and oriented to person, place, and time. She exhibits normal muscle tone. Coordination normal.   Skin: Skin is warm and dry. No rash noted. She is not diaphoretic. No erythema. No pallor.   Psychiatric: She has a normal mood and affect. Her behavior is normal. Judgment and thought content normal. "   Nursing note and vitals reviewed.      Lab Results   Component Value Date     11/09/2020    K 3.8 11/09/2020     11/09/2020    CO2 27 11/09/2020    BUN 11 11/09/2020    CREATININE 1.0 11/09/2020     11/09/2020    MG 2.2 09/19/2019    AST 17 11/09/2020    ALT 10 11/09/2020    ALBUMIN 4.1 11/09/2020    PROT 7.8 11/09/2020    BILITOT 0.5 11/09/2020    WBC 7.08 11/09/2020    HGB 11.8 (L) 11/09/2020    HCT 37.4 11/09/2020    MCV 89 11/09/2020     11/09/2020    INR 1.1 09/10/2019    TSH 2.365 09/19/2019    BNP 35 11/08/2020     Assessment:      1. Other chest pain    2. Bradycardia    3. Palpitations    4. Gastroesophageal reflux disease, unspecified whether esophagitis present    5. Pleural effusion        Plan:   1. Chest pain, recurrent  - has been taking prilosec  - discussed non cardiac etiologies  - order an ECHO; order CRP and Sed rate    2. GERD  - cont PPI  - f/u GI    3. Sinus bradycardia with occ palpitations   - Holter - negative  - TSH, T4 - normal   - hold BB    4. Tobacco use  - has quit smoking     5. Anxiety  - referred to psych    6. Pleural effusion  - refer to pulm    Thank you for allowing me to participate in this patient's care. Please do not hesitate to contact me with any questions or concerns. Consult note has been forwarded to the referral physician.

## 2020-11-10 NOTE — TELEPHONE ENCOUNTER
Spoke with patient gave echo results. Pt states the colchicine is not covered by her insurance she will have to pay out of pocket $300. Is there anything else you can give her. She also wants to know if this will be cured with medication?

## 2020-11-10 NOTE — TELEPHONE ENCOUNTER
----- Message from Pablo Hua MD sent at 11/10/2020 12:49 PM CST -----  Please call the patient regarding her result. Overall normal heart function and valves, small amount of fluid around the heart will monitor, start colchicine 0.6 mg twice a day for one month and ibuprofen 600 mg three times a day for 2 weeks for chest pain symptoms. Will refer to rheumatology to rule out further causes.

## 2020-11-18 ENCOUNTER — TELEPHONE (OUTPATIENT)
Dept: CARDIOLOGY | Facility: CLINIC | Age: 60
End: 2020-11-18

## 2020-11-18 NOTE — TELEPHONE ENCOUNTER
Patient states her BP is 169/92 HR 50 Please advise         ----- Message from Lucalucita  sent at 11/18/2020  1:32 PM CST -----  Type:  Needs Medical Advice    Who Called: Pt    Symptoms (please be specific):    How long has patient had these symptoms:    Pharmacy name and phone #:    Would the patient rather a call back or a response via [facility]? Call   Best Call Back Number: [phone] ([place])   Additional Information:   Caller is requesting a call back in regards to b/p being elevated

## 2020-11-19 ENCOUNTER — LAB VISIT (OUTPATIENT)
Dept: LAB | Facility: HOSPITAL | Age: 60
End: 2020-11-19
Attending: INTERNAL MEDICINE
Payer: COMMERCIAL

## 2020-11-19 ENCOUNTER — OFFICE VISIT (OUTPATIENT)
Dept: CARDIOLOGY | Facility: CLINIC | Age: 60
End: 2020-11-19
Payer: COMMERCIAL

## 2020-11-19 ENCOUNTER — OFFICE VISIT (OUTPATIENT)
Dept: RHEUMATOLOGY | Facility: CLINIC | Age: 60
End: 2020-11-19
Payer: COMMERCIAL

## 2020-11-19 VITALS
WEIGHT: 107.13 LBS | DIASTOLIC BLOOD PRESSURE: 78 MMHG | SYSTOLIC BLOOD PRESSURE: 152 MMHG | HEART RATE: 82 BPM | BODY MASS INDEX: 18.98 KG/M2

## 2020-11-19 VITALS
HEART RATE: 51 BPM | SYSTOLIC BLOOD PRESSURE: 184 MMHG | DIASTOLIC BLOOD PRESSURE: 106 MMHG | OXYGEN SATURATION: 99 % | WEIGHT: 107.38 LBS | BODY MASS INDEX: 19.02 KG/M2

## 2020-11-19 DIAGNOSIS — M77.9 INFLAMMATION AROUND JOINT: ICD-10-CM

## 2020-11-19 DIAGNOSIS — R70.0 ESR RAISED: ICD-10-CM

## 2020-11-19 DIAGNOSIS — R00.1 BRADYCARDIA: ICD-10-CM

## 2020-11-19 DIAGNOSIS — Z72.0 TOBACCO USE: Chronic | ICD-10-CM

## 2020-11-19 DIAGNOSIS — R00.2 PALPITATIONS: ICD-10-CM

## 2020-11-19 DIAGNOSIS — Z71.89 COUNSELING ON HEALTH PROMOTION AND DISEASE PREVENTION: ICD-10-CM

## 2020-11-19 DIAGNOSIS — I31.39 PERICARDIAL EFFUSION: Primary | ICD-10-CM

## 2020-11-19 DIAGNOSIS — I10 ESSENTIAL HYPERTENSION: ICD-10-CM

## 2020-11-19 DIAGNOSIS — I31.39 PERICARDIAL EFFUSION: ICD-10-CM

## 2020-11-19 DIAGNOSIS — R07.89 OTHER CHEST PAIN: Primary | ICD-10-CM

## 2020-11-19 DIAGNOSIS — R06.02 SHORTNESS OF BREATH: ICD-10-CM

## 2020-11-19 DIAGNOSIS — R06.00 DYSPNEA, UNSPECIFIED TYPE: ICD-10-CM

## 2020-11-19 LAB
C3 SERPL-MCNC: 120 MG/DL (ref 50–180)
C4 SERPL-MCNC: 44 MG/DL (ref 11–44)
CCP AB SER IA-ACNC: 0.5 U/ML
CK SERPL-CCNC: 152 U/L (ref 20–180)
RHEUMATOID FACT SERPL-ACNC: <10 IU/ML (ref 0–15)
T4 FREE SERPL-MCNC: 0.78 NG/DL (ref 0.71–1.51)
TSH SERPL DL<=0.005 MIU/L-ACNC: 9.6 UIU/ML (ref 0.4–4)
URATE SERPL-MCNC: 4.2 MG/DL (ref 2.4–5.7)

## 2020-11-19 PROCEDURE — 81374 HLA I TYPING 1 ANTIGEN LR: CPT

## 2020-11-19 PROCEDURE — 83520 IMMUNOASSAY QUANT NOS NONAB: CPT

## 2020-11-19 PROCEDURE — 99999 PR PBB SHADOW E&M-EST. PATIENT-LVL III: CPT | Mod: PBBFAC,,, | Performed by: INTERNAL MEDICINE

## 2020-11-19 PROCEDURE — 86235 NUCLEAR ANTIGEN ANTIBODY: CPT | Mod: 59

## 2020-11-19 PROCEDURE — 99999 PR PBB SHADOW E&M-EST. PATIENT-LVL III: ICD-10-PCS | Mod: PBBFAC,,, | Performed by: INTERNAL MEDICINE

## 2020-11-19 PROCEDURE — 99214 PR OFFICE/OUTPT VISIT, EST, LEVL IV, 30-39 MIN: ICD-10-PCS | Mod: S$GLB,,, | Performed by: INTERNAL MEDICINE

## 2020-11-19 PROCEDURE — 84439 ASSAY OF FREE THYROXINE: CPT

## 2020-11-19 PROCEDURE — 86480 TB TEST CELL IMMUN MEASURE: CPT

## 2020-11-19 PROCEDURE — 86431 RHEUMATOID FACTOR QUANT: CPT

## 2020-11-19 PROCEDURE — U0003 INFECTIOUS AGENT DETECTION BY NUCLEIC ACID (DNA OR RNA); SEVERE ACUTE RESPIRATORY SYNDROME CORONAVIRUS 2 (SARS-COV-2) (CORONAVIRUS DISEASE [COVID-19]), AMPLIFIED PROBE TECHNIQUE, MAKING USE OF HIGH THROUGHPUT TECHNOLOGIES AS DESCRIBED BY CMS-2020-01-R: HCPCS

## 2020-11-19 PROCEDURE — 84443 ASSAY THYROID STIM HORMONE: CPT

## 2020-11-19 PROCEDURE — 86039 ANTINUCLEAR ANTIBODIES (ANA): CPT

## 2020-11-19 PROCEDURE — 86235 NUCLEAR ANTIGEN ANTIBODY: CPT

## 2020-11-19 PROCEDURE — 80074 ACUTE HEPATITIS PANEL: CPT

## 2020-11-19 PROCEDURE — 82085 ASSAY OF ALDOLASE: CPT

## 2020-11-19 PROCEDURE — 1125F AMNT PAIN NOTED PAIN PRSNT: CPT | Mod: S$GLB,,, | Performed by: INTERNAL MEDICINE

## 2020-11-19 PROCEDURE — 84550 ASSAY OF BLOOD/URIC ACID: CPT

## 2020-11-19 PROCEDURE — 99205 OFFICE O/P NEW HI 60 MIN: CPT | Mod: S$GLB,,, | Performed by: INTERNAL MEDICINE

## 2020-11-19 PROCEDURE — 99205 PR OFFICE/OUTPT VISIT, NEW, LEVL V, 60-74 MIN: ICD-10-PCS | Mod: S$GLB,,, | Performed by: INTERNAL MEDICINE

## 2020-11-19 PROCEDURE — 3008F BODY MASS INDEX DOCD: CPT | Mod: CPTII,S$GLB,, | Performed by: INTERNAL MEDICINE

## 2020-11-19 PROCEDURE — 3008F PR BODY MASS INDEX (BMI) DOCUMENTED: ICD-10-PCS | Mod: CPTII,S$GLB,, | Performed by: INTERNAL MEDICINE

## 2020-11-19 PROCEDURE — 1125F PR PAIN SEVERITY QUANTIFIED, PAIN PRESENT: ICD-10-PCS | Mod: S$GLB,,, | Performed by: INTERNAL MEDICINE

## 2020-11-19 PROCEDURE — 86200 CCP ANTIBODY: CPT

## 2020-11-19 PROCEDURE — 82164 ANGIOTENSIN I ENZYME TEST: CPT

## 2020-11-19 PROCEDURE — 86160 COMPLEMENT ANTIGEN: CPT | Mod: 59

## 2020-11-19 PROCEDURE — 86160 COMPLEMENT ANTIGEN: CPT

## 2020-11-19 PROCEDURE — 99214 OFFICE O/P EST MOD 30 MIN: CPT | Mod: S$GLB,,, | Performed by: INTERNAL MEDICINE

## 2020-11-19 PROCEDURE — 82550 ASSAY OF CK (CPK): CPT

## 2020-11-19 PROCEDURE — 36415 COLL VENOUS BLD VENIPUNCTURE: CPT

## 2020-11-19 PROCEDURE — 86703 HIV-1/HIV-2 1 RESULT ANTBDY: CPT

## 2020-11-19 PROCEDURE — 86038 ANTINUCLEAR ANTIBODIES: CPT

## 2020-11-19 RX ORDER — COLCHICINE 0.6 MG/1
0.6 TABLET ORAL 2 TIMES DAILY
Qty: 60 TABLET | Refills: 0 | Status: SHIPPED | OUTPATIENT
Start: 2020-11-19 | End: 2020-12-14

## 2020-11-19 RX ORDER — ACETAMINOPHEN 500 MG
500 TABLET ORAL
Status: ON HOLD | COMMUNITY
End: 2023-12-26

## 2020-11-19 RX ORDER — AMLODIPINE BESYLATE 10 MG/1
10 TABLET ORAL DAILY
Qty: 30 TABLET | Refills: 3 | Status: SHIPPED | OUTPATIENT
Start: 2020-11-19 | End: 2020-12-17 | Stop reason: SDUPTHER

## 2020-11-19 RX ORDER — HYDROCODONE BITARTRATE AND ACETAMINOPHEN 5; 325 MG/1; MG/1
1 TABLET ORAL
COMMUNITY
Start: 2020-11-09 | End: 2021-09-23 | Stop reason: ALTCHOICE

## 2020-11-19 RX ORDER — HYDROCHLOROTHIAZIDE 12.5 MG/1
12.5 TABLET ORAL DAILY
Qty: 30 TABLET | Refills: 11 | Status: SHIPPED | OUTPATIENT
Start: 2020-11-19 | End: 2020-12-17 | Stop reason: SDUPTHER

## 2020-11-19 NOTE — PROGRESS NOTES
"       RHEUMATOLOGY OUTPATIENT CLINIC NOTE    11/19/2020    Attending Rheumatologist: Irwin Murphy  Primary Care Provider: MINH Ryan   Physician Requesting Consultation: Pablo Hua MD  [id number] [facility]  [place] [id number]  Chief Complaint/Reason For Consultation:  Chest pain    Subjective:       HPI  Yvonne Avila is a 60 y.o. White female with elevation of acute phase reactants and serositis referred for rheumatic evaluation.  Main complaint of left-sided chest pain.  Onset few weeks ago without any particular precipitating event prior episodes.  Pain is intermittent, last several hours.  Improved with the use of oral medications per Cardiology, currently on colchicine and NSAIDs.  Refers worsening with deep inspiration, denies worsening by laying flat.  Refers seen by Rheumatologist approximately 15 years ago for generalized rash.  Refers was given systemic corticosteroids that helped her symptoms, and reported having and "negative" workup.  Denies any active rash or photosensitivity.  Does not have tri-color discoloration of fingertips upon cold exposure or ulcers.  Denies joint swelling, persisent paresthesias, fever, or hematuria.      Review of Systems   Constitutional: Negative for chills, fever and malaise/fatigue.   HENT:        Mild xerostomia.  No mucosa ulcerations   Eyes: Negative for pain and redness.        Denies history uveitis   Respiratory: Negative for cough, hemoptysis and shortness of breath.    Cardiovascular: Positive for chest pain (Left-sided, pleuritic features). Negative for leg swelling.   Gastrointestinal: Negative for abdominal pain, blood in stool and melena.        Chronic intermittent dysphagia to both solids and liquids   Genitourinary: Negative for dysuria and hematuria.   Musculoskeletal: Negative for falls and joint pain.   Skin: Negative for rash.        Refers remote generalized rash without recurrence.  Denies photosensitivity or Raynaud's " phenomena.  No ulcerations.  No history of psoriasis   Neurological: Negative for tingling, focal weakness and weakness.   Psychiatric/Behavioral: Negative for memory loss. The patient does not have insomnia.      Chronic comorbid conditions affecting medical decision making today:  Past Medical History:   Diagnosis Date    Anxiety     Bradycardia 9/19/2019    GERD (gastroesophageal reflux disease)    · Pericardial effusion   · Tobacco use   · Dysphagia    History reviewed. No pertinent surgical history.  Family History   Problem Relation Age of Onset    Heart attack Mother     Hypertension Mother     Heart disease Father     Heart attack Maternal Aunt     Heart attack Maternal Uncle      Social History     Substance and Sexual Activity   Alcohol Use Never    Frequency: Never     Social History     Tobacco Use   Smoking Status Former Smoker    Packs/day: 0.50    Types: Cigarettes   Smokeless Tobacco Former User   Tobacco Comment    2 months ago      Social History     Substance and Sexual Activity   Drug Use Never       Current Outpatient Medications:     ALPRAZolam (XANAX) 0.5 MG tablet, Take 0.5 mg by mouth 2 (two) times daily as needed., Disp: , Rfl:     BUDESONIDE 0.6 MG/NORMAL SALINE 50 ML NASAL IRRIGATION, Patient to irrigate each nostril with 25ml twice daily., Disp: , Rfl:     calcium carbonate (TUMS) 300 mg (750 mg) Chew, Take 750 mg by mouth., Disp: , Rfl:     diphenhydrAMINE (BENADRYL) 50 MG capsule, Take 50 mg by mouth every 6 (six) hours as needed for Insomnia. , Disp: , Rfl:     FLUoxetine 20 MG capsule, Take 20 mg by mouth once daily., Disp: , Rfl:     HYDROcodone-acetaminophen (NORCO) 5-325 mg per tablet, Take 1 tablet by mouth., Disp: , Rfl:     ibuprofen (ADVIL,MOTRIN) 600 MG tablet, Take 1 tablet (600 mg total) by mouth 3 (three) times daily. for 14 days, Disp: 42 tablet, Rfl: 0    multivitamin (THERAGRAN) per tablet, Take 1 tablet by mouth once daily., Disp: , Rfl:      "ondansetron (ZOFRAN-ODT) 8 MG TbDL, Take 1 tablet (8 mg total) by mouth every 8 (eight) hours as needed., Disp: 20 tablet, Rfl: 1    colchicine (COLCRYS) 0.6 mg tablet, Take 1 tablet (0.6 mg total) by mouth 2 (two) times daily. (Patient not taking: Reported on 11/19/2020), Disp: 60 tablet, Rfl: 0    omeprazole (PRILOSEC) 40 MG capsule, Take 40 mg by mouth., Disp: , Rfl:      Objective:         There were no vitals filed for this visit.  Physical Exam   Constitutional: No distress.   Estimated body mass index is 18.98 kg/m² as calculated from the following:    Height as of 11/10/20: 5' 3" (1.6 m).    Weight as of this encounter: 48.6 kg (107 lb 2.3 oz).    Wt Readings from Last 1 Encounters:  11/19/20 0717 : 48.6 kg (107 lb 2.3 oz)     HENT:   Head: Normocephalic and atraumatic.   Eyes: Conjunctivae are normal. Pupils are equal, round, and reactive to light.   Neck: Normal range of motion.   Cardiovascular: Normal rate and intact distal pulses.    Pulmonary/Chest: Effort normal. No respiratory distress.   Abdominal: Soft. She exhibits no distension.   Neurological: She is alert. Gait normal.   Muscle strength 5/5 proximally and distally throughout.   Skin: No rash noted. No erythema.     Musculoskeletal: Normal range of motion. Deformity (Heberden's.  Hyperflexion of 5th right DIP) present. No tenderness.      Comments: : strong  No synovitis or significant squeeze tenderness    AROM: intact  PROM: intact    Devices used by patient: none       Reviewed old and all outside pertinent medical records available.    All lab results personally reviewed and interpreted by me.  Lab Results   Component Value Date    WBC 7.08 11/09/2020    HGB 11.8 (L) 11/09/2020    HCT 37.4 11/09/2020    MCV 89 11/09/2020    MCH 28.0 11/09/2020    MCHC 31.6 (L) 11/09/2020    RDW 12.6 11/09/2020     11/09/2020    MPV 10.4 11/09/2020       Lab Results   Component Value Date     11/09/2020    K 3.8 11/09/2020     " 11/09/2020    CO2 27 11/09/2020     11/09/2020    BUN 11 11/09/2020    CALCIUM 9.8 11/09/2020    PROT 7.8 11/09/2020    ALBUMIN 4.1 11/09/2020    BILITOT 0.5 11/09/2020    AST 17 11/09/2020    ALKPHOS 77 11/09/2020    ALT 10 11/09/2020     Lab Results   Component Value Date    COLORU Yellow 11/08/2020    APPEARANCEUA Clear 11/08/2020    SPECGRAV <=1.005 (A) 11/08/2020    PHUR 7.0 11/08/2020    PROTEINUA Negative 11/08/2020    KETONESU Negative 11/08/2020    LEUKOCYTESUR Negative 11/08/2020    NITRITE Negative 11/08/2020    UROBILINOGEN Negative 11/08/2020       Lab Results   Component Value Date    CRP 11.7 (H) 11/10/2020       Lab Results   Component Value Date    SEDRATE 41 (H) 11/10/2020       Lab Results   Component Value Date    SEDRATE 41 (H) 11/10/2020       No components found for: 25OHVITDTOT, 58TCBDCT8, 61CFACOE3, METHODNOTE    No results found for: URICACID    No components found for: TSPOTTB    Rheum Labs:   n/a     Infectious Labs:   n/a     Imaging:  All imaging reviewed and independently interpreted by me.    X-ray abdomen September 2019  The bones demonstrate mild degenerative changes within the lower lumbar region    CTA chest November 2020  Atelectasis seen at the left lung base associated with small left pleural effusion.  No nodules or infiltrates.  Apical pleural thickening noted.  Benign granuloma left lower lobe.  Small left pleural effusion. No significant adenopathy. Heart size is enlarged consistent with cardiomegaly.  Reflux into the IVC and hepatic veins suggest some component of right heart dysfunction.  Mild coronary artery disease.  Small pericardial effusion.  Bones/Chest Wall: Intact with mild degenerative changes. No evidence of pulmonary embolism.      EKG  Sinus bradycardia 058 BPM   Possible Left atrial enlargement   Right ventricular conduction delay  QTc Int : 453 ms    TTE 11/2020  The left ventricle is normal in size with normal systolic function. The estimated  ejection fraction is 65%.  Normal left ventricular diastolic function.  Normal right ventricular size with normal right ventricular systolic function.  The estimated PA systolic pressure is 25 mmHg.  Normal central venous pressure (3 mmHg).  Small pericardial effusion.     ASSESSMENT / PLAN:     Yvonne Avila is a 60 y.o. White female with:    1. Serositis and elevation of ESR  - remote history of generalized rash, chronic dysphagia, pericardial and pleural effusions  - does not have generalized synovitis, active rash, fever, or significant cytopenias  - currently on NSAIDs and colchicine per Cardiology.  Awaiting pulmonary evaluation  - recommend workup as below.  Further recommendations after lab review.  - TYE Screen w/Reflex; Standing  - Angiotensin Converting Enzyme; Standing  - Rheumatoid Factor; Future  - Cyclic Citrullinated Peptide Antibody, IgG; Future  - CK; Standing  - Aldolase; Future  - MyoMarker Panel 3; Future  - TSH; Future  - Uric Acid; Future  - HLA B27 Antigen; Future  - C3 Complement; Standing  - C4 Complement; Standing  - Hepatitis Panel, Acute; Future  - Quantiferon Gold TB; Future  - HIV 1/2 Ag/Ab (4th Gen); Future    2. Other specified counseling  - over 10 minutes spent regarding below topics:  - Immunization counseling done.  - malignancy screening per guidelines by PMD  - Limitation of alcohol consumption and continue smoking cessation.  - Medication counseling provided.    Follow up in about 1 month (around 12/19/2020).    Method of contact with patient concerns: [facility]    Disclaimer:  This note is prepared using voice recognition software and as such is likely to have errors and has not been proof read. Please contact me for questions.     Time spent: 60 minutes in face to face discussion concerning diagnosis, prognosis, review of lab and test results, benefits of treatment as well as management of disease, counseling of patient and coordination of care between  various health care providers.  Greater than half the time spent was used for coordination of care and counseling of patient.    Irwni Murphy M.D.  Rheumatology Department   [facility]

## 2020-11-19 NOTE — PROGRESS NOTES
Subjective:   Patient ID:  Yvonne Avila is a 60 y.o. female who presents for cardiac consult of Chest Pain      Shortness of Breath  Associated symptoms include chest pain.     The patient came in today for cardiac consult of Chest Pain      Yvonne Avila is a 60 y.o. female pt with HTN, GERD, Anxiety, tobacco use presents for follow up CV eval.     9/11/19  Pt presented to the ER yesterday for CP. ECG with sinus julian V rate 49. Enzymes neg x 2, NTG caused worsening of CP.   She has been having intermittent chest pain for 2 weeks. Chest pain can lasts for a few sec, took BC which helped. She just started Nexium as well.   She does get lightheaded and feels like she may pass out.     10/17/19  ETT normal, Holter normal. Normal 2D ECHO. She still has some atypical CP and MORRIS. Has a lot of anxiety will refer to psych.    12/23/19  She has been gaining weight, now > 101 lbs. She has mild MORRIS only with significant exertional. She still has occ panic attacks, saw psychologist. She has CP relief with PPI.     11/20/20 - ER follow up  She went to Emergency Department for chest pain, onset several days PTA. Pt presented to the ED yesterday for similar sxs, and was discharged after a negative workup. Symptoms are constant and moderate in severity. Pain is worse when taking deep breaths. No associated sxs reported. Patient denies any fever, chills, n/v/d, SOB, weakness, numbness, dizziness, headache, and all other sxs at this time.   Labwork neg Trop, ECG CXR neg, CT chest with L sided pleural effusion and possible right heart dysfunction.   She has been having constant chest pain for 6-7 days. She was doing a lot of painting and inhaled fumes. She had GI cocktail as well.     11/19/20  She had recent eval by Dr. Mayen undergoing further rheum workup for elevated imflamm markers. Sed rate and CRP were elevated, started on colchicine and nsaids, she said colchicine was not covered by insurance. She also had  elevated BP yesterday 160s/90s. ECHO normal BI V function, small pericardial effusion. Her pain is improved with NSAIDS.     Patient feels no leg swelling, no PND,  no syncope, no CNS symptoms.    Patient has fairly good exercise tolerance.    Patient is compliant with medications.    FH - mother - MI s/p stents    11/9/20 CT chest  Impression:    No evidence of pulmonary embolism.     Cardiomegaly.  Reflux into the IVC and hepatic veins suggest some component of right heart dysfunction.  Consider cardiac echo     Small left-sided pleural effusion with left lower lobe atelectasis.    Results for orders placed during the hospital encounter of 11/10/20   Echo Color Flow Doppler? Yes    Narrative · The left ventricle is normal in size with normal systolic function. The   estimated ejection fraction is 65%.  · Normal left ventricular diastolic function.  · Normal right ventricular size with normal right ventricular systolic   function.  · The estimated PA systolic pressure is 25 mmHg.  · Normal central venous pressure (3 mmHg).  · Small pericardial effusion.            9/10/19 ECG  sinus bradycardia- V rate 49  Biatrial enlargement    TEST DESCRIPTION   PREDOMINANT RHYTHM  1. Sinus rhythm with heart rates varying between 47 and 103 bpm with an average of 61 bpm.   VENTRICULAR ARRHYTHMIAS  1. There were no PVCs. There was no ventricular ectopic activity.  SUPRA VENTRICULAR ARRHYTHMIAS  1. There were very rare PACs totalling 59 and averaging 1 per hour.  There were 4 monomorphic couplets.  2. There were no episodes of sustained supraventricular tachycardia.    EKG Conclusions:    1. The EKG portion of this study is negative for ischemia at a moderate workload, and peak heart rate of 89 bpm (57% of predicted).   2. Exercise capacity is average.   3. Blood pressure response to exercise was normal (Presenting BP: 150/86 Peak BP: 144/91).   4. No significant arrhythmias were present.   5. There were no symptoms of chest  discomfort or significant dyspnea throughout the protocol.   6. The Duke treadmill score was 6 suggesting a low probability for future cardiovascular events.    This document has been electronically    SIGNED BY: Pablo Hua MD On: 10/10/2019 12:59    Past Medical History:   Diagnosis Date    Anxiety     Bradycardia 9/19/2019    GERD (gastroesophageal reflux disease)        History reviewed. No pertinent surgical history.    Social History     Tobacco Use    Smoking status: Former Smoker     Packs/day: 0.50     Types: Cigarettes    Smokeless tobacco: Former User    Tobacco comment: 2 months ago    Substance Use Topics    Alcohol use: Never     Frequency: Never    Drug use: Never       Family History   Problem Relation Age of Onset    Heart attack Mother     Hypertension Mother     Heart disease Father     Heart attack Maternal Aunt     Heart attack Maternal Uncle        Patient's Medications   New Prescriptions    AMLODIPINE (NORVASC) 10 MG TABLET    Take 1 tablet (10 mg total) by mouth once daily.    COLCHICINE (COLCRYS) 0.6 MG TABLET    Take 1 tablet (0.6 mg total) by mouth 2 (two) times daily.    HYDROCHLOROTHIAZIDE (HYDRODIURIL) 12.5 MG TAB    Take 1 tablet (12.5 mg total) by mouth once daily.   Previous Medications    ACETAMINOPHEN (TYLENOL) 500 MG TABLET    Take 500 mg by mouth as needed for Pain.    ALPRAZOLAM (XANAX) 0.5 MG TABLET    Take 0.5 mg by mouth 2 (two) times daily as needed.    BUDESONIDE 0.6 MG/NORMAL SALINE 50 ML NASAL IRRIGATION    Patient to irrigate each nostril with 25ml twice daily.    CALCIUM CARBONATE (TUMS) 300 MG (750 MG) CHEW    Take 750 mg by mouth.    COLCHICINE (COLCRYS) 0.6 MG TABLET    Take 1 tablet (0.6 mg total) by mouth 2 (two) times daily.    DIPHENHYDRAMINE (BENADRYL) 50 MG CAPSULE    Take 50 mg by mouth every 6 (six) hours as needed for Insomnia.     FLUOXETINE 20 MG CAPSULE    Take 20 mg by mouth once daily.    HYDROCODONE-ACETAMINOPHEN (NORCO) 5-325 MG PER  TABLET    Take 1 tablet by mouth.    IBUPROFEN (ADVIL,MOTRIN) 600 MG TABLET    Take 1 tablet (600 mg total) by mouth 3 (three) times daily. for 14 days    MULTIVITAMIN (THERAGRAN) PER TABLET    Take 1 tablet by mouth once daily.    OMEPRAZOLE (PRILOSEC) 40 MG CAPSULE    Take 40 mg by mouth.    ONDANSETRON (ZOFRAN-ODT) 8 MG TBDL    Take 1 tablet (8 mg total) by mouth every 8 (eight) hours as needed.   Modified Medications    No medications on file   Discontinued Medications    No medications on file       Review of Systems   Constitutional: Negative.    HENT: Negative.    Eyes: Negative.    Respiratory: Positive for shortness of breath.    Cardiovascular: Positive for chest pain.   Gastrointestinal: Negative.    Genitourinary: Negative.    Musculoskeletal: Negative.    Skin: Negative.    Neurological: Positive for dizziness.   Endo/Heme/Allergies: Negative.    Psychiatric/Behavioral: The patient is nervous/anxious.    All 12 systems otherwise negative.      Wt Readings from Last 3 Encounters:   11/19/20 48.7 kg (107 lb 5.8 oz)   11/19/20 48.6 kg (107 lb 2.3 oz)   11/10/20 47.2 kg (104 lb)     Temp Readings from Last 3 Encounters:   11/09/20 97.9 °F (36.6 °C) (Oral)   11/08/20 98.4 °F (36.9 °C) (Oral)   09/22/19 98.2 °F (36.8 °C) (Oral)     BP Readings from Last 3 Encounters:   11/19/20 (!) 184/106   11/19/20 (!) 152/78   11/10/20 (!) 140/88     Pulse Readings from Last 3 Encounters:   11/19/20 (!) 51   11/19/20 82   11/10/20 (!) 53       BP (!) 184/106 (BP Location: Right arm, Patient Position: Sitting, BP Method: Medium (Automatic))   Pulse (!) 51   Wt 48.7 kg (107 lb 5.8 oz)   SpO2 99%   BMI 19.02 kg/m²     Objective:   Physical Exam   Constitutional: She is oriented to person, place, and time. She appears well-developed and well-nourished. No distress.   HENT:   Head: Normocephalic and atraumatic.   Nose: Nose normal.   Mouth/Throat: Oropharynx is clear and moist.   Eyes: Conjunctivae and EOM are normal. No  scleral icterus.   Neck: Normal range of motion. Neck supple. No JVD present. No thyromegaly present.   Cardiovascular: Regular rhythm, S1 normal and S2 normal. Bradycardia present. Exam reveals no gallop, no S3, no S4 and no friction rub.   No murmur heard.  Pulmonary/Chest: Effort normal and breath sounds normal. No stridor. No respiratory distress. She has no wheezes. She has no rales. She exhibits no tenderness.   Abdominal: Soft. Bowel sounds are normal. She exhibits no distension and no mass. There is no abdominal tenderness. There is no rebound.   Genitourinary:    Genitourinary Comments: Deferred     Musculoskeletal: Normal range of motion.         General: No tenderness, deformity or edema.   Lymphadenopathy:     She has no cervical adenopathy.   Neurological: She is alert and oriented to person, place, and time. She exhibits normal muscle tone. Coordination normal.   Skin: Skin is warm and dry. No rash noted. She is not diaphoretic. No erythema. No pallor.   Psychiatric: She has a normal mood and affect. Her behavior is normal. Judgment and thought content normal.   Nursing note and vitals reviewed.      Lab Results   Component Value Date     11/09/2020    K 3.8 11/09/2020     11/09/2020    CO2 27 11/09/2020    BUN 11 11/09/2020    CREATININE 1.0 11/09/2020     11/09/2020    MG 2.2 09/19/2019    AST 17 11/09/2020    ALT 10 11/09/2020    ALBUMIN 4.1 11/09/2020    PROT 7.8 11/09/2020    BILITOT 0.5 11/09/2020    WBC 7.08 11/09/2020    HGB 11.8 (L) 11/09/2020    HCT 37.4 11/09/2020    MCV 89 11/09/2020     11/09/2020    INR 1.1 09/10/2019    TSH 2.365 09/19/2019    BNP 35 11/08/2020     Assessment:      1. Other chest pain    2. Bradycardia    3. Palpitations    4. Tobacco use (says stopped Sept 2019)     5. Essential hypertension    6. Dyspnea, unspecified type        Plan:   1. Chest pain, recurrent  -  has been taking prilosec  -  CRP and Sed rate - elevated  - small pericardial  effusion  - rheumatology evaluated, awaiting further workup    2. GERD  - cont PPI  - f/u GI    3. Sinus bradycardia with occ palpitations   - Holter - negative  - TSH, T4 - normal   - hold BB    4. Tobacco use  - has quit smoking     5. Anxiety  - referred to psych    6. Pleural effusion  - refer to pulm    7. HTN  - start Norvasc  - HCTZ  - needs to DC Afrin    Thank you for allowing me to participate in this patient's care. Please do not hesitate to contact me with any questions or concerns. Consult note has been forwarded to the referral physician.

## 2020-11-19 NOTE — LETTER
November 19, 2020      Pablo Hua MD  [id number] [facility]  [place] [id number]           [facility]  [address] [place] [address] [address] [place] [address]  Phone: [phone]  Fax: [phone]          Patient: Yvonne Avila   MR Number: [id number]   YOB: 1960   Date of Visit: 11/19/2020       Dear Dr. Pablo Hua:    Thank you for referring Yvonne Avila to me for evaluation. Attached you will find relevant portions of my assessment and plan of care.    If you have questions, please do not hesitate to call me. I look forward to following Yvonne Avila along with you.    Sincerely,    Irwin Murphy MD    Enclosure  CC:  No Recipients    If you would like to receive this communication electronically, please contact [email or web address] or [phone] to request more information on [employer] Link access.    For providers and/or their staff who would like to refer a patient to [facility], please contact us through our one-stop-shop provider referral line, [facility], at [phone].    If you feel you have received this communication in error or would no longer like to receive these types of communications, please e-mail [email or web address]

## 2020-11-20 LAB
ALDOLASE SERPL-CCNC: 4.5 U/L (ref 1.2–7.6)
ANA PATTERN 1: NORMAL
ANA SER QL IF: POSITIVE
ANA TITR SER IF: NORMAL {TITER}
HAV IGM SERPL QL IA: NEGATIVE
HBV CORE IGM SERPL QL IA: NEGATIVE
HBV SURFACE AG SERPL QL IA: NEGATIVE
HCV AB SERPL QL IA: NEGATIVE
HIV 1+2 AB+HIV1 P24 AG SERPL QL IA: NEGATIVE

## 2020-11-21 LAB — ACE SERPL-CCNC: 41 U/L (ref 16–85)

## 2020-11-23 ENCOUNTER — TELEPHONE (OUTPATIENT)
Dept: RHEUMATOLOGY | Facility: CLINIC | Age: 60
End: 2020-11-23

## 2020-11-23 ENCOUNTER — TELEPHONE (OUTPATIENT)
Dept: CARDIOLOGY | Facility: CLINIC | Age: 60
End: 2020-11-23

## 2020-11-23 LAB
ANTI SM ANTIBODY: 0.07 RATIO (ref 0–0.99)
ANTI SM/RNP ANTIBODY: 0.11 RATIO (ref 0–0.99)
ANTI-SM INTERPRETATION: NEGATIVE
ANTI-SM/RNP INTERPRETATION: NEGATIVE
ANTI-SSA ANTIBODY: 0.06 RATIO (ref 0–0.99)
ANTI-SSA INTERPRETATION: NEGATIVE
ANTI-SSB ANTIBODY: 0.06 RATIO (ref 0–0.99)
ANTI-SSB INTERPRETATION: NEGATIVE
DSDNA AB SER-ACNC: NORMAL [IU]/ML
GAMMA INTERFERON BACKGROUND BLD IA-ACNC: 0.03 IU/ML
M TB IFN-G CD4+ BCKGRND COR BLD-ACNC: -0.01 IU/ML
MITOGEN IGNF BCKGRD COR BLD-ACNC: 7.72 IU/ML
SARS-COV-2 RNA RESP QL NAA+PROBE: NOT DETECTED
TB GOLD PLUS: NEGATIVE
TB2 - NIL: 0 IU/ML

## 2020-11-23 NOTE — TELEPHONE ENCOUNTER
----- Message from Kristy Key sent at 11/23/2020  4:39 PM CST -----  Regarding: return a call back  .Type:  Patient Returning Call    Who Called: pt  Who Left Message for Patient:   Does the patient know what this is regarding?: no  Would the patient rather a call back or a response via [facility]?  Call back   Best Call Back Number: [phone]  Additional Information:

## 2020-11-23 NOTE — TELEPHONE ENCOUNTER
----- Message from Pablo Hua MD sent at 11/23/2020 12:27 PM CST -----  Please call the patient regarding her result. Negative for COVID 19 active infection

## 2020-11-23 NOTE — TELEPHONE ENCOUNTER
----- Message from Kirsty Key sent at 11/23/2020  4:38 PM CST -----  Regarding: return call  .Type:  Patient Returning Call    Who Called: pt  Who Left Message for Patient:   Does the patient know what this is regarding?: no  Would the patient rather a call back or a response via [facility]? Call back   Best Call Back Number: [phone] (home)   Additional Information:

## 2020-11-23 NOTE — TELEPHONE ENCOUNTER
Spoke with pt and she states that someone called her but she does not know who. Advised pt that per the notes we spoke with her earlier about her test results.

## 2020-11-23 NOTE — TELEPHONE ENCOUNTER
----- Message from Irwin Murphy MD sent at 11/19/2020 12:01 PM CST -----  Patient cannot receive test results via patient portal.  Please contact the patient and inform I reviewed the available work-up results.  Will recommend patient to follow with primary care physician for further evaluation and management of elevation of TSH.  Will update once other results are available.  We can continue with the plan discussed on our previous encounter.  For any questions or concerns, do not hesitate to contact me; and have the patient call the office or send a message through the patient portal for any concerns.    Thank you very much!    Sincerely,    Irwin Murphy MD  Rheumatology Department   [facility]

## 2020-12-01 LAB
HLA B27 INTERPRETATION: NORMAL
HLA-B27 RELATED AG QL: NEGATIVE
HLA-B27 RELATED AG QL: NORMAL

## 2020-12-06 LAB
ANTI-PM/SCL AB: <20 UNITS
ANTI-SS-A 52 KD AB, IGG: <20 UNITS
EJ AB SER QL: NEGATIVE
ENA JO1 AB SER IA-ACNC: <20 UNITS
ENA SM+RNP AB SER IA-ACNC: <20 UNITS
FIBRILLARIN (U3 RNP): NEGATIVE
KU AB SER QL: NEGATIVE
MDA-5 (P140): <20 UNITS
MI2 AB SER QL: NEGATIVE
NXP-2 (P140): <20 UNITS
OJ AB SER QL: NEGATIVE
PL12 AB SER QL: NEGATIVE
PL7 AB SER QL: NEGATIVE
SRP AB SERPL QL: NEGATIVE
TIF1 GAMMA (P155/140): <20 UNITS
U2 SNRNP: NEGATIVE

## 2020-12-14 ENCOUNTER — HOSPITAL ENCOUNTER (OUTPATIENT)
Dept: RADIOLOGY | Facility: HOSPITAL | Age: 60
Discharge: HOME | End: 2020-12-14
Attending: NURSE PRACTITIONER
Payer: COMMERCIAL

## 2020-12-14 ENCOUNTER — OFFICE VISIT (OUTPATIENT)
Dept: PULMONOLOGY | Facility: CLINIC | Age: 60
End: 2020-12-14
Payer: COMMERCIAL

## 2020-12-14 VITALS
SYSTOLIC BLOOD PRESSURE: 98 MMHG | BODY MASS INDEX: 18.54 KG/M2 | OXYGEN SATURATION: 98 % | HEART RATE: 56 BPM | DIASTOLIC BLOOD PRESSURE: 60 MMHG | WEIGHT: 104.63 LBS | RESPIRATION RATE: 18 BRPM | HEIGHT: 63 IN

## 2020-12-14 DIAGNOSIS — R06.02 SOB (SHORTNESS OF BREATH) ON EXERTION: ICD-10-CM

## 2020-12-14 DIAGNOSIS — K21.9 GASTROESOPHAGEAL REFLUX DISEASE, UNSPECIFIED WHETHER ESOPHAGITIS PRESENT: ICD-10-CM

## 2020-12-14 DIAGNOSIS — Z87.891 FORMER TOBACCO USE: ICD-10-CM

## 2020-12-14 DIAGNOSIS — J90 PLEURAL EFFUSION: ICD-10-CM

## 2020-12-14 DIAGNOSIS — I10 ESSENTIAL HYPERTENSION: ICD-10-CM

## 2020-12-14 DIAGNOSIS — J90 PLEURAL EFFUSION: Primary | ICD-10-CM

## 2020-12-14 DIAGNOSIS — R07.89 OTHER CHEST PAIN: ICD-10-CM

## 2020-12-14 PROCEDURE — 71046 XR CHEST PA AND LATERAL: ICD-10-PCS | Mod: 26,,, | Performed by: RADIOLOGY

## 2020-12-14 PROCEDURE — 71046 X-RAY EXAM CHEST 2 VIEWS: CPT | Mod: 26,,, | Performed by: RADIOLOGY

## 2020-12-14 PROCEDURE — 1126F PR PAIN SEVERITY QUANTIFIED, NO PAIN PRESENT: ICD-10-PCS | Mod: S$GLB,,, | Performed by: NURSE PRACTITIONER

## 2020-12-14 PROCEDURE — 1126F AMNT PAIN NOTED NONE PRSNT: CPT | Mod: S$GLB,,, | Performed by: NURSE PRACTITIONER

## 2020-12-14 PROCEDURE — 3008F PR BODY MASS INDEX (BMI) DOCUMENTED: ICD-10-PCS | Mod: CPTII,S$GLB,, | Performed by: NURSE PRACTITIONER

## 2020-12-14 PROCEDURE — 99204 OFFICE O/P NEW MOD 45 MIN: CPT | Mod: S$GLB,,, | Performed by: NURSE PRACTITIONER

## 2020-12-14 PROCEDURE — 99999 PR PBB SHADOW E&M-EST. PATIENT-LVL V: ICD-10-PCS | Mod: PBBFAC,,, | Performed by: NURSE PRACTITIONER

## 2020-12-14 PROCEDURE — 3008F BODY MASS INDEX DOCD: CPT | Mod: CPTII,S$GLB,, | Performed by: NURSE PRACTITIONER

## 2020-12-14 PROCEDURE — 71046 X-RAY EXAM CHEST 2 VIEWS: CPT | Mod: TC

## 2020-12-14 PROCEDURE — 99204 PR OFFICE/OUTPT VISIT, NEW, LEVL IV, 45-59 MIN: ICD-10-PCS | Mod: S$GLB,,, | Performed by: NURSE PRACTITIONER

## 2020-12-14 PROCEDURE — 99999 PR PBB SHADOW E&M-EST. PATIENT-LVL V: CPT | Mod: PBBFAC,,, | Performed by: NURSE PRACTITIONER

## 2020-12-14 RX ORDER — LEVOTHYROXINE SODIUM 50 UG/1
50 TABLET ORAL EVERY MORNING
COMMUNITY
Start: 2020-11-25 | End: 2021-09-23 | Stop reason: ALTCHOICE

## 2020-12-14 RX ORDER — FLUOXETINE HYDROCHLORIDE 40 MG/1
40 CAPSULE ORAL DAILY
COMMUNITY
Start: 2020-11-18 | End: 2022-02-03 | Stop reason: SDUPTHER

## 2020-12-14 NOTE — PROGRESS NOTES
Subjective:      Patient ID: Yvonne Avila is a 60 y.o. female.    Patient Active Problem List   Diagnosis    GERD (gastroesophageal reflux disease)    Former tobacco use    Palpitations    Chest pain    Other dysphagia    Body mass index (BMI) 19.9 or less, adult    Bradycardia    Essential hypertension       she has been referred by Pablo Hua MD for evaluation and management for   Chief Complaint   Patient presents with    Pleural Effusion       Chief Complaint: Pleural Effusion      HPI:  Yvonne Avila is a 60 y.o. female presents to pulmonary clinic initial evaluation related to small left pleural effusion.  11/9/2020 CT chest with L sided pleural effusion and possible right heart dysfunction.     Chest xray 12/14/2020 pleural effusion resolved.     Patient reports no wheezing.  No cough.  Mild shortness of breath with prolonged exertion, no shortness of breath with regular activity.  Has a regular walking program. Sternal chest pain improved.     Former smoker 26 pack years quit in 2019.    Previous Report Reviewed: lab reports, office notes and radiology reports     Past Medical History: The following portions of the patient's history were reviewed and updated as appropriate:   She  has no past surgical history on file.  Her family history includes Heart attack in her maternal aunt, maternal uncle, and mother; Heart disease in her father; Hypertension in her mother.  She  reports that she quit smoking about 1 years ago. Her smoking use included cigarettes. She started smoking about 38 years ago. She has a 26.25 pack-year smoking history. She has never used smokeless tobacco. She reports that she does not drink alcohol or use drugs.  She has a current medication list which includes the following prescription(s): acetaminophen, amlodipine, budesonide, calcium carbonate, colchicine, diphenhydramine, euthyrox, fluoxetine, hydrochlorothiazide, hydrocodone-acetaminophen, multivitamin,  "ondansetron, alprazolam, and omeprazole.  She is allergic to penicillins..    Review of Systems   Constitutional: Negative for fever, chills, weight loss, weight gain, activity change, appetite change, fatigue and night sweats.   HENT: Negative for postnasal drip, rhinorrhea, sinus pressure, voice change and congestion.    Eyes: Negative for redness and itching.   Respiratory: Negative for snoring, cough, sputum production, chest tightness, shortness of breath, wheezing, orthopnea, asthma nighttime symptoms, dyspnea on extertion, use of rescue inhaler and somnolence.    Cardiovascular: Negative.  Negative for chest pain, palpitations and leg swelling.   Genitourinary: Negative for difficulty urinating and hematuria.   Endocrine: Negative for cold intolerance and heat intolerance.    Musculoskeletal: Negative for arthralgias, gait problem, joint swelling and myalgias.   Skin: Negative.    Gastrointestinal: Negative for nausea, vomiting, abdominal pain and acid reflux.   Neurological: Negative for dizziness, weakness, light-headedness and headaches.   Hematological: Negative for adenopathy. No excessive bruising.   All other systems reviewed and are negative.       Objective:   BP 98/60   Pulse (!) 56   Resp 18   Ht 5' 3" (1.6 m)   Wt 47.4 kg (104 lb 9.7 oz)   SpO2 98%   BMI 18.53 kg/m²   Physical Exam  Vitals signs and nursing note reviewed.   Constitutional:       General: She is not in acute distress.     Appearance: Normal appearance. She is well-developed and normal weight. She is not ill-appearing or toxic-appearing.   HENT:      Head: Normocephalic.      Right Ear: External ear normal.      Left Ear: External ear normal.      Nose: Nose normal.      Mouth/Throat:      Pharynx: No oropharyngeal exudate.   Eyes:      Conjunctiva/sclera: Conjunctivae normal.   Neck:      Musculoskeletal: Normal range of motion and neck supple.   Cardiovascular:      Rate and Rhythm: Normal rate and regular rhythm.      Heart " sounds: Normal heart sounds.   Pulmonary:      Effort: Pulmonary effort is normal.      Breath sounds: Normal breath sounds. No stridor.   Abdominal:      Palpations: Abdomen is soft.   Musculoskeletal: Normal range of motion.   Lymphadenopathy:      Cervical: No cervical adenopathy.   Skin:     General: Skin is warm and dry.   Neurological:      Mental Status: She is alert and oriented to person, place, and time.   Psychiatric:         Behavior: Behavior normal. Behavior is cooperative.         Thought Content: Thought content normal.         Judgment: Judgment normal.       Personal Diagnostic Review    X-Ray Chest PA And Lateral  Narrative: EXAMINATION:  XR CHEST PA AND LATERAL    CLINICAL HISTORY:  Pleural effusion, not elsewhere classified    TECHNIQUE:  PA and lateral views of the chest were performed.    COMPARISON:  11/09/2020    FINDINGS:  No consolidation.  Stable scarring at the apices.  No effusion.  Cardiomediastinal silhouette is not enlarged.  The bones are intact  Impression: No acute process.  Stable chest.    Electronically signed by: Jerry Kyle MD  Date:    12/14/2020  Time:    13:55      Echo Color Flow Doppler? Yes  · The left ventricle is normal in size with normal systolic function. The   estimated ejection fraction is 65%.  · Normal left ventricular diastolic function.  · Normal right ventricular size with normal right ventricular systolic   function.  · The estimated PA systolic pressure is 25 mmHg.  · Normal central venous pressure (3 mmHg).  · Small pericardial effusion.     Assessment:     1. Pleural effusion Inactive   2. SOB (shortness of breath) on exertion    3. Former tobacco use    4. Essential hypertension    5. Other chest pain    6. Gastroesophageal reflux disease, unspecified whether esophagitis present      Orders Placed This Encounter   Procedures    X-Ray Chest PA And Lateral     Standing Status:   Future     Number of Occurrences:   1     Standing Expiration Date:    12/14/2021     Order Specific Question:   May the Radiologist modify the order per protocol to meet the clinical needs of the patient?     Answer:   Yes    Complete PFT with bronchodilator     Standing Status:   Future     Standing Expiration Date:   12/14/2021    Stress test, pulmonary     Standing Status:   Future     Standing Expiration Date:   12/14/2021     Order Specific Question:   Reason for study     Answer:   Functional status     Medication List with Changes/Refills   Current Medications    ACETAMINOPHEN (TYLENOL) 500 MG TABLET    Take 500 mg by mouth as needed for Pain.    ALPRAZOLAM (XANAX) 0.5 MG TABLET    Take 0.5 mg by mouth 2 (two) times daily as needed.    AMLODIPINE (NORVASC) 10 MG TABLET    Take 1 tablet (10 mg total) by mouth once daily.    BUDESONIDE 0.6 MG/NORMAL SALINE 50 ML NASAL IRRIGATION    Patient to irrigate each nostril with 25ml twice daily.    CALCIUM CARBONATE (TUMS) 300 MG (750 MG) CHEW    Take 750 mg by mouth.    COLCHICINE (COLCRYS) 0.6 MG TABLET    Take 1 tablet (0.6 mg total) by mouth 2 (two) times daily.    DIPHENHYDRAMINE (BENADRYL) 50 MG CAPSULE    Take 50 mg by mouth every 6 (six) hours as needed for Insomnia.     EUTHYROX 50 MCG TABLET    Take 50 mcg by mouth every morning.    FLUOXETINE 40 MG CAPSULE    Take 40 mg by mouth once daily.    HYDROCHLOROTHIAZIDE (HYDRODIURIL) 12.5 MG TAB    Take 1 tablet (12.5 mg total) by mouth once daily.    HYDROCODONE-ACETAMINOPHEN (NORCO) 5-325 MG PER TABLET    Take 1 tablet by mouth.    MULTIVITAMIN (THERAGRAN) PER TABLET    Take 1 tablet by mouth once daily.    OMEPRAZOLE (PRILOSEC) 40 MG CAPSULE    Take 40 mg by mouth.    ONDANSETRON (ZOFRAN-ODT) 8 MG TBDL    Take 1 tablet (8 mg total) by mouth every 8 (eight) hours as needed.   Discontinued Medications    COLCHICINE (COLCRYS) 0.6 MG TABLET    Take 1 tablet (0.6 mg total) by mouth 2 (two) times daily.    FLUOXETINE 20 MG CAPSULE    Take 20 mg by mouth once daily.     Plan:    Discussed diagnosis, its evaluation, treatment and usual course. All questions answered.  Problem List Items Addressed This Visit     GERD (gastroesophageal reflux disease)    Current Assessment & Plan     Controlled on Prilosec            Former tobacco use    Current Assessment & Plan     Quit 2019.  Former 26 pack year smoker.         Relevant Orders    Complete PFT with bronchodilator    Essential hypertension    Current Assessment & Plan     Controlled managed by PCP         Chest pain    Current Assessment & Plan     History of prior pericardial effusion managed by Cardiology.  Reported as improved           Other Visit Diagnoses     Pleural effusion   (Inactive)  -  Primary    resolved on 12/14/2020 cxr     Relevant Orders    X-Ray Chest PA And Lateral (Completed)    SOB (shortness of breath) on exertion        Relevant Orders    Complete PFT with bronchodilator    Stress test, pulmonary          Total time spent in face to face counseling and coordination of care 40 in face to face  discussion concerning diagnosis, prognosis, review of lab and test results, benefits of treatment as well as management of disease, counseling of patient and coordination of care between various health  care providers . Greater than half the time spent was used for coordination of care and counseling of patient. Discussion with other physicians or health care providers occurred.    Follow up for next available sob review cpft/6mwd. .

## 2020-12-14 NOTE — LETTER
December 14, 2020      Pablo Hua MD  [address]  [place] [address]           [facility]  [address] [place] [id number]  Phone: [phone]  Fax: [phone]          Patient: Yvonne Avila   MR Number: [id number]   YOB: 1960   Date of Visit: 12/14/2020       Dear Dr. Pablo Hua:    Thank you for referring Yvonne Avila to me for evaluation. Attached you will find relevant portions of my assessment and plan of care.    If you have questions, please do not hesitate to call me. I look forward to following Yvonne Avila along with you.    Sincerely,    Melissa Daniels, NP    Enclosure  CC:  No Recipients    If you would like to receive this communication electronically, please contact [email or web address] or [phone] to request more information on [employer] access.    For providers and/or their staff who would like to refer a patient to [facility], please contact us through our one-stop-shop provider referral line, [facility], at [phone].    If you feel you have received this communication in error or would no longer like to receive these types of communications, please e-mail [email or web address]

## 2020-12-17 ENCOUNTER — OFFICE VISIT (OUTPATIENT)
Dept: CARDIOLOGY | Facility: CLINIC | Age: 60
End: 2020-12-17
Payer: COMMERCIAL

## 2020-12-17 ENCOUNTER — LAB VISIT (OUTPATIENT)
Dept: INTERNAL MEDICINE | Facility: CLINIC | Age: 60
End: 2020-12-17
Payer: COMMERCIAL

## 2020-12-17 VITALS
OXYGEN SATURATION: 99 % | SYSTOLIC BLOOD PRESSURE: 110 MMHG | HEIGHT: 63 IN | HEART RATE: 57 BPM | BODY MASS INDEX: 18.16 KG/M2 | DIASTOLIC BLOOD PRESSURE: 70 MMHG | RESPIRATION RATE: 16 BRPM | WEIGHT: 102.5 LBS

## 2020-12-17 DIAGNOSIS — R00.2 PALPITATIONS: ICD-10-CM

## 2020-12-17 DIAGNOSIS — R00.1 BRADYCARDIA: ICD-10-CM

## 2020-12-17 DIAGNOSIS — Z87.891 FORMER TOBACCO USE: ICD-10-CM

## 2020-12-17 DIAGNOSIS — R07.89 OTHER CHEST PAIN: ICD-10-CM

## 2020-12-17 DIAGNOSIS — R06.00 DYSPNEA, UNSPECIFIED TYPE: ICD-10-CM

## 2020-12-17 DIAGNOSIS — I10 ESSENTIAL HYPERTENSION: Primary | ICD-10-CM

## 2020-12-17 DIAGNOSIS — K21.9 GASTROESOPHAGEAL REFLUX DISEASE, UNSPECIFIED WHETHER ESOPHAGITIS PRESENT: ICD-10-CM

## 2020-12-17 PROCEDURE — 3008F BODY MASS INDEX DOCD: CPT | Mod: CPTII,S$GLB,, | Performed by: INTERNAL MEDICINE

## 2020-12-17 PROCEDURE — 99214 OFFICE O/P EST MOD 30 MIN: CPT | Mod: S$GLB,,, | Performed by: INTERNAL MEDICINE

## 2020-12-17 PROCEDURE — 99999 PR PBB SHADOW E&M-EST. PATIENT-LVL IV: ICD-10-PCS | Mod: PBBFAC,,, | Performed by: INTERNAL MEDICINE

## 2020-12-17 PROCEDURE — 99214 PR OFFICE/OUTPT VISIT, EST, LEVL IV, 30-39 MIN: ICD-10-PCS | Mod: S$GLB,,, | Performed by: INTERNAL MEDICINE

## 2020-12-17 PROCEDURE — U0003 INFECTIOUS AGENT DETECTION BY NUCLEIC ACID (DNA OR RNA); SEVERE ACUTE RESPIRATORY SYNDROME CORONAVIRUS 2 (SARS-COV-2) (CORONAVIRUS DISEASE [COVID-19]), AMPLIFIED PROBE TECHNIQUE, MAKING USE OF HIGH THROUGHPUT TECHNOLOGIES AS DESCRIBED BY CMS-2020-01-R: HCPCS

## 2020-12-17 PROCEDURE — 3008F PR BODY MASS INDEX (BMI) DOCUMENTED: ICD-10-PCS | Mod: CPTII,S$GLB,, | Performed by: INTERNAL MEDICINE

## 2020-12-17 PROCEDURE — 99999 PR PBB SHADOW E&M-EST. PATIENT-LVL IV: CPT | Mod: PBBFAC,,, | Performed by: INTERNAL MEDICINE

## 2020-12-17 RX ORDER — AMLODIPINE BESYLATE 10 MG/1
10 TABLET ORAL DAILY
Qty: 30 TABLET | Refills: 3 | Status: SHIPPED | OUTPATIENT
Start: 2020-12-17 | End: 2021-09-23

## 2020-12-17 RX ORDER — COLCHICINE 0.6 MG/1
0.6 TABLET ORAL 2 TIMES DAILY
Qty: 180 TABLET | Refills: 0 | Status: SHIPPED | OUTPATIENT
Start: 2020-12-17 | End: 2021-09-23

## 2020-12-17 RX ORDER — HYDROCHLOROTHIAZIDE 12.5 MG/1
12.5 TABLET ORAL DAILY
Qty: 30 TABLET | Refills: 6 | Status: SHIPPED | OUTPATIENT
Start: 2020-12-17 | End: 2021-09-23 | Stop reason: SDUPTHER

## 2020-12-17 NOTE — PROGRESS NOTES
Subjective:   Patient ID:  Yvonne Avila is a 60 y.o. female who presents for cardiac consult of Shortness of Breath      Shortness of Breath  Associated symptoms include chest pain.     The patient came in today for cardiac consult of Shortness of Breath      Yvonne Avila is a 60 y.o. female pt with HTN, GERD, Anxiety, tobacco use presents for follow up CV eval.     9/11/19  Pt presented to the ER yesterday for CP. ECG with sinus julian V rate 49. Enzymes neg x 2, NTG caused worsening of CP.   She has been having intermittent chest pain for 2 weeks. Chest pain can lasts for a few sec, took BC which helped. She just started Nexium as well.   She does get lightheaded and feels like she may pass out.     10/17/19  ETT normal, Holter normal. Normal 2D ECHO. She still has some atypical CP and MORRIS. Has a lot of anxiety will refer to psych.    12/23/19  She has been gaining weight, now > 101 lbs. She has mild MORRIS only with significant exertional. She still has occ panic attacks, saw psychologist. She has CP relief with PPI.     11/20/20 - ER follow up  She went to Emergency Department for chest pain, onset several days PTA. Pt presented to the ED yesterday for similar sxs, and was discharged after a negative workup. Symptoms are constant and moderate in severity. Pain is worse when taking deep breaths. No associated sxs reported. Patient denies any fever, chills, n/v/d, SOB, weakness, numbness, dizziness, headache, and all other sxs at this time.   Labwork neg Trop, ECG CXR neg, CT chest with L sided pleural effusion and possible right heart dysfunction.   She has been having constant chest pain for 6-7 days. She was doing a lot of painting and inhaled fumes. She had GI cocktail as well.     11/19/20  She had recent eval by Dr. Mayen undergoing further rheum workup for elevated imflamm markers. Sed rate and CRP were elevated, started on colchicine and nsaids, she said colchicine was not covered by  insurance. She also had elevated BP yesterday 160s/90s. ECHO normal BI V function, small pericardial effusion. Her pain is improved with NSAIDS.     12/17/20  Started Norvasc last visit,  Remains on HCTZ. BP well controlled today 284[address]. Pulm eval with tests pending - PFTs, pulm stress test. She still has MORRIS, chest pain has improved. Recent CXR neg for effusion.     Patient feels no leg swelling, no PND,  no syncope, no CNS symptoms.    Patient has fairly good exercise tolerance.    Patient is compliant with medications.    FH - mother - MI s/p stents    11/9/20 CT chest  Impression:    No evidence of pulmonary embolism.     Cardiomegaly.  Reflux into the IVC and hepatic veins suggest some component of right heart dysfunction.  Consider cardiac echo     Small left-sided pleural effusion with left lower lobe atelectasis.    Results for orders placed during the hospital encounter of 11/10/20   Echo Color Flow Doppler? Yes    Narrative · The left ventricle is normal in size with normal systolic function. The   estimated ejection fraction is 65%.  · Normal left ventricular diastolic function.  · Normal right ventricular size with normal right ventricular systolic   function.  · The estimated PA systolic pressure is 25 mmHg.  · Normal central venous pressure (3 mmHg).  · Small pericardial effusion.            9/10/19 ECG  sinus bradycardia- V rate 49  Biatrial enlargement    TEST DESCRIPTION   PREDOMINANT RHYTHM  1. Sinus rhythm with heart rates varying between 47 and 103 bpm with an average of 61 bpm.   VENTRICULAR ARRHYTHMIAS  1. There were no PVCs. There was no ventricular ectopic activity.  SUPRA VENTRICULAR ARRHYTHMIAS  1. There were very rare PACs totalling 59 and averaging 1 per hour.  There were 4 monomorphic couplets.  2. There were no episodes of sustained supraventricular tachycardia.    EKG Conclusions:    1. The EKG portion of this study is negative for ischemia at a moderate workload, and peak heart rate of  89 bpm (57% of predicted).   2. Exercise capacity is average.   3. Blood pressure response to exercise was normal (Presenting BP: 150/86 Peak BP: 144/91).   4. No significant arrhythmias were present.   5. There were no symptoms of chest discomfort or significant dyspnea throughout the protocol.   6. The Duke treadmill score was 6 suggesting a low probability for future cardiovascular events.    This document has been electronically    SIGNED BY: Pablo Hua MD On: 10/10/2019 12:59    Past Medical History:   Diagnosis Date    Anxiety     Bradycardia 2019    GERD (gastroesophageal reflux disease)     Thyroid disease        History reviewed. No pertinent surgical history.    Social History     Tobacco Use    Smoking status: Former Smoker     Packs/day: 0.75     Years: 35.00     Pack years: 26.25     Types: Cigarettes     Start date:      Quit date:      Years since quittin.9    Smokeless tobacco: Never Used    Tobacco comment: did not smoke for 2 years since beginning smoking.    Substance Use Topics    Alcohol use: Never     Frequency: Never    Drug use: Never       Family History   Problem Relation Age of Onset    Heart attack Mother     Hypertension Mother     Heart disease Father     Heart attack Maternal Aunt     Heart attack Maternal Uncle        Patient's Medications   New Prescriptions    No medications on file   Previous Medications    ACETAMINOPHEN (TYLENOL) 500 MG TABLET    Take 500 mg by mouth as needed for Pain.    ALPRAZOLAM (XANAX) 0.5 MG TABLET    Take 0.5 mg by mouth 2 (two) times daily as needed.    AMLODIPINE (NORVASC) 10 MG TABLET    Take 1 tablet (10 mg total) by mouth once daily.    BUDESONIDE 0.6 MG/NORMAL SALINE 50 ML NASAL IRRIGATION    Patient to irrigate each nostril with 25ml twice daily.    CALCIUM CARBONATE (TUMS) 300 MG (750 MG) CHEW    Take 750 mg by mouth.    COLCHICINE (COLCRYS) 0.6 MG TABLET    Take 1 tablet (0.6 mg total) by mouth 2 (two) times daily.  "   DIPHENHYDRAMINE (BENADRYL) 50 MG CAPSULE    Take 50 mg by mouth every 6 (six) hours as needed for Insomnia.     EUTHYROX 50 MCG TABLET    Take 50 mcg by mouth every morning.    FLUOXETINE 40 MG CAPSULE    Take 40 mg by mouth once daily.    HYDROCHLOROTHIAZIDE (HYDRODIURIL) 12.5 MG TAB    Take 1 tablet (12.5 mg total) by mouth once daily.    HYDROCODONE-ACETAMINOPHEN (NORCO) 5-325 MG PER TABLET    Take 1 tablet by mouth.    MULTIVITAMIN (THERAGRAN) PER TABLET    Take 1 tablet by mouth once daily.    OMEPRAZOLE (PRILOSEC) 40 MG CAPSULE    Take 40 mg by mouth.    ONDANSETRON (ZOFRAN-ODT) 8 MG TBDL    Take 1 tablet (8 mg total) by mouth every 8 (eight) hours as needed.   Modified Medications    No medications on file   Discontinued Medications    No medications on file       Review of Systems   Constitutional: Negative.    HENT: Negative.    Eyes: Negative.    Respiratory: Positive for shortness of breath.    Cardiovascular: Positive for chest pain.   Gastrointestinal: Negative.    Genitourinary: Negative.    Musculoskeletal: Negative.    Skin: Negative.    Neurological: Positive for dizziness.   Endo/Heme/Allergies: Negative.    Psychiatric/Behavioral: The patient is nervous/anxious.    All 12 systems otherwise negative.      Wt Readings from Last 3 Encounters:   12/17/20 46.5 kg (102 lb 8.2 oz)   12/14/20 47.4 kg (104 lb 9.7 oz)   11/19/20 48.7 kg (107 lb 5.8 oz)     Temp Readings from Last 3 Encounters:   11/09/20 97.9 °F (36.6 °C) (Oral)   11/08/20 98.4 °F (36.9 °C) (Oral)   09/22/19 98.2 °F (36.8 °C) (Oral)     BP Readings from Last 3 Encounters:   12/17/20 110/70   12/14/20 98/60   11/19/20 (!) 184/106     Pulse Readings from Last 3 Encounters:   12/17/20 (!) 57   12/14/20 (!) 56   11/19/20 (!) 51       /70 (BP Location: Left arm, Patient Position: Sitting, BP Method: Medium (Manual))   Pulse (!) 57   Resp 16   Ht 5' 3" (1.6 m)   Wt 46.5 kg (102 lb 8.2 oz)   SpO2 99%   BMI 18.16 kg/m²     Objective: "   Physical Exam   Constitutional: She is oriented to person, place, and time. She appears well-developed and well-nourished. No distress.   HENT:   Head: Normocephalic and atraumatic.   Nose: Nose normal.   Mouth/Throat: Oropharynx is clear and moist.   Eyes: Conjunctivae and EOM are normal. No scleral icterus.   Neck: Normal range of motion. Neck supple. No JVD present. No thyromegaly present.   Cardiovascular: Regular rhythm, S1 normal and S2 normal. Bradycardia present. Exam reveals no gallop, no S3, no S4 and no friction rub.   No murmur heard.  Pulmonary/Chest: Effort normal and breath sounds normal. No stridor. No respiratory distress. She has no wheezes. She has no rales. She exhibits no tenderness.   Abdominal: Soft. Bowel sounds are normal. She exhibits no distension and no mass. There is no abdominal tenderness. There is no rebound.   Genitourinary:    Genitourinary Comments: Deferred     Musculoskeletal: Normal range of motion.         General: No tenderness, deformity or edema.   Lymphadenopathy:     She has no cervical adenopathy.   Neurological: She is alert and oriented to person, place, and time. She exhibits normal muscle tone. Coordination normal.   Skin: Skin is warm and dry. No rash noted. She is not diaphoretic. No erythema. No pallor.   Psychiatric: She has a normal mood and affect. Her behavior is normal. Judgment and thought content normal.   Nursing note and vitals reviewed.      Lab Results   Component Value Date     11/09/2020    K 3.8 11/09/2020     11/09/2020    CO2 27 11/09/2020    BUN 11 11/09/2020    CREATININE 1.0 11/09/2020     11/09/2020    MG 2.2 09/19/2019    AST 17 11/09/2020    ALT 10 11/09/2020    ALBUMIN 4.1 11/09/2020    PROT 7.8 11/09/2020    BILITOT 0.5 11/09/2020    WBC 7.08 11/09/2020    HGB 11.8 (L) 11/09/2020    HCT 37.4 11/09/2020    MCV 89 11/09/2020     11/09/2020    INR 1.1 09/10/2019    TSH 9.597 (H) 11/19/2020    BNP 35 11/08/2020      Assessment:      1. Essential hypertension    2. Bradycardia    3. Palpitations    4. Gastroesophageal reflux disease, unspecified whether esophagitis present    5. Former tobacco use    6. Other chest pain        Plan:   1. Chest pain, recurrent  -  has been taking prilosec  -  CRP and Sed rate - elevated  - small pericardial effusion  - rheumatology f/u as needed     2. GERD  - cont PPI  - f/u GI    3. Sinus bradycardia with occ palpitations   - Holter - negative  - TSH, T4 - normal   - hold BB    4. Tobacco use  - has quit smoking     5. Anxiety  - referred to psych    6. Pleural effusion  - refer to pulm - testing pending    7. HTN - well controlled  - cont meds and titrate  - needs to DC Afrin    8. Hypothyroidism   - cont meds per PCP    Thank you for allowing me to participate in this patient's care. Please do not hesitate to contact me with any questions or concerns. Consult note has been forwarded to the referral physician.

## 2020-12-18 ENCOUNTER — TELEPHONE (OUTPATIENT)
Dept: CARDIOLOGY | Facility: CLINIC | Age: 60
End: 2020-12-18

## 2020-12-18 DIAGNOSIS — U07.1 COVID-19 VIRUS DETECTED: ICD-10-CM

## 2020-12-18 LAB — SARS-COV-2 RNA RESP QL NAA+PROBE: DETECTED

## 2020-12-18 NOTE — TELEPHONE ENCOUNTER
Pt notified pb      ----- Message from Pablo Hua MD sent at 12/18/2020  2:43 PM CST -----  Please call the patient regarding her abnormal result. She has tested positive for COVID 19, need to monitor symptoms and trace contacts as best as possible. If any severe shortness or breath or COVID symptoms occur need to go to ER.

## 2020-12-30 ENCOUNTER — TELEPHONE (OUTPATIENT)
Dept: PULMONOLOGY | Facility: CLINIC | Age: 60
End: 2020-12-30

## 2020-12-30 NOTE — TELEPHONE ENCOUNTER
----- Message from Lucille Coates sent at 12/30/2020  4:18 PM CST -----  Contact: pt  Pt requesting a call back to have all appts rescheduled. She states that she is needing the appts on a Monday. She states that she had a covid test done on 12/18 and would like to know if those results will be ok. Please call pt back at [phone]

## 2020-12-31 ENCOUNTER — TELEPHONE (OUTPATIENT)
Dept: PULMONOLOGY | Facility: CLINIC | Age: 60
End: 2020-12-31

## 2020-12-31 NOTE — TELEPHONE ENCOUNTER
----- Message from Jaylin Del Rosario sent at 12/31/2020 12:38 PM CST -----  Regarding: missed call  Contact: pt  Returning a missed call. [phone]

## 2021-01-16 ENCOUNTER — HOSPITAL ENCOUNTER (EMERGENCY)
Facility: HOSPITAL | Age: 61
Discharge: HOME | End: 2021-01-17
Attending: FAMILY MEDICINE
Payer: COMMERCIAL

## 2021-01-16 DIAGNOSIS — E87.6 HYPOKALEMIA: ICD-10-CM

## 2021-01-16 DIAGNOSIS — K04.7 DENTAL INFECTION: Primary | ICD-10-CM

## 2021-01-16 LAB
BASOPHILS # BLD AUTO: 0.03 K/UL (ref 0–0.2)
BASOPHILS NFR BLD: 0.3 % (ref 0–1.9)
DIFFERENTIAL METHOD: ABNORMAL
EOSINOPHIL # BLD AUTO: 0.1 K/UL (ref 0–0.5)
EOSINOPHIL NFR BLD: 0.8 % (ref 0–8)
ERYTHROCYTE [DISTWIDTH] IN BLOOD BY AUTOMATED COUNT: 12.7 % (ref 11.5–14.5)
HCT VFR BLD AUTO: 37.8 % (ref 37–48.5)
HGB BLD-MCNC: 12.4 G/DL (ref 12–16)
IMM GRANULOCYTES # BLD AUTO: 0.04 K/UL (ref 0–0.04)
IMM GRANULOCYTES NFR BLD AUTO: 0.4 % (ref 0–0.5)
LYMPHOCYTES # BLD AUTO: 1.1 K/UL (ref 1–4.8)
LYMPHOCYTES NFR BLD: 10.1 % (ref 18–48)
MCH RBC QN AUTO: 27.7 PG (ref 27–31)
MCHC RBC AUTO-ENTMCNC: 32.8 G/DL (ref 32–36)
MCV RBC AUTO: 84 FL (ref 82–98)
MONOCYTES # BLD AUTO: 1.1 K/UL (ref 0.3–1)
MONOCYTES NFR BLD: 10.5 % (ref 4–15)
NEUTROPHILS # BLD AUTO: 8.4 K/UL (ref 1.8–7.7)
NEUTROPHILS NFR BLD: 77.9 % (ref 38–73)
NRBC BLD-RTO: 0 /100 WBC
PLATELET # BLD AUTO: 287 K/UL (ref 150–350)
PMV BLD AUTO: 10.1 FL (ref 9.2–12.9)
RBC # BLD AUTO: 4.48 M/UL (ref 4–5.4)
WBC # BLD AUTO: 10.73 K/UL (ref 3.9–12.7)

## 2021-01-16 PROCEDURE — 99291 CRITICAL CARE FIRST HOUR: CPT | Mod: 25

## 2021-01-16 PROCEDURE — 80053 COMPREHEN METABOLIC PANEL: CPT

## 2021-01-16 PROCEDURE — 63600175 PHARM REV CODE 636 W HCPCS: Performed by: FAMILY MEDICINE

## 2021-01-16 PROCEDURE — 25000003 PHARM REV CODE 250: Performed by: FAMILY MEDICINE

## 2021-01-16 PROCEDURE — 96365 THER/PROPH/DIAG IV INF INIT: CPT

## 2021-01-16 PROCEDURE — 96361 HYDRATE IV INFUSION ADD-ON: CPT

## 2021-01-16 PROCEDURE — 36415 COLL VENOUS BLD VENIPUNCTURE: CPT

## 2021-01-16 PROCEDURE — 87040 BLOOD CULTURE FOR BACTERIA: CPT

## 2021-01-16 PROCEDURE — 96375 TX/PRO/DX INJ NEW DRUG ADDON: CPT

## 2021-01-16 PROCEDURE — 25000003 PHARM REV CODE 250: Performed by: NURSE PRACTITIONER

## 2021-01-16 PROCEDURE — 85025 COMPLETE CBC W/AUTO DIFF WBC: CPT

## 2021-01-16 RX ORDER — ONDANSETRON 2 MG/ML
4 INJECTION INTRAMUSCULAR; INTRAVENOUS
Status: COMPLETED | OUTPATIENT
Start: 2021-01-16 | End: 2021-01-16

## 2021-01-16 RX ORDER — MORPHINE SULFATE 2 MG/ML
4 INJECTION, SOLUTION INTRAMUSCULAR; INTRAVENOUS
Status: DISCONTINUED | OUTPATIENT
Start: 2021-01-16 | End: 2021-01-16

## 2021-01-16 RX ORDER — CLINDAMYCIN PHOSPHATE 900 MG/50ML
900 INJECTION, SOLUTION INTRAVENOUS
Status: COMPLETED | OUTPATIENT
Start: 2021-01-16 | End: 2021-01-17

## 2021-01-16 RX ORDER — MORPHINE SULFATE 2 MG/ML
4 INJECTION, SOLUTION INTRAMUSCULAR; INTRAVENOUS
Status: COMPLETED | OUTPATIENT
Start: 2021-01-16 | End: 2021-01-16

## 2021-01-16 RX ORDER — KETOROLAC TROMETHAMINE 30 MG/ML
15 INJECTION, SOLUTION INTRAMUSCULAR; INTRAVENOUS
Status: COMPLETED | OUTPATIENT
Start: 2021-01-16 | End: 2021-01-16

## 2021-01-16 RX ADMIN — MORPHINE SULFATE 4 MG: 2 INJECTION, SOLUTION INTRAMUSCULAR; INTRAVENOUS at 11:01

## 2021-01-16 RX ADMIN — ONDANSETRON 4 MG: 2 INJECTION INTRAMUSCULAR; INTRAVENOUS at 11:01

## 2021-01-16 RX ADMIN — CLINDAMYCIN IN 5 PERCENT DEXTROSE 900 MG: 18 INJECTION, SOLUTION INTRAVENOUS at 11:01

## 2021-01-16 RX ADMIN — KETOROLAC TROMETHAMINE 15 MG: 30 INJECTION, SOLUTION INTRAMUSCULAR; INTRAVENOUS at 11:01

## 2021-01-16 RX ADMIN — SODIUM CHLORIDE 1000 ML: 0.9 INJECTION, SOLUTION INTRAVENOUS at 11:01

## 2021-01-17 VITALS
HEIGHT: 63 IN | HEART RATE: 77 BPM | SYSTOLIC BLOOD PRESSURE: 128 MMHG | DIASTOLIC BLOOD PRESSURE: 77 MMHG | WEIGHT: 103.38 LBS | RESPIRATION RATE: 18 BRPM | TEMPERATURE: 98 F | BODY MASS INDEX: 18.32 KG/M2 | OXYGEN SATURATION: 100 %

## 2021-01-17 LAB
ALBUMIN SERPL BCP-MCNC: 4 G/DL (ref 3.5–5.2)
ALP SERPL-CCNC: 93 U/L (ref 55–135)
ALT SERPL W/O P-5'-P-CCNC: 16 U/L (ref 10–44)
ANION GAP SERPL CALC-SCNC: 14 MMOL/L (ref 8–16)
AST SERPL-CCNC: 21 U/L (ref 10–40)
BILIRUB SERPL-MCNC: 0.6 MG/DL (ref 0.1–1)
BUN SERPL-MCNC: 17 MG/DL (ref 6–20)
CALCIUM SERPL-MCNC: 9.9 MG/DL (ref 8.7–10.5)
CHLORIDE SERPL-SCNC: 98 MMOL/L (ref 95–110)
CO2 SERPL-SCNC: 29 MMOL/L (ref 23–29)
CREAT SERPL-MCNC: 1 MG/DL (ref 0.5–1.4)
EST. GFR  (AFRICAN AMERICAN): >60 ML/MIN/1.73 M^2
EST. GFR  (NON AFRICAN AMERICAN): >60 ML/MIN/1.73 M^2
GLUCOSE SERPL-MCNC: 104 MG/DL (ref 70–110)
POTASSIUM SERPL-SCNC: 2.8 MMOL/L (ref 3.5–5.1)
POTASSIUM SERPL-SCNC: 3.5 MMOL/L (ref 3.5–5.1)
PROT SERPL-MCNC: 8.5 G/DL (ref 6–8.4)
SODIUM SERPL-SCNC: 141 MMOL/L (ref 136–145)

## 2021-01-17 PROCEDURE — 93005 ELECTROCARDIOGRAM TRACING: CPT

## 2021-01-17 PROCEDURE — 84132 ASSAY OF SERUM POTASSIUM: CPT

## 2021-01-17 PROCEDURE — 25500020 PHARM REV CODE 255: Performed by: FAMILY MEDICINE

## 2021-01-17 PROCEDURE — 93010 ELECTROCARDIOGRAM REPORT: CPT | Mod: ,,, | Performed by: INTERNAL MEDICINE

## 2021-01-17 PROCEDURE — 93010 EKG 12-LEAD: ICD-10-PCS | Mod: ,,, | Performed by: INTERNAL MEDICINE

## 2021-01-17 PROCEDURE — 25000003 PHARM REV CODE 250: Performed by: FAMILY MEDICINE

## 2021-01-17 RX ORDER — KETOROLAC TROMETHAMINE 10 MG/1
10 TABLET, FILM COATED ORAL EVERY 6 HOURS PRN
Qty: 12 TABLET | Refills: 0 | Status: SHIPPED | OUTPATIENT
Start: 2021-01-17 | End: 2023-10-25 | Stop reason: ALTCHOICE

## 2021-01-17 RX ORDER — CLINDAMYCIN HYDROCHLORIDE 150 MG/1
450 CAPSULE ORAL EVERY 8 HOURS
Qty: 63 CAPSULE | Refills: 0 | Status: SHIPPED | OUTPATIENT
Start: 2021-01-17 | End: 2021-01-24

## 2021-01-17 RX ORDER — POTASSIUM CHLORIDE 20 MEQ/1
60 TABLET, EXTENDED RELEASE ORAL ONCE
Status: DISCONTINUED | OUTPATIENT
Start: 2021-01-17 | End: 2021-01-17

## 2021-01-17 RX ADMIN — POTASSIUM BICARBONATE 60 MEQ: 391 TABLET, EFFERVESCENT ORAL at 01:01

## 2021-01-17 RX ADMIN — IOHEXOL 75 ML: 350 INJECTION, SOLUTION INTRAVENOUS at 01:01

## 2021-01-22 LAB
BACTERIA BLD CULT: NORMAL
BACTERIA BLD CULT: NORMAL

## 2021-04-09 ENCOUNTER — TELEPHONE (OUTPATIENT)
Dept: OTOLARYNGOLOGY | Facility: CLINIC | Age: 61
End: 2021-04-09

## 2021-07-02 ENCOUNTER — NURSE TRIAGE (OUTPATIENT)
Dept: ADMINISTRATIVE | Facility: CLINIC | Age: 61
End: 2021-07-02

## 2021-08-11 ENCOUNTER — HOSPITAL ENCOUNTER (OUTPATIENT)
Facility: HOSPITAL | Age: 61
Discharge: HOME | End: 2021-08-12
Attending: EMERGENCY MEDICINE | Admitting: INTERNAL MEDICINE
Payer: COMMERCIAL

## 2021-08-11 DIAGNOSIS — R10.9 ABDOMINAL PAIN: ICD-10-CM

## 2021-08-11 DIAGNOSIS — K52.9 COLITIS: Primary | ICD-10-CM

## 2021-08-11 DIAGNOSIS — K59.00 CONSTIPATION, UNSPECIFIED CONSTIPATION TYPE: ICD-10-CM

## 2021-08-11 LAB
ALBUMIN SERPL BCP-MCNC: 4.3 G/DL (ref 3.5–5.2)
ALP SERPL-CCNC: 76 U/L (ref 55–135)
ALT SERPL W/O P-5'-P-CCNC: 11 U/L (ref 10–44)
ANION GAP SERPL CALC-SCNC: 14 MMOL/L (ref 8–16)
AST SERPL-CCNC: 18 U/L (ref 10–40)
BASOPHILS # BLD AUTO: 0.03 K/UL (ref 0–0.2)
BASOPHILS NFR BLD: 0.3 % (ref 0–1.9)
BILIRUB SERPL-MCNC: 0.4 MG/DL (ref 0.1–1)
BUN SERPL-MCNC: 14 MG/DL (ref 6–20)
CALCIUM SERPL-MCNC: 10 MG/DL (ref 8.7–10.5)
CHLORIDE SERPL-SCNC: 104 MMOL/L (ref 95–110)
CO2 SERPL-SCNC: 26 MMOL/L (ref 23–29)
CREAT SERPL-MCNC: 1.1 MG/DL (ref 0.5–1.4)
DIFFERENTIAL METHOD: ABNORMAL
EOSINOPHIL # BLD AUTO: 0.1 K/UL (ref 0–0.5)
EOSINOPHIL NFR BLD: 1 % (ref 0–8)
ERYTHROCYTE [DISTWIDTH] IN BLOOD BY AUTOMATED COUNT: 13.7 % (ref 11.5–14.5)
EST. GFR  (AFRICAN AMERICAN): >60 ML/MIN/1.73 M^2
EST. GFR  (NON AFRICAN AMERICAN): 55 ML/MIN/1.73 M^2
GLUCOSE SERPL-MCNC: 119 MG/DL (ref 70–110)
HCT VFR BLD AUTO: 37.2 % (ref 37–48.5)
HGB BLD-MCNC: 11.8 G/DL (ref 12–16)
IMM GRANULOCYTES # BLD AUTO: 0.04 K/UL (ref 0–0.04)
IMM GRANULOCYTES NFR BLD AUTO: 0.4 % (ref 0–0.5)
LIPASE SERPL-CCNC: 31 U/L (ref 4–60)
LYMPHOCYTES # BLD AUTO: 1.1 K/UL (ref 1–4.8)
LYMPHOCYTES NFR BLD: 10.3 % (ref 18–48)
MCH RBC QN AUTO: 27.4 PG (ref 27–31)
MCHC RBC AUTO-ENTMCNC: 31.7 G/DL (ref 32–36)
MCV RBC AUTO: 87 FL (ref 82–98)
MONOCYTES # BLD AUTO: 1 K/UL (ref 0.3–1)
MONOCYTES NFR BLD: 8.9 % (ref 4–15)
NEUTROPHILS # BLD AUTO: 8.6 K/UL (ref 1.8–7.7)
NEUTROPHILS NFR BLD: 79.1 % (ref 38–73)
NRBC BLD-RTO: 0 /100 WBC
PLATELET # BLD AUTO: 305 K/UL (ref 150–450)
PMV BLD AUTO: 9.9 FL (ref 9.2–12.9)
POTASSIUM SERPL-SCNC: 3.6 MMOL/L (ref 3.5–5.1)
PROT SERPL-MCNC: 8.2 G/DL (ref 6–8.4)
RBC # BLD AUTO: 4.3 M/UL (ref 4–5.4)
SODIUM SERPL-SCNC: 144 MMOL/L (ref 136–145)
WBC # BLD AUTO: 10.82 K/UL (ref 3.9–12.7)

## 2021-08-11 PROCEDURE — G0378 HOSPITAL OBSERVATION PER HR: HCPCS

## 2021-08-11 PROCEDURE — 63600175 PHARM REV CODE 636 W HCPCS: Performed by: EMERGENCY MEDICINE

## 2021-08-11 PROCEDURE — 83690 ASSAY OF LIPASE: CPT | Performed by: EMERGENCY MEDICINE

## 2021-08-11 PROCEDURE — 80053 COMPREHEN METABOLIC PANEL: CPT | Performed by: EMERGENCY MEDICINE

## 2021-08-11 PROCEDURE — 96375 TX/PRO/DX INJ NEW DRUG ADDON: CPT

## 2021-08-11 PROCEDURE — 96376 TX/PRO/DX INJ SAME DRUG ADON: CPT

## 2021-08-11 PROCEDURE — 99285 EMERGENCY DEPT VISIT HI MDM: CPT | Mod: 25

## 2021-08-11 PROCEDURE — 96374 THER/PROPH/DIAG INJ IV PUSH: CPT

## 2021-08-11 PROCEDURE — U0002 COVID-19 LAB TEST NON-CDC: HCPCS | Performed by: EMERGENCY MEDICINE

## 2021-08-11 PROCEDURE — 25000003 PHARM REV CODE 250: Performed by: EMERGENCY MEDICINE

## 2021-08-11 PROCEDURE — 85025 COMPLETE CBC W/AUTO DIFF WBC: CPT | Performed by: EMERGENCY MEDICINE

## 2021-08-11 RX ORDER — CIPROFLOXACIN 2 MG/ML
400 INJECTION, SOLUTION INTRAVENOUS
Status: DISCONTINUED | OUTPATIENT
Start: 2021-08-12 | End: 2021-08-12 | Stop reason: HOSPADM

## 2021-08-11 RX ORDER — ONDANSETRON 2 MG/ML
4 INJECTION INTRAMUSCULAR; INTRAVENOUS
Status: COMPLETED | OUTPATIENT
Start: 2021-08-11 | End: 2021-08-11

## 2021-08-11 RX ORDER — METRONIDAZOLE 500 MG/100ML
500 INJECTION, SOLUTION INTRAVENOUS
Status: DISCONTINUED | OUTPATIENT
Start: 2021-08-12 | End: 2021-08-12 | Stop reason: HOSPADM

## 2021-08-11 RX ORDER — MORPHINE SULFATE 4 MG/ML
4 INJECTION, SOLUTION INTRAMUSCULAR; INTRAVENOUS
Status: COMPLETED | OUTPATIENT
Start: 2021-08-11 | End: 2021-08-11

## 2021-08-11 RX ORDER — KETOROLAC TROMETHAMINE 30 MG/ML
30 INJECTION, SOLUTION INTRAMUSCULAR; INTRAVENOUS
Status: COMPLETED | OUTPATIENT
Start: 2021-08-11 | End: 2021-08-11

## 2021-08-11 RX ORDER — DICYCLOMINE HYDROCHLORIDE 20 MG/1
20 TABLET ORAL
Status: COMPLETED | OUTPATIENT
Start: 2021-08-11 | End: 2021-08-11

## 2021-08-11 RX ADMIN — MORPHINE SULFATE 4 MG: 4 INJECTION INTRAVENOUS at 11:08

## 2021-08-11 RX ADMIN — KETOROLAC TROMETHAMINE 30 MG: 30 INJECTION, SOLUTION INTRAMUSCULAR; INTRAVENOUS at 09:08

## 2021-08-11 RX ADMIN — ONDANSETRON 4 MG: 2 INJECTION INTRAMUSCULAR; INTRAVENOUS at 09:08

## 2021-08-11 RX ADMIN — ONDANSETRON 4 MG: 2 INJECTION INTRAMUSCULAR; INTRAVENOUS at 11:08

## 2021-08-11 RX ADMIN — DICYCLOMINE HYDROCHLORIDE 20 MG: 20 TABLET ORAL at 09:08

## 2021-08-12 VITALS
BODY MASS INDEX: 18.71 KG/M2 | RESPIRATION RATE: 17 BRPM | DIASTOLIC BLOOD PRESSURE: 87 MMHG | SYSTOLIC BLOOD PRESSURE: 128 MMHG | HEIGHT: 63 IN | TEMPERATURE: 98 F | WEIGHT: 105.63 LBS | OXYGEN SATURATION: 100 % | HEART RATE: 63 BPM

## 2021-08-12 PROBLEM — K59.00 CONSTIPATION: Status: ACTIVE | Noted: 2021-08-12

## 2021-08-12 LAB
ALBUMIN SERPL BCP-MCNC: 4.1 G/DL (ref 3.5–5.2)
ALP SERPL-CCNC: 68 U/L (ref 55–135)
ALT SERPL W/O P-5'-P-CCNC: 12 U/L (ref 10–44)
ANION GAP SERPL CALC-SCNC: 10 MMOL/L (ref 8–16)
AST SERPL-CCNC: 16 U/L (ref 10–40)
BASOPHILS # BLD AUTO: 0.02 K/UL (ref 0–0.2)
BASOPHILS NFR BLD: 0.2 % (ref 0–1.9)
BILIRUB SERPL-MCNC: 0.4 MG/DL (ref 0.1–1)
BUN SERPL-MCNC: 14 MG/DL (ref 6–20)
CALCIUM SERPL-MCNC: 9.5 MG/DL (ref 8.7–10.5)
CHLORIDE SERPL-SCNC: 105 MMOL/L (ref 95–110)
CO2 SERPL-SCNC: 28 MMOL/L (ref 23–29)
CREAT SERPL-MCNC: 1.1 MG/DL (ref 0.5–1.4)
CTP QC/QA: YES
DIFFERENTIAL METHOD: ABNORMAL
EOSINOPHIL # BLD AUTO: 0.1 K/UL (ref 0–0.5)
EOSINOPHIL NFR BLD: 0.6 % (ref 0–8)
ERYTHROCYTE [DISTWIDTH] IN BLOOD BY AUTOMATED COUNT: 13.6 % (ref 11.5–14.5)
EST. GFR  (AFRICAN AMERICAN): >60 ML/MIN/1.73 M^2
EST. GFR  (NON AFRICAN AMERICAN): 55 ML/MIN/1.73 M^2
GLUCOSE SERPL-MCNC: 136 MG/DL (ref 70–110)
HCT VFR BLD AUTO: 35.2 % (ref 37–48.5)
HGB BLD-MCNC: 11.3 G/DL (ref 12–16)
IMM GRANULOCYTES # BLD AUTO: 0.05 K/UL (ref 0–0.04)
IMM GRANULOCYTES NFR BLD AUTO: 0.5 % (ref 0–0.5)
LYMPHOCYTES # BLD AUTO: 0.9 K/UL (ref 1–4.8)
LYMPHOCYTES NFR BLD: 8.8 % (ref 18–48)
MAGNESIUM SERPL-MCNC: 3.7 MG/DL (ref 1.6–2.6)
MCH RBC QN AUTO: 27.6 PG (ref 27–31)
MCHC RBC AUTO-ENTMCNC: 32.1 G/DL (ref 32–36)
MCV RBC AUTO: 86 FL (ref 82–98)
MONOCYTES # BLD AUTO: 0.6 K/UL (ref 0.3–1)
MONOCYTES NFR BLD: 6.1 % (ref 4–15)
NEUTROPHILS # BLD AUTO: 8.2 K/UL (ref 1.8–7.7)
NEUTROPHILS NFR BLD: 83.8 % (ref 38–73)
NRBC BLD-RTO: 0 /100 WBC
PHOSPHATE SERPL-MCNC: 3.2 MG/DL (ref 2.7–4.5)
PLATELET # BLD AUTO: 263 K/UL (ref 150–450)
PMV BLD AUTO: 10.1 FL (ref 9.2–12.9)
POTASSIUM SERPL-SCNC: 3.7 MMOL/L (ref 3.5–5.1)
PROT SERPL-MCNC: 7.7 G/DL (ref 6–8.4)
RBC # BLD AUTO: 4.09 M/UL (ref 4–5.4)
SARS-COV-2 RDRP RESP QL NAA+PROBE: NEGATIVE
SODIUM SERPL-SCNC: 143 MMOL/L (ref 136–145)
WBC # BLD AUTO: 9.84 K/UL (ref 3.9–12.7)

## 2021-08-12 PROCEDURE — S0030 INJECTION, METRONIDAZOLE: HCPCS | Performed by: EMERGENCY MEDICINE

## 2021-08-12 PROCEDURE — 63600175 PHARM REV CODE 636 W HCPCS: Performed by: EMERGENCY MEDICINE

## 2021-08-12 PROCEDURE — 85025 COMPLETE CBC W/AUTO DIFF WBC: CPT | Performed by: INTERNAL MEDICINE

## 2021-08-12 PROCEDURE — 83735 ASSAY OF MAGNESIUM: CPT | Performed by: INTERNAL MEDICINE

## 2021-08-12 PROCEDURE — 25000003 PHARM REV CODE 250: Performed by: FAMILY MEDICINE

## 2021-08-12 PROCEDURE — 80053 COMPREHEN METABOLIC PANEL: CPT | Performed by: INTERNAL MEDICINE

## 2021-08-12 PROCEDURE — 25000003 PHARM REV CODE 250: Performed by: INTERNAL MEDICINE

## 2021-08-12 PROCEDURE — 25000003 PHARM REV CODE 250: Performed by: EMERGENCY MEDICINE

## 2021-08-12 PROCEDURE — 96376 TX/PRO/DX INJ SAME DRUG ADON: CPT

## 2021-08-12 PROCEDURE — 84100 ASSAY OF PHOSPHORUS: CPT | Performed by: INTERNAL MEDICINE

## 2021-08-12 PROCEDURE — G0378 HOSPITAL OBSERVATION PER HR: HCPCS

## 2021-08-12 PROCEDURE — 96375 TX/PRO/DX INJ NEW DRUG ADDON: CPT

## 2021-08-12 RX ORDER — CIPROFLOXACIN 500 MG/1
500 TABLET ORAL EVERY 12 HOURS
Qty: 14 TABLET | Refills: 0 | Status: SHIPPED | OUTPATIENT
Start: 2021-08-12 | End: 2021-08-19

## 2021-08-12 RX ORDER — FLUOXETINE HYDROCHLORIDE 20 MG/1
40 CAPSULE ORAL DAILY
Status: DISCONTINUED | OUTPATIENT
Start: 2021-08-12 | End: 2021-08-12 | Stop reason: HOSPADM

## 2021-08-12 RX ORDER — ENOXAPARIN SODIUM 100 MG/ML
40 INJECTION SUBCUTANEOUS EVERY 24 HOURS
Status: DISCONTINUED | OUTPATIENT
Start: 2021-08-12 | End: 2021-08-12 | Stop reason: HOSPADM

## 2021-08-12 RX ORDER — DOCUSATE SODIUM 100 MG/1
100 CAPSULE, LIQUID FILLED ORAL 2 TIMES DAILY
Status: DISCONTINUED | OUTPATIENT
Start: 2021-08-12 | End: 2021-08-12 | Stop reason: HOSPADM

## 2021-08-12 RX ORDER — METRONIDAZOLE 500 MG/100ML
500 INJECTION, SOLUTION INTRAVENOUS
Status: DISCONTINUED | OUTPATIENT
Start: 2021-08-13 | End: 2021-08-12

## 2021-08-12 RX ORDER — SYRING-NEEDL,DISP,INSUL,0.3 ML 29 G X1/2"
296 SYRINGE, EMPTY DISPOSABLE MISCELLANEOUS
Status: COMPLETED | OUTPATIENT
Start: 2021-08-12 | End: 2021-08-12

## 2021-08-12 RX ORDER — HYDROCHLOROTHIAZIDE 12.5 MG/1
12.5 TABLET ORAL DAILY
Status: DISCONTINUED | OUTPATIENT
Start: 2021-08-12 | End: 2021-08-12 | Stop reason: HOSPADM

## 2021-08-12 RX ORDER — ALPRAZOLAM 0.5 MG/1
0.5 TABLET ORAL 2 TIMES DAILY PRN
Status: DISCONTINUED | OUTPATIENT
Start: 2021-08-12 | End: 2021-08-12 | Stop reason: HOSPADM

## 2021-08-12 RX ORDER — POLYETHYLENE GLYCOL 3350 17 G/17G
17 POWDER, FOR SOLUTION ORAL DAILY
Status: DISCONTINUED | OUTPATIENT
Start: 2021-08-12 | End: 2021-08-12 | Stop reason: HOSPADM

## 2021-08-12 RX ORDER — SODIUM CHLORIDE 0.9 % (FLUSH) 0.9 %
10 SYRINGE (ML) INJECTION
Status: DISCONTINUED | OUTPATIENT
Start: 2021-08-12 | End: 2021-08-12 | Stop reason: HOSPADM

## 2021-08-12 RX ORDER — PSEUDOEPHEDRINE/ACETAMINOPHEN 30MG-500MG
100 TABLET ORAL
Status: COMPLETED | OUTPATIENT
Start: 2021-08-12 | End: 2021-08-12

## 2021-08-12 RX ORDER — IBUPROFEN 200 MG
24 TABLET ORAL
Status: DISCONTINUED | OUTPATIENT
Start: 2021-08-12 | End: 2021-08-12 | Stop reason: HOSPADM

## 2021-08-12 RX ORDER — LEVOTHYROXINE SODIUM 50 UG/1
50 TABLET ORAL EVERY MORNING
Status: DISCONTINUED | OUTPATIENT
Start: 2021-08-12 | End: 2021-08-12 | Stop reason: HOSPADM

## 2021-08-12 RX ORDER — AMLODIPINE BESYLATE 10 MG/1
10 TABLET ORAL DAILY
Status: DISCONTINUED | OUTPATIENT
Start: 2021-08-12 | End: 2021-08-12 | Stop reason: HOSPADM

## 2021-08-12 RX ORDER — LACTULOSE 10 G/15ML
20 SOLUTION ORAL EVERY 12 HOURS PRN
Status: DISCONTINUED | OUTPATIENT
Start: 2021-08-12 | End: 2021-08-12 | Stop reason: HOSPADM

## 2021-08-12 RX ORDER — ONDANSETRON 8 MG/1
8 TABLET, ORALLY DISINTEGRATING ORAL EVERY 8 HOURS PRN
Status: DISCONTINUED | OUTPATIENT
Start: 2021-08-12 | End: 2021-08-12 | Stop reason: HOSPADM

## 2021-08-12 RX ORDER — GLUCAGON 1 MG
1 KIT INJECTION
Status: DISCONTINUED | OUTPATIENT
Start: 2021-08-12 | End: 2021-08-12 | Stop reason: HOSPADM

## 2021-08-12 RX ORDER — CIPROFLOXACIN 2 MG/ML
400 INJECTION, SOLUTION INTRAVENOUS
Status: DISCONTINUED | OUTPATIENT
Start: 2021-08-13 | End: 2021-08-12

## 2021-08-12 RX ORDER — IBUPROFEN 200 MG
16 TABLET ORAL
Status: DISCONTINUED | OUTPATIENT
Start: 2021-08-12 | End: 2021-08-12 | Stop reason: HOSPADM

## 2021-08-12 RX ORDER — HYDROCODONE BITARTRATE AND ACETAMINOPHEN 5; 325 MG/1; MG/1
1 TABLET ORAL DAILY PRN
Status: DISCONTINUED | OUTPATIENT
Start: 2021-08-12 | End: 2021-08-12 | Stop reason: HOSPADM

## 2021-08-12 RX ORDER — HYDROCODONE BITARTRATE AND ACETAMINOPHEN 5; 325 MG/1; MG/1
1 TABLET ORAL EVERY 8 HOURS PRN
Status: DISCONTINUED | OUTPATIENT
Start: 2021-08-12 | End: 2021-08-12

## 2021-08-12 RX ORDER — ACETAMINOPHEN 325 MG/1
650 TABLET ORAL EVERY 6 HOURS PRN
Status: DISCONTINUED | OUTPATIENT
Start: 2021-08-12 | End: 2021-08-12 | Stop reason: HOSPADM

## 2021-08-12 RX ORDER — PANTOPRAZOLE SODIUM 40 MG/1
40 TABLET, DELAYED RELEASE ORAL DAILY
Status: DISCONTINUED | OUTPATIENT
Start: 2021-08-12 | End: 2021-08-12 | Stop reason: HOSPADM

## 2021-08-12 RX ORDER — DOCUSATE SODIUM 100 MG/1
100 CAPSULE, LIQUID FILLED ORAL 2 TIMES DAILY
Qty: 60 CAPSULE | Refills: 0 | Status: SHIPPED | OUTPATIENT
Start: 2021-08-12 | End: 2022-02-03

## 2021-08-12 RX ORDER — METRONIDAZOLE 500 MG/1
500 TABLET ORAL EVERY 12 HOURS
Qty: 14 TABLET | Refills: 0 | Status: SHIPPED | OUTPATIENT
Start: 2021-08-12 | End: 2021-08-19

## 2021-08-12 RX ADMIN — HYDROCODONE BITARTRATE AND ACETAMINOPHEN 1 TABLET: 5; 325 TABLET ORAL at 06:08

## 2021-08-12 RX ADMIN — FLUOXETINE 40 MG: 20 CAPSULE ORAL at 08:08

## 2021-08-12 RX ADMIN — DOCUSATE SODIUM 100 MG: 100 CAPSULE, LIQUID FILLED ORAL at 11:08

## 2021-08-12 RX ADMIN — SODIUM CHLORIDE 500 ML: 0.9 INJECTION, SOLUTION INTRAVENOUS at 01:08

## 2021-08-12 RX ADMIN — ONDANSETRON 8 MG: 8 TABLET, ORALLY DISINTEGRATING ORAL at 12:08

## 2021-08-12 RX ADMIN — LEVOTHYROXINE SODIUM 50 MCG: 50 TABLET ORAL at 06:08

## 2021-08-12 RX ADMIN — CIPROFLOXACIN 400 MG: 2 INJECTION, SOLUTION INTRAVENOUS at 11:08

## 2021-08-12 RX ADMIN — HYDROCHLOROTHIAZIDE 12.5 MG: 12.5 TABLET ORAL at 09:08

## 2021-08-12 RX ADMIN — Medication 100 ML: at 01:08

## 2021-08-12 RX ADMIN — CIPROFLOXACIN 400 MG: 2 INJECTION, SOLUTION INTRAVENOUS at 12:08

## 2021-08-12 RX ADMIN — MAGNESIUM CITRATE 296 ML: 1.75 LIQUID ORAL at 01:08

## 2021-08-12 RX ADMIN — PANTOPRAZOLE SODIUM 40 MG: 40 TABLET, DELAYED RELEASE ORAL at 08:08

## 2021-08-12 RX ADMIN — METRONIDAZOLE 500 MG: 500 INJECTION, SOLUTION INTRAVENOUS at 08:08

## 2021-08-12 RX ADMIN — POLYETHYLENE GLYCOL 3350 17 G: 17 POWDER, FOR SOLUTION ORAL at 11:08

## 2021-08-12 RX ADMIN — METRONIDAZOLE 500 MG: 500 INJECTION, SOLUTION INTRAVENOUS at 12:08

## 2021-08-12 RX ADMIN — AMLODIPINE BESYLATE 10 MG: 10 TABLET ORAL at 08:08

## 2021-08-20 ENCOUNTER — TELEPHONE (OUTPATIENT)
Dept: CARDIOLOGY | Facility: CLINIC | Age: 61
End: 2021-08-20

## 2021-09-22 DIAGNOSIS — R00.2 PALPITATIONS: ICD-10-CM

## 2021-09-22 DIAGNOSIS — I10 ESSENTIAL HYPERTENSION: ICD-10-CM

## 2021-09-22 DIAGNOSIS — R07.89 OTHER CHEST PAIN: ICD-10-CM

## 2021-09-22 DIAGNOSIS — Z87.891 FORMER TOBACCO USE: ICD-10-CM

## 2021-09-22 DIAGNOSIS — R00.1 BRADYCARDIA: Primary | ICD-10-CM

## 2021-09-23 ENCOUNTER — HOSPITAL ENCOUNTER (OUTPATIENT)
Dept: CARDIOLOGY | Facility: HOSPITAL | Age: 61
Discharge: HOME | End: 2021-09-23
Attending: INTERNAL MEDICINE
Payer: COMMERCIAL

## 2021-09-23 ENCOUNTER — HOSPITAL ENCOUNTER (OUTPATIENT)
Dept: RADIOLOGY | Facility: HOSPITAL | Age: 61
Discharge: HOME | End: 2021-09-23
Attending: INTERNAL MEDICINE
Payer: COMMERCIAL

## 2021-09-23 ENCOUNTER — OFFICE VISIT (OUTPATIENT)
Dept: CARDIOLOGY | Facility: CLINIC | Age: 61
End: 2021-09-23
Payer: COMMERCIAL

## 2021-09-23 VITALS
DIASTOLIC BLOOD PRESSURE: 80 MMHG | BODY MASS INDEX: 19.14 KG/M2 | HEART RATE: 60 BPM | SYSTOLIC BLOOD PRESSURE: 122 MMHG | OXYGEN SATURATION: 98 % | HEIGHT: 63 IN | WEIGHT: 108 LBS

## 2021-09-23 DIAGNOSIS — R07.89 OTHER CHEST PAIN: ICD-10-CM

## 2021-09-23 DIAGNOSIS — R06.09 OTHER FORM OF DYSPNEA: ICD-10-CM

## 2021-09-23 DIAGNOSIS — R00.2 PALPITATIONS: ICD-10-CM

## 2021-09-23 DIAGNOSIS — Z87.891 FORMER TOBACCO USE: ICD-10-CM

## 2021-09-23 DIAGNOSIS — R07.89 OTHER CHEST PAIN: Primary | ICD-10-CM

## 2021-09-23 DIAGNOSIS — I10 ESSENTIAL HYPERTENSION: ICD-10-CM

## 2021-09-23 DIAGNOSIS — R00.1 BRADYCARDIA: ICD-10-CM

## 2021-09-23 DIAGNOSIS — K21.9 GASTROESOPHAGEAL REFLUX DISEASE, UNSPECIFIED WHETHER ESOPHAGITIS PRESENT: ICD-10-CM

## 2021-09-23 PROCEDURE — 93005 ELECTROCARDIOGRAM TRACING: CPT

## 2021-09-23 PROCEDURE — 3074F SYST BP LT 130 MM HG: CPT | Mod: CPTII,S$GLB,, | Performed by: INTERNAL MEDICINE

## 2021-09-23 PROCEDURE — 1159F MED LIST DOCD IN RCRD: CPT | Mod: CPTII,S$GLB,, | Performed by: INTERNAL MEDICINE

## 2021-09-23 PROCEDURE — 71046 X-RAY EXAM CHEST 2 VIEWS: CPT | Mod: TC

## 2021-09-23 PROCEDURE — 99999 PR PBB SHADOW E&M-EST. PATIENT-LVL IV: CPT | Mod: PBBFAC,,, | Performed by: INTERNAL MEDICINE

## 2021-09-23 PROCEDURE — 71046 X-RAY EXAM CHEST 2 VIEWS: CPT | Mod: 26,,, | Performed by: RADIOLOGY

## 2021-09-23 PROCEDURE — 99999 PR PBB SHADOW E&M-EST. PATIENT-LVL IV: ICD-10-PCS | Mod: PBBFAC,,, | Performed by: INTERNAL MEDICINE

## 2021-09-23 PROCEDURE — 3008F BODY MASS INDEX DOCD: CPT | Mod: CPTII,S$GLB,, | Performed by: INTERNAL MEDICINE

## 2021-09-23 PROCEDURE — 3074F PR MOST RECENT SYSTOLIC BLOOD PRESSURE < 130 MM HG: ICD-10-PCS | Mod: CPTII,S$GLB,, | Performed by: INTERNAL MEDICINE

## 2021-09-23 PROCEDURE — 3079F PR MOST RECENT DIASTOLIC BLOOD PRESSURE 80-89 MM HG: ICD-10-PCS | Mod: CPTII,S$GLB,, | Performed by: INTERNAL MEDICINE

## 2021-09-23 PROCEDURE — 71046 XR CHEST PA AND LATERAL: ICD-10-PCS | Mod: 26,,, | Performed by: RADIOLOGY

## 2021-09-23 PROCEDURE — 93010 ELECTROCARDIOGRAM REPORT: CPT | Mod: ,,, | Performed by: INTERNAL MEDICINE

## 2021-09-23 PROCEDURE — 1159F PR MEDICATION LIST DOCUMENTED IN MEDICAL RECORD: ICD-10-PCS | Mod: CPTII,S$GLB,, | Performed by: INTERNAL MEDICINE

## 2021-09-23 PROCEDURE — 1160F PR REVIEW ALL MEDS BY PRESCRIBER/CLIN PHARMACIST DOCUMENTED: ICD-10-PCS | Mod: CPTII,S$GLB,, | Performed by: INTERNAL MEDICINE

## 2021-09-23 PROCEDURE — 3008F PR BODY MASS INDEX (BMI) DOCUMENTED: ICD-10-PCS | Mod: CPTII,S$GLB,, | Performed by: INTERNAL MEDICINE

## 2021-09-23 PROCEDURE — 1160F RVW MEDS BY RX/DR IN RCRD: CPT | Mod: CPTII,S$GLB,, | Performed by: INTERNAL MEDICINE

## 2021-09-23 PROCEDURE — 3079F DIAST BP 80-89 MM HG: CPT | Mod: CPTII,S$GLB,, | Performed by: INTERNAL MEDICINE

## 2021-09-23 PROCEDURE — 93010 EKG 12-LEAD: ICD-10-PCS | Mod: ,,, | Performed by: INTERNAL MEDICINE

## 2021-09-23 PROCEDURE — 99214 PR OFFICE/OUTPT VISIT, EST, LEVL IV, 30-39 MIN: ICD-10-PCS | Mod: S$GLB,,, | Performed by: INTERNAL MEDICINE

## 2021-09-23 PROCEDURE — 99214 OFFICE O/P EST MOD 30 MIN: CPT | Mod: S$GLB,,, | Performed by: INTERNAL MEDICINE

## 2021-09-23 RX ORDER — ONDANSETRON 8 MG/1
8 TABLET, ORALLY DISINTEGRATING ORAL EVERY 8 HOURS PRN
Qty: 20 TABLET | Refills: 1 | Status: SHIPPED | OUTPATIENT
Start: 2021-09-23 | End: 2023-02-28 | Stop reason: SDUPTHER

## 2021-09-23 RX ORDER — LEVOTHYROXINE SODIUM 50 UG/1
50 TABLET ORAL
COMMUNITY

## 2021-09-23 RX ORDER — HYDROCHLOROTHIAZIDE 12.5 MG/1
12.5 TABLET ORAL DAILY
Qty: 30 TABLET | Refills: 6 | Status: SHIPPED | OUTPATIENT
Start: 2021-09-23 | End: 2022-07-12 | Stop reason: SDUPTHER

## 2021-09-24 ENCOUNTER — TELEPHONE (OUTPATIENT)
Dept: CARDIOLOGY | Facility: CLINIC | Age: 61
End: 2021-09-24

## 2021-09-24 DIAGNOSIS — M19.90 INFLAMMATION OF JOINT: Primary | ICD-10-CM

## 2021-09-24 RX ORDER — IBUPROFEN 600 MG/1
600 TABLET ORAL 3 TIMES DAILY
Qty: 21 TABLET | Refills: 0 | Status: SHIPPED | OUTPATIENT
Start: 2021-09-24 | End: 2021-10-01

## 2021-09-24 RX ORDER — COLCHICINE 0.6 MG/1
0.6 TABLET ORAL 2 TIMES DAILY
Qty: 28 TABLET | Refills: 0 | Status: SHIPPED | OUTPATIENT
Start: 2021-09-24 | End: 2022-02-03

## 2021-09-27 ENCOUNTER — HOSPITAL ENCOUNTER (OUTPATIENT)
Dept: CARDIOLOGY | Facility: HOSPITAL | Age: 61
Discharge: HOME | End: 2021-09-27
Attending: INTERNAL MEDICINE
Payer: COMMERCIAL

## 2021-09-27 VITALS
DIASTOLIC BLOOD PRESSURE: 80 MMHG | HEART RATE: 55 BPM | BODY MASS INDEX: 19.14 KG/M2 | HEIGHT: 63 IN | SYSTOLIC BLOOD PRESSURE: 122 MMHG | WEIGHT: 108 LBS

## 2021-09-27 DIAGNOSIS — I10 ESSENTIAL HYPERTENSION: ICD-10-CM

## 2021-09-27 DIAGNOSIS — R00.2 PALPITATIONS: ICD-10-CM

## 2021-09-27 DIAGNOSIS — Z87.891 FORMER TOBACCO USE: ICD-10-CM

## 2021-09-27 DIAGNOSIS — R07.89 OTHER CHEST PAIN: ICD-10-CM

## 2021-09-27 DIAGNOSIS — R06.09 OTHER FORM OF DYSPNEA: ICD-10-CM

## 2021-09-27 LAB
ASCENDING AORTA: 2.81 CM
AV INDEX (PROSTH): 0.82
AV MEAN GRADIENT: 4 MMHG
AV PEAK GRADIENT: 7 MMHG
AV VALVE AREA: 2.3 CM2
AV VELOCITY RATIO: 0.9
BSA FOR ECHO PROCEDURE: 1.48 M2
CV ECHO LV RWT: 0.53 CM
DOP CALC AO PEAK VEL: 1.36 M/S
DOP CALC AO VTI: 29.7 CM
DOP CALC LVOT AREA: 2.8 CM2
DOP CALC LVOT DIAMETER: 1.89 CM
DOP CALC LVOT PEAK VEL: 1.22 M/S
DOP CALC LVOT STROKE VOLUME: 68.25 CM3
DOP CALC RVOT PEAK VEL: 0.81 M/S
DOP CALC RVOT VTI: 20.33 CM
DOP CALCLVOT PEAK VEL VTI: 24.34 CM
E WAVE DECELERATION TIME: 174.31 MSEC
E/A RATIO: 1.62
E/E' RATIO: 8.1 M/S
ECHO LV POSTERIOR WALL: 0.96 CM (ref 0.6–1.1)
EJECTION FRACTION: 60 %
FRACTIONAL SHORTENING: 33 % (ref 28–44)
INTERVENTRICULAR SEPTUM: 0.99 CM (ref 0.6–1.1)
IVRT: 74.22 MSEC
LA MAJOR: 4.25 CM
LA MINOR: 3.84 CM
LA WIDTH: 2.36 CM
LEFT ATRIUM SIZE: 2.36 CM
LEFT ATRIUM VOLUME INDEX: 12.8 ML/M2
LEFT ATRIUM VOLUME: 19.1 CM3
LEFT INTERNAL DIMENSION IN SYSTOLE: 2.45 CM (ref 2.1–4)
LEFT VENTRICLE DIASTOLIC VOLUME INDEX: 37.37 ML/M2
LEFT VENTRICLE DIASTOLIC VOLUME: 55.68 ML
LEFT VENTRICLE MASS INDEX: 71 G/M2
LEFT VENTRICLE SYSTOLIC VOLUME INDEX: 14.2 ML/M2
LEFT VENTRICLE SYSTOLIC VOLUME: 21.18 ML
LEFT VENTRICULAR INTERNAL DIMENSION IN DIASTOLE: 3.63 CM (ref 3.5–6)
LEFT VENTRICULAR MASS: 105.36 G
LV LATERAL E/E' RATIO: 7.36 M/S
LV SEPTAL E/E' RATIO: 9 M/S
MV PEAK A VEL: 0.5 M/S
MV PEAK E VEL: 0.81 M/S
MV STENOSIS PRESSURE HALF TIME: 50.55 MS
MV VALVE AREA P 1/2 METHOD: 4.35 CM2
PISA TR MAX VEL: 1.79 M/S
PULM VEIN S/D RATIO: 1.18
PV MEAN GRADIENT: 2 MMHG
PV PEAK D VEL: 0.61 M/S
PV PEAK S VEL: 0.72 M/S
PV PEAK VELOCITY: 0.93 CM/S
RA MAJOR: 4.11 CM
RA PRESSURE: 3 MMHG
RA WIDTH: 2.63 CM
RIGHT VENTRICULAR END-DIASTOLIC DIMENSION: 2.23 CM
SINUS: 2.96 CM
STJ: 2.27 CM
TDI LATERAL: 0.11 M/S
TDI SEPTAL: 0.09 M/S
TDI: 0.1 M/S
TR MAX PG: 13 MMHG
TV REST PULMONARY ARTERY PRESSURE: 16 MMHG

## 2021-09-27 PROCEDURE — 93306 TTE W/DOPPLER COMPLETE: CPT | Mod: 26,,, | Performed by: INTERNAL MEDICINE

## 2021-09-27 PROCEDURE — 93306 ECHO (CUPID ONLY): ICD-10-PCS | Mod: 26,,, | Performed by: INTERNAL MEDICINE

## 2021-09-27 PROCEDURE — 93306 TTE W/DOPPLER COMPLETE: CPT

## 2021-09-28 ENCOUNTER — TELEPHONE (OUTPATIENT)
Dept: CARDIOLOGY | Facility: CLINIC | Age: 61
End: 2021-09-28

## 2021-10-01 ENCOUNTER — TELEPHONE (OUTPATIENT)
Dept: CARDIOLOGY | Facility: CLINIC | Age: 61
End: 2021-10-01

## 2021-10-19 ENCOUNTER — OFFICE VISIT (OUTPATIENT)
Dept: CARDIOLOGY | Facility: CLINIC | Age: 61
End: 2021-10-19
Payer: COMMERCIAL

## 2021-10-19 VITALS
DIASTOLIC BLOOD PRESSURE: 80 MMHG | SYSTOLIC BLOOD PRESSURE: 140 MMHG | WEIGHT: 108.25 LBS | HEART RATE: 52 BPM | BODY MASS INDEX: 19.18 KG/M2 | HEIGHT: 63 IN | OXYGEN SATURATION: 95 %

## 2021-10-19 DIAGNOSIS — R06.02 SHORTNESS OF BREATH: ICD-10-CM

## 2021-10-19 DIAGNOSIS — R00.1 BRADYCARDIA: ICD-10-CM

## 2021-10-19 DIAGNOSIS — R00.2 PALPITATIONS: Primary | ICD-10-CM

## 2021-10-19 DIAGNOSIS — Z87.891 FORMER TOBACCO USE: ICD-10-CM

## 2021-10-19 DIAGNOSIS — M19.90 INFLAMMATION OF JOINT: ICD-10-CM

## 2021-10-19 DIAGNOSIS — R07.89 OTHER CHEST PAIN: ICD-10-CM

## 2021-10-19 DIAGNOSIS — Z72.0 TOBACCO USE: ICD-10-CM

## 2021-10-19 DIAGNOSIS — K21.9 GASTROESOPHAGEAL REFLUX DISEASE, UNSPECIFIED WHETHER ESOPHAGITIS PRESENT: ICD-10-CM

## 2021-10-19 DIAGNOSIS — R06.09 OTHER FORM OF DYSPNEA: ICD-10-CM

## 2021-10-19 DIAGNOSIS — R06.00 DYSPNEA, UNSPECIFIED TYPE: ICD-10-CM

## 2021-10-19 DIAGNOSIS — I10 ESSENTIAL HYPERTENSION: ICD-10-CM

## 2021-10-19 PROCEDURE — 3008F PR BODY MASS INDEX (BMI) DOCUMENTED: ICD-10-PCS | Mod: CPTII,S$GLB,, | Performed by: INTERNAL MEDICINE

## 2021-10-19 PROCEDURE — 99999 PR PBB SHADOW E&M-EST. PATIENT-LVL IV: ICD-10-PCS | Mod: PBBFAC,,, | Performed by: INTERNAL MEDICINE

## 2021-10-19 PROCEDURE — 1160F RVW MEDS BY RX/DR IN RCRD: CPT | Mod: CPTII,S$GLB,, | Performed by: INTERNAL MEDICINE

## 2021-10-19 PROCEDURE — 99214 OFFICE O/P EST MOD 30 MIN: CPT | Mod: S$GLB,,, | Performed by: INTERNAL MEDICINE

## 2021-10-19 PROCEDURE — 99999 PR PBB SHADOW E&M-EST. PATIENT-LVL IV: CPT | Mod: PBBFAC,,, | Performed by: INTERNAL MEDICINE

## 2021-10-19 PROCEDURE — 1159F PR MEDICATION LIST DOCUMENTED IN MEDICAL RECORD: ICD-10-PCS | Mod: CPTII,S$GLB,, | Performed by: INTERNAL MEDICINE

## 2021-10-19 PROCEDURE — 3008F BODY MASS INDEX DOCD: CPT | Mod: CPTII,S$GLB,, | Performed by: INTERNAL MEDICINE

## 2021-10-19 PROCEDURE — 1160F PR REVIEW ALL MEDS BY PRESCRIBER/CLIN PHARMACIST DOCUMENTED: ICD-10-PCS | Mod: CPTII,S$GLB,, | Performed by: INTERNAL MEDICINE

## 2021-10-19 PROCEDURE — 3077F PR MOST RECENT SYSTOLIC BLOOD PRESSURE >= 140 MM HG: ICD-10-PCS | Mod: CPTII,S$GLB,, | Performed by: INTERNAL MEDICINE

## 2021-10-19 PROCEDURE — 3079F DIAST BP 80-89 MM HG: CPT | Mod: CPTII,S$GLB,, | Performed by: INTERNAL MEDICINE

## 2021-10-19 PROCEDURE — 99214 PR OFFICE/OUTPT VISIT, EST, LEVL IV, 30-39 MIN: ICD-10-PCS | Mod: S$GLB,,, | Performed by: INTERNAL MEDICINE

## 2021-10-19 PROCEDURE — 1159F MED LIST DOCD IN RCRD: CPT | Mod: CPTII,S$GLB,, | Performed by: INTERNAL MEDICINE

## 2021-10-19 PROCEDURE — 3079F PR MOST RECENT DIASTOLIC BLOOD PRESSURE 80-89 MM HG: ICD-10-PCS | Mod: CPTII,S$GLB,, | Performed by: INTERNAL MEDICINE

## 2021-10-19 PROCEDURE — 3077F SYST BP >= 140 MM HG: CPT | Mod: CPTII,S$GLB,, | Performed by: INTERNAL MEDICINE

## 2022-01-10 DIAGNOSIS — R06.02 SOB (SHORTNESS OF BREATH): Primary | ICD-10-CM

## 2022-01-11 ENCOUNTER — OFFICE VISIT (OUTPATIENT)
Dept: PULMONOLOGY | Facility: CLINIC | Age: 62
End: 2022-01-11
Payer: COMMERCIAL

## 2022-01-11 ENCOUNTER — HOSPITAL ENCOUNTER (OUTPATIENT)
Dept: RADIOLOGY | Facility: HOSPITAL | Age: 62
Discharge: HOME | End: 2022-01-11
Attending: NURSE PRACTITIONER
Payer: COMMERCIAL

## 2022-01-11 VITALS
DIASTOLIC BLOOD PRESSURE: 74 MMHG | HEIGHT: 63 IN | WEIGHT: 112 LBS | OXYGEN SATURATION: 100 % | SYSTOLIC BLOOD PRESSURE: 116 MMHG | RESPIRATION RATE: 16 BRPM | HEART RATE: 59 BPM | BODY MASS INDEX: 19.84 KG/M2

## 2022-01-11 DIAGNOSIS — I10 ESSENTIAL HYPERTENSION: ICD-10-CM

## 2022-01-11 DIAGNOSIS — R06.02 SOB (SHORTNESS OF BREATH) ON EXERTION: Primary | ICD-10-CM

## 2022-01-11 DIAGNOSIS — K21.9 GASTROESOPHAGEAL REFLUX DISEASE, UNSPECIFIED WHETHER ESOPHAGITIS PRESENT: ICD-10-CM

## 2022-01-11 DIAGNOSIS — R06.02 SOB (SHORTNESS OF BREATH): ICD-10-CM

## 2022-01-11 DIAGNOSIS — R06.09 OTHER FORM OF DYSPNEA: ICD-10-CM

## 2022-01-11 DIAGNOSIS — Z87.891 FORMER TOBACCO USE: ICD-10-CM

## 2022-01-11 PROCEDURE — 3078F PR MOST RECENT DIASTOLIC BLOOD PRESSURE < 80 MM HG: ICD-10-PCS | Mod: CPTII,S$GLB,, | Performed by: NURSE PRACTITIONER

## 2022-01-11 PROCEDURE — 71046 XR CHEST PA AND LATERAL: ICD-10-PCS | Mod: 26,,, | Performed by: RADIOLOGY

## 2022-01-11 PROCEDURE — 1160F RVW MEDS BY RX/DR IN RCRD: CPT | Mod: CPTII,S$GLB,, | Performed by: NURSE PRACTITIONER

## 2022-01-11 PROCEDURE — 3008F PR BODY MASS INDEX (BMI) DOCUMENTED: ICD-10-PCS | Mod: CPTII,S$GLB,, | Performed by: NURSE PRACTITIONER

## 2022-01-11 PROCEDURE — 1160F PR REVIEW ALL MEDS BY PRESCRIBER/CLIN PHARMACIST DOCUMENTED: ICD-10-PCS | Mod: CPTII,S$GLB,, | Performed by: NURSE PRACTITIONER

## 2022-01-11 PROCEDURE — 3074F PR MOST RECENT SYSTOLIC BLOOD PRESSURE < 130 MM HG: ICD-10-PCS | Mod: CPTII,S$GLB,, | Performed by: NURSE PRACTITIONER

## 2022-01-11 PROCEDURE — 99999 PR PBB SHADOW E&M-EST. PATIENT-LVL IV: CPT | Mod: PBBFAC,,, | Performed by: NURSE PRACTITIONER

## 2022-01-11 PROCEDURE — 99214 PR OFFICE/OUTPT VISIT, EST, LEVL IV, 30-39 MIN: ICD-10-PCS | Mod: S$GLB,,, | Performed by: NURSE PRACTITIONER

## 2022-01-11 PROCEDURE — 99999 PR PBB SHADOW E&M-EST. PATIENT-LVL IV: ICD-10-PCS | Mod: PBBFAC,,, | Performed by: NURSE PRACTITIONER

## 2022-01-11 PROCEDURE — 99214 OFFICE O/P EST MOD 30 MIN: CPT | Mod: S$GLB,,, | Performed by: NURSE PRACTITIONER

## 2022-01-11 PROCEDURE — 3008F BODY MASS INDEX DOCD: CPT | Mod: CPTII,S$GLB,, | Performed by: NURSE PRACTITIONER

## 2022-01-11 PROCEDURE — 71046 X-RAY EXAM CHEST 2 VIEWS: CPT | Mod: TC

## 2022-01-11 PROCEDURE — 1159F PR MEDICATION LIST DOCUMENTED IN MEDICAL RECORD: ICD-10-PCS | Mod: CPTII,S$GLB,, | Performed by: NURSE PRACTITIONER

## 2022-01-11 PROCEDURE — 71046 X-RAY EXAM CHEST 2 VIEWS: CPT | Mod: 26,,, | Performed by: RADIOLOGY

## 2022-01-11 PROCEDURE — 3074F SYST BP LT 130 MM HG: CPT | Mod: CPTII,S$GLB,, | Performed by: NURSE PRACTITIONER

## 2022-01-11 PROCEDURE — 1159F MED LIST DOCD IN RCRD: CPT | Mod: CPTII,S$GLB,, | Performed by: NURSE PRACTITIONER

## 2022-01-11 PROCEDURE — 3078F DIAST BP <80 MM HG: CPT | Mod: CPTII,S$GLB,, | Performed by: NURSE PRACTITIONER

## 2022-01-11 RX ORDER — ALBUTEROL SULFATE 90 UG/1
2 AEROSOL, METERED RESPIRATORY (INHALATION) EVERY 4 HOURS PRN
Qty: 18 G | Refills: 11 | Status: SHIPPED | OUTPATIENT
Start: 2022-01-11 | End: 2023-10-25 | Stop reason: SDUPTHER

## 2022-01-11 NOTE — PROGRESS NOTES
Subjective:      Patient ID: Yvonne Avila is a 61 y.o. female.    Patient Active Problem List   Diagnosis    GERD (gastroesophageal reflux disease)    Former tobacco use    Palpitations    Chest pain    Other dysphagia    Body mass index (BMI) 19.9 or less, adult    Bradycardia    Essential hypertension    Colitis    Constipation    SOB (shortness of breath) on exertion       she has been referred by Pablo Hua MD for evaluation and management for   Chief Complaint   Patient presents with    Shortness of Breath       Chief Complaint: Shortness of Breath      HPI:  Yvonne Avila is a 61 y.o. female presents to pulmonary clinic for evaluation related to shortness of breath on exertion.     Last seen in pulmonary 12/14/2020 when she had left pleural effusion. 11/9/2020 CT chest with L sided pleural effusion and possible right heart dysfunction.     Chest xray 12/14/2020 pleural effusion resolved. chest xray September 2021 and 1/11/2022 lungs clear.     Patient reports no wheezing.  No cough.  Mild shortness of breath with prolonged exertion, no shortness of breath with regular activity.  Has a regular walking program daily and strengthening exercises 3 -4 days a week.  Only occasional shortness of breath, 2-3 days a week, not daily with work out program. Recovers in few minutes. Since referral by Dr. Hua in October 2021 patient reports her shortness of breath on exertion has significantly improved.     Sternal chest pain improved over all.     Former smoker 26 pack years quit in 2019.     Dyspnea Characteristics:   Exertional Dyspnea   Dyspnea Duration:  Chronic, improved over past 2 years  Dyspnea Severity:  NING 2- 3 slight to moderate  Dyspnea Timing:  exercise  Dyspnea Contributing Factors:  Tobacco Abuse   Dyspnea Associated Symptoms:  no cough, no wheezing.  + left sided chest pain occurs with shortness of breath and without shortness of breath or exertion. Left sided  chest pain occurs 3-4 days a week.      Modified Renetta Dyspnea Scale      0  Nothing at all    0.5  Very, very slight (just noticeable)    1  Very slight    2  Slight    3  Moderate    4 Somewhat severe    5 Severe      6    7  Very severe    8    9   Very, very severe (almost maximal)  10  Maximal      Previous Report Reviewed: lab reports, office notes and radiology reports     Past Medical History: The following portions of the patient's history were reviewed and updated as appropriate:   She  has a past surgical history that includes Dental surgery and Breast biopsy.  Her family history includes Heart attack in her maternal aunt, maternal uncle, and mother; Heart disease in her father; Hypertension in her mother.  She  reports that she quit smoking about 3 years ago. Her smoking use included cigarettes. She started smoking about 40 years ago. She has a 26.25 pack-year smoking history. She has never used smokeless tobacco. She reports that she does not drink alcohol and does not use drugs.  She has a current medication list which includes the following prescription(s): acetaminophen, budesonide, calcium carbonate, fluoxetine, hydrochlorothiazide, ketorolac, levothyroxine, multivitamin, ondansetron, albuterol, alprazolam, colchicine, diphenhydramine, docusate sodium, lactobacillus rhamnosus gg, omeprazole, and potassium bicarbonate.  She is allergic to penicillins..    Review of Systems   Constitutional: Negative for fever, chills, weight loss, weight gain, activity change, appetite change, fatigue and night sweats.   HENT: Negative for postnasal drip, rhinorrhea, sinus pressure, voice change and congestion.    Eyes: Negative for redness and itching.   Respiratory: Negative for snoring, cough, sputum production, chest tightness, shortness of breath, wheezing, orthopnea, asthma nighttime symptoms, dyspnea on extertion, use of rescue inhaler and somnolence.    Cardiovascular: Negative.  Negative for chest pain,  "palpitations and leg swelling.   Genitourinary: Negative for difficulty urinating and hematuria.   Endocrine: Negative for cold intolerance and heat intolerance.    Musculoskeletal: Negative for arthralgias, gait problem, joint swelling and myalgias.   Skin: Negative.    Gastrointestinal: Negative for nausea, vomiting, abdominal pain and acid reflux.   Neurological: Negative for dizziness, weakness, light-headedness and headaches.   Hematological: Negative for adenopathy. No excessive bruising.   All other systems reviewed and are negative.       Objective:   /74   Pulse (!) 59   Resp 16   Ht 5' 3" (1.6 m)   Wt 50.8 kg (111 lb 15.9 oz)   SpO2 100%   BMI 19.84 kg/m²   Physical Exam  Vitals and nursing note reviewed.   Constitutional:       General: She is not in acute distress.     Appearance: Normal appearance. She is well-developed and normal weight. She is not ill-appearing or toxic-appearing.   HENT:      Head: Normocephalic.      Right Ear: External ear normal.      Left Ear: External ear normal.      Nose: Nose normal.      Mouth/Throat:      Pharynx: No oropharyngeal exudate.   Eyes:      Conjunctiva/sclera: Conjunctivae normal.   Cardiovascular:      Rate and Rhythm: Normal rate and regular rhythm.      Heart sounds: Normal heart sounds.   Pulmonary:      Effort: Pulmonary effort is normal.      Breath sounds: Normal breath sounds. No stridor.   Abdominal:      Palpations: Abdomen is soft.   Musculoskeletal:         General: Normal range of motion.      Cervical back: Normal range of motion and neck supple.   Lymphadenopathy:      Cervical: No cervical adenopathy.   Skin:     General: Skin is warm and dry.   Neurological:      Mental Status: She is alert and oriented to person, place, and time.   Psychiatric:         Behavior: Behavior normal. Behavior is cooperative.         Thought Content: Thought content normal.         Judgment: Judgment normal.       Personal Diagnostic Review    X-Ray Chest " PA And Lateral  Narrative: EXAMINATION:  XR CHEST PA AND LATERAL    CLINICAL HISTORY:  Shortness of breath    TECHNIQUE:  PA and lateral views of the chest were performed.    COMPARISON:  09/23/2021    FINDINGS:  The lungs are clear and free of infiltrate.  No pleural effusion or pneumothorax. The heart is not enlarged. Biapical pleuroparenchymal thickening noted.  Impression: 1.  No acute cardiopulmonary process.    Electronically signed by: Nick Knapp DO  Date:    01/11/2022  Time:    08:25    9/27/2021 ECHO   · The left ventricle is normal in size with concentric remodeling and normal systolic function.  · The estimated ejection fraction is 60%.  · Normal left ventricular diastolic function.  · Normal right ventricular size with normal right ventricular systolic function.  · Mild tricuspid regurgitation.  · Normal central venous pressure (3 mmHg).  · The estimated PA systolic pressure is 16 mmHg.          Assessment:     1. SOB (shortness of breath) on exertion    2. Other form of dyspnea    3. Gastroesophageal reflux disease, unspecified whether esophagitis present    4. Former tobacco use    5. Essential hypertension      Orders Placed This Encounter   Procedures    Complete PFT with bronchodilator     Standing Status:   Future     Standing Expiration Date:   1/11/2023     Order Specific Question:   Release to patient     Answer:   Immediate    Stress test, pulmonary     Standing Status:   Future     Standing Expiration Date:   1/11/2023     Order Specific Question:   Reason for study     Answer:   Functional status     Order Specific Question:   Release to patient     Answer:   Immediate    PULM - Arterial Blood Gases--in addition to PFT only     Standing Status:   Future     Standing Expiration Date:   1/11/2023     Order Specific Question:   Release to patient     Answer:   Immediate     Medication List with Changes/Refills   New Medications    ALBUTEROL (PROVENTIL/VENTOLIN HFA) 90 MCG/ACTUATION  INHALER    Inhale 2 puffs into the lungs every 4 (four) hours as needed for Wheezing or Shortness of Breath. Rescue   Current Medications    ACETAMINOPHEN (TYLENOL) 500 MG TABLET    Take 500 mg by mouth as needed for Pain.    ALPRAZOLAM (XANAX) 0.5 MG TABLET    Take 0.5 mg by mouth 2 (two) times daily as needed.    BUDESONIDE 0.6 MG/NORMAL SALINE 50 ML NASAL IRRIGATION    Patient to irrigate each nostril with 25ml twice daily.    CALCIUM CARBONATE (TUMS) 300 MG (750 MG) CHEW    Take 750 mg by mouth.    COLCHICINE (COLCRYS) 0.6 MG TABLET    Take 1 tablet (0.6 mg total) by mouth 2 (two) times a day. for 14 days    DIPHENHYDRAMINE (BENADRYL) 50 MG CAPSULE    Take 50 mg by mouth every 6 (six) hours as needed for Insomnia.     DOCUSATE SODIUM (COLACE) 100 MG CAPSULE    Take 1 capsule (100 mg total) by mouth 2 (two) times daily.    FLUOXETINE 40 MG CAPSULE    Take 40 mg by mouth once daily.    HYDROCHLOROTHIAZIDE (HYDRODIURIL) 12.5 MG TAB    Take 1 tablet (12.5 mg total) by mouth once daily.    KETOROLAC (TORADOL) 10 MG TABLET    Take 1 tablet (10 mg total) by mouth every 6 (six) hours as needed for Pain.    LACTOBACILLUS RHAMNOSUS GG (CULTURELLE) 10 BILLION CELL CAPSULE    Take 1 capsule by mouth once daily.    LEVOTHYROXINE (SYNTHROID) 50 MCG TABLET    Take 50 mcg by mouth before breakfast.    MULTIVITAMIN (THERAGRAN) PER TABLET    Take 1 tablet by mouth once daily.    OMEPRAZOLE (PRILOSEC) 40 MG CAPSULE    Take 40 mg by mouth.    ONDANSETRON (ZOFRAN-ODT) 8 MG TBDL    Take 1 tablet (8 mg total) by mouth every 8 (eight) hours as needed (nausea).    POTASSIUM BICARBONATE (K-LYTE) DISINTEGRATING TABLET    Take 1 tablet (25 mEq total) by mouth once daily.     Plan:   Discussed diagnosis, its evaluation, treatment and usual course. All questions answered.  Problem List Items Addressed This Visit     SOB (shortness of breath) on exertion - Primary    Current Assessment & Plan     Intermittent shortness of breath  exertional  No wheezing no cough   1/11/2022 chest xray lungs clear  Lungs to ausculation   Trial Albuterol inhaler before exercise or as needed shortness of breath   Return for cpft/abg/6mwd   Continue regular exercise         Relevant Medications    albuterol (PROVENTIL/VENTOLIN HFA) 90 mcg/actuation inhaler    Other Relevant Orders    Complete PFT with bronchodilator    Stress test, pulmonary    PULM - Arterial Blood Gases--in addition to PFT only    GERD (gastroesophageal reflux disease)    Current Assessment & Plan     Controlled on Prilosec            Former tobacco use    Current Assessment & Plan     Quit 2019.  Former 26 pack year smoker.         Essential hypertension    Current Assessment & Plan     Stable and controlled. Continue current treatment plan as previously prescribed with your PCP.                Other Visit Diagnoses     Other form of dyspnea        Relevant Orders    Complete PFT with bronchodilator    Stress test, pulmonary    PULM - Arterial Blood Gases--in addition to PFT only        I spent a total of 30 minutes on the day of the visit.  This includes face to face time and non-face to face time preparing to see the patient (eg, review of tests), obtaining and/or reviewing separately obtained history, documenting clinical information in the electronic or other health record, independently interpreting results and communicating results to the patient/family/caregiver, or care coordinator.    Follow up for next available SOB w/review cpf/pul stress/abg.

## 2022-01-11 NOTE — ASSESSMENT & PLAN NOTE
Intermittent shortness of breath exertional  No wheezing no cough   1/11/2022 chest xray lungs clear  Lungs to ausculation   Trial Albuterol inhaler before exercise or as needed shortness of breath   Return for cpft/abg/6mwd   Continue regular exercise

## 2022-02-03 ENCOUNTER — OFFICE VISIT (OUTPATIENT)
Dept: PSYCHIATRY | Facility: CLINIC | Age: 62
End: 2022-02-03
Payer: COMMERCIAL

## 2022-02-03 DIAGNOSIS — F41.0 PANIC ATTACKS: Primary | ICD-10-CM

## 2022-02-03 PROCEDURE — 99999 PR PBB SHADOW E&M-EST. PATIENT-LVL I: ICD-10-PCS | Mod: PBBFAC,,, | Performed by: PSYCHIATRY & NEUROLOGY

## 2022-02-03 PROCEDURE — 99999 PR PBB SHADOW E&M-EST. PATIENT-LVL I: CPT | Mod: PBBFAC,,, | Performed by: PSYCHIATRY & NEUROLOGY

## 2022-02-03 PROCEDURE — 99215 OFFICE O/P EST HI 40 MIN: CPT | Mod: S$GLB,,, | Performed by: PSYCHIATRY & NEUROLOGY

## 2022-02-03 PROCEDURE — 99215 PR OFFICE/OUTPT VISIT, EST, LEVL V, 40-54 MIN: ICD-10-PCS | Mod: S$GLB,,, | Performed by: PSYCHIATRY & NEUROLOGY

## 2022-02-03 RX ORDER — FLUOXETINE HYDROCHLORIDE 20 MG/1
20 CAPSULE ORAL DAILY
Qty: 30 CAPSULE | Refills: 2 | Status: SHIPPED | OUTPATIENT
Start: 2022-02-03 | End: 2022-04-13 | Stop reason: SDUPTHER

## 2022-02-03 RX ORDER — ALPRAZOLAM 0.5 MG/1
0.5 TABLET ORAL 2 TIMES DAILY PRN
Qty: 60 TABLET | Refills: 2 | Status: SHIPPED | OUTPATIENT
Start: 2022-02-19 | End: 2022-04-13 | Stop reason: SDUPTHER

## 2022-02-03 RX ORDER — FLUOXETINE HYDROCHLORIDE 40 MG/1
40 CAPSULE ORAL DAILY
Qty: 30 CAPSULE | Refills: 2 | Status: SHIPPED | OUTPATIENT
Start: 2022-02-03 | End: 2022-04-13 | Stop reason: SDUPTHER

## 2022-02-03 NOTE — PATIENT INSTRUCTIONS
Assessment:   Encounter Diagnosis   Name Primary?    Panic attacks Yes       PLAN    She asks for change in  from CHRISTINA Oliveros LCSW and evelyn to re asssin to Pennie Rajput LCSW  / for CBT anxiety / Panic     Follow up      2/17/2022 11:00 AM NEW PATIENT - PSYCHIATRY ([facility]) [facility] - Psychiatry Pennie Rajput LCSW    Location Instructions:    [address]       4/13/2022 10:00 AM ESTABLISHED PATIENT [facility] - Psychiat[facility] Aime Moralez MD    Location Instructions:    3rd Floor              Meds:     remains on Xanax  / I picked up from Nafisa Irving APRN    Prozac advanced  From 40 mg to 40 mg IN ADDITION to 20 mg ( 60 mg total)    Follow uip gen med and cardiology as well as any other med appts    Encouraged her to get Anxiety Phobia Workbook (as below)    References:      Relaxation stress reduction workbook: JUICE Mendez PhD ( used: $7-10)     Feeling Good Website: Aime Pablo MD / [email or web address] (free) / [email or web address]     Anxiety &  phobia workbook by ROSELINE Mariscal PhD  (web retailers: used: $ 7-10)     [place]: Path to Better Sleep : https://www.veterantraining.[place].gov/insomnia/ (free)    [place] Quit with Us [place]: http://www.quitwithusla.org    (and other resources as per counselor, if applicable)  Pt expressed appreciation for the visit today and did not have further question at this time though pt  was still informed to:     Call / Walk In if problems.    Call Report Side Effects to Psyc MD     Encouraged to follow up with primary care / Gen Med MD for continued monitoring of general health and wellness.    Call 911  Or go to ER if Acute Concerns (especially if any thoughts of harm to self or other).     Understanding was expressed; and no further concerns nor questions were raised at this time.
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
## 2022-02-09 ENCOUNTER — TELEPHONE (OUTPATIENT)
Dept: RHEUMATOLOGY | Facility: CLINIC | Age: 62
End: 2022-02-09
Payer: COMMERCIAL

## 2022-02-10 ENCOUNTER — OFFICE VISIT (OUTPATIENT)
Dept: RHEUMATOLOGY | Facility: CLINIC | Age: 62
End: 2022-02-10
Payer: COMMERCIAL

## 2022-02-10 ENCOUNTER — HOSPITAL ENCOUNTER (OUTPATIENT)
Dept: RADIOLOGY | Facility: HOSPITAL | Age: 62
Discharge: HOME | End: 2022-02-10
Attending: INTERNAL MEDICINE
Payer: COMMERCIAL

## 2022-02-10 VITALS
SYSTOLIC BLOOD PRESSURE: 136 MMHG | HEART RATE: 55 BPM | BODY MASS INDEX: 19.61 KG/M2 | DIASTOLIC BLOOD PRESSURE: 87 MMHG | WEIGHT: 110.69 LBS | HEIGHT: 63 IN

## 2022-02-10 DIAGNOSIS — Z71.89 COUNSELING ON HEALTH PROMOTION AND DISEASE PREVENTION: ICD-10-CM

## 2022-02-10 DIAGNOSIS — R70.0 ESR RAISED: ICD-10-CM

## 2022-02-10 DIAGNOSIS — R76.8 POSITIVE ANA (ANTINUCLEAR ANTIBODY): Primary | ICD-10-CM

## 2022-02-10 DIAGNOSIS — M19.90 INFLAMMATION OF JOINT: ICD-10-CM

## 2022-02-10 DIAGNOSIS — R76.8 POSITIVE ANA (ANTINUCLEAR ANTIBODY): ICD-10-CM

## 2022-02-10 PROCEDURE — 73130 X-RAY EXAM OF HAND: CPT | Mod: TC,50

## 2022-02-10 PROCEDURE — 1159F MED LIST DOCD IN RCRD: CPT | Mod: CPTII,S$GLB,, | Performed by: INTERNAL MEDICINE

## 2022-02-10 PROCEDURE — 73130 X-RAY EXAM OF HAND: CPT | Mod: 26,,, | Performed by: RADIOLOGY

## 2022-02-10 PROCEDURE — 3079F DIAST BP 80-89 MM HG: CPT | Mod: CPTII,S$GLB,, | Performed by: INTERNAL MEDICINE

## 2022-02-10 PROCEDURE — 3075F PR MOST RECENT SYSTOLIC BLOOD PRESS GE 130-139MM HG: ICD-10-PCS | Mod: CPTII,S$GLB,, | Performed by: INTERNAL MEDICINE

## 2022-02-10 PROCEDURE — 99999 PR PBB SHADOW E&M-EST. PATIENT-LVL IV: ICD-10-PCS | Mod: PBBFAC,,, | Performed by: INTERNAL MEDICINE

## 2022-02-10 PROCEDURE — 3079F PR MOST RECENT DIASTOLIC BLOOD PRESSURE 80-89 MM HG: ICD-10-PCS | Mod: CPTII,S$GLB,, | Performed by: INTERNAL MEDICINE

## 2022-02-10 PROCEDURE — 1159F PR MEDICATION LIST DOCUMENTED IN MEDICAL RECORD: ICD-10-PCS | Mod: CPTII,S$GLB,, | Performed by: INTERNAL MEDICINE

## 2022-02-10 PROCEDURE — 3075F SYST BP GE 130 - 139MM HG: CPT | Mod: CPTII,S$GLB,, | Performed by: INTERNAL MEDICINE

## 2022-02-10 PROCEDURE — 99999 PR PBB SHADOW E&M-EST. PATIENT-LVL IV: CPT | Mod: PBBFAC,,, | Performed by: INTERNAL MEDICINE

## 2022-02-10 PROCEDURE — 99214 PR OFFICE/OUTPT VISIT, EST, LEVL IV, 30-39 MIN: ICD-10-PCS | Mod: S$GLB,,, | Performed by: INTERNAL MEDICINE

## 2022-02-10 PROCEDURE — 3008F BODY MASS INDEX DOCD: CPT | Mod: CPTII,S$GLB,, | Performed by: INTERNAL MEDICINE

## 2022-02-10 PROCEDURE — 99214 OFFICE O/P EST MOD 30 MIN: CPT | Mod: S$GLB,,, | Performed by: INTERNAL MEDICINE

## 2022-02-10 PROCEDURE — 73130 XR HAND COMPLETE 3 VIEWS BILATERAL: ICD-10-PCS | Mod: 26,,, | Performed by: RADIOLOGY

## 2022-02-10 PROCEDURE — 3008F PR BODY MASS INDEX (BMI) DOCUMENTED: ICD-10-PCS | Mod: CPTII,S$GLB,, | Performed by: INTERNAL MEDICINE

## 2022-02-10 NOTE — PROGRESS NOTES
RHEUMATOLOGY OUTPATIENT CLINIC NOTE    2/10/2022    Attending Rheumatologist: Irwin Murphy  Primary Care Provider/Physician Requesting Consultation: MINH Ryan   Chief Complaint/Reason For Consultation:  Disease Management      Subjective:     Yvonne Avila is a 61 y.o. White female with episode of serositis and elevation of ESR for follow-up visit.    No acute complaints, reporting being on her usual state of health.  Intermittent thumb arthralgias with prominent mechanical pattern.  Denies association with joint swelling or prolonged morning stiffness.  Currently without fever, chest pain, or dyspnea.  Denies cough or refractory serositis since last encounter.  Denies Raynaud phenomena or ulcerations.  Currently without acute /GI complaints.    Review of Systems   Constitutional: Negative for fever and malaise/fatigue.   HENT:        Denies significant xerostomia or xerophthalmia   Eyes: Negative for pain.   Gastrointestinal: Negative for blood in stool and diarrhea.   Genitourinary: Negative for dysuria and hematuria.   Musculoskeletal: Negative for joint pain.   Skin: Negative for rash.   Neurological: Negative for sensory change and focal weakness.       Chronic comorbid conditions affecting medical decision making today:  Past Medical History:   Diagnosis Date    Anxiety     Bradycardia 9/19/2019    GERD (gastroesophageal reflux disease)     Thyroid disease      Past Surgical History:   Procedure Laterality Date    BREAST BIOPSY      DENTAL SURGERY       Family History   Problem Relation Age of Onset    Heart attack Mother     Hypertension Mother     Heart disease Father     Heart attack Maternal Aunt     Heart attack Maternal Uncle      Social History     Substance and Sexual Activity   Alcohol Use Never     Social History     Tobacco Use   Smoking Status Former Smoker    Packs/day: 0.75    Years: 35.00    Pack years: 26.25    Types: Cigarettes    Start date: 1982     Quit date: 2019    Years since quitting: 3.1   Smokeless Tobacco Never Used   Tobacco Comment    did not smoke for 2 years since beginning smoking.      Social History     Substance and Sexual Activity   Drug Use Never       Current Outpatient Medications:     acetaminophen (TYLENOL) 500 MG tablet, Take 500 mg by mouth as needed for Pain., Disp: , Rfl:     albuterol (PROVENTIL/VENTOLIN HFA) 90 mcg/actuation inhaler, Inhale 2 puffs into the lungs every 4 (four) hours as needed for Wheezing or Shortness of Breath. Rescue, Disp: 18 g, Rfl: 11    [START ON 2/19/2022] ALPRAZolam (XANAX) 0.5 MG tablet, Take 1 tablet (0.5 mg total) by mouth 2 (two) times daily as needed (SIGNIFICANT ANXIETY). New Prescriber, Disp: 60 tablet, Rfl: 2    calcium carbonate (TUMS) 300 mg (750 mg) Chew, Take 750 mg by mouth., Disp: , Rfl:     FLUoxetine 20 MG capsule, Take 1 capsule (20 mg total) by mouth once daily. Take IN ADDITION to Prozac 40 mg supply, Disp: 30 capsule, Rfl: 2    FLUoxetine 40 MG capsule, Take 1 capsule (40 mg total) by mouth once daily. Take IN ADDITION to Prozac 20 mg supply, Disp: 30 capsule, Rfl: 2    hydroCHLOROthiazide (HYDRODIURIL) 12.5 MG Tab, Take 1 tablet (12.5 mg total) by mouth once daily., Disp: 30 tablet, Rfl: 6    levothyroxine (SYNTHROID) 50 MCG tablet, Take 50 mcg by mouth before breakfast., Disp: , Rfl:     multivitamin (THERAGRAN) per tablet, Take 1 tablet by mouth once daily., Disp: , Rfl:     ondansetron (ZOFRAN-ODT) 8 MG TbDL, Take 1 tablet (8 mg total) by mouth every 8 (eight) hours as needed (nausea)., Disp: 20 tablet, Rfl: 1    BUDESONIDE 0.6 MG/NORMAL SALINE 50 ML NASAL IRRIGATION, Patient to irrigate each nostril with 25ml twice daily., Disp: , Rfl:     ketorolac (TORADOL) 10 mg tablet, Take 1 tablet (10 mg total) by mouth every 6 (six) hours as needed for Pain., Disp: 12 tablet, Rfl: 0    Lactobacillus rhamnosus GG (CULTURELLE) 10 billion cell capsule, Take 1 capsule by mouth once  daily., Disp: , Rfl:     omeprazole (PRILOSEC) 40 MG capsule, Take 40 mg by mouth., Disp: , Rfl:      Objective:     Vitals:    02/10/22 0717   BP: 136/87   Pulse: (!) 55     Physical Exam   Constitutional: She does not appear ill.   Eyes: Conjunctivae are normal.   Pulmonary/Chest: Effort normal. No respiratory distress.   Musculoskeletal:         General: Deformity (Heberden's) present. No swelling or tenderness.   Neurological: She displays no weakness.   Skin: No rash noted.       Reviewed available old and all outside pertinent medical records available.    All lab results personally reviewed and interpreted by me.  Lab Results   Component Value Date    WBC 9.84 08/12/2021    HGB 11.3 (L) 08/12/2021    HCT 35.2 (L) 08/12/2021    MCV 86 08/12/2021    RDW 13.6 08/12/2021     08/12/2021       Lab Results   Component Value Date     08/12/2021    K 3.7 08/12/2021     08/12/2021    CO2 28 08/12/2021     (H) 08/12/2021    BUN 14 08/12/2021    CALCIUM 9.5 08/12/2021    PROT 7.7 08/12/2021    ALBUMIN 4.1 08/12/2021    BILITOT 0.4 08/12/2021    AST 16 08/12/2021    ALKPHOS 68 08/12/2021    ALT 12 08/12/2021       Lab Results   Component Value Date    COLORU Yellow 11/08/2020    APPEARANCEUA Clear 11/08/2020    SPECGRAV <=1.005 (A) 11/08/2020    PHUR 7.0 11/08/2020    PROTEINUA Negative 11/08/2020    KETONESU Negative 11/08/2020    LEUKOCYTESUR Negative 11/08/2020    NITRITE Negative 11/08/2020    UROBILINOGEN Negative 11/08/2020       No results found for: PTH    Lab Results   Component Value Date    URICACID 4.2 11/19/2020       Lab Results   Component Value Date    CRP 7.2 09/23/2021       Lab Results   Component Value Date    ANATITER 1:80 11/19/2020       No components found for: TSPOTTB,  QUANTIFERON     ASSESSMENT:     Episode of pleural and pericardial effusion without recurrence.  Patient reports previously experienced generalized rashes of unknown etiology managed with systemic  steroids, have not recurred for several years.  Rheumatic workup revealed low titer TYE and persistent elevation of ESR which is nonspecific.  Patient currently without synovitis on exam or symptoms/signs suggestive of active systemic rheumatic disease for which would recommend immunosuppression.  Chest x-ray stable earlier this year, last echocardiogram without pericardial effusion reported.  Rest rheumatic workup unrevealing.  Considering signs, abnormal lab results, and prior sings related to thyroid disease which is now being addressed with thyroid replacement.  Alarm signs or symptoms suggestive of active rheumatic disease doubt g further workup discussed in detail with patient.  X-ray to determine presence of marginal erosive changes.  Repeat blood work prior next encounter or sooner if suspicious symptoms    PLAN:     1. Inflammation of joint    - Ambulatory referral/consult to Rheumatology    2. Counseling on health promotion and disease prevention      3. Positive TYE (antinuclear antibody)    - X-Ray Hand Complete Bilateral; Future  - CBC Auto Differential; Standing  - Comprehensive Metabolic Panel; Standing  - Protein/Creatinine Ratio, Urine; Standing  - Anti-DNA Ab, Double-Stranded; Standing  - C3 Complement; Standing  - C4 Complement; Standing    4. ESR raised    - X-Ray Hand Complete Bilateral; Future        Follow up in about 1 year (around 2/10/2023).      Disclaimer: This note is prepared using voice recognition software and as such is likely to have errors and has not been proof read. Please contact me for questions.     30 minutes of total time spent on the encounter, which includes face to face time and non-face to face time preparing to see the patient (eg, review of tests), Obtaining and/or reviewing separately obtained history, Documenting clinical information in the electronic or other health record, Independently interpreting results (not separately reported) and communicating results to the  patient/family/caregiver, or Care coordination (not separately reported).     Irwin Murphy M.D.

## 2022-02-10 NOTE — PATIENT INSTRUCTIONS
"Patient Education       Antinuclear Antibodies   The Basics   Written by the doctors and editors at [facility]   What are antinuclear antibodies? -- Antinuclear antibodies, called "TYE" for short, are proteins in the blood. They are made by the body's infection-fighting system, which is also called the immune system.  Normally, a person's immune system makes antibodies when they gets an infection. The antibodies attack the germs causing the infection.  If a person's immune system isn't working normally, it sometimes makes antibodies that attack their own body. This is called an autoimmune disease. Antibodies that attack a person's own body are also called autoantibodies. TYE are specific autoantibodies that attack substances inside of cells.  Autoantibodies can damage different parts of the body, including the blood, skin, joints, kidneys, lungs, and nervous system.  What is an TYE test? -- An TYE test is a blood test that measures the amount of TYE in your blood.  A "negative" result means that you have no (or very little) TYE in your blood.  A "positive" result means that you have TYE in your blood.  Why might a doctor or nurse order an TYE test? -- A doctor or nurse will order an TYE test to help figure out if you have an autoimmune disease. Many types of autoimmune diseases can cause a positive TYE test result. Some autoimmune diseases affect the whole body. These include:  · Lupus  · Scleroderma  · Mixed connective tissue disease  · Polymyositis  · Dermatomyositis  · Rheumatoid arthritis  Other autoimmune diseases affect only 1 part of the body, such as the thyroid (a gland in the neck), liver, or lungs.  What does a negative TYE test result mean? -- If you have a negative result, you probably do not have an autoimmune disease.  What does a positive TYE test result mean? -- If you have a positive result, you might have an autoimmune disease.  It's important to know that a positive result does not always mean a " "person has an autoimmune disease. Some people with a positive TYE result do not have an autoimmune disease and never get one. The higher the number (called the "titer") of your TYE test, the greater the chance that you have an autoimmune disease.  Also, some people with a positive TYE result do not have an autoimmune disease, but have an infection instead.  To figure out whether you have an autoimmune disease, your doctor will also learn about your symptoms, do an exam, and order other lab tests. They might order blood tests that look for more specific autoantibodies. These can help your doctor figure out which type of autoimmune disease you might have.  All topics are updated as new evidence becomes available and our peer review process is complete.  This topic retrieved from [employer] on: Sep 21, 2021.  Topic 99942 Version 7.0  Release: 29.4.2 - C29.263  © 2021 [employer] and/or its affiliates. All rights reserved.  Consumer Information Use and Disclaimer   This information is not specific medical advice and does not replace information you receive from your health care provider. This is only a brief summary of general information. It does NOT include all information about conditions, illnesses, injuries, tests, procedures, treatments, therapies, discharge instructions or life-style choices that may apply to you. You must talk with your health care provider for complete information about your health and treatment options. This information should not be used to decide whether or not to accept your health care provider's advice, instructions or recommendations. Only your health care provider has the knowledge and training to provide advice that is right for you. The use of this information is governed by the [employer] End User License Agreement, available at https://www.paymio."[employer]/en/solutions/uberlife/about/lit.The use of [employer] content is governed by the [employer] Terms of Use. ©2021 [employer]. All " rights reserved.  Copyright   © 2021 [employer] and/or its affiliates. All rights reserved.
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
## 2022-02-17 ENCOUNTER — OFFICE VISIT (OUTPATIENT)
Dept: PSYCHIATRY | Facility: CLINIC | Age: 62
End: 2022-02-17
Payer: COMMERCIAL

## 2022-02-17 DIAGNOSIS — F41.1 GENERALIZED ANXIETY DISORDER WITH PANIC ATTACKS: Primary | ICD-10-CM

## 2022-02-17 DIAGNOSIS — F41.0 GENERALIZED ANXIETY DISORDER WITH PANIC ATTACKS: Primary | ICD-10-CM

## 2022-02-17 PROCEDURE — 90791 PSYCH DIAGNOSTIC EVALUATION: CPT | Mod: S$GLB,,, | Performed by: SOCIAL WORKER

## 2022-02-17 PROCEDURE — 90791 PR PSYCHIATRIC DIAGNOSTIC EVALUATION: ICD-10-PCS | Mod: S$GLB,,, | Performed by: SOCIAL WORKER

## 2022-02-22 ENCOUNTER — TELEPHONE (OUTPATIENT)
Dept: RHEUMATOLOGY | Facility: CLINIC | Age: 62
End: 2022-02-22
Payer: COMMERCIAL

## 2022-02-22 ENCOUNTER — OFFICE VISIT (OUTPATIENT)
Dept: CARDIOLOGY | Facility: CLINIC | Age: 62
End: 2022-02-22
Payer: COMMERCIAL

## 2022-02-22 VITALS
WEIGHT: 108 LBS | HEART RATE: 50 BPM | HEIGHT: 63 IN | DIASTOLIC BLOOD PRESSURE: 82 MMHG | OXYGEN SATURATION: 100 % | BODY MASS INDEX: 19.14 KG/M2 | SYSTOLIC BLOOD PRESSURE: 128 MMHG | RESPIRATION RATE: 16 BRPM

## 2022-02-22 DIAGNOSIS — R00.2 PALPITATIONS: ICD-10-CM

## 2022-02-22 DIAGNOSIS — R06.09 OTHER FORM OF DYSPNEA: ICD-10-CM

## 2022-02-22 DIAGNOSIS — R07.89 OTHER CHEST PAIN: ICD-10-CM

## 2022-02-22 DIAGNOSIS — R00.1 BRADYCARDIA: ICD-10-CM

## 2022-02-22 DIAGNOSIS — Z87.891 FORMER TOBACCO USE: ICD-10-CM

## 2022-02-22 DIAGNOSIS — I10 ESSENTIAL HYPERTENSION: Primary | ICD-10-CM

## 2022-02-22 DIAGNOSIS — K21.9 GASTROESOPHAGEAL REFLUX DISEASE, UNSPECIFIED WHETHER ESOPHAGITIS PRESENT: ICD-10-CM

## 2022-02-22 DIAGNOSIS — M19.90 INFLAMMATION OF JOINT: ICD-10-CM

## 2022-02-22 PROCEDURE — 3008F BODY MASS INDEX DOCD: CPT | Mod: CPTII,S$GLB,, | Performed by: INTERNAL MEDICINE

## 2022-02-22 PROCEDURE — 1159F PR MEDICATION LIST DOCUMENTED IN MEDICAL RECORD: ICD-10-PCS | Mod: CPTII,S$GLB,, | Performed by: INTERNAL MEDICINE

## 2022-02-22 PROCEDURE — 1160F RVW MEDS BY RX/DR IN RCRD: CPT | Mod: CPTII,S$GLB,, | Performed by: INTERNAL MEDICINE

## 2022-02-22 PROCEDURE — 1159F MED LIST DOCD IN RCRD: CPT | Mod: CPTII,S$GLB,, | Performed by: INTERNAL MEDICINE

## 2022-02-22 PROCEDURE — 3079F PR MOST RECENT DIASTOLIC BLOOD PRESSURE 80-89 MM HG: ICD-10-PCS | Mod: CPTII,S$GLB,, | Performed by: INTERNAL MEDICINE

## 2022-02-22 PROCEDURE — 99214 PR OFFICE/OUTPT VISIT, EST, LEVL IV, 30-39 MIN: ICD-10-PCS | Mod: S$GLB,,, | Performed by: INTERNAL MEDICINE

## 2022-02-22 PROCEDURE — 1160F PR REVIEW ALL MEDS BY PRESCRIBER/CLIN PHARMACIST DOCUMENTED: ICD-10-PCS | Mod: CPTII,S$GLB,, | Performed by: INTERNAL MEDICINE

## 2022-02-22 PROCEDURE — 3079F DIAST BP 80-89 MM HG: CPT | Mod: CPTII,S$GLB,, | Performed by: INTERNAL MEDICINE

## 2022-02-22 PROCEDURE — 99999 PR PBB SHADOW E&M-EST. PATIENT-LVL IV: ICD-10-PCS | Mod: PBBFAC,,, | Performed by: INTERNAL MEDICINE

## 2022-02-22 PROCEDURE — 99214 OFFICE O/P EST MOD 30 MIN: CPT | Mod: S$GLB,,, | Performed by: INTERNAL MEDICINE

## 2022-02-22 PROCEDURE — 3008F PR BODY MASS INDEX (BMI) DOCUMENTED: ICD-10-PCS | Mod: CPTII,S$GLB,, | Performed by: INTERNAL MEDICINE

## 2022-02-22 PROCEDURE — 99999 PR PBB SHADOW E&M-EST. PATIENT-LVL IV: CPT | Mod: PBBFAC,,, | Performed by: INTERNAL MEDICINE

## 2022-02-22 PROCEDURE — 3074F SYST BP LT 130 MM HG: CPT | Mod: CPTII,S$GLB,, | Performed by: INTERNAL MEDICINE

## 2022-02-22 PROCEDURE — 3074F PR MOST RECENT SYSTOLIC BLOOD PRESSURE < 130 MM HG: ICD-10-PCS | Mod: CPTII,S$GLB,, | Performed by: INTERNAL MEDICINE

## 2022-02-22 NOTE — PROGRESS NOTES
Subjective:   Patient ID:  Yvonne Schmidt is a 61 y.o. female who presents for cardiac consult of No chief complaint on file.      Chest Pain   Associated symptoms include dizziness and shortness of breath.   Shortness of Breath  Associated symptoms include chest pain.     The patient came in today for cardiac consult of No chief complaint on file.      Yvonne Schmidt is a 61 y.o. female pt with HTN, GERD, Anxiety, tobacco use presents for follow up CV eval.     9/11/19  Pt presented to the ER yesterday for CP. ECG with sinus julian V rate 49. Enzymes neg x 2, NTG caused worsening of CP.   She has been having intermittent chest pain for 2 weeks. Chest pain can lasts for a few sec, took BC which helped. She just started Nexium as well.   She does get lightheaded and feels like she may pass out.     10/17/19  ETT normal, Holter normal. Normal 2D ECHO. She still has some atypical CP and OMRRIS. Has a lot of anxiety will refer to psych.    12/23/19  She has been gaining weight, now > 101 lbs. She has mild MORRIS only with significant exertional. She still has occ panic attacks, saw psychologist. She has CP relief with PPI.     11/20/20 - ER follow up  She went to Emergency Department for chest pain, onset several days PTA. Pt presented to the ED yesterday for similar sxs, and was discharged after a negative workup. Symptoms are constant and moderate in severity. Pain is worse when taking deep breaths. No associated sxs reported. Patient denies any fever, chills, n/v/d, SOB, weakness, numbness, dizziness, headache, and all other sxs at this time.   Labwork neg Trop, ECG CXR neg, CT chest with L sided pleural effusion and possible right heart dysfunction.   She has been having constant chest pain for 6-7 days. She was doing a lot of painting and inhaled fumes. She had GI cocktail as well.     11/19/20  She had recent eval by Dr. Mayen undergoing further rheum workup for elevated imflamm markers. Sed rate and CRP  were elevated, started on colchicine and nsaids, she said colchicine was not covered by insurance. She also had elevated BP yesterday 160s/90s. ECHO normal BI V function, small pericardial effusion. Her pain is improved with NSAIDS.     12/17/20  Started Norvasc last visit,  Remains on HCTZ. BP well controlled today 13392. Pulm eval with tests pending - PFTs, pulm stress test. She still has MORRIS, chest pain has improved. Recent CXR neg for effusion.     9/23/21  She has been feeling more heart burn feeling, in center of chest not has hard as in past. She also has MORRIS. She had  Possible COVID in Dec 2020.   ECG - NSR, LAE    10/19/21  BP 140s/80s. HR 50s  CRP elevated - labs are abnormal with elevated inflammatory markers, start motrin 600 mg three times a day for 7 days, colchine 0.6mg twice a day for 14 days, follow up with me in 3 -4 weeks, referral placed for rheumatology as well.   Has improved somewhat but stil has pain.     2/22/22  BP stable, HR 50s. Overall feels stable but has more fatigue. She saw Dr. Murphy, no changes in meds had labs/Xray ordered.     Patient feels no leg swelling, no PND,  no syncope, no CNS symptoms.    Patient has fairly good exercise tolerance.    Patient is compliant with medications.    FH - mother - MI s/p stents    Results for orders placed during the hospital encounter of 09/27/21    Echo    Interpretation Summary  · The left ventricle is normal in size with concentric remodeling and normal systolic function.  · The estimated ejection fraction is 60%.  · Normal left ventricular diastolic function.  · Normal right ventricular size with normal right ventricular systolic function.  · Mild tricuspid regurgitation.  · Normal central venous pressure (3 mmHg).  · The estimated PA systolic pressure is 16 mmHg.      11/9/20 CT chest  Impression:    No evidence of pulmonary embolism.     Cardiomegaly.  Reflux into the IVC and hepatic veins suggest some component of right heart  dysfunction.  Consider cardiac echo     Small left-sided pleural effusion with left lower lobe atelectasis.          TEST DESCRIPTION   PREDOMINANT RHYTHM  1. Sinus rhythm with heart rates varying between 47 and 103 bpm with an average of 61 bpm.   VENTRICULAR ARRHYTHMIAS  1. There were no PVCs. There was no ventricular ectopic activity.  SUPRA VENTRICULAR ARRHYTHMIAS  1. There were very rare PACs totalling 59 and averaging 1 per hour.  There were 4 monomorphic couplets.  2. There were no episodes of sustained supraventricular tachycardia.    EKG Conclusions:    1. The EKG portion of this study is negative for ischemia at a moderate workload, and peak heart rate of 89 bpm (57% of predicted).   2. Exercise capacity is average.   3. Blood pressure response to exercise was normal (Presenting BP: 150/86 Peak BP: 144/91).   4. No significant arrhythmias were present.   5. There were no symptoms of chest discomfort or significant dyspnea throughout the protocol.   6. The Duke treadmill score was 6 suggesting a low probability for future cardiovascular events.    This document has been electronically    SIGNED BY: Pablo Hua MD On: 10/10/2019 12:59    Past Medical History:   Diagnosis Date    Anxiety     Bradycardia 9/19/2019    GERD (gastroesophageal reflux disease)     Thyroid disease        Past Surgical History:   Procedure Laterality Date    BREAST BIOPSY      DENTAL SURGERY         Social History     Tobacco Use    Smoking status: Former Smoker     Packs/day: 0.75     Years: 35.00     Pack years: 26.25     Types: Cigarettes     Start date: 1982     Quit date: 2019     Years since quitting: 3.1    Smokeless tobacco: Never Used    Tobacco comment: did not smoke for 2 years since beginning smoking.    Substance Use Topics    Alcohol use: Never    Drug use: Never       Family History   Problem Relation Age of Onset    Heart attack Mother     Hypertension Mother     Heart disease Father     Heart attack  Maternal Aunt     Heart attack Maternal Uncle        Patient's Medications   New Prescriptions    No medications on file   Previous Medications    ACETAMINOPHEN (TYLENOL) 500 MG TABLET    Take 500 mg by mouth as needed for Pain.    ALBUTEROL (PROVENTIL/VENTOLIN HFA) 90 MCG/ACTUATION INHALER    Inhale 2 puffs into the lungs every 4 (four) hours as needed for Wheezing or Shortness of Breath. Rescue    ALPRAZOLAM (XANAX) 0.5 MG TABLET    Take 1 tablet (0.5 mg total) by mouth 2 (two) times daily as needed (SIGNIFICANT ANXIETY). New Prescriber    BUDESONIDE 0.6 MG/NORMAL SALINE 50 ML NASAL IRRIGATION    Patient to irrigate each nostril with 25ml twice daily.    CALCIUM CARBONATE (TUMS) 300 MG (750 MG) CHEW    Take 750 mg by mouth.    FLUOXETINE 20 MG CAPSULE    Take 1 capsule (20 mg total) by mouth once daily. Take IN ADDITION to Prozac 40 mg supply    FLUOXETINE 40 MG CAPSULE    Take 1 capsule (40 mg total) by mouth once daily. Take IN ADDITION to Prozac 20 mg supply    HYDROCHLOROTHIAZIDE (HYDRODIURIL) 12.5 MG TAB    Take 1 tablet (12.5 mg total) by mouth once daily.    KETOROLAC (TORADOL) 10 MG TABLET    Take 1 tablet (10 mg total) by mouth every 6 (six) hours as needed for Pain.    LACTOBACILLUS RHAMNOSUS GG (CULTURELLE) 10 BILLION CELL CAPSULE    Take 1 capsule by mouth once daily.    LEVOTHYROXINE (SYNTHROID) 50 MCG TABLET    Take 50 mcg by mouth before breakfast.    MULTIVITAMIN (THERAGRAN) PER TABLET    Take 1 tablet by mouth once daily.    OMEPRAZOLE (PRILOSEC) 40 MG CAPSULE    Take 40 mg by mouth.    ONDANSETRON (ZOFRAN-ODT) 8 MG TBDL    Take 1 tablet (8 mg total) by mouth every 8 (eight) hours as needed (nausea).   Modified Medications    No medications on file   Discontinued Medications    No medications on file       Review of Systems   Constitutional: Negative.    HENT: Negative.    Eyes: Negative.    Respiratory: Positive for shortness of breath.    Cardiovascular: Positive for chest pain.    Gastrointestinal: Negative.    Genitourinary: Negative.    Musculoskeletal: Negative.    Skin: Negative.    Neurological: Positive for dizziness.   Endo/Heme/Allergies: Negative.    Psychiatric/Behavioral: The patient is nervous/anxious.    All 12 systems otherwise negative.      Wt Readings from Last 3 Encounters:   02/10/22 50.2 kg (110 lb 10.7 oz)   01/11/22 50.8 kg (111 lb 15.9 oz)   10/19/21 49.1 kg (108 lb 3.9 oz)     Temp Readings from Last 3 Encounters:   08/12/21 97.9 °F (36.6 °C) (Oral)   01/17/21 98.1 °F (36.7 °C)   11/09/20 97.9 °F (36.6 °C) (Oral)     BP Readings from Last 3 Encounters:   02/10/22 136/87   01/11/22 116/74   10/19/21 (!) 140/80     Pulse Readings from Last 3 Encounters:   02/10/22 (!) 55   01/11/22 (!) 59   10/19/21 (!) 52       There were no vitals taken for this visit.    Objective:   Physical Exam  Vitals and nursing note reviewed.   Constitutional:       General: She is not in acute distress.     Appearance: She is well-developed. She is not diaphoretic.   HENT:      Head: Normocephalic and atraumatic.      Nose: Nose normal.   Eyes:      General: No scleral icterus.     Conjunctiva/sclera: Conjunctivae normal.   Neck:      Thyroid: No thyromegaly.      Vascular: No JVD.   Cardiovascular:      Rate and Rhythm: Regular rhythm. Bradycardia present.      Heart sounds: S1 normal and S2 normal. No murmur heard.    No friction rub. No gallop. No S3 or S4 sounds.   Pulmonary:      Effort: Pulmonary effort is normal. No respiratory distress.      Breath sounds: Normal breath sounds. No stridor. No wheezing or rales.   Chest:      Chest wall: No tenderness.   Abdominal:      General: Bowel sounds are normal. There is no distension.      Palpations: Abdomen is soft. There is no mass.      Tenderness: There is no abdominal tenderness. There is no rebound.   Genitourinary:     Comments: Deferred  Musculoskeletal:         General: No tenderness or deformity. Normal range of motion.       Cervical back: Normal range of motion and neck supple.   Lymphadenopathy:      Cervical: No cervical adenopathy.   Skin:     General: Skin is warm and dry.      Coloration: Skin is not pale.      Findings: No erythema or rash.   Neurological:      Mental Status: She is alert and oriented to person, place, and time.      Motor: No abnormal muscle tone.      Coordination: Coordination normal.   Psychiatric:         Behavior: Behavior normal.         Thought Content: Thought content normal.         Judgment: Judgment normal.         Lab Results   Component Value Date     08/12/2021    K 3.7 08/12/2021     08/12/2021    CO2 28 08/12/2021    BUN 14 08/12/2021    CREATININE 1.1 08/12/2021     (H) 08/12/2021    MG 3.7 (H) 08/12/2021    AST 16 08/12/2021    ALT 12 08/12/2021    ALBUMIN 4.1 08/12/2021    PROT 7.7 08/12/2021    BILITOT 0.4 08/12/2021    WBC 9.84 08/12/2021    HGB 11.3 (L) 08/12/2021    HCT 35.2 (L) 08/12/2021    MCV 86 08/12/2021     08/12/2021    INR 1.1 09/10/2019    TSH 9.597 (H) 11/19/2020    BNP 35 11/08/2020     Assessment:      1. Essential hypertension    2. Other chest pain    3. Palpitations    4. Former tobacco use    5. Other form of dyspnea    6. Inflammation of joint    7. Gastroesophageal reflux disease, unspecified whether esophagitis present    8. Bradycardia        Plan:   1. Chest pain, recurrent sec to inflammation   - small pericardial effusion in past  - f/u rheum eval   - cont motrin PRN or liquid advil     2. GERD  - cont PPI  - f/u GI    3. Bradycardia with occ palpitations   - Holter - negative  -TSH, T4 - normal   - off BB    4. Tobacco use   - has quit smoking     5. Anxiety  - referred to psych    6. Pleural effusion  - f/u pulm     7. HTN - well controlled  - cont meds and titrate    8. Hypothyroidism   - cont meds per PCP    Thank you for allowing me to participate in this patient's care. Please do not hesitate to contact me with any questions or  concerns. Consult note has been forwarded to the referral physician.

## 2022-02-22 NOTE — TELEPHONE ENCOUNTER
----- Message from Tania Frank sent at 2/22/2022  2:35 PM CST -----  Patient is calling requesting a call back for the Dr she stated that she was told she needed to have a procedure but nothing has happen please call her back at [phone]  Thanks.ln

## 2022-02-23 ENCOUNTER — TELEPHONE (OUTPATIENT)
Dept: CARDIOLOGY | Facility: CLINIC | Age: 62
End: 2022-02-23
Payer: COMMERCIAL

## 2022-02-23 NOTE — TELEPHONE ENCOUNTER
Patient  Was contacted and was advised that the provider stated Yes she can continue HCTZ daily and monitor BP. Thanks     The patient stated understanding with no questions or concerns.      ----- Message from Pablo Hua MD sent at 2/23/2022  3:36 PM CST -----  Regarding: RE: Message taken yesterday  Yes she can continue HCTZ daily and monitor BP. Thanks    ----- Message -----  From: Elisabeth Corea MA  Sent: 2/23/2022   3:27 PM CST  To: Pablo Hua MD  Subject: FW: Message taken yesterday                      Please advise.    Patient  MRN# [id number]  was wondering if she should continue the fluid pill as directed? She stated she seen you for a visit and it slipped her mind.    Thanks    ----- Message -----  From: Sarah Adler MA  Sent: 2/23/2022   9:36 AM CST  To: Elisabeth Corea MA  Subject: Message taken yesterday                          Are you still working on this?  Or can I do it?     Thanks for your help yesterday!!    Bessy  ----- Message -----  From: Tania Frank  Sent: 2/22/2022   4:48 PM CST  To: Javid Shah Staff    Patient is calling in requesting call back she stated she need to know which meds to discontinue. Please call her at [phone]  Thanks.ln

## 2022-02-23 NOTE — TELEPHONE ENCOUNTER
Patient was contacted, she stated she wasn't certain if  she should continue the fluid pill as directed? She stated she seen you for a visit and it slipped her mind.     The patient was advised that there were no changes from the after visit summary print out. She just wanted to double check.    We advised her that a message would be sent to the provider for advice. The patient stated understanding with no questions or concerns.      ----- Message from Sarah Adler MA sent at 2/23/2022  9:34 AM CST -----  Regarding: Message taken yesterday  Are you still working on this?  Or can I do it?     Thanks for your help yesterday!!    Bessy  ----- Message -----  From: Tania Frank  Sent: 2/22/2022   4:48 PM CST  To: Javid Shah Staff    Patient is calling in requesting call back she stated she need to know which meds to discontinue. Please call her at [phone]  Thanks.ln

## 2022-03-09 ENCOUNTER — OFFICE VISIT (OUTPATIENT)
Dept: PSYCHIATRY | Facility: CLINIC | Age: 62
End: 2022-03-09
Payer: COMMERCIAL

## 2022-03-09 DIAGNOSIS — F41.1 GENERALIZED ANXIETY DISORDER WITH PANIC ATTACKS: Primary | ICD-10-CM

## 2022-03-09 DIAGNOSIS — F41.0 GENERALIZED ANXIETY DISORDER WITH PANIC ATTACKS: Primary | ICD-10-CM

## 2022-03-09 PROCEDURE — 90837 PR PSYCHOTHERAPY W/PATIENT, 60 MIN: ICD-10-PCS | Mod: S$GLB,,, | Performed by: SOCIAL WORKER

## 2022-03-09 PROCEDURE — 90837 PSYTX W PT 60 MINUTES: CPT | Mod: S$GLB,,, | Performed by: SOCIAL WORKER

## 2022-03-09 NOTE — PROGRESS NOTES
"Individual Psychotherapy Follow-up Visit Progress Note (PhD/LCSW)     Outpatient Psychotherapy - 60 minutes with patient (53 minutes or more) - [id number]    Date: 03/09/2022    Visit Type: In Person        3/9/2022  MRN: [id number]  Primary Care Provider: MINH Ryan KYE Schmidt is a 61 y.o. female who presents today for follow-up of anxiety. Met with patient.      Preferred Name: Yvonne     Subjective:       Content of Current Session: Pt reported, "I'm doing ok" upon entry to this session. LCSW utilized active listening/reflection to engage with pt and review experiences since their last session with this provider. Pt reported experiencing a slight increase in anxiety since her last session with this provider. Pt reported she had felt ostracized by her 's family due to a misunderstanding on social media six months ago between her 's daughter and herself. Pt reported her  has historically remained passive in his intervention with the family in regards to their ostracization. LCSW utilized cognitive processing and supportive therapy. LCSW discussed sharing her feelings/thoughts with her  via utilization of healthy communication skills. LCSW encouraged pt to limit external distractions and maintain comfortable eye contact when conveying her feelings/thoughts to her . LCSW educated the pt on the various manifestations of anxiety (fight, flight, freeze, parish). LCSW discussed mindfulness based cognitive therapy to reduce the acuity of her anxiety response. LCSW specifically reviewed meditation as a proactive method to reduce her anxiety due the pt's verbalized interest in further her daily Bible devotional/prayer time. LCSW also specifically discussed utilization of grounding as a method to triage her anxiety symptoms and reduce their acuity in the midst of a panic attack. LCSW briefly reviewed cognitve distortions and anxiety driven intrusive thoughts. LCSW provided " pt with worksheets reviewing mindfulness based cognitive therapy skills, cognitive distortions, and addressing anxiety driven intrusive thoughts. Pt responded appropriately to aforementioned interventions. Pt denied SI/HI/AVH.     Therapeutic Interventions Utilized During Current Session: Cognitive Processing Therapy, Cognitive Behavioral Therapy, Mindfulness-Based Cognitive Therapy, Interpersonal Psychotherapy, Supportive Therapy    GAD7 3/9/2022 2/17/2022   1. Feeling nervous, anxious, or on edge? 3 3   2. Not being able to stop or control worrying? 3 3   3. Worrying too much about different things? 3 3   4. Trouble relaxing? 2 2   5. Being so restless that it is hard to sit still? 1 3   6. Becoming easily annoyed or irritable? 3 3   7. Feeling afraid as if something awful might happen? 3 1   8. If you checked off any problems, how difficult have these problems made it for you to do your work, take care of things at home, or get along with other people? 2 2   MILTON-7 Score 18 18      0-4 = Minimal anxiety  5-9 = Mild anxiety  10-14 = Moderate anxiety  15-21 = Severe anxiety     PHQ-9 Depression Patient Health Questionnaire 3/9/2022 2/17/2022   Patient agreed to terms: Yes Yes   Little interest or pleasure in doing things 2 1   Feeling down, depressed, or hopeless 3 2   Trouble falling or staying asleep, or sleeping too much 3 3   Feeling tired or having little energy 3 1   Poor appetite or overeating 1 1   Feeling bad about yourself - or that you are a failure or have let yourself or your family down 2 3   Trouble concentrating on things, such as reading the newspaper or watching television 3 3   Moving or speaking so slowly that other people could have noticed. Or the opposite - being so fidgety or restless that you have been moving around a lot more than usual 2 0   Thoughts that you would be better off dead, or of hurting yourself in some way 1 1   PHQ-9 Total Score 20 15   If you checked off any problems, how  difficult have these problems made it for you to do your work, take care of things at home, or get along with other people? Very difficult Very difficult   Interpretation Severe Moderately Severe     0-4 = No intervention  5 to 9 = Mild  10 to 14 = Moderate  15 to 19 = Moderately severe  ?20 = Severe      Objective:       Mental Status Evaluation  Appearance: unremarkable, age appropriate  Behavior: normal, cooperative  Speech: normal tone, normal rate, normal pitch, normal volume  Mood: anxious  Affect: congruent and appropriate  Thought Process: normal and logical  Thought Content: normal, no suicidality, no homicidality, delusions, or paranoia  Sensorium: grossly intact  Cognition: grossly intact  Insight: intact  Judgment: adequate to circumstances    Risk parameters:  Patient reports no suicidal ideation  Patient reports no homicidal ideation  Patient reports no self-injurious behavior  Patient reports no violent behavior      Assessment & Plan:     The patient's response to the interventions is accepting    The patient's progress toward treatment goals is fair     Homework assigned: none     Treatment plan:   A. Target symptoms: Anxiety   B. Therapeutic modalities: insight oriented psychotherapy, behavior modifying psychotherapy, supportive psychotherapy  C. Why chosen therapy is appropriate versus another modality: relevant to diagnosis, patient responds to this modality, evidence based practice   D. Outcome monitoring methods: self report, observation, rating scales, feedback from clinical staff      Visit Diagnosis:   1. Generalized anxiety disorder with panic attacks        Follow-up: individual psychotherapy    Return to Clinic: 2 weeks, 3 weeks  Pt Reported to Schedule via Epic EMR MyChart Application and/or Department Phoneline          Pennie Rajput LCSW  03/09/2022   2:00 PM

## 2022-03-23 ENCOUNTER — OFFICE VISIT (OUTPATIENT)
Dept: PSYCHIATRY | Facility: CLINIC | Age: 62
End: 2022-03-23
Payer: COMMERCIAL

## 2022-03-23 DIAGNOSIS — F41.1 GENERALIZED ANXIETY DISORDER WITH PANIC ATTACKS: Primary | ICD-10-CM

## 2022-03-23 DIAGNOSIS — F41.0 GENERALIZED ANXIETY DISORDER WITH PANIC ATTACKS: Primary | ICD-10-CM

## 2022-03-23 PROCEDURE — 90837 PSYTX W PT 60 MINUTES: CPT | Mod: S$GLB,,, | Performed by: SOCIAL WORKER

## 2022-03-23 PROCEDURE — 90837 PR PSYCHOTHERAPY W/PATIENT, 60 MIN: ICD-10-PCS | Mod: S$GLB,,, | Performed by: SOCIAL WORKER

## 2022-03-23 PROCEDURE — 99999 PR PBB SHADOW E&M-EST. PATIENT-LVL I: CPT | Mod: PBBFAC,,, | Performed by: SOCIAL WORKER

## 2022-03-23 PROCEDURE — 99999 PR PBB SHADOW E&M-EST. PATIENT-LVL I: ICD-10-PCS | Mod: PBBFAC,,, | Performed by: SOCIAL WORKER

## 2022-03-23 NOTE — PROGRESS NOTES
"Individual Psychotherapy Follow-up Visit Progress Note (PhD/LCSW)     Outpatient Psychotherapy - 60 minutes with patient (53 minutes or more) - [id number]    Date: 03/23/2022    Visit Type: In Person        3/23/2022  MRN: [id number]  Primary Care Provider: MINH Ryan    Yvonne KYE Schmidt is a 61 y.o. female who presents today for follow-up of anxiety. Met with patient.      Preferred Name: Yvonne     Subjective:       Content of Current Session: Pt reported, "I'm the same" upon entry to this session. LCSW utilized active listening/reflection to engage with pt and review experiences since their last session with this provider. Pt reported she continued to experience unprovoked panic attacks since her last session with this provider. Pt reported her sleep also remained erratic and of poor quality. LCSW utilzied cognitive processing and supportive therapy. LCSW utilized problem solving with pt in session to cite probable causes/triggers of her poor sleep and panic attacks; however, no cause was noted. LCSW discussed with pt ruling out medical causes via neurological consult. Pt reported she experienced dysphagia in 2018 which required a neurological consult and subsequent speech therapy with stimulation. Per [facility] EMR, pt met with Dr. Eitan Narayanan wherein there was no notable cause of the dysphagia. LCSW encouraged pt to consult with Dr. Narayanan to further rule out neurological etiology of her sporadic panic and poor sleep. LCSW provided pt with referral. LCSW discussed pt's utilization of mindfulness skills reviewed in the last session with this provider. Pt reported she attempted aromatherapy grounding; however, noted no change in the acuity of her anxiety. LCSW discussed utilization of temperature (heat/cold) and compression (wrapping in blankets, hugs, etc) in reduction of her anxiety response. LCSW discussed the importance of continual practice of her coping skills to create positive change in her " psychological response. Pt reported she had also been struggling with intermittent intrusive memories of her childhood. LCSW and pt processed a few aspects of her childhood in session. LCSW discussed journalling as a coping skill to reduce the impact of her intrusive thoughts. LCSW encouraged the pt to journal and bring the notes in for review in her upcoming session. Pt responded appropriately to aforementioned interventions. Pt denied SI/HI/AVH.     Therapeutic Interventions Utilized During Current Session: Cognitive Processing Therapy, Cognitive Behavioral Therapy, Mindfulness-Based Cognitive Therapy, Supportive Therapy    GAD7 3/9/2022 2/17/2022   1. Feeling nervous, anxious, or on edge? 3 3   2. Not being able to stop or control worrying? 3 3   3. Worrying too much about different things? 3 3   4. Trouble relaxing? 2 2   5. Being so restless that it is hard to sit still? 1 3   6. Becoming easily annoyed or irritable? 3 3   7. Feeling afraid as if something awful might happen? 3 1   8. If you checked off any problems, how difficult have these problems made it for you to do your work, take care of things at home, or get along with other people? 2 2   MILTON-7 Score 18 18      0-4 = Minimal anxiety  5-9 = Mild anxiety  10-14 = Moderate anxiety  15-21 = Severe anxiety     PHQ-9 Depression Patient Health Questionnaire 3/9/2022 2/17/2022   Patient agreed to terms: Yes Yes   Little interest or pleasure in doing things 2 1   Feeling down, depressed, or hopeless 3 2   Trouble falling or staying asleep, or sleeping too much 3 3   Feeling tired or having little energy 3 1   Poor appetite or overeating 1 1   Feeling bad about yourself - or that you are a failure or have let yourself or your family down 2 3   Trouble concentrating on things, such as reading the newspaper or watching television 3 3   Moving or speaking so slowly that other people could have noticed. Or the opposite - being so fidgety or restless that you have  been moving around a lot more than usual 2 0   Thoughts that you would be better off dead, or of hurting yourself in some way 1 1   PHQ-9 Total Score 20 15   If you checked off any problems, how difficult have these problems made it for you to do your work, take care of things at home, or get along with other people? Very difficult Very difficult   Interpretation Severe Moderately Severe     0-4 = No intervention  5 to 9 = Mild  10 to 14 = Moderate  15 to 19 = Moderately severe  ?20 = Severe      Objective:       Mental Status Evaluation  Appearance: unremarkable, age appropriate  Behavior: cooperative, restless and fidgety  Speech: normal tone, normal rate, normal pitch, normal volume  Mood: anxious  Affect: congruent and appropriate  Thought Process: normal and logical  Thought Content: normal, no suicidality, no homicidality, delusions, or paranoia  Sensorium: grossly intact  Cognition: grossly intact  Insight: intact  Judgment: adequate to circumstances    Risk parameters:  Patient reports no suicidal ideation  Patient reports no homicidal ideation  Patient reports no self-injurious behavior  Patient reports no violent behavior      Assessment & Plan:     The patient's response to the interventions is accepting    The patient's progress toward treatment goals is fair     Homework assigned: none     Treatment plan:   A. Target symptoms: Anxiety   B. Therapeutic modalities: insight oriented psychotherapy, behavior modifying psychotherapy, supportive psychotherapy  C. Why chosen therapy is appropriate versus another modality: relevant to diagnosis, patient responds to this modality, evidence based practice   D. Outcome monitoring methods: self report, observation, rating scales, feedback from clinical staff      Visit Diagnosis:   1. Generalized anxiety disorder with panic attacks        Follow-up: individual psychotherapy    Return to Clinic: 2 weeks  Pt Reported to Schedule via Epic EMR MyChart Application and/or  Department Phoneline          Pennie Rajput, LCSW  03/23/2022   1:00 PM

## 2022-04-01 ENCOUNTER — TELEPHONE (OUTPATIENT)
Dept: PULMONOLOGY | Facility: CLINIC | Age: 62
End: 2022-04-01
Payer: COMMERCIAL

## 2022-04-06 ENCOUNTER — OFFICE VISIT (OUTPATIENT)
Dept: PSYCHIATRY | Facility: CLINIC | Age: 62
End: 2022-04-06
Payer: COMMERCIAL

## 2022-04-06 DIAGNOSIS — F41.0 GENERALIZED ANXIETY DISORDER WITH PANIC ATTACKS: Primary | ICD-10-CM

## 2022-04-06 DIAGNOSIS — F41.1 GENERALIZED ANXIETY DISORDER WITH PANIC ATTACKS: Primary | ICD-10-CM

## 2022-04-06 PROCEDURE — 90832 PSYTX W PT 30 MINUTES: CPT | Mod: S$GLB,,, | Performed by: SOCIAL WORKER

## 2022-04-06 PROCEDURE — 90832 PR PSYCHOTHERAPY W/PATIENT, 30 MIN: ICD-10-PCS | Mod: S$GLB,,, | Performed by: SOCIAL WORKER

## 2022-04-06 NOTE — PROGRESS NOTES
"Individual Psychotherapy Follow-up Visit Progress Note (PhD/LCSW)     Outpatient Psychotherapy - 30 minutes with patient (16-37 minutes) - [id number]    Date: 04/06/2022    Visit Type: In Person        4/6/2022  MRN: [id number]  Primary Care Provider: MINH Ryan    Yvonne KYE Schmidt is a 61 y.o. female who presents today for follow-up of anxiety. Met with patient.      Preferred Name: Yvonne     Subjective:       Content of Current Session: Pt reported, "I'm the same" upon entry to this session. LCSW utilized active listening/reflection to engage with pt and review experiences since their last session with this provider. Pt reported she had experienced no change in her anxiety since her last session with this provider. LCSW utilized supportive therapy. Pt reported she was struggling with a vertigo over the last 2 weeks due to increased sinus pressure and swelling. Pt reported her face was swelling every morning due to the pressure which preceded her experiencing a mild panic attack. Pt reported she had been in contact with an ENT to address her sinus issues and was scheduled to have a sinus CAT scan tomorrow to identify/treat the issue. Pt reported she had utilized her coping skill of temperature change and compression as reviewed in her last session with this provider. Pt reported "I wrap up in a big housecoat and put a warm heating pad on my chest and it helps with the panic." LCSW praised her efforts and encouraged their continued utilization for further mental health success. Pt reported she had also not received a contact from Dr. Eitan Narayanan secondary to the to referral placed by this provider last session. LCSW provided pt with the address and phone numbers for Dr. Narayanan's office for follow up at her leisure. Pt reported her psychiatric experience since her last session with otherwise unremarkable and requested to end session early due to her sinus pressure/pain. Pt responded appropriately to " aforementioned interventions. Pt denied SI/HI/AVH.     Therapeutic Interventions Utilized During Current Session: Supportive Therapy    GAD7 3/9/2022 2/17/2022   1. Feeling nervous, anxious, or on edge? 3 3   2. Not being able to stop or control worrying? 3 3   3. Worrying too much about different things? 3 3   4. Trouble relaxing? 2 2   5. Being so restless that it is hard to sit still? 1 3   6. Becoming easily annoyed or irritable? 3 3   7. Feeling afraid as if something awful might happen? 3 1   8. If you checked off any problems, how difficult have these problems made it for you to do your work, take care of things at home, or get along with other people? 2 2   MILTON-7 Score 18 18      0-4 = Minimal anxiety  5-9 = Mild anxiety  10-14 = Moderate anxiety  15-21 = Severe anxiety     PHQ-9 Depression Patient Health Questionnaire 3/9/2022 2/17/2022   Patient agreed to terms: Yes Yes   Little interest or pleasure in doing things 2 1   Feeling down, depressed, or hopeless 3 2   Trouble falling or staying asleep, or sleeping too much 3 3   Feeling tired or having little energy 3 1   Poor appetite or overeating 1 1   Feeling bad about yourself - or that you are a failure or have let yourself or your family down 2 3   Trouble concentrating on things, such as reading the newspaper or watching television 3 3   Moving or speaking so slowly that other people could have noticed. Or the opposite - being so fidgety or restless that you have been moving around a lot more than usual 2 0   Thoughts that you would be better off dead, or of hurting yourself in some way 1 1   PHQ-9 Total Score 20 15   If you checked off any problems, how difficult have these problems made it for you to do your work, take care of things at home, or get along with other people? Very difficult Very difficult   Interpretation Severe Moderately Severe     0-4 = No intervention  5 to 9 = Mild  10 to 14 = Moderate  15 to 19 = Moderately severe  ?20 =  Severe      Objective:       Mental Status Evaluation  Appearance: unremarkable, age appropriate  Behavior: friendly and cooperative, restless and fidgety  Speech: normal tone, normal rate, normal pitch, normal volume  Mood: anxious  Affect: congruent and appropriate  Thought Process: normal and logical, poverty of thought  Thought Content: normal, no suicidality, no homicidality, delusions, or paranoia  Sensorium: grossly intact  Cognition: grossly intact  Insight: intact  Judgment: adequate to circumstances    Risk parameters:  Patient reports no suicidal ideation  Patient reports no homicidal ideation  Patient reports no self-injurious behavior  Patient reports no violent behavior      Assessment & Plan:     The patient's response to the interventions is accepting    The patient's progress toward treatment goals is fair     Homework assigned: none     Treatment plan:   A. Target symptoms: Anxiety   B. Therapeutic modalities: insight oriented psychotherapy, behavior modifying psychotherapy, supportive psychotherapy  C. Why chosen therapy is appropriate versus another modality: relevant to diagnosis, patient responds to this modality, evidence based practice   D. Outcome monitoring methods: self report, observation, rating scales, feedback from clinical staff      Visit Diagnosis:   1. Generalized anxiety disorder with panic attacks        Follow-up: individual psychotherapy    Return to Clinic: 2 weeks, 3 weeks            Pennie Rajput, LORRAINE  04/06/2022   2:00 PM

## 2022-04-13 ENCOUNTER — OFFICE VISIT (OUTPATIENT)
Dept: PSYCHIATRY | Facility: CLINIC | Age: 62
End: 2022-04-13
Payer: COMMERCIAL

## 2022-04-13 DIAGNOSIS — Z87.891 FORMER TOBACCO USE: ICD-10-CM

## 2022-04-13 DIAGNOSIS — F33.1 DEPRESSION, MAJOR, RECURRENT, MODERATE: Primary | ICD-10-CM

## 2022-04-13 DIAGNOSIS — R00.1 BRADYCARDIA: ICD-10-CM

## 2022-04-13 DIAGNOSIS — F41.0 PANIC ATTACKS: ICD-10-CM

## 2022-04-13 DIAGNOSIS — J32.9 SINUSITIS, UNSPECIFIED CHRONICITY, UNSPECIFIED LOCATION: ICD-10-CM

## 2022-04-13 PROCEDURE — 99999 PR PBB SHADOW E&M-EST. PATIENT-LVL I: ICD-10-PCS | Mod: PBBFAC,,, | Performed by: PSYCHIATRY & NEUROLOGY

## 2022-04-13 PROCEDURE — 99214 OFFICE O/P EST MOD 30 MIN: CPT | Mod: S$GLB,,, | Performed by: PSYCHIATRY & NEUROLOGY

## 2022-04-13 PROCEDURE — 99214 PR OFFICE/OUTPT VISIT, EST, LEVL IV, 30-39 MIN: ICD-10-PCS | Mod: S$GLB,,, | Performed by: PSYCHIATRY & NEUROLOGY

## 2022-04-13 PROCEDURE — 99999 PR PBB SHADOW E&M-EST. PATIENT-LVL I: CPT | Mod: PBBFAC,,, | Performed by: PSYCHIATRY & NEUROLOGY

## 2022-04-13 RX ORDER — FLUOXETINE HYDROCHLORIDE 40 MG/1
40 CAPSULE ORAL DAILY
Qty: 30 CAPSULE | Refills: 2 | Status: SHIPPED | OUTPATIENT
Start: 2022-04-13 | End: 2022-07-06 | Stop reason: SDUPTHER

## 2022-04-13 RX ORDER — ALPRAZOLAM 0.5 MG/1
0.5 TABLET ORAL 2 TIMES DAILY PRN
Qty: 60 TABLET | Refills: 2 | Status: SHIPPED | OUTPATIENT
Start: 2022-04-13 | End: 2022-07-06 | Stop reason: SDUPTHER

## 2022-04-13 RX ORDER — FLUOXETINE HYDROCHLORIDE 20 MG/1
20 CAPSULE ORAL DAILY
Qty: 30 CAPSULE | Refills: 2 | Status: SHIPPED | OUTPATIENT
Start: 2022-04-13 | End: 2022-07-06 | Stop reason: SDUPTHER

## 2022-04-13 NOTE — PATIENT INSTRUCTIONS
Assessment:   Encounter Diagnosis   Name Primary?    Panic attacks        PLAN     Follow up      D Post MD July 6 2022 10 am in clinic [facility]     And      4/20/2022  2:00 PM ESTABLISHED PATIENT [facility] - Psychiatry Pennie Rajput LCSW   Location Instructions:    [place] - [address]     Meds: see after visit summary     References:      Relaxation stress reduction workbook: JUICE Mendez PhD ( used: $7-10)     Feeling Good Website: Aime Pablo MD / [email or web address] ([email or web address]) [email or web address]     Anxiety &  phobia workbook by ROSELINE Mariscal PhD  (web retailers: used: $ 7-10)     [place]: Path to Better Sleep : https://www.veterantraining.[place].gov/insomnia/ (free)    [place] Quit with Us [place]: http://www.quitwithusla.org    (and other resources as per counselor, if applicable)  Pt expressed appreciation for the visit today and did not have further question at this time though pt  was still informed to:     Call / Walk In if problems.    Call Report Side Effects to Psyc MD     Encouraged to follow up with primary care / Gen Med MD for continued monitoring of general health and wellness.    Call 911  Or go to ER if Acute Concerns (especially if any thoughts of harm to self or other).     Understanding was expressed; and no further concerns nor questions were raised at this time.

## 2022-04-20 ENCOUNTER — OFFICE VISIT (OUTPATIENT)
Dept: PSYCHIATRY | Facility: CLINIC | Age: 62
End: 2022-04-20
Payer: COMMERCIAL

## 2022-04-20 DIAGNOSIS — F41.1 GENERALIZED ANXIETY DISORDER WITH PANIC ATTACKS: Primary | ICD-10-CM

## 2022-04-20 DIAGNOSIS — F41.0 GENERALIZED ANXIETY DISORDER WITH PANIC ATTACKS: Primary | ICD-10-CM

## 2022-04-20 PROCEDURE — 90834 PR PSYCHOTHERAPY W/PATIENT, 45 MIN: ICD-10-PCS | Mod: S$GLB,,, | Performed by: SOCIAL WORKER

## 2022-04-20 PROCEDURE — 90834 PSYTX W PT 45 MINUTES: CPT | Mod: S$GLB,,, | Performed by: SOCIAL WORKER

## 2022-04-20 NOTE — PROGRESS NOTES
"Individual Psychotherapy Follow-up Visit Progress Note (PhD/LCSW)     Outpatient Psychotherapy - 45 minutes with patient (38-52 minutes) - [id number]    Date: 04/20/2022    Visit Type: In Person        4/20/2022  MRN: [id number]  Primary Care Provider: MINH Ryan    Yvonne KYE Schmidt is a 61 y.o. female who presents today for follow-up of anxiety. Met with patient.      Preferred Name: Yvonne     Subjective:       Content of Current Session: Pt reported, "I'm doing ok" upon entry to this session. LCSW utilized active listening/reflection to engage with pt and review experiences since their last session with this provider. Pt reported she had been taking more daytime naps and noted "I feel like I sleep better during the day than at night." LCSW discussed the impact of daytime naps on sleep quality at night. LCSW discussed the impact of poor or broken sleep on increasing anxiety. LCSW discussed healthy sleep hygiene practices (ie, ceasing liquids at least one hour before bedtime). LCSW discussed sleep training and its impact on her sleep quantity/quality. LCSW encouraged pt to monitor her sleep patterns for review in her next session. Pt reported she had been experiencing breakthrough anxiety when she was shopping in a store alone. Pt noted "I used to go with my  to shop a lot but I don't really do that anymore because of his work schedule." LCSW discussed utilizing small objectives to change her anxiety response relative to shopping (ie, limiting her time in the store, streamlining her shopping with a list). LCSW encouraged pt to utilize her grounding skills to support herself while shopping (ie, carrying a fan to cool herself, dressing in comfortable clothes). LCSW encouraged pt to continue her utilization of her coping skills and medication compliance to further her positive mental health progress. Pt responded appropriately to aforementioned interventions. Pt denied SI/HI/AVH.     Therapeutic " Interventions Utilized During Current Session: Cognitive Processing Therapy, Mindfulness-Based Cognitive Therapy, Solution Focused Therapy, Supportive Therapy    GAD7 3/9/2022 2/17/2022   1. Feeling nervous, anxious, or on edge? 3 3   2. Not being able to stop or control worrying? 3 3   3. Worrying too much about different things? 3 3   4. Trouble relaxing? 2 2   5. Being so restless that it is hard to sit still? 1 3   6. Becoming easily annoyed or irritable? 3 3   7. Feeling afraid as if something awful might happen? 3 1   8. If you checked off any problems, how difficult have these problems made it for you to do your work, take care of things at home, or get along with other people? 2 2   MILTON-7 Score 18 18      0-4 = Minimal anxiety  5-9 = Mild anxiety  10-14 = Moderate anxiety  15-21 = Severe anxiety     PHQ-9 Depression Patient Health Questionnaire 3/9/2022 2/17/2022   Patient agreed to terms: Yes Yes   Little interest or pleasure in doing things 2 1   Feeling down, depressed, or hopeless 3 2   Trouble falling or staying asleep, or sleeping too much 3 3   Feeling tired or having little energy 3 1   Poor appetite or overeating 1 1   Feeling bad about yourself - or that you are a failure or have let yourself or your family down 2 3   Trouble concentrating on things, such as reading the newspaper or watching television 3 3   Moving or speaking so slowly that other people could have noticed. Or the opposite - being so fidgety or restless that you have been moving around a lot more than usual 2 0   Thoughts that you would be better off dead, or of hurting yourself in some way 1 1   PHQ-9 Total Score 20 15   If you checked off any problems, how difficult have these problems made it for you to do your work, take care of things at home, or get along with other people? Very difficult Very difficult   Interpretation Severe Moderately Severe     0-4 = No intervention  5 to 9 = Mild  10 to 14 = Moderate  15 to 19 =  Moderately severe  ?20 = Severe      Objective:       Mental Status Evaluation  Appearance: unremarkable, age appropriate  Behavior: normal, cooperative  Speech: normal tone, normal rate, normal pitch, normal volume  Mood: euthymic  Affect: congruent and appropriate  Thought Process: normal and logical  Thought Content: normal, no suicidality, no homicidality, delusions, or paranoia  Sensorium: grossly intact  Cognition: grossly intact  Insight: intact  Judgment: adequate to circumstances    Risk parameters:  Patient reports no suicidal ideation  Patient reports no homicidal ideation  Patient reports no self-injurious behavior  Patient reports no violent behavior      Assessment & Plan:     The patient's response to the interventions is accepting    The patient's progress toward treatment goals is fair     Homework assigned: none     Treatment plan:   A. Target symptoms: Anxiety   B. Therapeutic modalities: insight oriented psychotherapy, behavior modifying psychotherapy, supportive psychotherapy  C. Why chosen therapy is appropriate versus another modality: relevant to diagnosis, patient responds to this modality, evidence based practice   D. Outcome monitoring methods: self report, observation, rating scales, feedback from clinical staff      Visit Diagnosis:   1. Generalized anxiety disorder with panic attacks        Follow-up: individual psychotherapy    Return to Clinic: 2 weeks, 3 weeks            Pennie Rajput LCSW  04/20/2022   2:00 PM

## 2022-04-27 ENCOUNTER — TELEPHONE (OUTPATIENT)
Dept: PSYCHIATRY | Facility: CLINIC | Age: 62
End: 2022-04-27
Payer: COMMERCIAL

## 2022-05-04 ENCOUNTER — OFFICE VISIT (OUTPATIENT)
Dept: PSYCHIATRY | Facility: CLINIC | Age: 62
End: 2022-05-04
Payer: COMMERCIAL

## 2022-05-04 DIAGNOSIS — F41.1 GENERALIZED ANXIETY DISORDER WITH PANIC ATTACKS: Primary | ICD-10-CM

## 2022-05-04 DIAGNOSIS — F41.0 GENERALIZED ANXIETY DISORDER WITH PANIC ATTACKS: Primary | ICD-10-CM

## 2022-05-04 DIAGNOSIS — F33.1 DEPRESSION, MAJOR, RECURRENT, MODERATE: ICD-10-CM

## 2022-05-04 PROCEDURE — 90834 PSYTX W PT 45 MINUTES: CPT | Mod: S$GLB,,, | Performed by: SOCIAL WORKER

## 2022-05-04 PROCEDURE — 90834 PR PSYCHOTHERAPY W/PATIENT, 45 MIN: ICD-10-PCS | Mod: S$GLB,,, | Performed by: SOCIAL WORKER

## 2022-05-04 NOTE — PROGRESS NOTES
"Individual Psychotherapy Follow-up Visit Progress Note (PhD/LCSW)     Outpatient Psychotherapy - 45 minutes with patient (38-52 minutes) - [id number]    Date: 05/04/2022    Visit Type: In Person        5/4/2022  MRN: [id number]  Primary Care Provider: MINH Ryan    Yvonne KYE Schmidt is a 61 y.o. female who presents today for follow-up of depression and anxiety. Met with patient.      Preferred Name: Yvonne     Subjective:       Content of Current Session: Pt reported, "I'm the same" upon entry to this session. LCSW utilized active listening/reflection to engage with pt and review experiences since their last session with this provider. Pt reported she had been working on her ability to go to the store without experiencing a panic attack. Pt reported she was concerned about the efficacy of her Prozac via "I've been taking the Prozac 60mg everyday around the same time and I haven't seen a difference in my panic attacks or anxiety." Pt requested this provider notify her psychiatrist as appropriate. LCSW utilized cognitive processing and supportive therapy. Pt reported she had been going to the store more frequently for shorter durations, with a list, to smaller stores, and only as necessary throughout the week; however, she reported no change in her panic episodes. LCSW praised pt's resilience and compliance with her treatment plan. LCSW discussed application of mindfulness skills (ie, grounding, body scanning/awareness, and meditation) prior and throughout the shopping experience. LCSW discussed utilization of her support system (ie, mother, sisters, etc) via phone call during her shopping trips to reduce her anxiety response. Pt reported, "now that I think of it, I really get anxious when my sinus pressure is bad and makes my vision blurry before I go into the store." LCSW discussed application of body scanning prior to entry of the store to proactively address her anxiety response. LCSW discussed " utilization of positive self-talk during her shopping experience. LCSW discussed utilizing reality orientation and Socratic Questioning to provide her with objectivity and reduction of her anxiety response. LCSW discussed utilization of strengths based perspective to proactively and reactively address her anxiety response through increased empowerment. LCSW and pt practiced aforementioned skills in session. Pt responded appropriately to aforementioned interventions. Pt denied SI/HI/AVH.     Therapeutic Interventions Utilized During Current Session: Cognitive Processing Therapy, Cognitive Behavioral Therapy, Mindfulness-Based Cognitive Therapy, Reality Therapy, Strength-Based Therapy, Supportive Therapy    GAD7 3/9/2022 2/17/2022   1. Feeling nervous, anxious, or on edge? 3 3   2. Not being able to stop or control worrying? 3 3   3. Worrying too much about different things? 3 3   4. Trouble relaxing? 2 2   5. Being so restless that it is hard to sit still? 1 3   6. Becoming easily annoyed or irritable? 3 3   7. Feeling afraid as if something awful might happen? 3 1   8. If you checked off any problems, how difficult have these problems made it for you to do your work, take care of things at home, or get along with other people? 2 2   MILTON-7 Score 18 18      0-4 = Minimal anxiety  5-9 = Mild anxiety  10-14 = Moderate anxiety  15-21 = Severe anxiety     PHQ-9 Depression Patient Health Questionnaire 3/9/2022 2/17/2022   Patient agreed to terms: Yes Yes   Little interest or pleasure in doing things 2 1   Feeling down, depressed, or hopeless 3 2   Trouble falling or staying asleep, or sleeping too much 3 3   Feeling tired or having little energy 3 1   Poor appetite or overeating 1 1   Feeling bad about yourself - or that you are a failure or have let yourself or your family down 2 3   Trouble concentrating on things, such as reading the newspaper or watching television 3 3   Moving or speaking so slowly that other people  could have noticed. Or the opposite - being so fidgety or restless that you have been moving around a lot more than usual 2 0   Thoughts that you would be better off dead, or of hurting yourself in some way 1 1   PHQ-9 Total Score 20 15   If you checked off any problems, how difficult have these problems made it for you to do your work, take care of things at home, or get along with other people? Very difficult Very difficult   Interpretation Severe Moderately Severe     0-4 = No intervention  5 to 9 = Mild  10 to 14 = Moderate  15 to 19 = Moderately severe  ?20 = Severe      Objective:       Mental Status Evaluation  Appearance: unremarkable, age appropriate  Behavior: normal, cooperative  Speech: normal tone, normal rate, normal pitch, normal volume  Mood: euthymic  Affect: congruent and appropriate  Thought Process: normal and logical  Thought Content: normal, no suicidality, no homicidality, delusions, or paranoia  Sensorium: grossly intact  Cognition: grossly intact  Insight: intact  Judgment: adequate to circumstances    Risk parameters:  Patient reports no suicidal ideation  Patient reports no homicidal ideation  Patient reports no self-injurious behavior  Patient reports no violent behavior      Assessment & Plan:     The patient's response to the interventions is accepting    The patient's progress toward treatment goals is fair     Homework assigned: none     Treatment plan:   A. Target symptoms: Depression and Anxiety   B. Therapeutic modalities: insight oriented psychotherapy, behavior modifying psychotherapy, supportive psychotherapy  C. Why chosen therapy is appropriate versus another modality: relevant to diagnosis, patient responds to this modality, evidence based practice   D. Outcome monitoring methods: self report, observation, rating scales, feedback from clinical staff      Visit Diagnosis:   1. Generalized anxiety disorder with panic attacks    2. Depression, major, recurrent, moderate         Follow-up: individual psychotherapy    Return to Clinic: 3 weeks            Pennie Rajput, LCSW  05/04/2022   1:00 PM

## 2022-05-18 ENCOUNTER — OFFICE VISIT (OUTPATIENT)
Dept: PSYCHIATRY | Facility: CLINIC | Age: 62
End: 2022-05-18
Payer: COMMERCIAL

## 2022-05-18 DIAGNOSIS — F33.1 DEPRESSION, MAJOR, RECURRENT, MODERATE: ICD-10-CM

## 2022-05-18 DIAGNOSIS — F41.1 GENERALIZED ANXIETY DISORDER WITH PANIC ATTACKS: Primary | ICD-10-CM

## 2022-05-18 DIAGNOSIS — F41.0 GENERALIZED ANXIETY DISORDER WITH PANIC ATTACKS: Primary | ICD-10-CM

## 2022-05-18 PROCEDURE — 90834 PSYTX W PT 45 MINUTES: CPT | Mod: S$GLB,,, | Performed by: SOCIAL WORKER

## 2022-05-18 PROCEDURE — 90834 PR PSYCHOTHERAPY W/PATIENT, 45 MIN: ICD-10-PCS | Mod: S$GLB,,, | Performed by: SOCIAL WORKER

## 2022-05-18 NOTE — PROGRESS NOTES
"Individual Psychotherapy Follow-up Visit Progress Note (PhD/LCSW)     Outpatient Psychotherapy - 45 minutes with patient (38-52 minutes) - [id number]    Date: 05/18/2022    Visit Type: In Person        5/18/2022  MRN: [id number]  Primary Care Provider: MINH Ryan    Yvonne KYE Schmidt is a 61 y.o. female who presents today for follow-up of anxiety. Met with patient.      Preferred Name: Yvonne     Subjective:       Content of Current Session: Pt reported, "I'm doing ok" upon entry to this session. LCSW utilized active listening/reflection to engage with pt and review experiences since their last session with this provider. Pt reported she had been sleeping better since her last session with this provider. Pt reported, due to her improved nighttime sleep quality, she has noted a decrease in daytime naps. Pt noted "I switched from Motrin PM at night to 3 Benedryls at night and it makes such a difference." Pt noted an improvement in her anxiety response during the day subsequent her improved sleep. Pt cited an increase in daytime energy.  LCSW utilized cognitive processing and supportive therapy. LCSW discussed pt's utilization of her sleep hygiene coping skills in conjunction with her new medication regimen. Pt noted, "even though I'm sleeping better, I'm still not noticing a difference with the 60mg Prozac because I still have to take a half a Xanax anytime I go to do something like go to the store or out and about." Pt noted she had started preemptively consuming half a Xanax prior to going shopping, going to family events, etc and noted "I usually don't have a panic attack or it's not that bad of a panic attack if I do have one." LCSW discussed utilization of her coping skills proactively to reduce her anxiety response. LCSW utilized CBT model of behaviors/triggers to support pt in identifying/addressing relative anxiety triggers proactively. LCSW discussed application of mindfulness based cognitive therapy " skills to proactively and reactively address her anxiety response. LCSW encouraged pt's ongoing medication compliance and coping skill utilization. Pt responded appropriately to aforementioned interventions. Pt denied SI/HI/AVH.     Therapeutic Interventions Utilized During Current Session: Cognitive Processing Therapy, Cognitive Behavioral Therapy, Mindfulness-Based Cognitive Therapy, Supportive Therapy    GAD7 5/18/2022 3/9/2022 2/17/2022   1. Feeling nervous, anxious, or on edge? 3 3 3   2. Not being able to stop or control worrying? 2 3 3   3. Worrying too much about different things? 1 3 3   4. Trouble relaxing? 2 2 2   5. Being so restless that it is hard to sit still? 0 1 3   6. Becoming easily annoyed or irritable? 2 3 3   7. Feeling afraid as if something awful might happen? 2 3 1   8. If you checked off any problems, how difficult have these problems made it for you to do your work, take care of things at home, or get along with other people? 2 2 2   MILTON-7 Score 12 18 18      0-4 = Minimal anxiety  5-9 = Mild anxiety  10-14 = Moderate anxiety  15-21 = Severe anxiety     PHQ-9 Depression Patient Health Questionnaire 5/18/2022 3/9/2022 2/17/2022   Patient agreed to terms: Yes Yes Yes   Little interest or pleasure in doing things 1 2 1   Feeling down, depressed, or hopeless 2 3 2   Trouble falling or staying asleep, or sleeping too much 1 3 3   Feeling tired or having little energy 2 3 1   Poor appetite or overeating 1 1 1   Feeling bad about yourself - or that you are a failure or have let yourself or your family down 1 2 3   Trouble concentrating on things, such as reading the newspaper or watching television 1 3 3   Moving or speaking so slowly that other people could have noticed. Or the opposite - being so fidgety or restless that you have been moving around a lot more than usual 0 2 0   Thoughts that you would be better off dead, or of hurting yourself in some way 0 1 1   PHQ-9 Total Score 9 20 15   If  you checked off any problems, how difficult have these problems made it for you to do your work, take care of things at home, or get along with other people? Very difficult Very difficult Very difficult   Interpretation Mild Severe Moderately Severe     0-4 = No intervention  5 to 9 = Mild  10 to 14 = Moderate  15 to 19 = Moderately severe  ?20 = Severe      Objective:       Mental Status Evaluation  Appearance: unremarkable, age appropriate  Behavior: normal, cooperative  Speech: normal tone, normal rate, normal pitch, normal volume  Mood: euthymic  Affect: congruent and appropriate  Thought Process: normal and logical  Thought Content: normal, no suicidality, no homicidality, delusions, or paranoia  Sensorium: grossly intact  Cognition: grossly intact  Insight: intact  Judgment: adequate to circumstances    Risk parameters:  Patient reports no suicidal ideation  Patient reports no homicidal ideation  Patient reports no self-injurious behavior  Patient reports no violent behavior      Assessment & Plan:     The patient's response to the interventions is accepting    The patient's progress toward treatment goals is fair     Homework assigned: none     Treatment plan:   A. Target symptoms: Depression and Anxiety   B. Therapeutic modalities: insight oriented psychotherapy, behavior modifying psychotherapy, supportive psychotherapy  C. Why chosen therapy is appropriate versus another modality: relevant to diagnosis, patient responds to this modality, evidence based practice   D. Outcome monitoring methods: self report, observation, rating scales, feedback from clinical staff      Visit Diagnosis:   1. Generalized anxiety disorder with panic attacks    2. Depression, major, recurrent, moderate        Follow-up: individual psychotherapy    Return to Clinic: as scheduled by pt  Pt Reported to Schedule Self via Epic EMR MyChart Application and/or Department Support Staff          Pennie Rajput LCSW  05/18/2022   1:00  PM

## 2022-06-02 ENCOUNTER — TELEPHONE (OUTPATIENT)
Dept: PULMONOLOGY | Facility: CLINIC | Age: 62
End: 2022-06-02
Payer: COMMERCIAL

## 2022-07-06 ENCOUNTER — OFFICE VISIT (OUTPATIENT)
Dept: PSYCHIATRY | Facility: CLINIC | Age: 62
End: 2022-07-06
Payer: COMMERCIAL

## 2022-07-06 VITALS
DIASTOLIC BLOOD PRESSURE: 79 MMHG | BODY MASS INDEX: 19.21 KG/M2 | SYSTOLIC BLOOD PRESSURE: 118 MMHG | WEIGHT: 108.44 LBS | HEART RATE: 72 BPM

## 2022-07-06 DIAGNOSIS — J32.9 SINUSITIS, UNSPECIFIED CHRONICITY, UNSPECIFIED LOCATION: ICD-10-CM

## 2022-07-06 DIAGNOSIS — F33.1 DEPRESSION, MAJOR, RECURRENT, MODERATE: Primary | ICD-10-CM

## 2022-07-06 DIAGNOSIS — F41.0 PANIC ATTACKS: ICD-10-CM

## 2022-07-06 DIAGNOSIS — F41.9 ANXIETY DISORDER, UNSPECIFIED TYPE: ICD-10-CM

## 2022-07-06 DIAGNOSIS — Z87.891 FORMER TOBACCO USE: ICD-10-CM

## 2022-07-06 PROCEDURE — 3078F PR MOST RECENT DIASTOLIC BLOOD PRESSURE < 80 MM HG: ICD-10-PCS | Mod: CPTII,S$GLB,, | Performed by: PSYCHIATRY & NEUROLOGY

## 2022-07-06 PROCEDURE — 3008F PR BODY MASS INDEX (BMI) DOCUMENTED: ICD-10-PCS | Mod: CPTII,S$GLB,, | Performed by: PSYCHIATRY & NEUROLOGY

## 2022-07-06 PROCEDURE — 3078F DIAST BP <80 MM HG: CPT | Mod: CPTII,S$GLB,, | Performed by: PSYCHIATRY & NEUROLOGY

## 2022-07-06 PROCEDURE — 3008F BODY MASS INDEX DOCD: CPT | Mod: CPTII,S$GLB,, | Performed by: PSYCHIATRY & NEUROLOGY

## 2022-07-06 PROCEDURE — 99999 PR PBB SHADOW E&M-EST. PATIENT-LVL II: CPT | Mod: PBBFAC,,, | Performed by: PSYCHIATRY & NEUROLOGY

## 2022-07-06 PROCEDURE — 99214 PR OFFICE/OUTPT VISIT, EST, LEVL IV, 30-39 MIN: ICD-10-PCS | Mod: S$GLB,,, | Performed by: PSYCHIATRY & NEUROLOGY

## 2022-07-06 PROCEDURE — 99999 PR PBB SHADOW E&M-EST. PATIENT-LVL II: ICD-10-PCS | Mod: PBBFAC,,, | Performed by: PSYCHIATRY & NEUROLOGY

## 2022-07-06 PROCEDURE — 99214 OFFICE O/P EST MOD 30 MIN: CPT | Mod: S$GLB,,, | Performed by: PSYCHIATRY & NEUROLOGY

## 2022-07-06 PROCEDURE — 3074F SYST BP LT 130 MM HG: CPT | Mod: CPTII,S$GLB,, | Performed by: PSYCHIATRY & NEUROLOGY

## 2022-07-06 PROCEDURE — 3074F PR MOST RECENT SYSTOLIC BLOOD PRESSURE < 130 MM HG: ICD-10-PCS | Mod: CPTII,S$GLB,, | Performed by: PSYCHIATRY & NEUROLOGY

## 2022-07-06 RX ORDER — ALPRAZOLAM 0.5 MG/1
0.5 TABLET ORAL 2 TIMES DAILY PRN
Qty: 60 TABLET | Refills: 2 | Status: SHIPPED | OUTPATIENT
Start: 2022-07-15 | End: 2024-02-09

## 2022-07-06 RX ORDER — FLUOXETINE HYDROCHLORIDE 40 MG/1
40 CAPSULE ORAL DAILY
Qty: 30 CAPSULE | Refills: 2 | Status: SHIPPED | OUTPATIENT
Start: 2022-07-06 | End: 2022-10-04

## 2022-07-06 RX ORDER — FLUOXETINE HYDROCHLORIDE 20 MG/1
20 CAPSULE ORAL DAILY
Qty: 30 CAPSULE | Refills: 2 | Status: SHIPPED | OUTPATIENT
Start: 2022-07-06 | End: 2023-11-16

## 2022-07-06 RX ORDER — BUSPIRONE HYDROCHLORIDE 10 MG/1
10 TABLET ORAL 2 TIMES DAILY
Qty: 60 TABLET | Refills: 2 | Status: SHIPPED | OUTPATIENT
Start: 2022-07-06 | End: 2023-11-09
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
## 2022-07-06 NOTE — PATIENT INSTRUCTIONS
Assessment:   Encounter Diagnosis   Name Primary?    Panic attacks        PLAN    Follow up   D Post MD  Sept 21 2022  1 pm In Clinic [facility]   And     Pennie Corona LCSW as arranged      Also routed her to desk for info / possible finance assistance     Meds: see after visit summary     References:      Relaxation stress reduction workbook: JUICE Mendez PhD ( used: $7-10)     Feeling Good Website: Aime Pablo MD / www.[employer] website (free) / maureen. PODCASTS     Anxiety &  phobia workbook by ROSELINE Mariscal PhD  (web retailers: used: $ 7-10)     [place]: Path to Better Sleep : https://www.veterantraining.[place].gov/insomnia/ (free)    [place]: http://www.quitwithusla.org    (and other resources as per counselor, if applicable)  Pt expressed appreciation for the visit today and did not have further question at this time though pt  was still informed to:     Call / Walk In if problems.    Call Report Side Effects to Psyc MD     Encouraged to follow up with primary care / Gen Med MD for continued monitoring of general health and wellness.    Call 911  Or go to ER if Acute Concerns (especially if any thoughts of harm to self or other).     Understanding was expressed; and no further concerns nor questions were raised at this time.

## 2022-07-12 RX ORDER — HYDROCHLOROTHIAZIDE 12.5 MG/1
12.5 TABLET ORAL DAILY
Qty: 90 TABLET | Refills: 1 | Status: SHIPPED | OUTPATIENT
Start: 2022-07-12 | End: 2023-02-28 | Stop reason: SDUPTHER

## 2022-07-12 NOTE — TELEPHONE ENCOUNTER
----- Message from Julien Barrios sent at 7/12/2022  1:43 PM CDT -----  Contact: Yvonne Carvern would like a call back at [phone] in regards to if she needs to get a refill on Hydrochlorothiazide (HYDRODIURIL) 12.5 MG Tab or will she just not take the prescription anymore.  [facility] [address] [place] [address] [id number] [place] [address]  [id number] [place] [id number]  Phone: [phone] Fax: [phone]    Thanks

## 2022-10-14 ENCOUNTER — TELEPHONE (OUTPATIENT)
Dept: CARDIOLOGY | Facility: CLINIC | Age: 62
End: 2022-10-14
Payer: COMMERCIAL

## 2022-10-14 NOTE — TELEPHONE ENCOUNTER
Appt made for 10-27-22 at 240 pm- pt notified.          ---- Message from Pablo Hua MD sent at 10/14/2022  9:09 AM CDT -----  Contact: Self/[phone]  Ok please have her follow up with me and bring results of recent lipid panel will discuss meds thanks    ----- Message -----  From: Lynnette Cain LPN  Sent: 10/13/2022   4:54 PM CDT  To: Pablo Hua MD    Her cholesterol is really high and she can not swallow pills.  Her pcp advised she talk to you about it pb  ----- Message -----  From: Shreya Nugent  Sent: 10/13/2022   4:52 PM CDT  To: Javid Shah Staff    Pt is calling in regards to getting a shot for high cholesterol. Please give her a call back at [phone]. Thank you s/g

## 2022-10-20 ENCOUNTER — TELEPHONE (OUTPATIENT)
Dept: CARDIOLOGY | Facility: CLINIC | Age: 62
End: 2022-10-20
Payer: COMMERCIAL

## 2022-10-20 DIAGNOSIS — R00.1 BRADYCARDIA: ICD-10-CM

## 2022-10-20 DIAGNOSIS — F41.0 PANIC ATTACKS: Primary | ICD-10-CM

## 2022-10-20 DIAGNOSIS — R07.89 OTHER CHEST PAIN: ICD-10-CM

## 2022-10-27 ENCOUNTER — OFFICE VISIT (OUTPATIENT)
Dept: CARDIOLOGY | Facility: CLINIC | Age: 62
End: 2022-10-27
Payer: COMMERCIAL

## 2022-10-27 ENCOUNTER — HOSPITAL ENCOUNTER (OUTPATIENT)
Dept: CARDIOLOGY | Facility: HOSPITAL | Age: 62
Discharge: HOME | End: 2022-10-27
Attending: INTERNAL MEDICINE
Payer: COMMERCIAL

## 2022-10-27 VITALS
WEIGHT: 104.94 LBS | HEIGHT: 63 IN | DIASTOLIC BLOOD PRESSURE: 84 MMHG | SYSTOLIC BLOOD PRESSURE: 134 MMHG | OXYGEN SATURATION: 98 % | HEART RATE: 57 BPM | BODY MASS INDEX: 18.59 KG/M2

## 2022-10-27 DIAGNOSIS — E78.49 OTHER HYPERLIPIDEMIA: ICD-10-CM

## 2022-10-27 DIAGNOSIS — R00.1 BRADYCARDIA: ICD-10-CM

## 2022-10-27 DIAGNOSIS — Z87.891 FORMER TOBACCO USE: ICD-10-CM

## 2022-10-27 DIAGNOSIS — R06.09 OTHER FORM OF DYSPNEA: ICD-10-CM

## 2022-10-27 DIAGNOSIS — I10 ESSENTIAL HYPERTENSION: ICD-10-CM

## 2022-10-27 DIAGNOSIS — K21.9 GASTROESOPHAGEAL REFLUX DISEASE, UNSPECIFIED WHETHER ESOPHAGITIS PRESENT: ICD-10-CM

## 2022-10-27 DIAGNOSIS — R07.89 OTHER CHEST PAIN: Primary | ICD-10-CM

## 2022-10-27 DIAGNOSIS — R00.2 PALPITATIONS: ICD-10-CM

## 2022-10-27 DIAGNOSIS — M19.90 INFLAMMATION OF JOINT: ICD-10-CM

## 2022-10-27 DIAGNOSIS — R07.89 OTHER CHEST PAIN: ICD-10-CM

## 2022-10-27 DIAGNOSIS — F41.0 PANIC ATTACKS: ICD-10-CM

## 2022-10-27 PROCEDURE — 1160F PR REVIEW ALL MEDS BY PRESCRIBER/CLIN PHARMACIST DOCUMENTED: ICD-10-PCS | Mod: CPTII,S$GLB,, | Performed by: INTERNAL MEDICINE

## 2022-10-27 PROCEDURE — 99214 OFFICE O/P EST MOD 30 MIN: CPT | Mod: S$GLB,,, | Performed by: INTERNAL MEDICINE

## 2022-10-27 PROCEDURE — 3079F PR MOST RECENT DIASTOLIC BLOOD PRESSURE 80-89 MM HG: ICD-10-PCS | Mod: CPTII,S$GLB,, | Performed by: INTERNAL MEDICINE

## 2022-10-27 PROCEDURE — 3075F PR MOST RECENT SYSTOLIC BLOOD PRESS GE 130-139MM HG: ICD-10-PCS | Mod: CPTII,S$GLB,, | Performed by: INTERNAL MEDICINE

## 2022-10-27 PROCEDURE — 1159F MED LIST DOCD IN RCRD: CPT | Mod: CPTII,S$GLB,, | Performed by: INTERNAL MEDICINE

## 2022-10-27 PROCEDURE — 99999 PR PBB SHADOW E&M-EST. PATIENT-LVL IV: CPT | Mod: PBBFAC,,, | Performed by: INTERNAL MEDICINE

## 2022-10-27 PROCEDURE — 3079F DIAST BP 80-89 MM HG: CPT | Mod: CPTII,S$GLB,, | Performed by: INTERNAL MEDICINE

## 2022-10-27 PROCEDURE — 99999 PR PBB SHADOW E&M-EST. PATIENT-LVL IV: ICD-10-PCS | Mod: PBBFAC,,, | Performed by: INTERNAL MEDICINE

## 2022-10-27 PROCEDURE — 99214 PR OFFICE/OUTPT VISIT, EST, LEVL IV, 30-39 MIN: ICD-10-PCS | Mod: S$GLB,,, | Performed by: INTERNAL MEDICINE

## 2022-10-27 PROCEDURE — 1160F RVW MEDS BY RX/DR IN RCRD: CPT | Mod: CPTII,S$GLB,, | Performed by: INTERNAL MEDICINE

## 2022-10-27 PROCEDURE — 3075F SYST BP GE 130 - 139MM HG: CPT | Mod: CPTII,S$GLB,, | Performed by: INTERNAL MEDICINE

## 2022-10-27 PROCEDURE — 93010 EKG 12-LEAD: ICD-10-PCS | Mod: ,,, | Performed by: INTERNAL MEDICINE

## 2022-10-27 PROCEDURE — 1159F PR MEDICATION LIST DOCUMENTED IN MEDICAL RECORD: ICD-10-PCS | Mod: CPTII,S$GLB,, | Performed by: INTERNAL MEDICINE

## 2022-10-27 PROCEDURE — 93005 ELECTROCARDIOGRAM TRACING: CPT

## 2022-10-27 PROCEDURE — 93010 ELECTROCARDIOGRAM REPORT: CPT | Mod: ,,, | Performed by: INTERNAL MEDICINE

## 2022-10-27 RX ORDER — ATORVASTATIN CALCIUM 40 MG/1
40 TABLET, FILM COATED ORAL NIGHTLY
Qty: 30 TABLET | Refills: 3 | Status: SHIPPED | OUTPATIENT
Start: 2022-10-27 | End: 2022-11-02

## 2022-10-27 NOTE — PROGRESS NOTES
Subjective:   Patient ID:  Yvonne Schmidt is a 62 y.o. female who presents for cardiac consult of No chief complaint on file.      The patient came in today for cardiac consult of No chief complaint on file.      Yvonne Schmidt is a 62 y.o. female pt with HTN, HLD, GERD, Anxiety, tobacco use presents for follow up CV eval.     9/11/19  Pt presented to the ER yesterday for CP. ECG with sinus julian V rate 49. Enzymes neg x 2, NTG caused worsening of CP.   She has been having intermittent chest pain for 2 weeks. Chest pain can lasts for a few sec, took BC which helped. She just started Nexium as well.   She does get lightheaded and feels like she may pass out.     10/17/19  ETT normal, Holter normal. Normal 2D ECHO. She still has some atypical CP and MORRIS. Has a lot of anxiety will refer to psych.    12/23/19  She has been gaining weight, now > 101 lbs. She has mild MORRIS only with significant exertional. She still has occ panic attacks, saw psychologist. She has CP relief with PPI.     11/20/20 - ER follow up  She went to Emergency Department for chest pain, onset several days PTA. Pt presented to the ED yesterday for similar sxs, and was discharged after a negative workup. Symptoms are constant and moderate in severity. Pain is worse when taking deep breaths. No associated sxs reported. Patient denies any fever, chills, n/v/d, SOB, weakness, numbness, dizziness, headache, and all other sxs at this time.   Labwork neg Trop, ECG CXR neg, CT chest with L sided pleural effusion and possible right heart dysfunction.   She has been having constant chest pain for 6-7 days. She was doing a lot of painting and inhaled fumes. She had GI cocktail as well.     11/19/20  She had recent eval by Dr. Mayen undergoing further rheum workup for elevated imflamm markers. Sed rate and CRP were elevated, started on colchicine and nsaids, she said colchicine was not covered by insurance. She also had elevated BP yesterday  160s/90s. ECHO normal BI V function, small pericardial effusion. Her pain is improved with NSAIDS.     12/17/20  Started Norvasc last visit,  Remains on HCTZ. BP well controlled today 36181. Pulm eval with tests pending - PFTs, pulm stress test. She still has MORRIS, chest pain has improved. Recent CXR neg for effusion.     9/23/21  She has been feeling more heart burn feeling, in center of chest not has hard as in past. She also has MORRIS. She had  Possible COVID in Dec 2020.   ECG - NSR, LAE    10/19/21  BP 140s/80s. HR 50s  CRP elevated - labs are abnormal with elevated inflammatory markers, start motrin 600 mg three times a day for 7 days, colchine 0.6mg twice a day for 14 days, follow up with me in 3 -4 weeks, referral placed for rheumatology as well.   Has improved somewhat but stil has pain.     2/22/22  BP stable, HR 50s. Overall feels stable but has more fatigue. She saw Dr. Murphy, no changes in meds had labs/Xray ordered.     10/27/22  Recent lipid panel with Tc 258, , Tg 212, HDL 47. She has been using inhaler pRN  ECG - NSR, biatral enlargement    Patient feels no leg swelling, no PND,  no syncope, no CNS symptoms.    Patient has fairly good exercise tolerance.    Patient is compliant with medications.    FH - mother - MI s/p stents      Cholesterol 100 - 199 mg/dL 258 High     Triglycerides 0 - 149 mg/dL 212 High     HDL >39 mg/dL 47    VLDL Cholesterol Bora 5 - 40 mg/dL 40    LDL Calculated 0 - 99 mg/dL 171 High         Results for orders placed during the hospital encounter of 09/27/21    Echo    Interpretation Summary  · The left ventricle is normal in size with concentric remodeling and normal systolic function.  · The estimated ejection fraction is 60%.  · Normal left ventricular diastolic function.  · Normal right ventricular size with normal right ventricular systolic function.  · Mild tricuspid regurgitation.  · Normal central venous pressure (3 mmHg).  · The estimated PA systolic pressure is  16 mmHg.      11/9/20 CT chest  Impression:    No evidence of pulmonary embolism.     Cardiomegaly.  Reflux into the IVC and hepatic veins suggest some component of right heart dysfunction.  Consider cardiac echo     Small left-sided pleural effusion with left lower lobe atelectasis.          TEST DESCRIPTION   PREDOMINANT RHYTHM  1. Sinus rhythm with heart rates varying between 47 and 103 bpm with an average of 61 bpm.   VENTRICULAR ARRHYTHMIAS  1. There were no PVCs. There was no ventricular ectopic activity.  SUPRA VENTRICULAR ARRHYTHMIAS  1. There were very rare PACs totalling 59 and averaging 1 per hour.  There were 4 monomorphic couplets.  2. There were no episodes of sustained supraventricular tachycardia.    EKG Conclusions:    1. The EKG portion of this study is negative for ischemia at a moderate workload, and peak heart rate of 89 bpm (57% of predicted).   2. Exercise capacity is average.   3. Blood pressure response to exercise was normal (Presenting BP: 150/86 Peak BP: 144/91).   4. No significant arrhythmias were present.   5. There were no symptoms of chest discomfort or significant dyspnea throughout the protocol.   6. The Duke treadmill score was 6 suggesting a low probability for future cardiovascular events.    This document has been electronically    SIGNED BY: Pablo Hua MD On: 10/10/2019 12:59    Past Medical History:   Diagnosis Date    Anxiety     Bradycardia 9/19/2019    GERD (gastroesophageal reflux disease)     Thyroid disease        Past Surgical History:   Procedure Laterality Date    BREAST BIOPSY      DENTAL SURGERY         Social History     Tobacco Use    Smoking status: Former     Packs/day: 0.75     Years: 35.00     Pack years: 26.25     Types: Cigarettes     Start date: 1982     Quit date: 2019     Years since quitting: 3.8    Smokeless tobacco: Never    Tobacco comments:     did not smoke for 2 years since beginning smoking.    Substance Use Topics    Alcohol use: Never    Drug  use: Never       Family History   Problem Relation Age of Onset    Heart attack Mother     Hypertension Mother     Heart disease Father     Heart attack Maternal Aunt     Heart attack Maternal Uncle        Patient's Medications   New Prescriptions    ATORVASTATIN (LIPITOR) 40 MG TABLET    Take 1 tablet (40 mg total) by mouth every evening.   Previous Medications    ACETAMINOPHEN (TYLENOL) 500 MG TABLET    Take 500 mg by mouth as needed for Pain.    ALBUTEROL (PROVENTIL/VENTOLIN HFA) 90 MCG/ACTUATION INHALER    Inhale 2 puffs into the lungs every 4 (four) hours as needed for Wheezing or Shortness of Breath. Rescue    ALPRAZOLAM (XANAX) 0.5 MG TABLET    Take 1 tablet (0.5 mg total) by mouth 2 (two) times daily as needed (SIGNIFICANT ANXIETY). New Prescriber    BUDESONIDE 0.6 MG/NORMAL SALINE 50 ML NASAL IRRIGATION    Patient to irrigate each nostril with 25ml twice daily.    BUSPIRONE (BUSPAR) 10 MG TABLET    Take 1 tablet (10 mg total) by mouth 2 (two) times daily.    CALCIUM CARBONATE (TUMS) 300 MG (750 MG) CHEW    Take 750 mg by mouth.    FLUOXETINE 20 MG CAPSULE    Take 1 capsule (20 mg total) by mouth once daily. Take IN ADDITION to Prozac 40 mg supply    HYDROCHLOROTHIAZIDE (HYDRODIURIL) 12.5 MG TAB    Take 1 tablet (12.5 mg total) by mouth once daily. Do not take if BP is low and feeling dry/dehydrated/dizzy    KETOROLAC (TORADOL) 10 MG TABLET    Take 1 tablet (10 mg total) by mouth every 6 (six) hours as needed for Pain.    LACTOBACILLUS RHAMNOSUS GG (CULTURELLE) 10 BILLION CELL CAPSULE    Take 1 capsule by mouth once daily.    LEVOTHYROXINE (SYNTHROID) 50 MCG TABLET    Take 50 mcg by mouth before breakfast.    MULTIVITAMIN (THERAGRAN) PER TABLET    Take 1 tablet by mouth once daily.    OMEPRAZOLE (PRILOSEC) 40 MG CAPSULE    Take 40 mg by mouth.    ONDANSETRON (ZOFRAN-ODT) 8 MG TBDL    Take 1 tablet (8 mg total) by mouth every 8 (eight) hours as needed (nausea).   Modified Medications    No medications on file  "  Discontinued Medications    No medications on file       Review of Systems   Constitutional: Negative.    HENT: Negative.     Eyes: Negative.    Respiratory:  Positive for shortness of breath.    Cardiovascular:  Positive for chest pain.   Gastrointestinal: Negative.    Genitourinary: Negative.    Musculoskeletal: Negative.    Skin: Negative.    Neurological:  Positive for dizziness.   Endo/Heme/Allergies: Negative.    Psychiatric/Behavioral:  The patient is nervous/anxious.    All 12 systems otherwise negative.    Wt Readings from Last 3 Encounters:   10/27/22 47.6 kg (104 lb 15 oz)   07/06/22 49.2 kg (108 lb 7.5 oz)   02/22/22 49 kg (108 lb 0.4 oz)     Temp Readings from Last 3 Encounters:   08/12/21 97.9 °F (36.6 °C) (Oral)   01/17/21 98.1 °F (36.7 °C)   11/09/20 97.9 °F (36.6 °C) (Oral)     BP Readings from Last 3 Encounters:   10/27/22 134/84   07/06/22 118/79   02/22/22 128/82     Pulse Readings from Last 3 Encounters:   10/27/22 (!) 57   07/06/22 72   02/22/22 (!) 50       /84   Pulse (!) 57   Ht 5' 3" (1.6 m)   Wt 47.6 kg (104 lb 15 oz)   SpO2 98%   BMI 18.59 kg/m²     Objective:   Physical Exam  Vitals and nursing note reviewed.   Constitutional:       General: She is not in acute distress.     Appearance: She is well-developed. She is not diaphoretic.   HENT:      Head: Normocephalic and atraumatic.      Nose: Nose normal.   Eyes:      General: No scleral icterus.     Conjunctiva/sclera: Conjunctivae normal.   Neck:      Thyroid: No thyromegaly.      Vascular: No JVD.   Cardiovascular:      Rate and Rhythm: Regular rhythm. Bradycardia present.      Heart sounds: S1 normal and S2 normal. No murmur heard.    No friction rub. No gallop. No S3 or S4 sounds.   Pulmonary:      Effort: Pulmonary effort is normal. No respiratory distress.      Breath sounds: Normal breath sounds. No stridor. No wheezing or rales.   Chest:      Chest wall: No tenderness.   Abdominal:      General: Bowel sounds are " normal. There is no distension.      Palpations: Abdomen is soft. There is no mass.      Tenderness: There is no abdominal tenderness. There is no rebound.   Genitourinary:     Comments: Deferred  Musculoskeletal:         General: No tenderness or deformity. Normal range of motion.      Cervical back: Normal range of motion and neck supple.   Lymphadenopathy:      Cervical: No cervical adenopathy.   Skin:     General: Skin is warm and dry.      Coloration: Skin is not pale.      Findings: No erythema or rash.   Neurological:      Mental Status: She is alert and oriented to person, place, and time.      Motor: No abnormal muscle tone.      Coordination: Coordination normal.   Psychiatric:         Behavior: Behavior normal.         Thought Content: Thought content normal.         Judgment: Judgment normal.       Lab Results   Component Value Date     08/12/2021    K 3.7 08/12/2021     08/12/2021    CO2 28 08/12/2021    BUN 14 08/12/2021    CREATININE 1.1 08/12/2021     (H) 08/12/2021    MG 3.7 (H) 08/12/2021    AST 16 08/12/2021    ALT 12 08/12/2021    ALBUMIN 4.1 08/12/2021    PROT 7.7 08/12/2021    BILITOT 0.4 08/12/2021    WBC 9.84 08/12/2021    HGB 11.3 (L) 08/12/2021    HCT 35.2 (L) 08/12/2021    MCV 86 08/12/2021     08/12/2021    INR 1.1 09/10/2019    TSH 9.597 (H) 11/19/2020    BNP 35 11/08/2020     Assessment:      1. Other chest pain    2. Bradycardia    3. Essential hypertension    4. Palpitations    5. Former tobacco use    6. Other form of dyspnea    7. Inflammation of joint    8. Gastroesophageal reflux disease, unspecified whether esophagitis present    9. Other hyperlipidemia          Plan:   1. Chest pain, recurrent sec to inflammation   - small pericardial effusion in past  - f/u rheum eval   - cont motrin PRN or liquid advil     2. GERD  - cont PPI  - f/u GI    3. Bradycardia with occ palpitations   - Holter - negative  -TSH, T4 - normal   - off BB    4. Tobacco use   - has  quit smoking     5. Anxiety  - referred to psych    6. Pleural effusion  - f/u pulm     7. HTN - well controlled  - cont meds and titrate    8. Hypothyroidism   - cont Synthroid    9. HLD, elevated TGs  - start Lipitor 40mg  - repeat labs in 3 months     Thank you for allowing me to participate in this patient's care. Please do not hesitate to contact me with any questions or concerns. Consult note has been forwarded to the referral physician.

## 2022-11-01 ENCOUNTER — TELEPHONE (OUTPATIENT)
Dept: CARDIOLOGY | Facility: CLINIC | Age: 62
End: 2022-11-01
Payer: COMMERCIAL

## 2022-11-01 NOTE — TELEPHONE ENCOUNTER
----- Message from Pablo Hua MD sent at 11/1/2022 12:47 PM CDT -----  Contact: Yvonne ROBERTS for now can you tell her to cut the dose in half? If she can tolerate that will continue Lipitor 20mg qhs. Thanks    ----- Message -----  From: Dorian Gomez MA  Sent: 11/1/2022  11:59 AM CDT  To: Pablo Hua MD    Please advise, thank you.  ----- Message -----  From: Pete Mesa  Sent: 11/1/2022  11:51 AM CDT  To: Javid Shah Staff    Patient is calling to speak with the office regarding medication concerns. Reports atorvastatin (LIPITOR) 40 MG tablet is too strong and is requesting a lowered dosage. Please give patient a urgent all back at [phone]    .  [facility] [address] [place] [id number]  Phone: [phone] Fax: [phone]

## 2022-11-01 NOTE — TELEPHONE ENCOUNTER
Spoke with pt in regards to medication dosage     Informed pt to     for now can you tell her to cut the dose in half,  If she can tolerate that will continue Lipitor 20mg qhs. Per Dr. Hua      Pt verbalized understanding with no questions or concerns.

## 2022-11-01 NOTE — TELEPHONE ENCOUNTER
----- Message from Pablo Hua MD sent at 11/1/2022 12:47 PM CDT -----  Contact: Yvonne ROBERTS for now can you tell her to cut the dose in half? If she can tolerate that will continue Lipitor 20mg qhs. Thanks    ----- Message -----  From: Dorian Gomez MA  Sent: 11/1/2022  11:59 AM CDT  To: Pablo Hua MD    Please advise, thank you.  ----- Message -----  From: Pete Mesa  Sent: 11/1/2022  11:51 AM CDT  To: Javid Shah Staff    Patient is calling to speak with the office regarding medication concerns. Reports atorvastatin (LIPITOR) 40 MG tablet is too strong and is requesting a lowered dosage. Please give patient a urgent all back at [phone]    .  [facility] [address] [place] [id number]  Phone: [phone] Fax: [phone]

## 2022-11-02 ENCOUNTER — TELEPHONE (OUTPATIENT)
Dept: CARDIOLOGY | Facility: CLINIC | Age: 62
End: 2022-11-02
Payer: COMMERCIAL

## 2022-11-02 DIAGNOSIS — E78.49 OTHER HYPERLIPIDEMIA: Primary | ICD-10-CM

## 2022-11-02 RX ORDER — EZETIMIBE 10 MG/1
10 TABLET ORAL DAILY
Qty: 90 TABLET | Refills: 3 | Status: SHIPPED | OUTPATIENT
Start: 2022-11-02 | End: 2023-11-16 | Stop reason: SDUPTHER

## 2022-11-02 NOTE — TELEPHONE ENCOUNTER
Spoke with pt in regards to medication concerns. I informed pt    OK I have stopped the lipitor and changed it to another med - Zetia, which should not have same side effects can start that and follow up with me. Per Dr. Zavaleta       Pt verbalized understanding with no questions or concerns and will f/u with Dr. zavaleta

## 2022-11-02 NOTE — TELEPHONE ENCOUNTER
----- Message from Pablo Hua MD sent at 11/2/2022  1:47 PM CDT -----  Contact: [phone]  OK I have stopped the lipitor and changed it to another med - Zetia, which should not have same side effects can start that and follow up with me. Thanks     ----- Message -----  From: Dorian Gomez MA  Sent: 11/2/2022  10:10 AM CDT  To: Pablo Hua MD    Please advise, thank you.  ----- Message -----  From: Demar Rivera  Sent: 11/2/2022  10:03 AM CDT  To: Javid Shah Staff    Pt is calling regarding medication ,atorvastatin (LIPITOR) 40 MG tablet , pt states she think she is allergic to medication she had some side effects and it caused her to have a panic attack as well . Please call back at [phone] . Thanks/dj

## 2022-11-13 ENCOUNTER — HOSPITAL ENCOUNTER (EMERGENCY)
Facility: HOSPITAL | Age: 62
Discharge: HOME | End: 2022-11-13
Attending: EMERGENCY MEDICINE
Payer: COMMERCIAL

## 2022-11-13 VITALS
WEIGHT: 101.44 LBS | SYSTOLIC BLOOD PRESSURE: 137 MMHG | HEART RATE: 60 BPM | TEMPERATURE: 99 F | HEIGHT: 63 IN | OXYGEN SATURATION: 98 % | BODY MASS INDEX: 17.97 KG/M2 | RESPIRATION RATE: 18 BRPM | DIASTOLIC BLOOD PRESSURE: 69 MMHG

## 2022-11-13 DIAGNOSIS — S39.012A STRAIN OF LUMBAR REGION, INITIAL ENCOUNTER: Primary | ICD-10-CM

## 2022-11-13 PROCEDURE — 99284 EMERGENCY DEPT VISIT MOD MDM: CPT

## 2022-11-13 PROCEDURE — 96372 THER/PROPH/DIAG INJ SC/IM: CPT | Performed by: REGISTERED NURSE

## 2022-11-13 PROCEDURE — 63600175 PHARM REV CODE 636 W HCPCS: Performed by: REGISTERED NURSE

## 2022-11-13 RX ORDER — CYCLOBENZAPRINE HCL 10 MG
10 TABLET ORAL 3 TIMES DAILY PRN
Qty: 15 TABLET | Refills: 0 | Status: SHIPPED | OUTPATIENT
Start: 2022-11-13 | End: 2022-11-18

## 2022-11-13 RX ORDER — HYDROCODONE BITARTRATE AND ACETAMINOPHEN 5; 325 MG/1; MG/1
1 TABLET ORAL EVERY 6 HOURS PRN
Qty: 12 TABLET | Refills: 0 | Status: SHIPPED | OUTPATIENT
Start: 2022-11-13 | End: 2023-10-25 | Stop reason: ALTCHOICE

## 2022-11-13 RX ORDER — DICLOFENAC SODIUM 50 MG/1
50 TABLET, DELAYED RELEASE ORAL 2 TIMES DAILY
Qty: 20 TABLET | Refills: 0 | Status: SHIPPED | OUTPATIENT
Start: 2022-11-13 | End: 2022-12-30

## 2022-11-13 RX ORDER — MORPHINE SULFATE 4 MG/ML
4 INJECTION, SOLUTION INTRAMUSCULAR; INTRAVENOUS
Status: COMPLETED | OUTPATIENT
Start: 2022-11-13 | End: 2022-11-13

## 2022-11-13 RX ORDER — ONDANSETRON 2 MG/ML
4 INJECTION INTRAMUSCULAR; INTRAVENOUS
Status: COMPLETED | OUTPATIENT
Start: 2022-11-13 | End: 2022-11-13

## 2022-11-13 RX ADMIN — ONDANSETRON 4 MG: 2 INJECTION INTRAMUSCULAR; INTRAVENOUS at 07:11

## 2022-11-13 RX ADMIN — MORPHINE SULFATE 4 MG: 4 INJECTION INTRAVENOUS at 07:11

## 2022-11-14 NOTE — ED PROVIDER NOTES
Encounter Date: 11/13/2022       History     Chief Complaint   Patient presents with    Back Pain     Right lower back pain, was picking up on a load of clothes and pain immediately followed     62-year-old female presents emergency department with complaints of low back pain after picking up on a light of close at home.  Patient denies any saddle anesthesia, bowel bladder incontinence, fever, chills or any other symptoms at this time.  Patient reports heating pad and ibuprofen home with no relief.    The history is provided by the patient.   Review of patient's allergies indicates:   Allergen Reactions    Penicillins      Past Medical History:   Diagnosis Date    Anxiety     Bradycardia 9/19/2019    GERD (gastroesophageal reflux disease)     Thyroid disease      Past Surgical History:   Procedure Laterality Date    BREAST BIOPSY      DENTAL SURGERY       Family History   Problem Relation Age of Onset    Heart attack Mother     Hypertension Mother     Heart disease Father     Heart attack Maternal Aunt     Heart attack Maternal Uncle      Social History     Tobacco Use    Smoking status: Former     Packs/day: 0.75     Years: 35.00     Pack years: 26.25     Types: Cigarettes     Start date: 1982     Quit date: 2019     Years since quitting: 3.8    Smokeless tobacco: Never    Tobacco comments:     did not smoke for 2 years since beginning smoking.    Substance Use Topics    Alcohol use: Never    Drug use: Never     Review of Systems   Constitutional:  Negative for fever.   HENT:  Negative for sore throat.    Respiratory:  Negative for shortness of breath.    Cardiovascular:  Negative for chest pain.   Gastrointestinal:  Negative for nausea.   Genitourinary:  Negative for dysuria.   Musculoskeletal:  Positive for back pain.   Skin:  Negative for rash.   Neurological:  Negative for weakness.   Hematological:  Does not bruise/bleed easily.   All other systems reviewed and are negative.    Physical Exam     Initial Vitals  [11/13/22 1853]   BP Pulse Resp Temp SpO2   138/69 (!) 59 18 98.4 °F (36.9 °C) 98 %      MAP       --         Physical Exam    Constitutional: She appears well-developed and well-nourished. She is not diaphoretic. No distress.   HENT:   Head: Normocephalic and atraumatic.   Eyes: Conjunctivae and EOM are normal. Pupils are equal, round, and reactive to light.   Neck: Neck supple.   Normal range of motion.  Cardiovascular:  Normal rate, regular rhythm and normal heart sounds.           No murmur heard.  Pulmonary/Chest: Breath sounds normal. No respiratory distress. She has no wheezes. She has no rales.   Abdominal: Abdomen is soft. Bowel sounds are normal. There is no abdominal tenderness. There is no rebound and no guarding.   Musculoskeletal:         General: No edema. Normal range of motion.      Cervical back: Normal range of motion and neck supple.      Lumbar back: Tenderness present.      Comments: Right lumbar paraspinal tenderness, no midline tenderness no step-offs or deformities, negative straight leg raise strength 5/5 to bilateral lower extremities     Neurological: She is alert and oriented to person, place, and time. No cranial nerve deficit. GCS score is 15. GCS eye subscore is 4. GCS verbal subscore is 5. GCS motor subscore is 6.   Skin: Skin is warm and dry. Capillary refill takes less than 2 seconds.   Psychiatric: She has a normal mood and affect. Thought content normal.       ED Course   Procedures  Labs Reviewed - No data to display       Imaging Results              X-Ray Lumbar Spine Ap And Lateral (Final result)  Result time 11/13/22 20:29:37      Final result by Shahram Paulino MD (11/13/22 20:29:37)                   Impression:      No acute abnormality.      Electronically signed by: Shahram Paulino  Date:    11/13/2022  Time:    20:29               Narrative:    EXAMINATION:  XR LUMBAR SPINE AP AND LATERAL    CLINICAL HISTORY:  low back pain;    TECHNIQUE:  Three views of the lumbar spine  were performed.    COMPARISON:  None    FINDINGS:  Alignment: Alignment is maintained.    Vertebrae: Vertebral body heights are maintained.  No suspicious appearing lytic or blastic lesions.    Discs and facets: Disc heights are maintained. Facet joints are unremarkable.    Miscellaneous: Bones appear osteopenic.  Moderate large volume stool throughout the colon.                                       Medications   morphine injection 4 mg (4 mg Intramuscular Given 11/13/22 1929)   ondansetron injection 4 mg (4 mg Intramuscular Given 11/13/22 1929)                  8:39 PM: Reassessed pt at this time.  Pt states her condition has improved at this time. Discussed with pt all pertinent ED information and results. Discussed pt dx and plan of tx. Gave pt all f/u and return to the ED instructions. All questions and concerns were addressed at this time. Pt expresses understanding of information and instructions, and is comfortable with plan to discharge. Pt is stable for discharge.    I discussed with patient and/or family/caretaker that negative X-ray does not rule out occult fracture or other soft tissue injury.  Persistent pain greater than 7-10 days or increased pain requires follow up, specifically with orthopedics.              Clinical Impression:   Final diagnoses:  [S39.012A] Strain of lumbar region, initial encounter (Primary)      ED Disposition Condition    Discharge Stable          ED Prescriptions       Medication Sig Dispense Start Date End Date Auth. Provider    HYDROcodone-acetaminophen (NORCO) 5-325 mg per tablet Take 1 tablet by mouth every 6 (six) hours as needed for Pain. 12 tablet 11/13/2022 -- Patrick Willoughby Jr., FNP    cyclobenzaprine (FLEXERIL) 10 MG tablet Take 1 tablet (10 mg total) by mouth 3 (three) times daily as needed for Muscle spasms. 15 tablet 11/13/2022 11/18/2022 Patrick Willoughby Jr., GUILLERMOP    diclofenac (VOLTAREN) 50 MG EC tablet Take 1 tablet (50 mg total) by mouth 2 (two) times  daily. 20 tablet 11/13/2022 -- Patrick Willoughby Jr., [facility]          Follow-up Information    None          Patrick Willoughby Jr., [facility]  11/13/22 2040

## 2022-11-27 ENCOUNTER — HOSPITAL ENCOUNTER (EMERGENCY)
Facility: HOSPITAL | Age: 62
Discharge: HOME | End: 2022-11-28
Attending: EMERGENCY MEDICINE
Payer: COMMERCIAL

## 2022-11-27 DIAGNOSIS — R07.89 CHEST WALL PAIN: Primary | ICD-10-CM

## 2022-11-27 LAB
ALBUMIN SERPL BCP-MCNC: 3.8 G/DL (ref 3.5–5.2)
ALP SERPL-CCNC: 75 U/L (ref 55–135)
ALT SERPL W/O P-5'-P-CCNC: 9 U/L (ref 10–44)
ANION GAP SERPL CALC-SCNC: 13 MMOL/L (ref 8–16)
AST SERPL-CCNC: 15 U/L (ref 10–40)
BASOPHILS # BLD AUTO: 0.03 K/UL (ref 0–0.2)
BASOPHILS NFR BLD: 0.4 % (ref 0–1.9)
BILIRUB SERPL-MCNC: 0.3 MG/DL (ref 0.1–1)
BUN SERPL-MCNC: 14 MG/DL (ref 6–20)
CALCIUM SERPL-MCNC: 10.4 MG/DL (ref 8.7–10.5)
CHLORIDE SERPL-SCNC: 102 MMOL/L (ref 95–110)
CO2 SERPL-SCNC: 26 MMOL/L (ref 23–29)
CREAT SERPL-MCNC: 1.1 MG/DL (ref 0.5–1.4)
DIFFERENTIAL METHOD: ABNORMAL
EOSINOPHIL # BLD AUTO: 0.2 K/UL (ref 0–0.5)
EOSINOPHIL NFR BLD: 3.2 % (ref 0–8)
ERYTHROCYTE [DISTWIDTH] IN BLOOD BY AUTOMATED COUNT: 13.5 % (ref 11.5–14.5)
EST. GFR  (NO RACE VARIABLE): 58 ML/MIN/1.73 M^2
GLUCOSE SERPL-MCNC: 105 MG/DL (ref 70–110)
HCT VFR BLD AUTO: 35.9 % (ref 37–48.5)
HGB BLD-MCNC: 11.8 G/DL (ref 12–16)
IMM GRANULOCYTES # BLD AUTO: 0.01 K/UL (ref 0–0.04)
IMM GRANULOCYTES NFR BLD AUTO: 0.1 % (ref 0–0.5)
LYMPHOCYTES # BLD AUTO: 1.4 K/UL (ref 1–4.8)
LYMPHOCYTES NFR BLD: 19.4 % (ref 18–48)
MCH RBC QN AUTO: 27.6 PG (ref 27–31)
MCHC RBC AUTO-ENTMCNC: 32.9 G/DL (ref 32–36)
MCV RBC AUTO: 84 FL (ref 82–98)
MONOCYTES # BLD AUTO: 0.6 K/UL (ref 0.3–1)
MONOCYTES NFR BLD: 7.7 % (ref 4–15)
NEUTROPHILS # BLD AUTO: 5 K/UL (ref 1.8–7.7)
NEUTROPHILS NFR BLD: 69.2 % (ref 38–73)
NRBC BLD-RTO: 0 /100 WBC
PLATELET # BLD AUTO: 275 K/UL (ref 150–450)
PMV BLD AUTO: 9.6 FL (ref 9.2–12.9)
POTASSIUM SERPL-SCNC: 3.5 MMOL/L (ref 3.5–5.1)
PROT SERPL-MCNC: 8 G/DL (ref 6–8.4)
RBC # BLD AUTO: 4.28 M/UL (ref 4–5.4)
SODIUM SERPL-SCNC: 141 MMOL/L (ref 136–145)
TROPONIN I SERPL DL<=0.01 NG/ML-MCNC: 0.01 NG/ML (ref 0–0.03)
TROPONIN I SERPL DL<=0.01 NG/ML-MCNC: 0.02 NG/ML (ref 0–0.03)
WBC # BLD AUTO: 7.17 K/UL (ref 3.9–12.7)

## 2022-11-27 PROCEDURE — 84484 ASSAY OF TROPONIN QUANT: CPT | Performed by: NURSE PRACTITIONER

## 2022-11-27 PROCEDURE — 84484 ASSAY OF TROPONIN QUANT: CPT | Mod: 91 | Performed by: EMERGENCY MEDICINE

## 2022-11-27 PROCEDURE — 96374 THER/PROPH/DIAG INJ IV PUSH: CPT

## 2022-11-27 PROCEDURE — 80053 COMPREHEN METABOLIC PANEL: CPT | Performed by: NURSE PRACTITIONER

## 2022-11-27 PROCEDURE — 93010 ELECTROCARDIOGRAM REPORT: CPT | Mod: ,,, | Performed by: STUDENT IN AN ORGANIZED HEALTH CARE EDUCATION/TRAINING PROGRAM

## 2022-11-27 PROCEDURE — 99285 EMERGENCY DEPT VISIT HI MDM: CPT | Mod: 25

## 2022-11-27 PROCEDURE — 63600175 PHARM REV CODE 636 W HCPCS: Performed by: EMERGENCY MEDICINE

## 2022-11-27 PROCEDURE — 93010 EKG 12-LEAD: ICD-10-PCS | Mod: ,,, | Performed by: STUDENT IN AN ORGANIZED HEALTH CARE EDUCATION/TRAINING PROGRAM

## 2022-11-27 PROCEDURE — 85025 COMPLETE CBC W/AUTO DIFF WBC: CPT | Performed by: NURSE PRACTITIONER

## 2022-11-27 PROCEDURE — 93005 ELECTROCARDIOGRAM TRACING: CPT

## 2022-11-27 RX ORDER — KETOROLAC TROMETHAMINE 30 MG/ML
15 INJECTION, SOLUTION INTRAMUSCULAR; INTRAVENOUS
Status: COMPLETED | OUTPATIENT
Start: 2022-11-27 | End: 2022-11-27

## 2022-11-27 RX ADMIN — KETOROLAC TROMETHAMINE 15 MG: 30 INJECTION, SOLUTION INTRAMUSCULAR; INTRAVENOUS at 11:11

## 2022-11-28 VITALS
TEMPERATURE: 98 F | DIASTOLIC BLOOD PRESSURE: 73 MMHG | SYSTOLIC BLOOD PRESSURE: 119 MMHG | HEART RATE: 66 BPM | OXYGEN SATURATION: 100 % | HEIGHT: 63 IN | BODY MASS INDEX: 18.29 KG/M2 | WEIGHT: 103.19 LBS | RESPIRATION RATE: 16 BRPM

## 2022-11-28 NOTE — FIRST PROVIDER EVALUATION
Medical screening examination initiated.  I have conducted a focused provider triage encounter, findings are as follows:    Brief history of present illness:  Midsternal chest pain starting today.    There were no vitals filed for this visit.    Pertinent physical exam:      Brief workup plan:      Preliminary workup initiated; this workup will be continued and followed by the physician or advanced practice provider that is assigned to the patient when roomed.

## 2022-11-28 NOTE — ED PROVIDER NOTES
"SCRIBE #1 NOTE: I, Doris Mir, am scribing for, and in the presence of, Jorge Alcantara MD. I have scribed the entire note.      History      Chief Complaint   Patient presents with    Chest Pain     STATES ONSET OF SHARP MIDSTERNAL CHEST PAIN SINCE 1430 TODAY. STATES CARDIAC HX. DENIES ANY N/V       Review of patient's allergies indicates:   Allergen Reactions    Penicillins         HPI   HPI    11/27/2022, 11:07 PM   History obtained from the patient      History of Present Illness: Yvonne Schmidt is a 62 y.o. female patient with a PMHx of GERD and thyroid disease who presents to the Emergency Department for mid-sternal CP which onset suddenly at 1430 today. The pt reports that she has had this pain before and when she was evaluated for it, she was told that she had "fluid around her heart and lungs." Has been working out more frequently lately. Symptoms are constant and moderate in severity. No mitigating or exacerbating factors reported. No associated sxs reported. Patient denies any fever, chills, N/V/D, diaphoresis, SOB, numbness, weakness, and all other sxs at this time. No prior Tx reported. No further complaints or concerns at this time.         Arrival mode: Personal vehicle      PCP: [facility]       Past Medical History:  Past Medical History:   Diagnosis Date    Anxiety     Bradycardia 9/19/2019    GERD (gastroesophageal reflux disease)     Thyroid disease        Past Surgical History:  Past Surgical History:   Procedure Laterality Date    BREAST BIOPSY      DENTAL SURGERY           Family History:  Family History   Problem Relation Age of Onset    Heart attack Mother     Hypertension Mother     Heart disease Father     Heart attack Maternal Aunt     Heart attack Maternal Uncle        Social History:  Social History     Tobacco Use    Smoking status: Former     Packs/day: 0.75     Years: 35.00     Pack years: 26.25     Types: Cigarettes     Start date: 1982     Quit date: 2019     " Years since quitting: 3.9    Smokeless tobacco: Never    Tobacco comments:     did not smoke for 2 years since beginning smoking.    Substance and Sexual Activity    Alcohol use: Never    Drug use: Never    Sexual activity: Yes     Partners: Male       ROS   Review of Systems   Constitutional:  Negative for chills, diaphoresis and fever.   HENT:  Negative for sore throat.    Respiratory:  Negative for shortness of breath.    Cardiovascular:  Positive for chest pain (mid-sternal).   Gastrointestinal:  Negative for diarrhea, nausea and vomiting.   Genitourinary:  Negative for dysuria.   Musculoskeletal:  Negative for back pain.   Skin:  Negative for rash.   Neurological:  Negative for weakness and numbness.   Hematological:  Does not bruise/bleed easily.   All other systems reviewed and are negative.    Physical Exam      Initial Vitals [11/27/22 2050]   BP Pulse Resp Temp SpO2   116/69 72 17 98.2 °F (36.8 °C) 98 %      MAP       --          Physical Exam  Nursing Notes and Vital Signs Reviewed.  Constitutional: Patient is in no acute distress. Well-developed and well-nourished.  Head: Atraumatic. Normocephalic.  Eyes: PERRL. EOM intact. Conjunctivae are not pale. No scleral icterus.  ENT: Mucous membranes are moist. Oropharynx is clear and symmetric.    Neck: Supple. Full ROM. No lymphadenopathy.  Cardiovascular: Regular rate. Regular rhythm. No murmurs, rubs, or gallops. Distal pulses are 2+ and symmetric.  Pulmonary/Chest: No respiratory distress. Clear to auscultation bilaterally. No wheezing or rales. There is mid-sternal tenderness to palpation.  Abdominal: Soft and non-distended.  There is no tenderness.  No rebound, guarding, or rigidity.   Musculoskeletal: Moves all extremities. No obvious deformities. No edema.  Skin: Warm and dry.  Neurological:  Alert, awake, and appropriate.  Normal speech.  No acute focal neurological deficits are appreciated.  Psychiatric: Normal affect. Good eye contact. Appropriate in  "content.    ED Course    Procedures  ED Vital Signs:  Vitals:    11/27/22 2050 11/27/22 2315 11/28/22 0000 11/28/22 0030   BP: 116/69 129/74 111/63 118/70   Pulse: 72 67 66 67   Resp: 17 16 16 16   Temp: 98.2 °F (36.8 °C)      TempSrc: Oral      SpO2: 98% 100% 100% 100%   Weight: 46.8 kg (103 lb 2.8 oz)      Height: 5' 3" (1.6 m)       11/28/22 0128   BP: 119/73   Pulse: 66   Resp: 16   Temp: 98.2 °F (36.8 °C)   TempSrc:    SpO2: 100%   Weight:    Height:        Abnormal Lab Results:  Labs Reviewed   CBC W/ AUTO DIFFERENTIAL - Abnormal; Notable for the following components:       Result Value    Hemoglobin 11.8 (*)     Hematocrit 35.9 (*)     All other components within normal limits   COMPREHENSIVE METABOLIC PANEL - Abnormal; Notable for the following components:    ALT 9 (*)     eGFR 58 (*)     All other components within normal limits   TROPONIN I   TROPONIN I        All Lab Results:  Results for orders placed or performed during the hospital encounter of 11/27/22   CBC auto differential   Result Value Ref Range    WBC 7.17 3.90 - 12.70 K/uL    RBC 4.28 4.00 - 5.40 M/uL    Hemoglobin 11.8 (L) 12.0 - 16.0 g/dL    Hematocrit 35.9 (L) 37.0 - 48.5 %    MCV 84 82 - 98 fL    MCH 27.6 27.0 - 31.0 pg    MCHC 32.9 32.0 - 36.0 g/dL    RDW 13.5 11.5 - 14.5 %    Platelets 275 150 - 450 K/uL    MPV 9.6 9.2 - 12.9 fL    Immature Granulocytes 0.1 0.0 - 0.5 %    Gran # (ANC) 5.0 1.8 - 7.7 K/uL    Immature Grans (Abs) 0.01 0.00 - 0.04 K/uL    Lymph # 1.4 1.0 - 4.8 K/uL    Mono # 0.6 0.3 - 1.0 K/uL    Eos # 0.2 0.0 - 0.5 K/uL    Baso # 0.03 0.00 - 0.20 K/uL    nRBC 0 0 /100 WBC    Gran % 69.2 38.0 - 73.0 %    Lymph % 19.4 18.0 - 48.0 %    Mono % 7.7 4.0 - 15.0 %    Eosinophil % 3.2 0.0 - 8.0 %    Basophil % 0.4 0.0 - 1.9 %    Differential Method Automated    Comprehensive metabolic panel   Result Value Ref Range    Sodium 141 136 - 145 mmol/L    Potassium 3.5 3.5 - 5.1 mmol/L    Chloride 102 95 - 110 mmol/L    CO2 26 23 - 29 " mmol/L    Glucose 105 70 - 110 mg/dL    BUN 14 6 - 20 mg/dL    Creatinine 1.1 0.5 - 1.4 mg/dL    Calcium 10.4 8.7 - 10.5 mg/dL    Total Protein 8.0 6.0 - 8.4 g/dL    Albumin 3.8 3.5 - 5.2 g/dL    Total Bilirubin 0.3 0.1 - 1.0 mg/dL    Alkaline Phosphatase 75 55 - 135 U/L    AST 15 10 - 40 U/L    ALT 9 (L) 10 - 44 U/L    Anion Gap 13 8 - 16 mmol/L    eGFR 58 (A) >60 mL/min/1.73 m^2   Troponin I   Result Value Ref Range    Troponin I 0.022 0.000 - 0.026 ng/mL   Troponin I   Result Value Ref Range    Troponin I 0.012 0.000 - 0.026 ng/mL         Imaging Results:  Imaging Results              X-Ray Chest PA And Lateral (Final result)  Result time 11/27/22 21:21:59      Final result by Bella Rodríguez MD (11/27/22 21:21:59)                   Impression:      No acute process seen      Electronically signed by: Marcos Blanco  Date:    11/27/2022  Time:    21:21               Narrative:    EXAMINATION:  XR CHEST PA AND LATERAL    CLINICAL HISTORY:  Chest pain, unspecified    TECHNIQUE:  PA and lateral views of the chest were performed.    COMPARISON:  None    FINDINGS:  Lungs are clear.  No acute osseous injury.  Cardiac silhouette within normal limits.                                     The EKG was ordered, reviewed, and independently interpreted by the ED provider.  Interpretation time: 20:55  Rate: 78 BPM  Rhythm: normal sinus rhythm  Interpretation: Right atrial enlargement. No STEMI.           The Emergency Provider reviewed the vital signs and test results, which are outlined above.    ED Discussion     12:59 AM: Reassessed pt at this time. Discussed with pt all pertinent ED information and results. Discussed pt dx and plan of tx. Gave pt all f/u and return to the ED instructions. All questions and concerns were addressed at this time. Pt expresses understanding of information and instructions, and is comfortable with plan to discharge. Pt is stable for discharge.    I discussed with patient and/or family/caretaker  that evaluation in the ED does not suggest any emergent or life threatening medical conditions requiring immediate intervention beyond what was provided in the ED, and I believe patient is safe for discharge.  Regardless, an unremarkable evaluation in the ED does not preclude the development or presence of a serious of life threatening condition. As such, patient was instructed to return immediately for any worsening or change in current symptoms.           ED Medication(s):  Medications   ketorolac injection 15 mg (15 mg Intravenous Given 11/27/22 1692)        Follow-up Information       [facility]. Call in 2 days.    Specialty: Family Medicine  Why: For re-evaluation and further treatment  Contact information:  [address] [facility] [address]  [phone]               [facility] - Emergency Dept.. Go today.    Specialty: Emergency Medicine  Why: If symptoms worsen, For re-evaluation and further treatment, As needed  Contact information:  [address] [facility] [id number] [phone]                           Discharge Medication List as of 11/28/2022  1:00 AM            Medical Decision Making    Medical Decision Making:   Clinical Tests:   Lab Tests: Ordered and Reviewed  Radiological Study: Ordered and Reviewed  Medical Tests: Ordered and Reviewed         Scribe Attestation:   Scribe #1: I performed the above scribed service and the documentation accurately describes the services I performed. I attest to the accuracy of the note.    Attending:   Physician Attestation Statement for Scribe #1: I, Jorge Alcantara MD, personally performed the services described in this documentation, as scribed by Doris Mir, in my presence, and it is both accurate and complete.          Clinical Impression       ICD-10-CM ICD-9-CM   1. Chest wall pain  R07.89 786.52       Disposition:   Disposition: Discharged  Condition: Stable       Jorge Alcantara  MD  11/28/22 0757

## 2022-12-06 ENCOUNTER — TELEPHONE (OUTPATIENT)
Dept: CARDIOLOGY | Facility: CLINIC | Age: 62
End: 2022-12-06
Payer: COMMERCIAL

## 2022-12-06 NOTE — TELEPHONE ENCOUNTER
LVM for pt to call back in regards to        Caller is requesting a sooner appointment.  [facility] declined first available appointment listed below.  Caller will not accept being placed on the waitlist and is requesting a message be sent to doctor.   Name of Caller: Yvonne   When is the first available appointment?12/12/2022   Symptoms: ER follow up, inflammation in the chest area between the breast   Would the patient rather a call back or a response via [facility]? Call back   Best Call Back Number: Please call her at [phone]   Additional Information:

## 2022-12-06 NOTE — TELEPHONE ENCOUNTER
Offered patient the first available appointment for a ER follow up next week on Dec 12, and the patient declined.-BR

## 2022-12-06 NOTE — TELEPHONE ENCOUNTER
----- Message from Elizabeth Rivera sent at 12/6/2022 10:12 AM CST -----  Contact: Yvonne  .Type:  Patient Returning Call    Who Called:Yvonne  Who Left Message for Patient:nurse  Does the patient know what this is regarding?:yes  Would the patient rather a call back or a response via [facility]? Call back  Best Call Back Number:[phone]  Additional Information: na        Thanks  HERNANDEZ

## 2022-12-30 ENCOUNTER — HOSPITAL ENCOUNTER (OUTPATIENT)
Dept: CARDIOLOGY | Facility: HOSPITAL | Age: 62
Discharge: HOME | End: 2022-12-30
Attending: INTERNAL MEDICINE
Payer: COMMERCIAL

## 2022-12-30 ENCOUNTER — OFFICE VISIT (OUTPATIENT)
Dept: CARDIOLOGY | Facility: CLINIC | Age: 62
End: 2022-12-30
Payer: COMMERCIAL

## 2022-12-30 VITALS
OXYGEN SATURATION: 99 % | HEART RATE: 85 BPM | WEIGHT: 102.06 LBS | HEIGHT: 63 IN | DIASTOLIC BLOOD PRESSURE: 66 MMHG | BODY MASS INDEX: 18.08 KG/M2 | SYSTOLIC BLOOD PRESSURE: 100 MMHG

## 2022-12-30 DIAGNOSIS — I10 ESSENTIAL HYPERTENSION: Primary | ICD-10-CM

## 2022-12-30 DIAGNOSIS — R00.1 BRADYCARDIA: ICD-10-CM

## 2022-12-30 DIAGNOSIS — R07.89 OTHER CHEST PAIN: ICD-10-CM

## 2022-12-30 DIAGNOSIS — R07.89 OTHER CHEST PAIN: Primary | ICD-10-CM

## 2022-12-30 DIAGNOSIS — Z87.891 FORMER TOBACCO USE: ICD-10-CM

## 2022-12-30 DIAGNOSIS — R00.2 PALPITATIONS: ICD-10-CM

## 2022-12-30 PROCEDURE — 3008F PR BODY MASS INDEX (BMI) DOCUMENTED: ICD-10-PCS | Mod: CPTII,S$GLB,, | Performed by: INTERNAL MEDICINE

## 2022-12-30 PROCEDURE — 1160F PR REVIEW ALL MEDS BY PRESCRIBER/CLIN PHARMACIST DOCUMENTED: ICD-10-PCS | Mod: CPTII,S$GLB,, | Performed by: INTERNAL MEDICINE

## 2022-12-30 PROCEDURE — 1160F RVW MEDS BY RX/DR IN RCRD: CPT | Mod: CPTII,S$GLB,, | Performed by: INTERNAL MEDICINE

## 2022-12-30 PROCEDURE — 3074F PR MOST RECENT SYSTOLIC BLOOD PRESSURE < 130 MM HG: ICD-10-PCS | Mod: CPTII,S$GLB,, | Performed by: INTERNAL MEDICINE

## 2022-12-30 PROCEDURE — 99204 OFFICE O/P NEW MOD 45 MIN: CPT | Mod: S$GLB,,, | Performed by: INTERNAL MEDICINE

## 2022-12-30 PROCEDURE — 3078F DIAST BP <80 MM HG: CPT | Mod: CPTII,S$GLB,, | Performed by: INTERNAL MEDICINE

## 2022-12-30 PROCEDURE — 99999 PR PBB SHADOW E&M-EST. PATIENT-LVL V: ICD-10-PCS | Mod: PBBFAC,,, | Performed by: INTERNAL MEDICINE

## 2022-12-30 PROCEDURE — 93005 ELECTROCARDIOGRAM TRACING: CPT

## 2022-12-30 PROCEDURE — 93010 ELECTROCARDIOGRAM REPORT: CPT | Mod: ,,, | Performed by: INTERNAL MEDICINE

## 2022-12-30 PROCEDURE — 99204 PR OFFICE/OUTPT VISIT, NEW, LEVL IV, 45-59 MIN: ICD-10-PCS | Mod: S$GLB,,, | Performed by: INTERNAL MEDICINE

## 2022-12-30 PROCEDURE — 3078F PR MOST RECENT DIASTOLIC BLOOD PRESSURE < 80 MM HG: ICD-10-PCS | Mod: CPTII,S$GLB,, | Performed by: INTERNAL MEDICINE

## 2022-12-30 PROCEDURE — 99999 PR PBB SHADOW E&M-EST. PATIENT-LVL V: CPT | Mod: PBBFAC,,, | Performed by: INTERNAL MEDICINE

## 2022-12-30 PROCEDURE — 93010 EKG 12-LEAD: ICD-10-PCS | Mod: ,,, | Performed by: INTERNAL MEDICINE

## 2022-12-30 PROCEDURE — 3074F SYST BP LT 130 MM HG: CPT | Mod: CPTII,S$GLB,, | Performed by: INTERNAL MEDICINE

## 2022-12-30 PROCEDURE — 1159F PR MEDICATION LIST DOCUMENTED IN MEDICAL RECORD: ICD-10-PCS | Mod: CPTII,S$GLB,, | Performed by: INTERNAL MEDICINE

## 2022-12-30 PROCEDURE — 1159F MED LIST DOCD IN RCRD: CPT | Mod: CPTII,S$GLB,, | Performed by: INTERNAL MEDICINE

## 2022-12-30 PROCEDURE — 3008F BODY MASS INDEX DOCD: CPT | Mod: CPTII,S$GLB,, | Performed by: INTERNAL MEDICINE

## 2022-12-30 RX ORDER — INDOMETHACIN 25 MG/1
25 CAPSULE ORAL 3 TIMES DAILY
Qty: 90 CAPSULE | Refills: 3 | Status: SHIPPED | OUTPATIENT
Start: 2022-12-30

## 2022-12-30 RX ORDER — RIMEGEPANT SULFATE 75 MG/75MG
75 TABLET, ORALLY DISINTEGRATING ORAL ONCE AS NEEDED
COMMUNITY
End: 2023-11-09

## 2022-12-30 RX ORDER — CYCLOBENZAPRINE HCL 5 MG
5 TABLET ORAL 3 TIMES DAILY PRN
COMMUNITY
End: 2023-11-09

## 2022-12-30 RX ORDER — BETAMETHASONE DIPROPIONATE 0.5 MG/G
CREAM TOPICAL 2 TIMES DAILY
COMMUNITY
End: 2023-11-09

## 2022-12-30 NOTE — PROGRESS NOTES
Subjective:   Patient ID:  Yvonne Schmidt is a 62 y.o. female who presents for follow-up of Chest Pain  Pt with atypical CP-pleuritic. Pt given muscle relaxers, topical ointment without releif.  EKG (-)  Pt of Dr. Hua  Hypertension  This is a chronic problem. The current episode started more than 1 year ago. The problem has been gradually improving since onset. The problem is controlled. Associated symptoms include chest pain. Pertinent negatives include no palpitations or shortness of breath. Past treatments include diuretics. The current treatment provides moderate improvement. There are no compliance problems.    Hyperlipidemia  This is a chronic problem. The current episode started more than 1 year ago. The problem is controlled. Associated symptoms include chest pain. Pertinent negatives include no shortness of breath. Current antihyperlipidemic treatment includes ezetimibe. The current treatment provides moderate improvement of lipids. There are no compliance problems.    Chest Pain   This is a recurrent problem. The current episode started in the past 7 days. The onset quality is gradual. The problem occurs intermittently. The problem has been waxing and waning. The pain is present in the substernal region. The pain is moderate. The quality of the pain is described as sharp. The pain does not radiate. Pertinent negatives include no dizziness, palpitations or shortness of breath.   Her past medical history is significant for hyperlipidemia.   Pertinent negatives for past medical history include no muscle weakness. Prior diagnostic workup includes stress thallium.     Review of Systems   Constitutional: Negative. Negative for weight gain.   HENT: Negative.     Eyes: Negative.    Cardiovascular:  Positive for chest pain. Negative for leg swelling and palpitations.   Respiratory: Negative.  Negative for shortness of breath.    Endocrine: Negative.    Hematologic/Lymphatic: Negative.    Skin: Negative.     Musculoskeletal:  Negative for muscle weakness.   Gastrointestinal: Negative.    Genitourinary: Negative.    Neurological: Negative.  Negative for dizziness.   Psychiatric/Behavioral: Negative.     Allergic/Immunologic: Negative.    All other systems reviewed and are negative.  Family History   Problem Relation Age of Onset    Heart attack Mother     Hypertension Mother     Heart disease Father     Heart attack Maternal Aunt     Heart attack Maternal Uncle      Past Medical History:   Diagnosis Date    Anxiety     Bradycardia 9/19/2019    GERD (gastroesophageal reflux disease)     Thyroid disease      Social History     Socioeconomic History    Marital status:    Tobacco Use    Smoking status: Former     Packs/day: 0.75     Years: 35.00     Pack years: 26.25     Types: Cigarettes     Start date: 1982     Quit date: 2019     Years since quitting: 3.9    Smokeless tobacco: Never    Tobacco comments:     did not smoke for 2 years since beginning smoking.    Substance and Sexual Activity    Alcohol use: Never    Drug use: Never    Sexual activity: Yes     Partners: Male     Current Outpatient Medications on File Prior to Visit   Medication Sig Dispense Refill    ALPRAZolam (XANAX) 0.5 MG tablet Take 1 tablet (0.5 mg total) by mouth 2 (two) times daily as needed (SIGNIFICANT ANXIETY). New Prescriber 60 tablet 2    augmented betamethasone dipropionate (DIPROLENE-AF) 0.05 % cream Apply topically 2 (two) times daily.      cyclobenzaprine (FLEXERIL) 5 MG tablet Take 5 mg by mouth 3 (three) times daily as needed for Muscle spasms.      diclofenac (VOLTAREN) 50 MG EC tablet Take 1 tablet (50 mg total) by mouth 2 (two) times daily. 20 tablet 0    ezetimibe (ZETIA) 10 mg tablet Take 1 tablet (10 mg total) by mouth once daily. 90 tablet 3    hydroCHLOROthiazide (HYDRODIURIL) 12.5 MG Tab Take 1 tablet (12.5 mg total) by mouth once daily. Do not take if BP is low and feeling dry/dehydrated/dizzy 90 tablet 1     levothyroxine (SYNTHROID) 50 MCG tablet Take 50 mcg by mouth before breakfast.      ondansetron (ZOFRAN-ODT) 8 MG TbDL Take 1 tablet (8 mg total) by mouth every 8 (eight) hours as needed (nausea). 20 tablet 1    rimegepant (NURTEC) 75 mg odt Take 75 mg by mouth once as needed for Migraine. Place ODT tablet on the tongue; alternatively the ODT tablet may be placed under the tongue      acetaminophen (TYLENOL) 500 MG tablet Take 500 mg by mouth as needed for Pain.      albuterol (PROVENTIL/VENTOLIN HFA) 90 mcg/actuation inhaler Inhale 2 puffs into the lungs every 4 (four) hours as needed for Wheezing or Shortness of Breath. Rescue 18 g 11    BUDESONIDE 0.6 MG/NORMAL SALINE 50 ML NASAL IRRIGATION Patient to irrigate each nostril with 25ml twice daily.      busPIRone (BUSPAR) 10 MG tablet Take 1 tablet (10 mg total) by mouth 2 (two) times daily. 60 tablet 2    calcium carbonate (TUMS) 300 mg (750 mg) Chew Take 750 mg by mouth.      FLUoxetine 20 MG capsule Take 1 capsule (20 mg total) by mouth once daily. Take IN ADDITION to Prozac 40 mg supply 30 capsule 2    HYDROcodone-acetaminophen (NORCO) 5-325 mg per tablet Take 1 tablet by mouth every 6 (six) hours as needed for Pain. 12 tablet 0    ketorolac (TORADOL) 10 mg tablet Take 1 tablet (10 mg total) by mouth every 6 (six) hours as needed for Pain. (Patient not taking: Reported on 2/22/2022) 12 tablet 0    Lactobacillus rhamnosus GG (CULTURELLE) 10 billion cell capsule Take 1 capsule by mouth once daily.      multivitamin (THERAGRAN) per tablet Take 1 tablet by mouth once daily.      omeprazole (PRILOSEC) 40 MG capsule Take 40 mg by mouth.       No current facility-administered medications on file prior to visit.     Review of patient's allergies indicates:   Allergen Reactions    Penicillins        Objective:     Physical Exam  Vitals and nursing note reviewed.   Constitutional:       Appearance: She is well-developed.   HENT:      Head: Normocephalic and atraumatic.    Eyes:      Conjunctiva/sclera: Conjunctivae normal.      Pupils: Pupils are equal, round, and reactive to light.   Cardiovascular:      Rate and Rhythm: Normal rate and regular rhythm.      Pulses: Intact distal pulses.      Heart sounds: Normal heart sounds.   Pulmonary:      Effort: Pulmonary effort is normal.      Breath sounds: Normal breath sounds.   Abdominal:      General: Bowel sounds are normal.      Palpations: Abdomen is soft.   Musculoskeletal:         General: Normal range of motion.      Cervical back: Normal range of motion and neck supple.   Skin:     General: Skin is warm and dry.   Neurological:      Mental Status: She is alert and oriented to person, place, and time.       Assessment:     1. Essential hypertension    2. Bradycardia    3. Other chest pain    4. Palpitations    5. Former tobacco use        Plan:     Essential hypertension    Bradycardia    Other chest pain    Palpitations    Former tobacco use        Echo  Nuclear stress test  Continue hctz-htn  Continue zetia- HLP  Add indocin  F/U [facility]

## 2023-01-03 ENCOUNTER — TELEPHONE (OUTPATIENT)
Dept: CARDIOLOGY | Facility: HOSPITAL | Age: 63
End: 2023-01-03
Payer: COMMERCIAL

## 2023-02-28 ENCOUNTER — HOSPITAL ENCOUNTER (OUTPATIENT)
Dept: CARDIOLOGY | Facility: HOSPITAL | Age: 63
Discharge: HOME | End: 2023-02-28
Attending: INTERNAL MEDICINE
Payer: COMMERCIAL

## 2023-02-28 ENCOUNTER — OFFICE VISIT (OUTPATIENT)
Dept: CARDIOLOGY | Facility: CLINIC | Age: 63
End: 2023-02-28
Payer: COMMERCIAL

## 2023-02-28 ENCOUNTER — HOSPITAL ENCOUNTER (OUTPATIENT)
Dept: RADIOLOGY | Facility: HOSPITAL | Age: 63
Discharge: HOME | End: 2023-02-28
Attending: INTERNAL MEDICINE
Payer: COMMERCIAL

## 2023-02-28 VITALS
OXYGEN SATURATION: 98 % | HEART RATE: 66 BPM | BODY MASS INDEX: 17.65 KG/M2 | WEIGHT: 99.63 LBS | DIASTOLIC BLOOD PRESSURE: 70 MMHG | SYSTOLIC BLOOD PRESSURE: 102 MMHG

## 2023-02-28 DIAGNOSIS — R07.89 OTHER CHEST PAIN: ICD-10-CM

## 2023-02-28 DIAGNOSIS — Z01.810 PREOP CARDIOVASCULAR EXAM: Primary | ICD-10-CM

## 2023-02-28 DIAGNOSIS — K21.9 GASTROESOPHAGEAL REFLUX DISEASE, UNSPECIFIED WHETHER ESOPHAGITIS PRESENT: ICD-10-CM

## 2023-02-28 DIAGNOSIS — R00.2 PALPITATIONS: ICD-10-CM

## 2023-02-28 DIAGNOSIS — I10 ESSENTIAL HYPERTENSION: ICD-10-CM

## 2023-02-28 DIAGNOSIS — R00.1 BRADYCARDIA: ICD-10-CM

## 2023-02-28 DIAGNOSIS — E78.49 OTHER HYPERLIPIDEMIA: ICD-10-CM

## 2023-02-28 DIAGNOSIS — R06.09 OTHER FORM OF DYSPNEA: ICD-10-CM

## 2023-02-28 DIAGNOSIS — Z87.891 FORMER TOBACCO USE: ICD-10-CM

## 2023-02-28 DIAGNOSIS — Z01.810 PREOP CARDIOVASCULAR EXAM: ICD-10-CM

## 2023-02-28 DIAGNOSIS — F41.0 PANIC ATTACKS: ICD-10-CM

## 2023-02-28 DIAGNOSIS — M19.90 INFLAMMATION OF JOINT: ICD-10-CM

## 2023-02-28 DIAGNOSIS — R06.02 SHORTNESS OF BREATH: ICD-10-CM

## 2023-02-28 PROCEDURE — 3074F SYST BP LT 130 MM HG: CPT | Mod: CPTII,S$GLB,, | Performed by: INTERNAL MEDICINE

## 2023-02-28 PROCEDURE — 99999 PR PBB SHADOW E&M-EST. PATIENT-LVL IV: CPT | Mod: PBBFAC,,, | Performed by: INTERNAL MEDICINE

## 2023-02-28 PROCEDURE — 3078F PR MOST RECENT DIASTOLIC BLOOD PRESSURE < 80 MM HG: ICD-10-PCS | Mod: CPTII,S$GLB,, | Performed by: INTERNAL MEDICINE

## 2023-02-28 PROCEDURE — 71046 XR CHEST PA AND LATERAL: ICD-10-PCS | Mod: 26,,, | Performed by: RADIOLOGY

## 2023-02-28 PROCEDURE — 3078F DIAST BP <80 MM HG: CPT | Mod: CPTII,S$GLB,, | Performed by: INTERNAL MEDICINE

## 2023-02-28 PROCEDURE — 99214 PR OFFICE/OUTPT VISIT, EST, LEVL IV, 30-39 MIN: ICD-10-PCS | Mod: S$GLB,,, | Performed by: INTERNAL MEDICINE

## 2023-02-28 PROCEDURE — 93005 ELECTROCARDIOGRAM TRACING: CPT

## 2023-02-28 PROCEDURE — 99999 PR PBB SHADOW E&M-EST. PATIENT-LVL IV: ICD-10-PCS | Mod: PBBFAC,,, | Performed by: INTERNAL MEDICINE

## 2023-02-28 PROCEDURE — 1160F RVW MEDS BY RX/DR IN RCRD: CPT | Mod: CPTII,S$GLB,, | Performed by: INTERNAL MEDICINE

## 2023-02-28 PROCEDURE — 1160F PR REVIEW ALL MEDS BY PRESCRIBER/CLIN PHARMACIST DOCUMENTED: ICD-10-PCS | Mod: CPTII,S$GLB,, | Performed by: INTERNAL MEDICINE

## 2023-02-28 PROCEDURE — 71046 X-RAY EXAM CHEST 2 VIEWS: CPT | Mod: TC

## 2023-02-28 PROCEDURE — 1159F MED LIST DOCD IN RCRD: CPT | Mod: CPTII,S$GLB,, | Performed by: INTERNAL MEDICINE

## 2023-02-28 PROCEDURE — 71046 X-RAY EXAM CHEST 2 VIEWS: CPT | Mod: 26,,, | Performed by: RADIOLOGY

## 2023-02-28 PROCEDURE — 3074F PR MOST RECENT SYSTOLIC BLOOD PRESSURE < 130 MM HG: ICD-10-PCS | Mod: CPTII,S$GLB,, | Performed by: INTERNAL MEDICINE

## 2023-02-28 PROCEDURE — 3008F PR BODY MASS INDEX (BMI) DOCUMENTED: ICD-10-PCS | Mod: CPTII,S$GLB,, | Performed by: INTERNAL MEDICINE

## 2023-02-28 PROCEDURE — 93010 ELECTROCARDIOGRAM REPORT: CPT | Mod: S$GLB,,, | Performed by: INTERNAL MEDICINE

## 2023-02-28 PROCEDURE — 93010 EKG 12-LEAD: ICD-10-PCS | Mod: S$GLB,,, | Performed by: INTERNAL MEDICINE

## 2023-02-28 PROCEDURE — 3008F BODY MASS INDEX DOCD: CPT | Mod: CPTII,S$GLB,, | Performed by: INTERNAL MEDICINE

## 2023-02-28 PROCEDURE — 99214 OFFICE O/P EST MOD 30 MIN: CPT | Mod: S$GLB,,, | Performed by: INTERNAL MEDICINE

## 2023-02-28 PROCEDURE — 1159F PR MEDICATION LIST DOCUMENTED IN MEDICAL RECORD: ICD-10-PCS | Mod: CPTII,S$GLB,, | Performed by: INTERNAL MEDICINE

## 2023-02-28 RX ORDER — ONDANSETRON 8 MG/1
8 TABLET, ORALLY DISINTEGRATING ORAL EVERY 8 HOURS PRN
Qty: 20 TABLET | Refills: 1 | Status: SHIPPED | OUTPATIENT
Start: 2023-02-28

## 2023-02-28 RX ORDER — HYDROCHLOROTHIAZIDE 12.5 MG/1
12.5 TABLET ORAL DAILY PRN
Qty: 90 TABLET | Refills: 1 | Status: SHIPPED | OUTPATIENT
Start: 2023-02-28 | End: 2023-10-19 | Stop reason: SDUPTHER

## 2023-02-28 NOTE — PROGRESS NOTES
Subjective:   Patient ID:  Yvonne Schmidt is a 62 y.o. female who presents for cardiac consult of No chief complaint on file.        The patient came in today for cardiac consult of No chief complaint on file.        Yvonne Schmidt is a 62 y.o. female pt with HTN, HLD, GERD, Anxiety, tobacco use presents for follow up CV eval.     9/11/19  Pt presented to the ER yesterday for CP. ECG with sinus julian V rate 49. Enzymes neg x 2, NTG caused worsening of CP.   She has been having intermittent chest pain for 2 weeks. Chest pain can lasts for a few sec, took BC which helped. She just started Nexium as well.   She does get lightheaded and feels like she may pass out.     10/17/19  ETT normal, Holter normal. Normal 2D ECHO. She still has some atypical CP and MORRIS. Has a lot of anxiety will refer to psych.    12/23/19  She has been gaining weight, now > 101 lbs. She has mild MORRIS only with significant exertional. She still has occ panic attacks, saw psychologist. She has CP relief with PPI.     11/20/20 - ER follow up  She went to Emergency Department for chest pain, onset several days PTA. Pt presented to the ED yesterday for similar sxs, and was discharged after a negative workup. Symptoms are constant and moderate in severity. Pain is worse when taking deep breaths. No associated sxs reported. Patient denies any fever, chills, n/v/d, SOB, weakness, numbness, dizziness, headache, and all other sxs at this time.   Labwork neg Trop, ECG CXR neg, CT chest with L sided pleural effusion and possible right heart dysfunction.   She has been having constant chest pain for 6-7 days. She was doing a lot of painting and inhaled fumes. She had GI cocktail as well.     11/19/20  She had recent eval by Dr. Mayen undergoing further rheum workup for elevated imflamm markers. Sed rate and CRP were elevated, started on colchicine and nsaids, she said colchicine was not covered by insurance. She also had elevated BP yesterday  160s/90s. ECHO normal BI V function, small pericardial effusion. Her pain is improved with NSAIDS.     12/17/20  Started Norvasc last visit,  Remains on HCTZ. BP well controlled today 28032. Pulm eval with tests pending - PFTs, pulm stress test. She still has MORRIS, chest pain has improved. Recent CXR neg for effusion.     9/23/21  She has been feeling more heart burn feeling, in center of chest not has hard as in past. She also has MORRIS. She had  Possible COVID in Dec 2020.   ECG - NSR, LAE    10/19/21  BP 140s/80s. HR 50s  CRP elevated - labs are abnormal with elevated inflammatory markers, start motrin 600 mg three times a day for 7 days, colchine 0.6mg twice a day for 14 days, follow up with me in 3 -4 weeks, referral placed for rheumatology as well.   Has improved somewhat but stil has pain.     2/22/22  BP stable, HR 50s. Overall feels stable but has more fatigue. She saw Dr. Murphy, no changes in meds had labs/Xray ordered.     10/27/22  Recent lipid panel with Tc 258, , Tg 212, HDL 47. She has been using inhaler pRN  ECG - NSR, biatral enlargement    2/28/23  She had adverse side effects of statin so DC'd and changed to Zetia. Had back pain and chest pain went to ER twice and followed up with Dr. Justice in Dec 2022 - ECHO and Nuclear stress ordered but was rescheduled and not completed still. Her CP seems more MSK/atypical.     She will have sinus surgery with Dr. Lundberg 3/10/23    Patient feels no leg swelling, no PND,  no syncope, no CNS symptoms.    Patient has fairly good exercise tolerance.    Patient is compliant with medications.    FH - mother - MI s/p stents      Cholesterol 100 - 199 mg/dL 258 High     Triglycerides 0 - 149 mg/dL 212 High     HDL >39 mg/dL 47    VLDL Cholesterol Bora 5 - 40 mg/dL 40    LDL Calculated 0 - 99 mg/dL 171 High         Results for orders placed during the hospital encounter of 09/27/21    Echo    Interpretation Summary  · The left ventricle is normal in size with  concentric remodeling and normal systolic function.  · The estimated ejection fraction is 60%.  · Normal left ventricular diastolic function.  · Normal right ventricular size with normal right ventricular systolic function.  · Mild tricuspid regurgitation.  · Normal central venous pressure (3 mmHg).  · The estimated PA systolic pressure is 16 mmHg.      11/9/20 CT chest  Impression:    No evidence of pulmonary embolism.  Cardiomegaly.  Reflux into the IVC and hepatic veins suggest some component of right heart dysfunction.  Consider cardiac echo  Small left-sided pleural effusion with left lower lobe atelectasis.        TEST DESCRIPTION   PREDOMINANT RHYTHM  1. Sinus rhythm with heart rates varying between 47 and 103 bpm with an average of 61 bpm.   VENTRICULAR ARRHYTHMIAS  1. There were no PVCs. There was no ventricular ectopic activity.  SUPRA VENTRICULAR ARRHYTHMIAS  1. There were very rare PACs totalling 59 and averaging 1 per hour.  There were 4 monomorphic couplets.  2. There were no episodes of sustained supraventricular tachycardia.    EKG Conclusions:    1. The EKG portion of this study is negative for ischemia at a moderate workload, and peak heart rate of 89 bpm (57% of predicted).   2. Exercise capacity is average.   3. Blood pressure response to exercise was normal (Presenting BP: 150/86 Peak BP: 144/91).   4. No significant arrhythmias were present.   5. There were no symptoms of chest discomfort or significant dyspnea throughout the protocol.   6. The Duke treadmill score was 6 suggesting a low probability for future cardiovascular events.    This document has been electronically    SIGNED BY: Pablo Hua MD On: 10/10/2019 12:59    Past Medical History:   Diagnosis Date    Anxiety     Bradycardia 9/19/2019    GERD (gastroesophageal reflux disease)     Thyroid disease        Past Surgical History:   Procedure Laterality Date    BREAST BIOPSY      DENTAL SURGERY         Social History     Tobacco Use     Smoking status: Former     Packs/day: 0.75     Years: 35.00     Pack years: 26.25     Types: Cigarettes     Start date:      Quit date: 2019     Years since quittin.1    Smokeless tobacco: Never    Tobacco comments:     did not smoke for 2 years since beginning smoking.    Substance Use Topics    Alcohol use: Never    Drug use: Never       Family History   Problem Relation Age of Onset    Heart attack Mother     Hypertension Mother     Heart disease Father     Heart attack Maternal Aunt     Heart attack Maternal Uncle        Patient's Medications   New Prescriptions    No medications on file   Previous Medications    ACETAMINOPHEN (TYLENOL) 500 MG TABLET    Take 500 mg by mouth as needed for Pain.    ALBUTEROL (PROVENTIL/VENTOLIN HFA) 90 MCG/ACTUATION INHALER    Inhale 2 puffs into the lungs every 4 (four) hours as needed for Wheezing or Shortness of Breath. Rescue    ALPRAZOLAM (XANAX) 0.5 MG TABLET    Take 1 tablet (0.5 mg total) by mouth 2 (two) times daily as needed (SIGNIFICANT ANXIETY). New Prescriber    AUGMENTED BETAMETHASONE DIPROPIONATE (DIPROLENE-AF) 0.05 % CREAM    Apply topically 2 (two) times daily.    BUDESONIDE 0.6 MG/NORMAL SALINE 50 ML NASAL IRRIGATION    Patient to irrigate each nostril with 25ml twice daily.    BUSPIRONE (BUSPAR) 10 MG TABLET    Take 1 tablet (10 mg total) by mouth 2 (two) times daily.    CALCIUM CARBONATE (TUMS) 300 MG (750 MG) CHEW    Take 750 mg by mouth.    CYCLOBENZAPRINE (FLEXERIL) 5 MG TABLET    Take 5 mg by mouth 3 (three) times daily as needed for Muscle spasms.    EZETIMIBE (ZETIA) 10 MG TABLET    Take 1 tablet (10 mg total) by mouth once daily.    FLUOXETINE 20 MG CAPSULE    Take 1 capsule (20 mg total) by mouth once daily. Take IN ADDITION to Prozac 40 mg supply    HYDROCHLOROTHIAZIDE (HYDRODIURIL) 12.5 MG TAB    Take 1 tablet (12.5 mg total) by mouth once daily. Do not take if BP is low and feeling dry/dehydrated/dizzy    HYDROCODONE-ACETAMINOPHEN (NORCO)  5-325 MG PER TABLET    Take 1 tablet by mouth every 6 (six) hours as needed for Pain.    INDOMETHACIN (INDOCIN) 25 MG CAPSULE    Take 1 capsule (25 mg total) by mouth 3 (three) times daily.    KETOROLAC (TORADOL) 10 MG TABLET    Take 1 tablet (10 mg total) by mouth every 6 (six) hours as needed for Pain.    LACTOBACILLUS RHAMNOSUS GG (CULTURELLE) 10 BILLION CELL CAPSULE    Take 1 capsule by mouth once daily.    LEVOTHYROXINE (SYNTHROID) 50 MCG TABLET    Take 50 mcg by mouth before breakfast.    MULTIVITAMIN (THERAGRAN) PER TABLET    Take 1 tablet by mouth once daily.    OMEPRAZOLE (PRILOSEC) 40 MG CAPSULE    Take 40 mg by mouth.    ONDANSETRON (ZOFRAN-ODT) 8 MG TBDL    Take 1 tablet (8 mg total) by mouth every 8 (eight) hours as needed (nausea).    RIMEGEPANT (NURTEC) 75 MG ODT    Take 75 mg by mouth once as needed for Migraine. Place ODT tablet on the tongue; alternatively the ODT tablet may be placed under the tongue   Modified Medications    No medications on file   Discontinued Medications    No medications on file       Review of Systems   Constitutional: Negative.    HENT: Negative.     Eyes: Negative.    Respiratory:  Positive for shortness of breath.    Cardiovascular:  Positive for chest pain.   Gastrointestinal: Negative.    Genitourinary: Negative.    Musculoskeletal: Negative.    Skin: Negative.    Neurological:  Positive for dizziness.   Endo/Heme/Allergies: Negative.    Psychiatric/Behavioral:  The patient is nervous/anxious.    All 12 systems otherwise negative.    Wt Readings from Last 3 Encounters:   12/30/22 46.3 kg (102 lb 1.2 oz)   11/27/22 46.8 kg (103 lb 2.8 oz)   11/13/22 46 kg (101 lb 6.6 oz)     Temp Readings from Last 3 Encounters:   11/28/22 98.2 °F (36.8 °C)   11/13/22 98.6 °F (37 °C) (Oral)   08/12/21 97.9 °F (36.6 °C) (Oral)     BP Readings from Last 3 Encounters:   12/30/22 100/66   11/28/22 119/73   11/13/22 137/69     Pulse Readings from Last 3 Encounters:   12/30/22 85   11/28/22 66    11/13/22 60       There were no vitals taken for this visit.    Objective:   Physical Exam  Vitals and nursing note reviewed.   Constitutional:       General: She is not in acute distress.     Appearance: She is well-developed. She is not diaphoretic.   HENT:      Head: Normocephalic and atraumatic.      Nose: Nose normal.   Eyes:      General: No scleral icterus.     Conjunctiva/sclera: Conjunctivae normal.   Neck:      Thyroid: No thyromegaly.      Vascular: No JVD.   Cardiovascular:      Rate and Rhythm: Regular rhythm. Bradycardia present.      Heart sounds: S1 normal and S2 normal. No murmur heard.    No friction rub. No gallop. No S3 or S4 sounds.   Pulmonary:      Effort: Pulmonary effort is normal. No respiratory distress.      Breath sounds: Normal breath sounds. No stridor. No wheezing or rales.   Chest:      Chest wall: No tenderness.   Abdominal:      General: Bowel sounds are normal. There is no distension.      Palpations: Abdomen is soft. There is no mass.      Tenderness: There is no abdominal tenderness. There is no rebound.   Genitourinary:     Comments: Deferred  Musculoskeletal:         General: No tenderness or deformity. Normal range of motion.      Cervical back: Normal range of motion and neck supple.   Lymphadenopathy:      Cervical: No cervical adenopathy.   Skin:     General: Skin is warm and dry.      Coloration: Skin is not pale.      Findings: No erythema or rash.   Neurological:      Mental Status: She is alert and oriented to person, place, and time.      Motor: No abnormal muscle tone.      Coordination: Coordination normal.   Psychiatric:         Behavior: Behavior normal.         Thought Content: Thought content normal.         Judgment: Judgment normal.       Lab Results   Component Value Date     11/27/2022    K 3.5 11/27/2022     11/27/2022    CO2 26 11/27/2022    BUN 14 11/27/2022    CREATININE 1.1 11/27/2022     11/27/2022    MG 3.7 (H) 08/12/2021    AST  15 11/27/2022    ALT 9 (L) 11/27/2022    ALBUMIN 3.8 11/27/2022    PROT 8.0 11/27/2022    BILITOT 0.3 11/27/2022    WBC 7.17 11/27/2022    HGB 11.8 (L) 11/27/2022    HCT 35.9 (L) 11/27/2022    MCV 84 11/27/2022     11/27/2022    INR 1.1 09/10/2019    TSH 9.597 (H) 11/19/2020    BNP 35 11/08/2020     Assessment:      1. Other chest pain    2. Essential hypertension    3. Bradycardia    4. Palpitations    5. Former tobacco use    6. Other hyperlipidemia    7. Panic attacks    8. Other form of dyspnea    9. Inflammation of joint    10. Gastroesophageal reflux disease, unspecified whether esophagitis present    11. Shortness of breath            Plan:   1. Chest pain, recurrent sec to inflammation   - small pericardial effusion in past  - f/u rheum eval   - cont motrin PRN or liquid advil   - ECHO and Nuclear stress    2. GERD  - cont PPI  - f/u GI    3. Bradycardia with occ palpitations   - Holter - negative  -TSH, T4 - normal   - off BB    4. Tobacco use   - has quit smoking     5. Anxiety  - referred to psych    6. Pleural effusion  - f/u pulm     7. HTN - well controlled  - cont meds and titrate    8. Hypothyroidism   - cont Synthroid    9. HLD, elevated TGs  - Lipitor 40mg - adverse side effects of statin so DC'd and changed to Zetia.   - repeat labs in 3 months   - takes fish oils     10.  Pre-OP CV evaluation prior to sinus surgery with Dr. Lundberg 3/10/23 - fax number [phone]  Low periop risk of CV events for moderate risk procedure.  Ok to proceed to the scheduled surgery without further cardiac study.  ECG - sinus julian - stable     Thank you for allowing me to participate in this patient's care. Please do not hesitate to contact me with any questions or concerns. Consult note has been forwarded to the referral physician.     Pablo Hua MD, [facility]  Cardiovascular Disease  [facility], [place]  [phone] (P)

## 2023-03-29 ENCOUNTER — TELEPHONE (OUTPATIENT)
Dept: CARDIOLOGY | Facility: CLINIC | Age: 63
End: 2023-03-29

## 2023-03-29 ENCOUNTER — HOSPITAL ENCOUNTER (OUTPATIENT)
Dept: CARDIOLOGY | Facility: HOSPITAL | Age: 63
Discharge: HOME | End: 2023-03-29
Attending: INTERNAL MEDICINE
Payer: COMMERCIAL

## 2023-03-29 VITALS
SYSTOLIC BLOOD PRESSURE: 102 MMHG | HEART RATE: 60 BPM | HEIGHT: 63 IN | BODY MASS INDEX: 17.54 KG/M2 | DIASTOLIC BLOOD PRESSURE: 70 MMHG | WEIGHT: 99 LBS

## 2023-03-29 DIAGNOSIS — F41.0 PANIC ATTACKS: ICD-10-CM

## 2023-03-29 DIAGNOSIS — R00.2 PALPITATIONS: ICD-10-CM

## 2023-03-29 DIAGNOSIS — I10 ESSENTIAL HYPERTENSION: ICD-10-CM

## 2023-03-29 DIAGNOSIS — Z01.810 PREOP CARDIOVASCULAR EXAM: ICD-10-CM

## 2023-03-29 DIAGNOSIS — R06.02 SHORTNESS OF BREATH: ICD-10-CM

## 2023-03-29 DIAGNOSIS — M19.90 INFLAMMATION OF JOINT: ICD-10-CM

## 2023-03-29 LAB
AORTIC ROOT ANNULUS: 2.49 CM
ASCENDING AORTA: 2.75 CM
AV INDEX (PROSTH): 0.79
AV MEAN GRADIENT: 3 MMHG
AV PEAK GRADIENT: 6 MMHG
AV VALVE AREA: 2.16 CM2
AV VELOCITY RATIO: 0.91
BSA FOR ECHO PROCEDURE: 1.41 M2
CV ECHO LV RWT: 0.51 CM
DOP CALC AO PEAK VEL: 1.18 M/S
DOP CALC AO VTI: 24.9 CM
DOP CALC LVOT AREA: 2.7 CM2
DOP CALC LVOT DIAMETER: 1.87 CM
DOP CALC LVOT PEAK VEL: 1.07 M/S
DOP CALC LVOT STROKE VOLUME: 53.8 CM3
DOP CALC RVOT PEAK VEL: 0.63 M/S
DOP CALC RVOT VTI: 14.7 CM
DOP CALCLVOT PEAK VEL VTI: 19.6 CM
E WAVE DECELERATION TIME: 203.51 MSEC
E/A RATIO: 0.9
E/E' RATIO: 8.59 M/S
ECHO LV POSTERIOR WALL: 0.86 CM (ref 0.6–1.1)
EJECTION FRACTION: 60 %
FRACTIONAL SHORTENING: 31 % (ref 28–44)
INTERVENTRICULAR SEPTUM: 0.96 CM (ref 0.6–1.1)
IVRT: 94.2 MSEC
LA MAJOR: 3.06 CM
LA MINOR: 3.05 CM
LA WIDTH: 3.5 CM
LEFT ATRIUM SIZE: 2.1 CM
LEFT ATRIUM VOLUME INDEX MOD: 11.9 ML/M2
LEFT ATRIUM VOLUME INDEX: 13.3 ML/M2
LEFT ATRIUM VOLUME MOD: 16.96 CM3
LEFT ATRIUM VOLUME: 19.09 CM3
LEFT INTERNAL DIMENSION IN SYSTOLE: 2.33 CM (ref 2.1–4)
LEFT VENTRICLE DIASTOLIC VOLUME INDEX: 32.68 ML/M2
LEFT VENTRICLE DIASTOLIC VOLUME: 46.73 ML
LEFT VENTRICLE MASS INDEX: 60 G/M2
LEFT VENTRICLE SYSTOLIC VOLUME INDEX: 13.2 ML/M2
LEFT VENTRICLE SYSTOLIC VOLUME: 18.82 ML
LEFT VENTRICULAR INTERNAL DIMENSION IN DIASTOLE: 3.38 CM (ref 3.5–6)
LEFT VENTRICULAR MASS: 85.46 G
LV LATERAL E/E' RATIO: 8.11 M/S
LV SEPTAL E/E' RATIO: 9.13 M/S
LVOT MG: 2.31 MMHG
LVOT MV: 0.7 CM/S
MV PEAK A VEL: 0.81 M/S
MV PEAK E VEL: 0.73 M/S
MV STENOSIS PRESSURE HALF TIME: 59.02 MS
MV VALVE AREA P 1/2 METHOD: 3.73 CM2
PISA TR MAX VEL: 1.52 M/S
PULM VEIN S/D RATIO: 1.66
PV MEAN GRADIENT: 0.98 MMHG
PV PEAK D VEL: 0.32 M/S
PV PEAK S VEL: 0.53 M/S
PV PEAK VELOCITY: 0.92 CM/S
RA MAJOR: 3.31 CM
RA PRESSURE: 3 MMHG
RA WIDTH: 2.86 CM
RIGHT VENTRICULAR END-DIASTOLIC DIMENSION: 2.59 CM
SINUS: 2.67 CM
STJ: 2.58 CM
TDI LATERAL: 0.09 M/S
TDI SEPTAL: 0.08 M/S
TDI: 0.09 M/S
TR MAX PG: 9 MMHG
TV REST PULMONARY ARTERY PRESSURE: 12 MMHG

## 2023-03-29 PROCEDURE — 93306 ECHO (CUPID ONLY): ICD-10-PCS | Mod: 26,,, | Performed by: INTERNAL MEDICINE

## 2023-03-29 PROCEDURE — 93306 TTE W/DOPPLER COMPLETE: CPT | Mod: 26,,, | Performed by: INTERNAL MEDICINE

## 2023-03-29 PROCEDURE — 93306 TTE W/DOPPLER COMPLETE: CPT

## 2023-03-29 NOTE — TELEPHONE ENCOUNTER
Spoke with pt in regards to       Please contact the patient and let them know that their results of echo reveal Overall normal heart function and valves. Very minimal extra fluid around the heart, will continue to monitor it. Per Javid      Pt verbalized understanding with no questions or concerns

## 2023-06-15 ENCOUNTER — OFFICE VISIT (OUTPATIENT)
Dept: RHEUMATOLOGY | Facility: CLINIC | Age: 63
End: 2023-06-15
Payer: COMMERCIAL

## 2023-06-15 VITALS
DIASTOLIC BLOOD PRESSURE: 80 MMHG | WEIGHT: 104.94 LBS | SYSTOLIC BLOOD PRESSURE: 115 MMHG | HEIGHT: 63 IN | HEART RATE: 65 BPM | BODY MASS INDEX: 18.59 KG/M2

## 2023-06-15 DIAGNOSIS — M15.4 EROSIVE OSTEOARTHRITIS: ICD-10-CM

## 2023-06-15 DIAGNOSIS — R76.8 POSITIVE ANA (ANTINUCLEAR ANTIBODY): Primary | ICD-10-CM

## 2023-06-15 DIAGNOSIS — R06.09 DOE (DYSPNEA ON EXERTION): ICD-10-CM

## 2023-06-15 DIAGNOSIS — E03.9 HYPOTHYROIDISM, UNSPECIFIED TYPE: ICD-10-CM

## 2023-06-15 DIAGNOSIS — F41.0 PANIC ATTACKS: ICD-10-CM

## 2023-06-15 DIAGNOSIS — J84.9 INTERSTITIAL PULMONARY DISEASE, UNSPECIFIED: ICD-10-CM

## 2023-06-15 DIAGNOSIS — F33.1 DEPRESSION, MAJOR, RECURRENT, MODERATE: ICD-10-CM

## 2023-06-15 DIAGNOSIS — K21.9 GASTROESOPHAGEAL REFLUX DISEASE, UNSPECIFIED WHETHER ESOPHAGITIS PRESENT: ICD-10-CM

## 2023-06-15 DIAGNOSIS — F41.0 GENERALIZED ANXIETY DISORDER WITH PANIC ATTACKS: ICD-10-CM

## 2023-06-15 DIAGNOSIS — F41.1 GENERALIZED ANXIETY DISORDER WITH PANIC ATTACKS: ICD-10-CM

## 2023-06-15 DIAGNOSIS — Z87.891 FORMER TOBACCO USE: ICD-10-CM

## 2023-06-15 DIAGNOSIS — J32.9 SINUSITIS, UNSPECIFIED CHRONICITY, UNSPECIFIED LOCATION: ICD-10-CM

## 2023-06-15 PROCEDURE — 1159F MED LIST DOCD IN RCRD: CPT | Mod: CPTII,S$GLB,, | Performed by: INTERNAL MEDICINE

## 2023-06-15 PROCEDURE — 99999 PR PBB SHADOW E&M-EST. PATIENT-LVL IV: ICD-10-PCS | Mod: PBBFAC,,, | Performed by: INTERNAL MEDICINE

## 2023-06-15 PROCEDURE — 3079F PR MOST RECENT DIASTOLIC BLOOD PRESSURE 80-89 MM HG: ICD-10-PCS | Mod: CPTII,S$GLB,, | Performed by: INTERNAL MEDICINE

## 2023-06-15 PROCEDURE — 3008F PR BODY MASS INDEX (BMI) DOCUMENTED: ICD-10-PCS | Mod: CPTII,S$GLB,, | Performed by: INTERNAL MEDICINE

## 2023-06-15 PROCEDURE — 3079F DIAST BP 80-89 MM HG: CPT | Mod: CPTII,S$GLB,, | Performed by: INTERNAL MEDICINE

## 2023-06-15 PROCEDURE — 99214 OFFICE O/P EST MOD 30 MIN: CPT | Mod: S$GLB,,, | Performed by: INTERNAL MEDICINE

## 2023-06-15 PROCEDURE — 99214 PR OFFICE/OUTPT VISIT, EST, LEVL IV, 30-39 MIN: ICD-10-PCS | Mod: S$GLB,,, | Performed by: INTERNAL MEDICINE

## 2023-06-15 PROCEDURE — 99999 PR PBB SHADOW E&M-EST. PATIENT-LVL IV: CPT | Mod: PBBFAC,,, | Performed by: INTERNAL MEDICINE

## 2023-06-15 PROCEDURE — 3074F SYST BP LT 130 MM HG: CPT | Mod: CPTII,S$GLB,, | Performed by: INTERNAL MEDICINE

## 2023-06-15 PROCEDURE — 1159F PR MEDICATION LIST DOCUMENTED IN MEDICAL RECORD: ICD-10-PCS | Mod: CPTII,S$GLB,, | Performed by: INTERNAL MEDICINE

## 2023-06-15 PROCEDURE — 3008F BODY MASS INDEX DOCD: CPT | Mod: CPTII,S$GLB,, | Performed by: INTERNAL MEDICINE

## 2023-06-15 PROCEDURE — 3074F PR MOST RECENT SYSTOLIC BLOOD PRESSURE < 130 MM HG: ICD-10-PCS | Mod: CPTII,S$GLB,, | Performed by: INTERNAL MEDICINE

## 2023-06-15 NOTE — PROGRESS NOTES
RHEUMATOLOGY OUTPATIENT CLINIC NOTE    6/15/2023    Attending Rheumatologist: Irwin Murphy  Primary Care Provider/Physician Requesting Consultation: MINH Ryan   Chief Complaint/Reason For Consultation:  Positive GERARDO      Subjective:     Yvonne Schmidt is a 62 y.o. White female with +gerardo for follow up visit.    Main concern of chronic hand arthralgias w/ predominant mechanical pattern. Reports intermittent hand swelling.    Review of Systems   Constitutional:  Positive for malaise/fatigue. Negative for fever.   Eyes:  Negative for photophobia and pain.   Respiratory:  Negative for hemoptysis and shortness of breath (MORRIS, chronic).    Gastrointestinal:  Negative for blood in stool and melena.        Chronic dysphagia to solids and liquids   Genitourinary:  Negative for hematuria.   Musculoskeletal:  Positive for joint pain.   Skin:  Negative for rash.        Denies RP   Neurological:  Positive for weakness. Negative for focal weakness.     Chronic comorbid conditions affecting medical decision making today:  Past Medical History:   Diagnosis Date    Anxiety     Bradycardia 2019    GERD (gastroesophageal reflux disease)     Thyroid disease      Past Surgical History:   Procedure Laterality Date    BREAST BIOPSY      DENTAL SURGERY      SINUS SURGERY       Family History   Problem Relation Age of Onset    Heart attack Mother     Hypertension Mother     Heart disease Father     Heart attack Maternal Aunt     Heart attack Maternal Uncle      Social History     Tobacco Use   Smoking Status Former    Packs/day: 0.75    Years: 35.00    Pack years: 26.25    Types: Cigarettes    Start date:     Quit date:     Years since quittin.4   Smokeless Tobacco Never   Tobacco Comments    did not smoke for 2 years since beginning smoking.        Current Outpatient Medications:     acetaminophen (TYLENOL) 500 MG tablet, Take 500 mg by mouth as needed for Pain., Disp: , Rfl:     albuterol  (PROVENTIL/VENTOLIN HFA) 90 mcg/actuation inhaler, Inhale 2 puffs into the lungs every 4 (four) hours as needed for Wheezing or Shortness of Breath. Rescue, Disp: 18 g, Rfl: 11    ALPRAZolam (XANAX) 0.5 MG tablet, Take 1 tablet (0.5 mg total) by mouth 2 (two) times daily as needed (SIGNIFICANT ANXIETY). New Prescriber, Disp: 60 tablet, Rfl: 2    augmented betamethasone dipropionate (DIPROLENE-AF) 0.05 % cream, Apply topically 2 (two) times daily., Disp: , Rfl:     BUDESONIDE 0.6 MG/NORMAL SALINE 50 ML NASAL IRRIGATION, Patient to irrigate each nostril with 25ml twice daily., Disp: , Rfl:     busPIRone (BUSPAR) 10 MG tablet, Take 1 tablet (10 mg total) by mouth 2 (two) times daily., Disp: 60 tablet, Rfl: 2    calcium carbonate (TUMS) 300 mg (750 mg) Chew, Take 750 mg by mouth., Disp: , Rfl:     cyclobenzaprine (FLEXERIL) 5 MG tablet, Take 5 mg by mouth 3 (three) times daily as needed for Muscle spasms., Disp: , Rfl:     ezetimibe (ZETIA) 10 mg tablet, Take 1 tablet (10 mg total) by mouth once daily., Disp: 90 tablet, Rfl: 3    FLUoxetine 20 MG capsule, Take 1 capsule (20 mg total) by mouth once daily. Take IN ADDITION to Prozac 40 mg supply, Disp: 30 capsule, Rfl: 2    hydroCHLOROthiazide (HYDRODIURIL) 12.5 MG Tab, Take 1 tablet (12.5 mg total) by mouth daily as needed (edema, swelling). Do not take if BP is low and feeling dry/dehydrated/dizzy, Disp: 90 tablet, Rfl: 1    HYDROcodone-acetaminophen (NORCO) 5-325 mg per tablet, Take 1 tablet by mouth every 6 (six) hours as needed for Pain., Disp: 12 tablet, Rfl: 0    indomethacin (INDOCIN) 25 MG capsule, Take 1 capsule (25 mg total) by mouth 3 (three) times daily., Disp: 90 capsule, Rfl: 3    ketorolac (TORADOL) 10 mg tablet, Take 1 tablet (10 mg total) by mouth every 6 (six) hours as needed for Pain., Disp: 12 tablet, Rfl: 0    Lactobacillus rhamnosus GG (CULTURELLE) 10 billion cell capsule, Take 1 capsule by mouth once daily., Disp: , Rfl:     levothyroxine  (SYNTHROID) 50 MCG tablet, Take 50 mcg by mouth before breakfast., Disp: , Rfl:     multivitamin (THERAGRAN) per tablet, Take 1 tablet by mouth once daily., Disp: , Rfl:     omeprazole (PRILOSEC) 40 MG capsule, Take 40 mg by mouth., Disp: , Rfl:     ondansetron (ZOFRAN-ODT) 8 MG TbDL, Take 1 tablet (8 mg total) by mouth every 8 (eight) hours as needed (nausea)., Disp: 20 tablet, Rfl: 1    rimegepant (NURTEC) 75 mg odt, Take 75 mg by mouth once as needed for Migraine. Place ODT tablet on the tongue; alternatively the ODT tablet may be placed under the tongue, Disp: , Rfl:      Objective:     Vitals:    06/15/23 1355   BP: 115/80   Pulse: 65     Physical Exam   Eyes: Conjunctivae are normal.   Pulmonary/Chest: Effort normal. No respiratory distress.   Musculoskeletal:         General: Deformity present. No swelling. Normal range of motion.   Neurological: She displays no weakness.   Skin: No rash noted.     Reviewed available old and all outside pertinent medical records available.    All lab results personally reviewed and interpreted by me.       ASSESSMENT      Encounter Diagnoses   Name Primary?    Interstitial pulmonary disease, unspecified     Positive TYE (antinuclear antibody) Yes    Generalized anxiety disorder with panic attacks     Panic attacks     Depression, major, recurrent, moderate     Sinusitis, unspecified chronicity, unspecified location     Gastroesophageal reflux disease, unspecified whether esophagitis present     Former tobacco use     Hypothyroidism, unspecified type     Erosive osteoarthritis     MORRIS (dyspnea on exertion)       PLAN     Elevated TYE in context of thyroid disease.  Chronic intermittent arthralgias w/ mixed pattern of pain and predominant mechanical pattern. No squeeze tenderness or active synovitis on exam.  No trophic changes on fingertips, reproducible weakness, or rashes w/ CTD cris.  No recent bloodwork on file for review.  No prior concerning cytopenias, evidence of active  liver dysfunction, or nephropathy.   Low titer TYE w/ negative DANY panel.  Myomarker panel negative.  RA serologies negative.  DJD changes on imaging w/ features of early erosive oa.  No marginal erosive changes or ankylosis reported.  No acute cardiopulmonary disease on CXR 2/2023.  TTE documented w/ trivial pericardial effusion.  Impression of erosive OA w/ sx of FMS.  Suggest trial of HCQ for at least 3 months.  Side effects discussed.  CT to screen for ILD.  Labs close to f/u visit.    Irwin Murphy M.D.

## 2023-06-16 ENCOUNTER — HOSPITAL ENCOUNTER (EMERGENCY)
Facility: HOSPITAL | Age: 63
Discharge: HOME | End: 2023-06-16
Attending: EMERGENCY MEDICINE
Payer: COMMERCIAL

## 2023-06-16 VITALS
HEART RATE: 63 BPM | RESPIRATION RATE: 18 BRPM | TEMPERATURE: 98 F | OXYGEN SATURATION: 99 % | DIASTOLIC BLOOD PRESSURE: 59 MMHG | SYSTOLIC BLOOD PRESSURE: 121 MMHG

## 2023-06-16 DIAGNOSIS — R07.9 CHEST PAIN: ICD-10-CM

## 2023-06-16 DIAGNOSIS — R06.02 SHORTNESS OF BREATH: Primary | ICD-10-CM

## 2023-06-16 DIAGNOSIS — J43.8 OTHER EMPHYSEMA: ICD-10-CM

## 2023-06-16 LAB
ALBUMIN SERPL BCP-MCNC: 3.7 G/DL (ref 3.5–5.2)
ALP SERPL-CCNC: 72 U/L (ref 55–135)
ALT SERPL W/O P-5'-P-CCNC: 8 U/L (ref 10–44)
ANION GAP SERPL CALC-SCNC: 11 MMOL/L (ref 8–16)
AST SERPL-CCNC: 16 U/L (ref 10–40)
BASOPHILS # BLD AUTO: 0.04 K/UL (ref 0–0.2)
BASOPHILS NFR BLD: 0.6 % (ref 0–1.9)
BILIRUB SERPL-MCNC: 0.3 MG/DL (ref 0.1–1)
BNP SERPL-MCNC: 45 PG/ML (ref 0–99)
BUN SERPL-MCNC: 13 MG/DL (ref 6–20)
CALCIUM SERPL-MCNC: 9.4 MG/DL (ref 8.7–10.5)
CHLORIDE SERPL-SCNC: 106 MMOL/L (ref 95–110)
CK SERPL-CCNC: 112 U/L (ref 20–180)
CO2 SERPL-SCNC: 23 MMOL/L (ref 23–29)
CREAT SERPL-MCNC: 1 MG/DL (ref 0.5–1.4)
DIFFERENTIAL METHOD: ABNORMAL
EOSINOPHIL # BLD AUTO: 0.2 K/UL (ref 0–0.5)
EOSINOPHIL NFR BLD: 3.3 % (ref 0–8)
ERYTHROCYTE [DISTWIDTH] IN BLOOD BY AUTOMATED COUNT: 13.2 % (ref 11.5–14.5)
EST. GFR  (NO RACE VARIABLE): >60 ML/MIN/1.73 M^2
GLUCOSE SERPL-MCNC: 109 MG/DL (ref 70–110)
HCT VFR BLD AUTO: 35.8 % (ref 37–48.5)
HGB BLD-MCNC: 11.8 G/DL (ref 12–16)
IMM GRANULOCYTES # BLD AUTO: 0.02 K/UL (ref 0–0.04)
IMM GRANULOCYTES NFR BLD AUTO: 0.3 % (ref 0–0.5)
LIPASE SERPL-CCNC: 25 U/L (ref 4–60)
LYMPHOCYTES # BLD AUTO: 1 K/UL (ref 1–4.8)
LYMPHOCYTES NFR BLD: 14.8 % (ref 18–48)
MCH RBC QN AUTO: 28.6 PG (ref 27–31)
MCHC RBC AUTO-ENTMCNC: 33 G/DL (ref 32–36)
MCV RBC AUTO: 87 FL (ref 82–98)
MONOCYTES # BLD AUTO: 0.6 K/UL (ref 0.3–1)
MONOCYTES NFR BLD: 7.9 % (ref 4–15)
NEUTROPHILS # BLD AUTO: 5.2 K/UL (ref 1.8–7.7)
NEUTROPHILS NFR BLD: 73.1 % (ref 38–73)
NRBC BLD-RTO: 0 /100 WBC
PLATELET # BLD AUTO: 269 K/UL (ref 150–450)
PMV BLD AUTO: 10.4 FL (ref 9.2–12.9)
POTASSIUM SERPL-SCNC: 3.7 MMOL/L (ref 3.5–5.1)
PROT SERPL-MCNC: 7.1 G/DL (ref 6–8.4)
RBC # BLD AUTO: 4.12 M/UL (ref 4–5.4)
SODIUM SERPL-SCNC: 140 MMOL/L (ref 136–145)
TROPONIN I SERPL DL<=0.01 NG/ML-MCNC: 0.01 NG/ML (ref 0–0.03)
WBC # BLD AUTO: 7.05 K/UL (ref 3.9–12.7)

## 2023-06-16 PROCEDURE — 83880 ASSAY OF NATRIURETIC PEPTIDE: CPT | Performed by: NURSE PRACTITIONER

## 2023-06-16 PROCEDURE — 82550 ASSAY OF CK (CPK): CPT | Performed by: NURSE PRACTITIONER

## 2023-06-16 PROCEDURE — 99285 EMERGENCY DEPT VISIT HI MDM: CPT | Mod: 25

## 2023-06-16 PROCEDURE — 96374 THER/PROPH/DIAG INJ IV PUSH: CPT

## 2023-06-16 PROCEDURE — 93005 ELECTROCARDIOGRAM TRACING: CPT

## 2023-06-16 PROCEDURE — 63600175 PHARM REV CODE 636 W HCPCS: Performed by: EMERGENCY MEDICINE

## 2023-06-16 PROCEDURE — 93010 ELECTROCARDIOGRAM REPORT: CPT | Mod: ,,, | Performed by: INTERNAL MEDICINE

## 2023-06-16 PROCEDURE — 25000003 PHARM REV CODE 250: Performed by: EMERGENCY MEDICINE

## 2023-06-16 PROCEDURE — 85025 COMPLETE CBC W/AUTO DIFF WBC: CPT | Performed by: NURSE PRACTITIONER

## 2023-06-16 PROCEDURE — 93010 EKG 12-LEAD: ICD-10-PCS | Mod: ,,, | Performed by: INTERNAL MEDICINE

## 2023-06-16 PROCEDURE — 84484 ASSAY OF TROPONIN QUANT: CPT | Performed by: NURSE PRACTITIONER

## 2023-06-16 PROCEDURE — 83690 ASSAY OF LIPASE: CPT | Performed by: NURSE PRACTITIONER

## 2023-06-16 PROCEDURE — 80053 COMPREHEN METABOLIC PANEL: CPT | Performed by: NURSE PRACTITIONER

## 2023-06-16 RX ORDER — KETOROLAC TROMETHAMINE 30 MG/ML
15 INJECTION, SOLUTION INTRAMUSCULAR; INTRAVENOUS
Status: COMPLETED | OUTPATIENT
Start: 2023-06-16 | End: 2023-06-16

## 2023-06-16 RX ORDER — FAMOTIDINE 20 MG/1
20 TABLET, FILM COATED ORAL
Status: COMPLETED | OUTPATIENT
Start: 2023-06-16 | End: 2023-06-16

## 2023-06-16 RX ADMIN — FAMOTIDINE 20 MG: 20 TABLET, FILM COATED ORAL at 11:06

## 2023-06-16 RX ADMIN — KETOROLAC TROMETHAMINE 15 MG: 30 INJECTION, SOLUTION INTRAMUSCULAR; INTRAVENOUS at 11:06

## 2023-06-16 NOTE — ED PROVIDER NOTES
SCRIBE #1 NOTE: I, Chito Ho, am scribing for, and in the presence of, Dianna Mcqueen MD. I have scribed the entire note.      History      Chief Complaint   Patient presents with    Shortness of Breath     Shortness of breath and epigastric pain       Review of patient's allergies indicates:   Allergen Reactions    Penicillins         HPI   HPI    2023, 11:13 AM   History obtained from the patient      History of Present Illness: Yvonne Schmidt is a 62 y.o. female patient with a PMHx of GERD, anxiety who presents to the Emergency Department for SOB, onset this morning. Symptoms are constant and moderate in severity. No mitigating or exacerbating factors reported. Associated sxs include lower chest pain, epigastric abdominal pain, and cough. Patient denies any fever, chills, n/v/d, weakness, numbness, dizziness, headache, and all other sxs at this time. Pt had a CT chest ordered by Dr. Murphy (Rheumatology) yesterday following an outpatient clinic visit. No further complaints or concerns at this time.     Arrival mode: EMS    PCP: MINH Ryan       Past Medical History:  Past Medical History:   Diagnosis Date    Anxiety     Bradycardia 2019    GERD (gastroesophageal reflux disease)     Thyroid disease        Past Surgical History:  Past Surgical History:   Procedure Laterality Date    BREAST BIOPSY      DENTAL SURGERY      SINUS SURGERY           Family History:  Family History   Problem Relation Age of Onset    Heart attack Mother     Hypertension Mother     Heart disease Father     Heart attack Maternal Aunt     Heart attack Maternal Uncle        Social History:  Social History     Tobacco Use    Smoking status: Former     Packs/day: 0.75     Years: 35.00     Pack years: 26.25     Types: Cigarettes     Start date:      Quit date: 2019     Years since quittin.4    Smokeless tobacco: Never    Tobacco comments:     did not smoke for 2 years since beginning smoking.    Substance  and Sexual Activity    Alcohol use: Never    Drug use: Never    Sexual activity: Yes     Partners: Male       ROS   Review of Systems   Constitutional:  Negative for chills and fever.   HENT:  Negative for sore throat.    Respiratory:  Positive for shortness of breath.    Cardiovascular:  Positive for chest pain (lower).   Gastrointestinal:  Positive for abdominal pain (epigastric). Negative for diarrhea, nausea and vomiting.   Genitourinary:  Negative for dysuria.   Musculoskeletal:  Negative for back pain.   Skin:  Negative for rash.   Neurological:  Negative for dizziness, weakness, light-headedness, numbness and headaches.   Hematological:  Does not bruise/bleed easily.   All other systems reviewed and are negative.    Physical Exam      Initial Vitals   BP Pulse Resp Temp SpO2   06/16/23 0841 06/16/23 0841 06/16/23 0841 06/16/23 0842 06/16/23 0841   130/75 64 16 98 °F (36.7 °C) 99 %      MAP       --                 Physical Exam  Nursing Notes and Vital Signs Reviewed.  Constitutional: Patient is in no acute distress. Well-developed and well-nourished.  Head: Atraumatic. Normocephalic.  Eyes: PERRL. EOM intact. Conjunctivae are not pale. No scleral icterus.  ENT: Mucous membranes are moist. Oropharynx is clear and symmetric.    Neck: Supple. Full ROM.  Cardiovascular: Regular rate. Regular rhythm. No murmurs, rubs, or gallops. Distal pulses are 2+ and symmetric.  Pulmonary/Chest: No respiratory distress. Clear to auscultation bilaterally. No wheezing or rales.  Abdominal: Soft and non-distended.  There is no tenderness.  No rebound, guarding, or rigidity.   Musculoskeletal: Moves all extremities. No obvious deformities. No edema.  Skin: Warm and dry.  Neurological:  Alert, awake, and appropriate.  Normal speech.  No acute focal neurological deficits are appreciated.  Psychiatric: Normal affect. Good eye contact. Appropriate in content.    ED Course    Procedures  ED Vital Signs:  Vitals:    06/16/23 0841  06/16/23 0842 06/16/23 1222   BP: 130/75  (!) 121/59   Pulse: 64  63   Resp: 16  18   Temp:  98 °F (36.7 °C) 98 °F (36.7 °C)   TempSrc:  Oral Oral   SpO2: 99%  99%       Abnormal Lab Results:  Labs Reviewed   CBC W/ AUTO DIFFERENTIAL - Abnormal; Notable for the following components:       Result Value    Hemoglobin 11.8 (*)     Hematocrit 35.8 (*)     Gran % 73.1 (*)     Lymph % 14.8 (*)     All other components within normal limits   COMPREHENSIVE METABOLIC PANEL - Abnormal; Notable for the following components:    ALT 8 (*)     All other components within normal limits   LIPASE   CK   TROPONIN I   B-TYPE NATRIURETIC PEPTIDE        All Lab Results:  Results for orders placed or performed during the hospital encounter of 06/16/23   CBC auto differential   Result Value Ref Range    WBC 7.05 3.90 - 12.70 K/uL    RBC 4.12 4.00 - 5.40 M/uL    Hemoglobin 11.8 (L) 12.0 - 16.0 g/dL    Hematocrit 35.8 (L) 37.0 - 48.5 %    MCV 87 82 - 98 fL    MCH 28.6 27.0 - 31.0 pg    MCHC 33.0 32.0 - 36.0 g/dL    RDW 13.2 11.5 - 14.5 %    Platelets 269 150 - 450 K/uL    MPV 10.4 9.2 - 12.9 fL    Immature Granulocytes 0.3 0.0 - 0.5 %    Gran # (ANC) 5.2 1.8 - 7.7 K/uL    Immature Grans (Abs) 0.02 0.00 - 0.04 K/uL    Lymph # 1.0 1.0 - 4.8 K/uL    Mono # 0.6 0.3 - 1.0 K/uL    Eos # 0.2 0.0 - 0.5 K/uL    Baso # 0.04 0.00 - 0.20 K/uL    nRBC 0 0 /100 WBC    Gran % 73.1 (H) 38.0 - 73.0 %    Lymph % 14.8 (L) 18.0 - 48.0 %    Mono % 7.9 4.0 - 15.0 %    Eosinophil % 3.3 0.0 - 8.0 %    Basophil % 0.6 0.0 - 1.9 %    Differential Method Automated    Comprehensive metabolic panel   Result Value Ref Range    Sodium 140 136 - 145 mmol/L    Potassium 3.7 3.5 - 5.1 mmol/L    Chloride 106 95 - 110 mmol/L    CO2 23 23 - 29 mmol/L    Glucose 109 70 - 110 mg/dL    BUN 13 6 - 20 mg/dL    Creatinine 1.0 0.5 - 1.4 mg/dL    Calcium 9.4 8.7 - 10.5 mg/dL    Total Protein 7.1 6.0 - 8.4 g/dL    Albumin 3.7 3.5 - 5.2 g/dL    Total Bilirubin 0.3 0.1 - 1.0 mg/dL     Alkaline Phosphatase 72 55 - 135 U/L    AST 16 10 - 40 U/L    ALT 8 (L) 10 - 44 U/L    Anion Gap 11 8 - 16 mmol/L    eGFR >60 >60 mL/min/1.73 m^2   Lipase   Result Value Ref Range    Lipase 25 4 - 60 U/L   CPK   Result Value Ref Range     20 - 180 U/L   Troponin I   Result Value Ref Range    Troponin I 0.007 0.000 - 0.026 ng/mL   Brain natriuretic peptide   Result Value Ref Range    BNP 45 0 - 99 pg/mL     Imaging Results:  Imaging Results              CT Chest Without Contrast (Final result)  Result time 06/16/23 12:16:19      Final result by Delio Fierro MD (06/16/23 12:16:19)                   Impression:      Mild to moderate emphysematous changes.  No acute finding in the chest.      Electronically signed by: Delio Fierro  Date:    06/16/2023  Time:    12:16               Narrative:    EXAMINATION:  CT CHEST WITHOUT CONTRAST    CLINICAL HISTORY:  Cough, persistent;    COMPARISON:  Chest x-ray June 16, 2023.    TECHNIQUE:  Axial CT images were obtained of the CHEST.  Iterative reconstruction technique was used. The CT exam was performed using one or more of the following dose reduction techniques- Automated exposure control, adjustment of the mA and/or kV according to patient size, and/or use of iterative reconstructed technique.    FINDINGS:  Heart and Great vessels: Coronary artery calcifications. Heart size is within normal limits. No pericardial effusion.    Thoracic Adenopathy: None demonstrated    Lungs: Mild-to-moderate centrilobular emphysematous changes.  Calcified granuloma in the superior aspect left lower lobe.  Minimal bibasilar atelectasis/scarring.  No pneumothorax.  No pleural effusion.    Soft tissues: No acute finding.  No axillary or mediastinal lymphadenopathy.    Bones: No acute or aggressive osseous findings.    Visualized upper abdomen: No acute finding.                                       X-Ray Chest 1 View (Final result)  Result time 06/16/23 09:15:00      Final result by  Merrick Tomas MD (06/16/23 09:15:00)                   Impression:      No acute abnormality.  Stable chest, as above.      Electronically signed by: Merrick Tomas  Date:    06/16/2023  Time:    09:15               Narrative:    EXAMINATION:  XR CHEST 1 VIEW    CLINICAL HISTORY:  Chest pain, unspecified    TECHNIQUE:  Single frontal view of the chest was performed.    COMPARISON:  Chest radiograph 02/28/2023    FINDINGS:  Stable chest without evidence of active disease.  No effusion or pneumothorax.  Probable COPD.    The cardiac silhouette is stable.  The hilar and mediastinal contours are unchanged.    No acute bony abnormality.                                     The EKG was ordered, reviewed, and independently interpreted by the ED provider.  Interpretation time: 08:56  Rate: 57 BPM  Rhythm: sinus bradycardia  Interpretation: No acute ST changes. No STEMI.           The Emergency Provider reviewed the vital signs and test results, which are outlined above.    ED Discussion     12:31 PM: Reassessed pt at this time. Discussed with pt all pertinent ED information and results. Discussed pt dx and plan of tx. Gave pt all f/u and return to the ED instructions. All questions and concerns were addressed at this time. Pt expresses understanding of information and instructions, and is comfortable with plan to discharge. Pt is stable for discharge.    I discussed with patient and/or family/caretaker that evaluation in the ED does not suggest any emergent or life threatening medical conditions requiring immediate intervention beyond what was provided in the ED, and I believe patient is safe for discharge.  Regardless, an unremarkable evaluation in the ED does not preclude the development or presence of a serious of life threatening condition. As such, patient was instructed to return immediately for any worsening or change in current symptoms.         ED Medication(s):  Medications   ketorolac injection 15 mg (15 mg  Intravenous Given 6/16/23 1144)   famotidine tablet 20 mg (20 mg Oral Given 6/16/23 1143)       Discharge Medication List as of 6/16/2023 12:23 PM          Medical Decision Making    Medical Decision Making:   Differential Diagnosis:   1. Copd  2. Pneumonia  3. CHF  4. ACS  Clinical Tests:   Lab Tests: Ordered and Reviewed  Radiological Study: Ordered and Reviewed  Medical Tests: Ordered and Reviewed  ED Management:  Presents with shortness of breath and substernal pain, ECG reviewed and no ischemic changes, vital signs stable, she is concerned she may have fluid on her lungs or heart, CXR reviewed and otherwise stable with copd changes noted but no fluid, CT chest shows emphysema but otherwise stable, lab work reviewed and otherwise normal including BNP and cardiac markers, she is stable in my opinion for discharge.          Scribe Attestation:   Scribe #1: I performed the above scribed service and the documentation accurately describes the services I performed. I attest to the accuracy of the note.    Attending:   Physician Attestation Statement for Scribe #1: I, Dianna Mcqueen MD, personally performed the services described in this documentation, as scribed by Chito Ho, in my presence, and it is both accurate and complete.          Clinical Impression       ICD-10-CM ICD-9-CM   1. Shortness of breath  R06.02 786.05   2. Chest pain  R07.9 786.50   3. Other emphysema  J43.8 492.8       Disposition:   Disposition: Discharged  Condition: Stable       Dianna Mcqueen MD  06/16/23 1523

## 2023-06-16 NOTE — FIRST PROVIDER EVALUATION
Medical screening examination initiated.  I have conducted a focused provider triage encounter, findings are as follows:    Brief history of present illness:  62-year-old female with complaint of lower chest pain and upper abdominal pain for the past 3 days.  Patient reports occasional shortness of breath but no shortness of breath at present.    Vitals:    06/16/23 0841 06/16/23 0842   BP: 130/75    Pulse: 64    Resp: 16    Temp:  98 °F (36.7 °C)   TempSrc:  Oral   SpO2: 99%        Pertinent physical exam:  lower chest wall tenderness

## 2023-06-19 ENCOUNTER — TELEPHONE (OUTPATIENT)
Dept: RHEUMATOLOGY | Facility: CLINIC | Age: 63
End: 2023-06-19
Payer: COMMERCIAL

## 2023-06-19 NOTE — TELEPHONE ENCOUNTER
"Attempted to return patients phone call. Left v/m for patient to call back at earliest convenience.    Meghna Peters (Allye), [facility]  Rheumatology Department    "

## 2023-06-19 NOTE — TELEPHONE ENCOUNTER
----- Message from Viola Mir sent at 6/19/2023  8:30 AM CDT -----  Contact: [phone]  Patient is requesting a call back regarding 3 month trial of HCQ that Dr. Murphy was supposed to send to her pharmacy. Please call her back at [phone]. Thanks

## 2023-06-21 DIAGNOSIS — M15.4 EROSIVE OSTEOARTHRITIS: ICD-10-CM

## 2023-06-21 DIAGNOSIS — R76.8 POSITIVE ANA (ANTINUCLEAR ANTIBODY): Primary | ICD-10-CM

## 2023-06-21 RX ORDER — HYDROXYCHLOROQUINE SULFATE 200 MG/1
200 TABLET, FILM COATED ORAL DAILY
Qty: 90 TABLET | Refills: 1 | Status: SHIPPED | OUTPATIENT
Start: 2023-06-21 | End: 2023-11-09

## 2023-06-21 NOTE — TELEPHONE ENCOUNTER
"Dr. Murphy, this patient saw you this past Thursday 06/15/2023. Was she supposed to have a prescription for HCQ sent to [facility] on [address]?    Meghna Peters (Allye), [facility]  Rheumatology Department   "

## 2023-06-21 NOTE — TELEPHONE ENCOUNTER
Patient returned my phone call.     Nara Gayle Staff  Caller: Self (Today, 10:30 AM)  .Type:  Patient Returning Call     Who Called:Yvonne   Who Left Message for Patient:Meghna   Does the patient know what this is regarding?:Yes   Would the patient rather a call back or a response via [facility]? Phone call   Best Call Back Number:[phone]   Additional Information:

## 2023-06-22 ENCOUNTER — TELEPHONE (OUTPATIENT)
Dept: RHEUMATOLOGY | Facility: CLINIC | Age: 63
End: 2023-06-22
Payer: COMMERCIAL

## 2023-06-22 NOTE — TELEPHONE ENCOUNTER
Patient aware that Dr PATEL will be out of office until 7/17. Virtual visit scheduled with Dr Garnica 6/23 for facial swelling

## 2023-06-22 NOTE — TELEPHONE ENCOUNTER
----- Message from Tiffanie Pedro MA sent at 6/22/2023  1:28 PM CDT -----  Contact: Yvonne    ----- Message -----  From: Kecia Juarez  Sent: 6/22/2023  12:58 PM CDT  To: Yadira Tesfaye Staff    Type:  Sooner Apoointment Request    Caller is requesting a sooner appointment. Caller will not accept being placed on the waitlist and is requesting a message be sent to doctor.  Name of Caller:Yvonne  When is the first available appointment?unknown  Symptoms:Rheumatoid Arthritis/swollen warts on sides of face/Swelling of fingers while walking/chest pain  Would the patient rather a call back or a response via [facility]? call  Best Call Back Number:[phone]   Additional Information: Patient request to schedule an appointment for 6/23/23 or 6/24/23, if available. Please give patient a call back to assist.  Thank you,  [facility]

## 2023-06-22 NOTE — TELEPHONE ENCOUNTER
"Returned patients phone call. Patient stated that she requested for the message to be sent to Dr. Alfredo' office. She no longer wants to see Dr. Murphy she wants to establish care with Dr. May. Advised patient that I will route this message to Dr. Alfredo' staff regarding this. All questions answered.      Meghna Peters (Allye), [facility]  Rheumatology Department    "

## 2023-06-23 ENCOUNTER — OFFICE VISIT (OUTPATIENT)
Dept: RHEUMATOLOGY | Facility: CLINIC | Age: 63
End: 2023-06-23
Payer: COMMERCIAL

## 2023-06-23 DIAGNOSIS — R76.8 POSITIVE ANA (ANTINUCLEAR ANTIBODY): Primary | ICD-10-CM

## 2023-06-23 DIAGNOSIS — R22.0 FACIAL SWELLING: ICD-10-CM

## 2023-06-23 DIAGNOSIS — M15.4 EROSIVE OSTEOARTHRITIS: ICD-10-CM

## 2023-06-23 PROCEDURE — 1159F MED LIST DOCD IN RCRD: CPT | Mod: CPTII,95,, | Performed by: STUDENT IN AN ORGANIZED HEALTH CARE EDUCATION/TRAINING PROGRAM

## 2023-06-23 PROCEDURE — 99214 PR OFFICE/OUTPT VISIT, EST, LEVL IV, 30-39 MIN: ICD-10-PCS | Mod: 95,,, | Performed by: STUDENT IN AN ORGANIZED HEALTH CARE EDUCATION/TRAINING PROGRAM

## 2023-06-23 PROCEDURE — 99214 OFFICE O/P EST MOD 30 MIN: CPT | Mod: 95,,, | Performed by: STUDENT IN AN ORGANIZED HEALTH CARE EDUCATION/TRAINING PROGRAM

## 2023-06-23 PROCEDURE — 1160F PR REVIEW ALL MEDS BY PRESCRIBER/CLIN PHARMACIST DOCUMENTED: ICD-10-PCS | Mod: CPTII,95,, | Performed by: STUDENT IN AN ORGANIZED HEALTH CARE EDUCATION/TRAINING PROGRAM

## 2023-06-23 PROCEDURE — 1160F RVW MEDS BY RX/DR IN RCRD: CPT | Mod: CPTII,95,, | Performed by: STUDENT IN AN ORGANIZED HEALTH CARE EDUCATION/TRAINING PROGRAM

## 2023-06-23 PROCEDURE — 1159F PR MEDICATION LIST DOCUMENTED IN MEDICAL RECORD: ICD-10-PCS | Mod: CPTII,95,, | Performed by: STUDENT IN AN ORGANIZED HEALTH CARE EDUCATION/TRAINING PROGRAM

## 2023-06-26 NOTE — PROGRESS NOTES
RHEUMATOLOGY CLINIC ESTABLISHED PATIENT VISIT    The patient location is: Home  The chief complaint leading to consultation is: Facial swelling    Visit type: audio only    Face to Face time with patient: 22  31 minutes of total time spent on the encounter, which includes face to face time and non-face to face time preparing to see the patient (eg, review of tests), Obtaining and/or reviewing separately obtained history, Documenting clinical information in the electronic or other health record, Independently interpreting results (not separately reported) and communicating results to the patient/family/caregiver, or Care coordination (not separately reported).     Each patient to whom he or she provides medical services by telemedicine is:  (1) informed of the relationship between the physician and patient and the respective role of any other health care provider with respect to management of the patient; and (2) notified that he or she may decline to receive medical services by telemedicine and may withdraw from such care at any time.    Reason for consult:- facial swelling    Chief complaints, HPI, ROS, EXAM, Assessment & Plans:-    Yvonne Schmidt is a 62 y.o. pleasant female who presents to be evaluated for facial swelling.  She was just evaluated by Dr. Murphy on June 15th.  Has history of positive TYE but no obvious findings of underlying autoimmune disease.  Was found to have osteoarthritis of hands on x-rays.  Was suggested to trial hydroxychloroquine but she is not started this as of yet.  Her main concern today is feeling of facial swelling underneath her cheek bones bilaterally.  She feels this may be related to her recent use of prednisone after sinus surgery.  Is not itchy or significantly painful.  No new symptoms in the interim.  Rheumatologic review of systems negative.  Physical exam deferred as patient not able to get camera working on virtual visit.    Reviewed all available old and outside  pertinent medical records.    All lab results personally reviewed and interpreted by me.    1. Positive TYE (antinuclear antibody)    2. Erosive osteoarthritis    3. Facial swelling        Problem List Items Addressed This Visit    None  Visit Diagnoses       Positive TYE (antinuclear antibody)    -  Primary    Erosive osteoarthritis        Facial swelling                Patient following up today for facial swelling  Previously evaluated for positive TYE and found to have no evidence of underlying autoimmune disease  She does have osteoarthritis  Does not wish to start hydroxychloroquine as suggested previously at this time  Unclear etiology of her facial swelling but would suspect link to her recent sinus surgery or possible maxillary sinusitis  suggest repeat evaluation by ENT and/or PCP if does not resolve    # No follow-ups on file.    Chronic comorbid conditions affecting medical decision making today:    Past Medical History:   Diagnosis Date    Anxiety     Bradycardia 2019    GERD (gastroesophageal reflux disease)     Thyroid disease        Past Surgical History:   Procedure Laterality Date    BREAST BIOPSY      DENTAL SURGERY      SINUS SURGERY          Social History     Tobacco Use    Smoking status: Former     Packs/day: 0.75     Years: 35.00     Pack years: 26.25     Types: Cigarettes     Start date:      Quit date: 2019     Years since quittin.4    Smokeless tobacco: Never    Tobacco comments:     did not smoke for 2 years since beginning smoking.    Substance Use Topics    Alcohol use: Never    Drug use: Never       Family History   Problem Relation Age of Onset    Heart attack Mother     Hypertension Mother     Heart disease Father     Heart attack Maternal Aunt     Heart attack Maternal Uncle        Review of patient's allergies indicates:   Allergen Reactions    Penicillins        Medication List with Changes/Refills   Current Medications    ACETAMINOPHEN (TYLENOL) 500 MG TABLET     Take 500 mg by mouth as needed for Pain.    ALBUTEROL (PROVENTIL/VENTOLIN HFA) 90 MCG/ACTUATION INHALER    Inhale 2 puffs into the lungs every 4 (four) hours as needed for Wheezing or Shortness of Breath. Rescue    ALPRAZOLAM (XANAX) 0.5 MG TABLET    Take 1 tablet (0.5 mg total) by mouth 2 (two) times daily as needed (SIGNIFICANT ANXIETY). New Prescriber    AUGMENTED BETAMETHASONE DIPROPIONATE (DIPROLENE-AF) 0.05 % CREAM    Apply topically 2 (two) times daily.    BUDESONIDE 0.6 MG/NORMAL SALINE 50 ML NASAL IRRIGATION    Patient to irrigate each nostril with 25ml twice daily.    BUSPIRONE (BUSPAR) 10 MG TABLET    Take 1 tablet (10 mg total) by mouth 2 (two) times daily.    CALCIUM CARBONATE (TUMS) 300 MG (750 MG) CHEW    Take 750 mg by mouth.    CYCLOBENZAPRINE (FLEXERIL) 5 MG TABLET    Take 5 mg by mouth 3 (three) times daily as needed for Muscle spasms.    EZETIMIBE (ZETIA) 10 MG TABLET    Take 1 tablet (10 mg total) by mouth once daily.    FLUOXETINE 20 MG CAPSULE    Take 1 capsule (20 mg total) by mouth once daily. Take IN ADDITION to Prozac 40 mg supply    HYDROCHLOROTHIAZIDE (HYDRODIURIL) 12.5 MG TAB    Take 1 tablet (12.5 mg total) by mouth daily as needed (edema, swelling). Do not take if BP is low and feeling dry/dehydrated/dizzy    HYDROCODONE-ACETAMINOPHEN (NORCO) 5-325 MG PER TABLET    Take 1 tablet by mouth every 6 (six) hours as needed for Pain.    HYDROXYCHLOROQUINE (PLAQUENIL) 200 MG TABLET    Take 1 tablet (200 mg total) by mouth once daily.    INDOMETHACIN (INDOCIN) 25 MG CAPSULE    Take 1 capsule (25 mg total) by mouth 3 (three) times daily.    KETOROLAC (TORADOL) 10 MG TABLET    Take 1 tablet (10 mg total) by mouth every 6 (six) hours as needed for Pain.    LACTOBACILLUS RHAMNOSUS GG (CULTURELLE) 10 BILLION CELL CAPSULE    Take 1 capsule by mouth once daily.    LEVOTHYROXINE (SYNTHROID) 50 MCG TABLET    Take 50 mcg by mouth before breakfast.    MULTIVITAMIN (THERAGRAN) PER TABLET    Take 1 tablet  by mouth once daily.    OMEPRAZOLE (PRILOSEC) 40 MG CAPSULE    Take 40 mg by mouth.    ONDANSETRON (ZOFRAN-ODT) 8 MG TBDL    Take 1 tablet (8 mg total) by mouth every 8 (eight) hours as needed (nausea).    RIMEGEPANT (NURTEC) 75 MG ODT    Take 75 mg by mouth once as needed for Migraine. Place ODT tablet on the tongue; alternatively the ODT tablet may be placed under the tongue         Disclaimer: This note was prepared using voice recognition system and is likely to have sound alike errors and is not proofread.  Please message me with any questions.    31 minutes of total time spent on the encounter, which includes face to face time and non-face to face time preparing to see the patient (eg, review of tests), Obtaining and/or reviewing separately obtained history, Documenting clinical information in the electronic or other health record, Independently interpreting results (not separately reported) and communicating results to the patient/family/caregiver, or Care coordination (not separately reported).     Thank you for allowing me to participate in the care of Yvonne Schmidt.    Berry Garnica MD

## 2023-08-30 ENCOUNTER — TELEPHONE (OUTPATIENT)
Dept: RHEUMATOLOGY | Facility: CLINIC | Age: 63
End: 2023-08-30
Payer: COMMERCIAL

## 2023-08-30 NOTE — TELEPHONE ENCOUNTER
[employer] [facility] has approved CT Throax with out contrast    6-22-23 til 7-21-23

## 2023-09-01 ENCOUNTER — TELEPHONE (OUTPATIENT)
Dept: PULMONOLOGY | Facility: CLINIC | Age: 63
End: 2023-09-01

## 2023-09-01 ENCOUNTER — OFFICE VISIT (OUTPATIENT)
Dept: PULMONOLOGY | Facility: CLINIC | Age: 63
End: 2023-09-01
Payer: COMMERCIAL

## 2023-09-01 ENCOUNTER — HOSPITAL ENCOUNTER (OUTPATIENT)
Dept: RADIOLOGY | Facility: HOSPITAL | Age: 63
Discharge: HOME | End: 2023-09-01
Attending: INTERNAL MEDICINE
Payer: COMMERCIAL

## 2023-09-01 VITALS
BODY MASS INDEX: 17.31 KG/M2 | HEIGHT: 63 IN | RESPIRATION RATE: 17 BRPM | WEIGHT: 97.69 LBS | OXYGEN SATURATION: 97 % | HEART RATE: 83 BPM | SYSTOLIC BLOOD PRESSURE: 98 MMHG | DIASTOLIC BLOOD PRESSURE: 64 MMHG

## 2023-09-01 DIAGNOSIS — R05.2 SUBACUTE COUGH: ICD-10-CM

## 2023-09-01 DIAGNOSIS — J44.89 ASTHMA WITH COPD: ICD-10-CM

## 2023-09-01 DIAGNOSIS — R06.02 SOB (SHORTNESS OF BREATH) ON EXERTION: ICD-10-CM

## 2023-09-01 DIAGNOSIS — J44.1 COPD EXACERBATION: ICD-10-CM

## 2023-09-01 DIAGNOSIS — J18.9 PNEUMONIA OF RIGHT UPPER LOBE DUE TO INFECTIOUS ORGANISM: Primary | ICD-10-CM

## 2023-09-01 DIAGNOSIS — M35.9 CONNECTIVE TISSUE DISORDER: ICD-10-CM

## 2023-09-01 PROCEDURE — 1159F PR MEDICATION LIST DOCUMENTED IN MEDICAL RECORD: ICD-10-PCS | Mod: CPTII,S$GLB,, | Performed by: INTERNAL MEDICINE

## 2023-09-01 PROCEDURE — 99999 PR PBB SHADOW E&M-EST. PATIENT-LVL V: ICD-10-PCS | Mod: PBBFAC,,, | Performed by: INTERNAL MEDICINE

## 2023-09-01 PROCEDURE — 3074F SYST BP LT 130 MM HG: CPT | Mod: CPTII,S$GLB,, | Performed by: INTERNAL MEDICINE

## 2023-09-01 PROCEDURE — 1159F MED LIST DOCD IN RCRD: CPT | Mod: CPTII,S$GLB,, | Performed by: INTERNAL MEDICINE

## 2023-09-01 PROCEDURE — 3074F PR MOST RECENT SYSTOLIC BLOOD PRESSURE < 130 MM HG: ICD-10-PCS | Mod: CPTII,S$GLB,, | Performed by: INTERNAL MEDICINE

## 2023-09-01 PROCEDURE — 3078F PR MOST RECENT DIASTOLIC BLOOD PRESSURE < 80 MM HG: ICD-10-PCS | Mod: CPTII,S$GLB,, | Performed by: INTERNAL MEDICINE

## 2023-09-01 PROCEDURE — 99215 OFFICE O/P EST HI 40 MIN: CPT | Mod: S$GLB,,, | Performed by: INTERNAL MEDICINE

## 2023-09-01 PROCEDURE — 1160F RVW MEDS BY RX/DR IN RCRD: CPT | Mod: CPTII,S$GLB,, | Performed by: INTERNAL MEDICINE

## 2023-09-01 PROCEDURE — 99999 PR PBB SHADOW E&M-EST. PATIENT-LVL V: CPT | Mod: PBBFAC,,, | Performed by: INTERNAL MEDICINE

## 2023-09-01 PROCEDURE — 3078F DIAST BP <80 MM HG: CPT | Mod: CPTII,S$GLB,, | Performed by: INTERNAL MEDICINE

## 2023-09-01 PROCEDURE — 71046 X-RAY EXAM CHEST 2 VIEWS: CPT | Mod: TC

## 2023-09-01 PROCEDURE — 99215 PR OFFICE/OUTPT VISIT, EST, LEVL V, 40-54 MIN: ICD-10-PCS | Mod: S$GLB,,, | Performed by: INTERNAL MEDICINE

## 2023-09-01 PROCEDURE — 1160F PR REVIEW ALL MEDS BY PRESCRIBER/CLIN PHARMACIST DOCUMENTED: ICD-10-PCS | Mod: CPTII,S$GLB,, | Performed by: INTERNAL MEDICINE

## 2023-09-01 PROCEDURE — 3008F PR BODY MASS INDEX (BMI) DOCUMENTED: ICD-10-PCS | Mod: CPTII,S$GLB,, | Performed by: INTERNAL MEDICINE

## 2023-09-01 PROCEDURE — 71046 X-RAY EXAM CHEST 2 VIEWS: CPT | Mod: 26,,, | Performed by: RADIOLOGY

## 2023-09-01 PROCEDURE — 71046 XR CHEST PA AND LATERAL: ICD-10-PCS | Mod: 26,,, | Performed by: RADIOLOGY

## 2023-09-01 PROCEDURE — 3008F BODY MASS INDEX DOCD: CPT | Mod: CPTII,S$GLB,, | Performed by: INTERNAL MEDICINE

## 2023-09-01 RX ORDER — AZITHROMYCIN 250 MG/1
TABLET, FILM COATED ORAL
Qty: 6 TABLET | Refills: 0 | Status: SHIPPED | OUTPATIENT
Start: 2023-09-01 | End: 2023-10-25 | Stop reason: ALTCHOICE

## 2023-09-01 RX ORDER — PREDNISONE 20 MG/1
TABLET ORAL
Qty: 20 TABLET | Refills: 0 | Status: SHIPPED | OUTPATIENT
Start: 2023-09-01 | End: 2023-10-25 | Stop reason: SDUPTHER

## 2023-09-01 RX ORDER — ALBUTEROL SULFATE 0.83 MG/ML
2.5 SOLUTION RESPIRATORY (INHALATION)
Qty: 360 ML | Refills: 11 | Status: SHIPPED | OUTPATIENT
Start: 2023-09-01 | End: 2024-08-31

## 2023-09-01 RX ORDER — MONTELUKAST SODIUM 10 MG/1
10 TABLET ORAL NIGHTLY
COMMUNITY

## 2023-09-01 NOTE — PROGRESS NOTES
Subjective:     Patient ID: Yvonne Schmidt is a 62 y.o. female.    Chief Complaint:      HPI   Cough , fever  for 2 weeks  Took levaquin for 2 days but was unable to tolerate      Dyspnea  Patient complains of shortness of breath. Symptoms occur while getting dressed. Symptoms began 2 weeks ago, gradually worsening since. Associated symptoms include  constant cough, difficulty breathing, dyspnea on exertion, post nasal drip, and productive cough. She denies tightness in chest. She does not have had recent travel. Weight has been stable. Symptoms are exacerbated by minimal activity. Symptoms are alleviated by rest.   Former smoker stopped 4 years ago  Joints hurt, legs mainly  Complicated past history of shortness of breath and distant (2020) Hx of pleural effusion . Previous evaluation for  shortness of breath on exertion. 12/14/2020 when she had left pleural effusion. 11/9/2020 CT chest with L sided pleural effusion and possible right heart dysfunction.     Chest xray 12/14/2020 pleural effusion resolved. chest xray September 2021 and 1/11/2022 lungs clear.     Patient reports wheezing and cough.  Mild to moderateshortness of breath with prolonged exertion, no shortness of breath with regular activity.  Has a regular walking program daily and strengthening exercises 3 -4 days a week.  Only occasional shortness of breath, 2-3 days a week, not daily with work out program. Recovers in few minutes.   Sternal chest pain improved over all.   Former smoker 26 pack years quit in 2019.     Past Medical History:   Diagnosis Date    Anxiety     Bradycardia 9/19/2019    GERD (gastroesophageal reflux disease)     Thyroid disease      Past Surgical History:   Procedure Laterality Date    BREAST BIOPSY      DENTAL SURGERY      SINUS SURGERY       Review of patient's allergies indicates:   Allergen Reactions    Penicillins      Current Outpatient Medications on File Prior to Visit   Medication Sig Dispense Refill     acetaminophen (TYLENOL) 500 MG tablet Take 500 mg by mouth as needed for Pain.      albuterol (PROVENTIL/VENTOLIN HFA) 90 mcg/actuation inhaler Inhale 2 puffs into the lungs every 4 (four) hours as needed for Wheezing or Shortness of Breath. Rescue 18 g 11    ALPRAZolam (XANAX) 0.5 MG tablet Take 1 tablet (0.5 mg total) by mouth 2 (two) times daily as needed (SIGNIFICANT ANXIETY). New Prescriber 60 tablet 2    augmented betamethasone dipropionate (DIPROLENE-AF) 0.05 % cream Apply topically 2 (two) times daily.      BUDESONIDE 0.6 MG/NORMAL SALINE 50 ML NASAL IRRIGATION Patient to irrigate each nostril with 25ml twice daily.      busPIRone (BUSPAR) 10 MG tablet Take 1 tablet (10 mg total) by mouth 2 (two) times daily. 60 tablet 2    calcium carbonate (TUMS) 300 mg (750 mg) Chew Take 750 mg by mouth.      cyclobenzaprine (FLEXERIL) 5 MG tablet Take 5 mg by mouth 3 (three) times daily as needed for Muscle spasms.      ezetimibe (ZETIA) 10 mg tablet Take 1 tablet (10 mg total) by mouth once daily. 90 tablet 3    FLUoxetine 20 MG capsule Take 1 capsule (20 mg total) by mouth once daily. Take IN ADDITION to Prozac 40 mg supply 30 capsule 2    fluticasone-umeclidin-vilanter (TRELEGY ELLIPTA) 100-62.5-25 mcg DsDv Inhale 1 puff into the lungs once daily.      hydroCHLOROthiazide (HYDRODIURIL) 12.5 MG Tab Take 1 tablet (12.5 mg total) by mouth daily as needed (edema, swelling). Do not take if BP is low and feeling dry/dehydrated/dizzy 90 tablet 1    HYDROcodone-acetaminophen (NORCO) 5-325 mg per tablet Take 1 tablet by mouth every 6 (six) hours as needed for Pain. 12 tablet 0    hydrOXYchloroQUINE (PLAQUENIL) 200 mg tablet Take 1 tablet (200 mg total) by mouth once daily. 90 tablet 1    indomethacin (INDOCIN) 25 MG capsule Take 1 capsule (25 mg total) by mouth 3 (three) times daily. 90 capsule 3    ketorolac (TORADOL) 10 mg tablet Take 1 tablet (10 mg total) by mouth every 6 (six) hours as needed for Pain. 12 tablet 0     Lactobacillus rhamnosus GG (CULTURELLE) 10 billion cell capsule Take 1 capsule by mouth once daily.      levothyroxine (SYNTHROID) 50 MCG tablet Take 50 mcg by mouth before breakfast.      montelukast (SINGULAIR) 10 mg tablet Take 10 mg by mouth once daily.      multivitamin (THERAGRAN) per tablet Take 1 tablet by mouth once daily.      omeprazole (PRILOSEC) 40 MG capsule Take 40 mg by mouth.      ondansetron (ZOFRAN-ODT) 8 MG TbDL Take 1 tablet (8 mg total) by mouth every 8 (eight) hours as needed (nausea). 20 tablet 1    rimegepant (NURTEC) 75 mg odt Take 75 mg by mouth once as needed for Migraine. Place ODT tablet on the tongue; alternatively the ODT tablet may be placed under the tongue       No current facility-administered medications on file prior to visit.     Social History     Socioeconomic History    Marital status:    Tobacco Use    Smoking status: Former     Current packs/day: 0.00     Average packs/day: 0.8 packs/day for 37.0 years (27.8 ttl pk-yrs)     Types: Cigarettes     Start date:      Quit date: 2019     Years since quittin.6    Smokeless tobacco: Never    Tobacco comments:     did not smoke for 2 years since beginning smoking.    Substance and Sexual Activity    Alcohol use: Never    Drug use: Never    Sexual activity: Yes     Partners: Male     Family History   Problem Relation Age of Onset    Heart attack Mother     Hypertension Mother     Heart disease Father     Heart attack Maternal Aunt     Heart attack Maternal Uncle        Review of Systems   Constitutional:  Positive for fatigue. Negative for fever.   HENT:  Positive for postnasal drip, rhinorrhea and congestion.    Eyes:  Negative for redness and itching.   Respiratory:  Positive for cough, sputum production, shortness of breath, dyspnea on extertion, use of rescue inhaler and Paroxysmal Nocturnal Dyspnea.    Cardiovascular:  Negative for chest pain, palpitations and leg swelling.   Genitourinary:  Negative for  "difficulty urinating and hematuria.   Endocrine:  Negative for cold intolerance and heat intolerance.    Skin:  Negative for rash.   Gastrointestinal:  Negative for nausea and abdominal pain.   Neurological:  Negative for dizziness, syncope, weakness and light-headedness.   Hematological:  Negative for adenopathy. Does not bruise/bleed easily.   Psychiatric/Behavioral:  Negative for sleep disturbance. The patient is not nervous/anxious.        Objective:      BP 98/64   Pulse 83   Resp 17   Ht 5' 3" (1.6 m)   Wt 44.3 kg (97 lb 10.6 oz)   SpO2 97%   BMI 17.30 kg/m²   Physical Exam  Vitals and nursing note reviewed.   Constitutional:       Appearance: She is well-developed.   HENT:      Head: Normocephalic and atraumatic.      Nose: Congestion present.      Mouth/Throat:      Pharynx: No oropharyngeal exudate.   Eyes:      Conjunctiva/sclera: Conjunctivae normal.      Pupils: Pupils are equal, round, and reactive to light.   Neck:      Thyroid: No thyromegaly.      Vascular: No JVD.      Trachea: No tracheal deviation.   Cardiovascular:      Rate and Rhythm: Normal rate and regular rhythm.      Heart sounds: Normal heart sounds.   Pulmonary:      Effort: Pulmonary effort is normal. No respiratory distress.      Breath sounds: Examination of the right-lower field reveals wheezing. Examination of the left-lower field reveals wheezing. Decreased breath sounds and wheezing present. No rhonchi or rales.   Chest:      Chest wall: No tenderness.   Abdominal:      General: Bowel sounds are normal.      Palpations: Abdomen is soft.   Musculoskeletal:         General: Normal range of motion.      Cervical back: Neck supple.   Lymphadenopathy:      Cervical: No cervical adenopathy.   Skin:     General: Skin is warm and dry.   Neurological:      Mental Status: She is alert and oriented to person, place, and time.       Personal Diagnostic Review  Chest x-ray: right upper lobe infiltrate        9/1/2023    10:25 AM " "  Pulmonary Studies Review   SpO2 97 %   Height 5' 3" (1.6 m)   Weight 44.3 kg (97 lb 10.6 oz)   BMI (Calculated) 17.3   Predicted Distance 420.25   Predicted Distance Meters (Calculated) 556.35 meters       X-Ray Chest PA And Lateral  Narrative: EXAMINATION:  XR CHEST PA AND LATERAL    CLINICAL HISTORY:  SOB; Subacute cough    TECHNIQUE:  PA and lateral views of the chest were performed.    COMPARISON:  06/16/2023    FINDINGS:  The cardiac and mediastinal silhouettes appear within normal limits.   There has been interval development of patchy airspace opacities in the periphery of the right upper lung which are concerning for probable pneumonia.  Left lung remains clear.  No pleural effusion visualized.  No acute osseous findings demonstrated.  Impression: Findings concerning for pneumonia in the right upper lung.  Recommend radiographic follow-up to resolution.    This report was flagged in Epic as abnormal.    Electronically signed by: Jaswant Shaw MD  Date:    09/01/2023  Time:    12:29      Office Spirometry Results:         9/1/2023    10:25 AM 6/16/2023    12:22 PM 6/16/2023     8:41 AM 6/15/2023     1:55 PM 3/29/2023     1:32 PM 2/28/2023     1:26 PM 12/30/2022     1:11 PM   Pulmonary Function Tests   SpO2 97 % 99 % 99 %   98 % 99 %   Height 5' 3" (1.6 m)   5' 3" (1.6 m) 5' 3" (1.6 m)  5' 3" (1.6 m)   Weight 44.3 kg (97 lb 10.6 oz)   47.6 kg (104 lb 15 oz) 44.9 kg (99 lb) 45.2 kg (99 lb 10.4 oz) 46.3 kg (102 lb 1.2 oz)   BMI (Calculated) 17.3   18.6 17.5  18.1         9/1/2023    10:25 AM   Pulmonary Studies Review   SpO2 97 %   Height 5' 3" (1.6 m)   Weight 44.3 kg (97 lb 10.6 oz)   BMI (Calculated) 17.3   Predicted Distance 420.25   Predicted Distance Meters (Calculated) 556.35 meters         Assessment:       Pneumonia of right upper lobe due to infectious organism  Start Z pack  Follow pp Chest X Ray in 4 weeks    Pneumonia of right upper lobe due to infectious organism    COPD exacerbation  -     " predniSONE (DELTASONE) 20 MG tablet; Prednisone 60 mg/ day for 3 days, 40 mg/day for 3 days,20 mg/ day for 3 days, (1/2 tablet )10 mg a day for 3 days.  Dispense: 20 tablet; Refill: 0  -     albuterol (PROVENTIL) 2.5 mg /3 mL (0.083 %) nebulizer solution; Take 3 mLs (2.5 mg total) by nebulization every 4 to 6 hours as needed for Wheezing or Shortness of Breath.  Dispense: 360 mL; Refill: 11  -     NEBULIZER KIT (SUPPLIES) FOR HOME USE  -     NEBULIZER FOR HOME USE  -     azithromycin (ZITHROMAX Z-BETO) 250 MG tablet; 500 mg on day 1 (two tablets) followed by 250 mg once daily on days 2-5  Dispense: 6 tablet; Refill: 0    Asthma with COPD  -     X-Ray Chest PA And Lateral; Future; Expected date: 09/01/2023  -     Complete PFT with bronchodilator; Future; Expected date: 09/01/2023  -     albuterol (PROVENTIL) 2.5 mg /3 mL (0.083 %) nebulizer solution; Take 3 mLs (2.5 mg total) by nebulization every 4 to 6 hours as needed for Wheezing or Shortness of Breath.  Dispense: 360 mL; Refill: 11  -     NEBULIZER KIT (SUPPLIES) FOR HOME USE  -     NEBULIZER FOR HOME USE    Subacute cough  -     X-Ray Chest PA And Lateral; Future; Expected date: 09/01/2023    SOB (shortness of breath) on exertion  -     CBC Auto Differential; Future; Expected date: 09/01/2023  -     IRON AND TIBC; Future; Expected date: 09/01/2023    Connective tissue disorder  -     Sedimentation rate; Future; Expected date: 09/01/2023  -     TYE Multiplex w/ Reflex to 9 Biomarkers; Future; Expected date: 09/01/2023  -     Angiotensin Converting Enzyme; Future; Expected date: 09/01/2023  -     QUANTIFERON GOLD TB; Future; Expected date: 09/01/2023  -     Fungitell Assay For (1.3)-B-D-Glucans; Future; Expected date: 09/01/2023  -     Rheumatoid Factor; Future; Expected date: 09/01/2023  -     Protein Electrophoresis, Serum; Future; Expected date: 09/01/2023          Outpatient Encounter Medications as of 9/1/2023   Medication Sig Dispense Refill    acetaminophen  (TYLENOL) 500 MG tablet Take 500 mg by mouth as needed for Pain.      albuterol (PROVENTIL) 2.5 mg /3 mL (0.083 %) nebulizer solution Take 3 mLs (2.5 mg total) by nebulization every 4 to 6 hours as needed for Wheezing or Shortness of Breath. 360 mL 11    albuterol (PROVENTIL/VENTOLIN HFA) 90 mcg/actuation inhaler Inhale 2 puffs into the lungs every 4 (four) hours as needed for Wheezing or Shortness of Breath. Rescue 18 g 11    ALPRAZolam (XANAX) 0.5 MG tablet Take 1 tablet (0.5 mg total) by mouth 2 (two) times daily as needed (SIGNIFICANT ANXIETY). New Prescriber 60 tablet 2    augmented betamethasone dipropionate (DIPROLENE-AF) 0.05 % cream Apply topically 2 (two) times daily.      azithromycin (ZITHROMAX Z-BETO) 250 MG tablet 500 mg on day 1 (two tablets) followed by 250 mg once daily on days 2-5 6 tablet 0    BUDESONIDE 0.6 MG/NORMAL SALINE 50 ML NASAL IRRIGATION Patient to irrigate each nostril with 25ml twice daily.      busPIRone (BUSPAR) 10 MG tablet Take 1 tablet (10 mg total) by mouth 2 (two) times daily. 60 tablet 2    calcium carbonate (TUMS) 300 mg (750 mg) Chew Take 750 mg by mouth.      cyclobenzaprine (FLEXERIL) 5 MG tablet Take 5 mg by mouth 3 (three) times daily as needed for Muscle spasms.      ezetimibe (ZETIA) 10 mg tablet Take 1 tablet (10 mg total) by mouth once daily. 90 tablet 3    FLUoxetine 20 MG capsule Take 1 capsule (20 mg total) by mouth once daily. Take IN ADDITION to Prozac 40 mg supply 30 capsule 2    fluticasone-umeclidin-vilanter (TRELEGY ELLIPTA) 100-62.5-25 mcg DsDv Inhale 1 puff into the lungs once daily.      hydroCHLOROthiazide (HYDRODIURIL) 12.5 MG Tab Take 1 tablet (12.5 mg total) by mouth daily as needed (edema, swelling). Do not take if BP is low and feeling dry/dehydrated/dizzy 90 tablet 1    HYDROcodone-acetaminophen (NORCO) 5-325 mg per tablet Take 1 tablet by mouth every 6 (six) hours as needed for Pain. 12 tablet 0    hydrOXYchloroQUINE (PLAQUENIL) 200 mg tablet Take 1  tablet (200 mg total) by mouth once daily. 90 tablet 1    indomethacin (INDOCIN) 25 MG capsule Take 1 capsule (25 mg total) by mouth 3 (three) times daily. 90 capsule 3    ketorolac (TORADOL) 10 mg tablet Take 1 tablet (10 mg total) by mouth every 6 (six) hours as needed for Pain. 12 tablet 0    Lactobacillus rhamnosus GG (CULTURELLE) 10 billion cell capsule Take 1 capsule by mouth once daily.      levothyroxine (SYNTHROID) 50 MCG tablet Take 50 mcg by mouth before breakfast.      montelukast (SINGULAIR) 10 mg tablet Take 10 mg by mouth once daily.      multivitamin (THERAGRAN) per tablet Take 1 tablet by mouth once daily.      omeprazole (PRILOSEC) 40 MG capsule Take 40 mg by mouth.      ondansetron (ZOFRAN-ODT) 8 MG TbDL Take 1 tablet (8 mg total) by mouth every 8 (eight) hours as needed (nausea). 20 tablet 1    predniSONE (DELTASONE) 20 MG tablet Prednisone 60 mg/ day for 3 days, 40 mg/day for 3 days,20 mg/ day for 3 days, (1/2 tablet )10 mg a day for 3 days. 20 tablet 0    rimegepant (NURTEC) 75 mg odt Take 75 mg by mouth once as needed for Migraine. Place ODT tablet on the tongue; alternatively the ODT tablet may be placed under the tongue       No facility-administered encounter medications on file as of 2023.     Plan:       Requested Prescriptions     Signed Prescriptions Disp Refills    predniSONE (DELTASONE) 20 MG tablet 20 tablet 0     Sig: Prednisone 60 mg/ day for 3 days, 40 mg/day for 3 days,20 mg/ day for 3 days, (1/2 tablet )10 mg a day for 3 days.    albuterol (PROVENTIL) 2.5 mg /3 mL (0.083 %) nebulizer solution 360 mL 11     Sig: Take 3 mLs (2.5 mg total) by nebulization every 4 to 6 hours as needed for Wheezing or Shortness of Breath.    azithromycin (ZITHROMAX Z-BETO) 250 MG tablet 6 tablet 0     Si mg on day 1 (two tablets) followed by 250 mg once daily on days 2-5     Problem List Items Addressed This Visit       Pneumonia of right upper lobe due to infectious organism - Primary     Current Assessment & Plan     Start Z pack  Follow pp Chest X Ray in 4 weeks         SOB (shortness of breath) on exertion    Relevant Orders    CBC Auto Differential    IRON AND TIBC     Other Visit Diagnoses       COPD exacerbation        Relevant Medications    predniSONE (DELTASONE) 20 MG tablet    albuterol (PROVENTIL) 2.5 mg /3 mL (0.083 %) nebulizer solution    azithromycin (ZITHROMAX Z-BETO) 250 MG tablet    Other Relevant Orders    NEBULIZER KIT (SUPPLIES) FOR HOME USE    NEBULIZER FOR HOME USE    Asthma with COPD        Relevant Medications    albuterol (PROVENTIL) 2.5 mg /3 mL (0.083 %) nebulizer solution    Other Relevant Orders    X-Ray Chest PA And Lateral (Completed)    Complete PFT with bronchodilator    NEBULIZER KIT (SUPPLIES) FOR HOME USE    NEBULIZER FOR HOME USE    Subacute cough        Relevant Orders    X-Ray Chest PA And Lateral (Completed)    Connective tissue disorder        Relevant Orders    Sedimentation rate (Completed)    TYE Multiplex w/ Reflex to 9 Biomarkers    Angiotensin Converting Enzyme    QUANTIFERON GOLD TB    Fungitell Assay For (1.3)-B-D-Glucans    Rheumatoid Factor    Protein Electrophoresis, Serum               Follow up in about 4 weeks (around 9/29/2023) for Review CXR - perform today, PFT on return.    MEDICAL DECISION MAKING: Moderate to high complexity.  Overall, the multiple problems listed are of moderate to high severity that may impact quality of life and activities of daily living. Side effects of medications, treatment plan as well as options and alternatives reviewed and discussed with patient. There was counseling of patient concerning these issues.    Total time spent in counseling and coordination of care - 45  minutes of total time spent on the encounter, which includes face to face time and non-face to face time preparing to see the patient (eg, review of tests), Obtaining and/or reviewing separately obtained history, Documenting clinical information in the  electronic or other health record, Independently interpreting results (not separately reported) and communicating results to the patient/family/caregiver, or Care coordination (not separately reported).    Time was used in discussion of prognosis, risks, benefits of treatment, instructions and compliance with regimen . Discussion with other physicians and/or health care providers - home health or for use of durable medical equipment (oxygen, nebulizers, CPAP, BiPAP) occurred.

## 2023-09-01 NOTE — TELEPHONE ENCOUNTER
Called and discussed Chest X Ray findings of right upper lobe pneumonia  Advised to start Z pac   Follow up Chest X Ray in 4 weeks to document clearance

## 2023-09-06 ENCOUNTER — TELEPHONE (OUTPATIENT)
Dept: PULMONOLOGY | Facility: CLINIC | Age: 63
End: 2023-09-06
Payer: COMMERCIAL

## 2023-09-06 ENCOUNTER — TELEPHONE (OUTPATIENT)
Dept: CARDIOLOGY | Facility: CLINIC | Age: 63
End: 2023-09-06
Payer: COMMERCIAL

## 2023-09-06 DIAGNOSIS — D50.9 IRON DEFICIENCY ANEMIA, UNSPECIFIED IRON DEFICIENCY ANEMIA TYPE: Primary | ICD-10-CM

## 2023-09-06 DIAGNOSIS — R06.02 SOB (SHORTNESS OF BREATH): Primary | ICD-10-CM

## 2023-09-06 RX ORDER — FERROUS SULFATE 220 (44)/5
5 ELIXIR ORAL DAILY
Qty: 120 ML | Refills: 2 | Status: SHIPPED | OUTPATIENT
Start: 2023-09-06

## 2023-09-06 NOTE — TELEPHONE ENCOUNTER
Called and spoke with pt to have her rescheduled. Pt scheduled for 11/16/23 at 4:40 PM. Pt was also added to he wait list. She prefers the [facility] location and the latest appointment available.     ----- Message from Itzel Barone sent at 9/6/2023 12:38 PM CDT -----  Contact: Yvonne Cueva is calling to reschedule canceled appt at [facility] for some time sooner than 11/6  .  Please give a call back at [phone] .

## 2023-09-06 NOTE — TELEPHONE ENCOUNTER
----- Message from Ira Carroll sent at 9/6/2023  7:27 AM CDT -----  Regarding: cancelled labs  The Quantiferon Gold TB test can not be drawn the day before a holiday. These labs were cancelled in the system. Please put the orders back in and schedule with the patient to have them drawn again if they are still needed.    Thank you

## 2023-09-06 NOTE — TELEPHONE ENCOUNTER
----- Message from Itzel Barone sent at 9/6/2023 12:41 PM CDT -----  Contact: Yvonne Cueva is calling to speak with a nurse regarding results . Please give a call back at [phone] .

## 2023-09-07 ENCOUNTER — TELEPHONE (OUTPATIENT)
Dept: PULMONOLOGY | Facility: CLINIC | Age: 63
End: 2023-09-07
Payer: COMMERCIAL

## 2023-09-07 NOTE — TELEPHONE ENCOUNTER
----- Message from Latanya Neff sent at 9/7/2023 12:42 PM CDT -----  Contact: Yvonne  Patient is calling to speak with the nurse in regards to appt. Reports needing a 4:30pm appt for 4 weeks follow up . Please give patient a callback at [phone].

## 2023-09-15 ENCOUNTER — TELEPHONE (OUTPATIENT)
Dept: PULMONOLOGY | Facility: CLINIC | Age: 63
End: 2023-09-15
Payer: COMMERCIAL

## 2023-09-15 NOTE — TELEPHONE ENCOUNTER
----- Message from Toni Milligan, MA sent at 9/14/2023  3:30 PM CDT -----  Regarding: appointment with GI/ questions about  labs  Contact: Yvonne  Pt requesting callback from  office, pt stay that  staff is to contact with GI appointment (nothing available until 02/24) and answer questions regarding labs   ----- Message -----  From: Elizabeth Nayak  Sent: 9/14/2023  12:51 PM CDT  To: [facility] Clinical Staff    Pt is calling in regards to getting an appt on 10/25  please call back at [phone]        Thanks  [facility]

## 2023-09-20 ENCOUNTER — TELEPHONE (OUTPATIENT)
Dept: PULMONOLOGY | Facility: CLINIC | Age: 63
End: 2023-09-20
Payer: COMMERCIAL

## 2023-09-20 DIAGNOSIS — J18.9 PNEUMONIA OF RIGHT UPPER LOBE DUE TO INFECTIOUS ORGANISM: Primary | ICD-10-CM

## 2023-09-20 NOTE — TELEPHONE ENCOUNTER
----- Message from Jayleen Wilcox sent at 9/19/2023  4:28 PM CDT -----  Patient is requesting a call back concerning relapsing pneumonia. Call back at [phone]  Thx jm

## 2023-09-20 NOTE — TELEPHONE ENCOUNTER
Pt last seen 9/1/23.  She stated that she feels like the pneumonia is trying to come back.  She is requesting something sent to the pharmacy for her but she stated she does not want any steroids.   Please advise.

## 2023-09-21 RX ORDER — DOXYCYCLINE 100 MG/1
100 CAPSULE ORAL EVERY 12 HOURS
Qty: 20 CAPSULE | Refills: 1 | Status: SHIPPED | OUTPATIENT
Start: 2023-09-21 | End: 2023-10-25 | Stop reason: ALTCHOICE

## 2023-09-21 NOTE — TELEPHONE ENCOUNTER
Called   Allergies review    Cleared with prednisone and Z back  Now with recurrence of yellow phlegm  Prescription for doxy sent to pharmacy  Needs repeat Chest X Ray in 10 days

## 2023-09-22 ENCOUNTER — TELEPHONE (OUTPATIENT)
Dept: PULMONOLOGY | Facility: CLINIC | Age: 63
End: 2023-09-22
Payer: COMMERCIAL

## 2023-09-25 ENCOUNTER — HOSPITAL ENCOUNTER (EMERGENCY)
Facility: HOSPITAL | Age: 63
Discharge: HOME | End: 2023-09-25
Attending: EMERGENCY MEDICINE
Payer: COMMERCIAL

## 2023-09-25 VITALS
DIASTOLIC BLOOD PRESSURE: 72 MMHG | SYSTOLIC BLOOD PRESSURE: 122 MMHG | OXYGEN SATURATION: 98 % | RESPIRATION RATE: 20 BRPM | BODY MASS INDEX: 18.7 KG/M2 | TEMPERATURE: 98 F | HEIGHT: 62 IN | WEIGHT: 101.63 LBS | HEART RATE: 78 BPM

## 2023-09-25 DIAGNOSIS — M54.50 LOW BACK PAIN WITHOUT SCIATICA, UNSPECIFIED BACK PAIN LATERALITY, UNSPECIFIED CHRONICITY: Primary | ICD-10-CM

## 2023-09-25 PROCEDURE — 99284 EMERGENCY DEPT VISIT MOD MDM: CPT

## 2023-09-25 PROCEDURE — 96372 THER/PROPH/DIAG INJ SC/IM: CPT | Performed by: NURSE PRACTITIONER

## 2023-09-25 PROCEDURE — 25000003 PHARM REV CODE 250: Performed by: NURSE PRACTITIONER

## 2023-09-25 PROCEDURE — 63600175 PHARM REV CODE 636 W HCPCS: Performed by: NURSE PRACTITIONER

## 2023-09-25 RX ORDER — HYDROCODONE BITARTRATE AND ACETAMINOPHEN 5; 325 MG/1; MG/1
1 TABLET ORAL EVERY 6 HOURS PRN
Qty: 12 TABLET | Refills: 0 | Status: SHIPPED | OUTPATIENT
Start: 2023-09-25 | End: 2023-11-09

## 2023-09-25 RX ORDER — ONDANSETRON 4 MG/1
4 TABLET, ORALLY DISINTEGRATING ORAL
Status: COMPLETED | OUTPATIENT
Start: 2023-09-25 | End: 2023-09-25

## 2023-09-25 RX ORDER — MORPHINE SULFATE 4 MG/ML
4 INJECTION, SOLUTION INTRAMUSCULAR; INTRAVENOUS
Status: COMPLETED | OUTPATIENT
Start: 2023-09-25 | End: 2023-09-25

## 2023-09-25 RX ADMIN — ONDANSETRON 4 MG: 4 TABLET, ORALLY DISINTEGRATING ORAL at 05:09

## 2023-09-25 RX ADMIN — MORPHINE SULFATE 4 MG: 4 INJECTION INTRAVENOUS at 05:09

## 2023-09-26 NOTE — ED PROVIDER NOTES
Encounter Date: 2023       History     Chief Complaint   Patient presents with    Tailbone Pain     Pt complaining of lower back/tailbone pain that started yesterday. Pt currently on antibiotics for pneumonia     62-year-old female presents the emergency department for evaluation of lower back pain after lifting a heavy child.  Patient reports pain radiates down to her tailbone.  Patient denies any other specific injury.  Patient reports pain increases with range of motion.  Patient denies any fever, chills, chest pain, shortness of breath, abdominal pain, nausea, vomiting, and all other concerns at this time.        Review of patient's allergies indicates:   Allergen Reactions    Penicillins      Past Medical History:   Diagnosis Date    Anxiety     Bradycardia 2019    GERD (gastroesophageal reflux disease)     Thyroid disease      Past Surgical History:   Procedure Laterality Date    BREAST BIOPSY      DENTAL SURGERY      SINUS SURGERY       Family History   Problem Relation Age of Onset    Heart attack Mother     Hypertension Mother     Heart disease Father     Heart attack Maternal Aunt     Heart attack Maternal Uncle      Social History     Tobacco Use    Smoking status: Former     Current packs/day: 0.00     Average packs/day: 0.8 packs/day for 37.0 years (27.8 ttl pk-yrs)     Types: Cigarettes     Start date:      Quit date: 2019     Years since quittin.7    Smokeless tobacco: Never    Tobacco comments:     did not smoke for 2 years since beginning smoking.    Substance Use Topics    Alcohol use: Never    Drug use: Never     Review of Systems   Constitutional:  Negative for fever.   HENT:  Negative for sore throat.    Respiratory:  Negative for shortness of breath.    Cardiovascular:  Negative for chest pain.   Gastrointestinal:  Negative for abdominal pain, nausea and vomiting.   Genitourinary:  Negative for dysuria.   Musculoskeletal:  Positive for back pain.   Skin:  Negative for rash.    Neurological:  Negative for weakness.        No saddle anesthesia  No incontinence   Hematological:  Does not bruise/bleed easily.       Physical Exam     Initial Vitals [09/25/23 1701]   BP Pulse Resp Temp SpO2   122/72 78 18 98 °F (36.7 °C) 98 %      MAP       --         Physical Exam    Nursing note and vitals reviewed.  Constitutional: She appears well-developed and well-nourished. She is not diaphoretic. No distress.   HENT:   Head: Normocephalic and atraumatic.   Eyes: Right eye exhibits no discharge. Left eye exhibits no discharge.   Neck: Neck supple.   Normal range of motion.  Cardiovascular:  Normal rate.           Pulmonary/Chest: No respiratory distress.   Abdominal: She exhibits no distension.   Musculoskeletal:         General: Normal range of motion.      Cervical back: Normal range of motion and neck supple.     Neurological: She is alert and oriented to person, place, and time. She has normal strength.   Skin: Skin is warm and dry.   Psychiatric: She has a normal mood and affect. Her behavior is normal. Thought content normal.         ED Course   Procedures  Labs Reviewed - No data to display       Imaging Results              X-Ray Lumbar Spine Ap And Lateral (Final result)  Result time 09/25/23 18:05:59      Final result by Marcos Blanco MD (09/25/23 18:05:59)                   Impression:      No acute abnormality.      Electronically signed by: Marcos Blanco  Date:    09/25/2023  Time:    18:05               Narrative:    EXAMINATION:  XR LUMBAR SPINE AP AND LATERAL    CLINICAL HISTORY:  XR LUMBAR SPINE AP AND LATERAL    COMPARISON:  None    FINDINGS:  Multiple radiographic views  were obtained.    No evidence of acute fracture or dislocation.  Bony mineralization is normal.  Soft tissues are unremarkable. Suggestion of degenerative joint disease                                       Medications   morphine injection 4 mg (4 mg Intramuscular Given 9/25/23 1536)   ondansetron disintegrating  tablet 4 mg (4 mg Oral Given 9/25/23 1565)     Medical Decision Making  Amount and/or Complexity of Data Reviewed  Radiology: ordered.    Risk  Prescription drug management.                     I discussed with patient that the evaluation in the emergency department does not suggest any emergent or life threatening medical condition requiring immediate intervention beyond what was provided in the ED, and I believe patient is safe for discharge.  Regardless, an unremarkable evaluation in the ED does not preclude the development or presence of a serious of life threatening condition. As such, patient was instructed to return immediately for any worsening or change in current symptoms. I also discussed the results of my evaluation and diagnosis with patient and she concurs with the evaluation and management plan.  Detailed written and verbal instructions provided to patient and she expressed a verbal understanding of the discharge instructions and overall management plan. Reiterated the importance of medication administration and safety and advised patient to follow up with primary care provider in 3-5 days or sooner if needed.  Also advised patient to return to the ER for any complications.             Clinical Impression:   Final diagnoses:  [M54.50] Low back pain without sciatica, unspecified back pain laterality, unspecified chronicity (Primary)        ED Disposition Condition    Discharge Stable          ED Prescriptions       Medication Sig Dispense Start Date End Date Auth. Provider    HYDROcodone-acetaminophen (NORCO) 5-325 mg per tablet Take 1 tablet by mouth every 6 (six) hours as needed for Pain. 12 tablet 9/25/2023 -- Jorge Mckinnon, NP          Follow-up Information       Follow up With Specialties Details Why Contact Info    pcp of choice   As needed     [facility] - Emergency Dept. Emergency Medicine  If symptoms worsen [id number] [facility] [id number] [phone]              Jorge Mckinnon, NP  09/25/23 214

## 2023-09-29 ENCOUNTER — HOSPITAL ENCOUNTER (EMERGENCY)
Facility: HOSPITAL | Age: 63
Discharge: HOME | End: 2023-09-29
Attending: FAMILY MEDICINE
Payer: COMMERCIAL

## 2023-09-29 VITALS
HEART RATE: 72 BPM | DIASTOLIC BLOOD PRESSURE: 81 MMHG | TEMPERATURE: 98 F | WEIGHT: 100.5 LBS | RESPIRATION RATE: 18 BRPM | SYSTOLIC BLOOD PRESSURE: 139 MMHG | HEIGHT: 62 IN | BODY MASS INDEX: 18.5 KG/M2 | OXYGEN SATURATION: 99 %

## 2023-09-29 DIAGNOSIS — M54.31 SCIATICA OF RIGHT SIDE: Primary | ICD-10-CM

## 2023-09-29 PROCEDURE — 63600175 PHARM REV CODE 636 W HCPCS: Performed by: NURSE PRACTITIONER

## 2023-09-29 PROCEDURE — 99284 EMERGENCY DEPT VISIT MOD MDM: CPT

## 2023-09-29 PROCEDURE — 96372 THER/PROPH/DIAG INJ SC/IM: CPT | Performed by: NURSE PRACTITIONER

## 2023-09-29 RX ORDER — ONDANSETRON 2 MG/ML
4 INJECTION INTRAMUSCULAR; INTRAVENOUS
Status: COMPLETED | OUTPATIENT
Start: 2023-09-29 | End: 2023-09-29

## 2023-09-29 RX ORDER — DICLOFENAC SODIUM 75 MG/1
75 TABLET, DELAYED RELEASE ORAL 2 TIMES DAILY
Qty: 30 TABLET | Refills: 0 | Status: SHIPPED | OUTPATIENT
Start: 2023-09-29 | End: 2023-11-09

## 2023-09-29 RX ORDER — MORPHINE SULFATE 4 MG/ML
4 INJECTION, SOLUTION INTRAMUSCULAR; INTRAVENOUS
Status: COMPLETED | OUTPATIENT
Start: 2023-09-29 | End: 2023-09-29

## 2023-09-29 RX ORDER — KETOROLAC TROMETHAMINE 30 MG/ML
30 INJECTION, SOLUTION INTRAMUSCULAR; INTRAVENOUS
Status: COMPLETED | OUTPATIENT
Start: 2023-09-29 | End: 2023-09-29

## 2023-09-29 RX ORDER — METHOCARBAMOL 750 MG/1
750 TABLET, FILM COATED ORAL 4 TIMES DAILY
Qty: 40 TABLET | Refills: 0 | Status: SHIPPED | OUTPATIENT
Start: 2023-09-29 | End: 2023-10-09

## 2023-09-29 RX ADMIN — ONDANSETRON 4 MG: 2 INJECTION INTRAMUSCULAR; INTRAVENOUS at 05:09

## 2023-09-29 RX ADMIN — MORPHINE SULFATE 4 MG: 4 INJECTION INTRAVENOUS at 05:09

## 2023-09-29 RX ADMIN — KETOROLAC TROMETHAMINE 30 MG: 30 INJECTION, SOLUTION INTRAMUSCULAR; INTRAVENOUS at 05:09

## 2023-09-29 NOTE — ED PROVIDER NOTES
Encounter Date: 2023       History     Chief Complaint   Patient presents with    Back Pain     Pt was here two days for non traumatic back pain. She was treated and released. She is still having severe back pain. Pain 10/10     Patient is a 63-year-old female that presents with complaints of right-sided lower back pain that radiates down the right leg.  Patient reports a history of right-sided sciatica.  She was seen in the ER a couple of days ago with the same type of pain.  She reports exacerbating the pain 2 days ago lifting a child.  She denies any bowel or bladder incontinence, saddle anesthesia or numbness or tingling to the lower extremities.  Patient states that the medication that she was prescribed is not helping with her pain.  Patient shows no signs of distress at this time.      Review of patient's allergies indicates:   Allergen Reactions    Penicillins      Past Medical History:   Diagnosis Date    Anxiety     Bradycardia 2019    GERD (gastroesophageal reflux disease)     Thyroid disease      Past Surgical History:   Procedure Laterality Date    BREAST BIOPSY      DENTAL SURGERY      SINUS SURGERY       Family History   Problem Relation Age of Onset    Heart attack Mother     Hypertension Mother     Heart disease Father     Heart attack Maternal Aunt     Heart attack Maternal Uncle      Social History     Tobacco Use    Smoking status: Former     Current packs/day: 0.00     Average packs/day: 0.8 packs/day for 37.0 years (27.8 ttl pk-yrs)     Types: Cigarettes     Start date:      Quit date: 2019     Years since quittin.7    Smokeless tobacco: Never    Tobacco comments:     did not smoke for 2 years since beginning smoking.    Substance Use Topics    Alcohol use: Never    Drug use: Never     Review of Systems   Constitutional:  Negative for fever.   HENT:  Negative for sore throat.    Respiratory:  Negative for shortness of breath.    Cardiovascular:  Negative for chest pain.    Gastrointestinal:  Negative for nausea.   Genitourinary:  Negative for dysuria.   Musculoskeletal:  Positive for back pain.   Skin:  Negative for rash.   Neurological:  Negative for weakness.   Hematological:  Does not bruise/bleed easily.       Physical Exam     Initial Vitals [09/29/23 1716]   BP Pulse Resp Temp SpO2   139/81 72 18 98.2 °F (36.8 °C) 99 %      MAP       --         Physical Exam    Nursing note and vitals reviewed.  Constitutional: She appears well-developed and well-nourished.   HENT:   Head: Normocephalic and atraumatic.   Eyes: EOM are normal. Pupils are equal, round, and reactive to light.   Neck: Neck supple.   Normal range of motion.  Cardiovascular:  Normal rate, regular rhythm, normal heart sounds and intact distal pulses.           Pulmonary/Chest: Breath sounds normal.   Abdominal: Abdomen is soft. Bowel sounds are normal.   Musculoskeletal:         General: Normal range of motion.      Cervical back: Normal range of motion and neck supple.      Lumbar back: Tenderness (Right lower) present. No swelling, deformity or bony tenderness. Normal range of motion.        Back:      Neurological: She is alert and oriented to person, place, and time. She has normal strength and normal reflexes.   Skin: Skin is warm and dry.         ED Course   Procedures  Labs Reviewed - No data to display       Imaging Results    None          Medications   morphine injection 4 mg (4 mg Intramuscular Given 9/29/23 1750)   ondansetron injection 4 mg (4 mg Intramuscular Given 9/29/23 1750)   ketorolac injection 30 mg (30 mg Intramuscular Given 9/29/23 1750)     Medical Decision Making  Patient reports significant decrease in pain after interventions in the ER.  Patient instructed take medications as prescribed and follow-up with primary care physician.  Patient to return to the emergency room with any worsening symptoms.  No distress noted at time of discharge.    Risk  Prescription drug management.                                Clinical Impression:   Final diagnoses:  [M54.31] Sciatica of right side (Primary)        ED Disposition Condition    Discharge Stable          ED Prescriptions       Medication Sig Dispense Start Date End Date Auth. Provider    methocarbamoL (ROBAXIN) 750 MG Tab Take 1 tablet (750 mg total) by mouth 4 (four) times daily. for 10 days 40 tablet 9/29/2023 10/9/2023 Brad Doss NP    diclofenac (VOLTAREN) 75 MG EC tablet Take 1 tablet (75 mg total) by mouth 2 (two) times daily. 30 tablet 9/29/2023 -- Brad Doss NP          Follow-up Information       Follow up With Specialties Details Why Contact Info    Nafisa Irving, APRN Family Medicine  As needed [id number] [facility] [address]  [phone]               Brad Doss NP  09/29/23 6089

## 2023-10-03 ENCOUNTER — OFFICE VISIT (OUTPATIENT)
Dept: PULMONOLOGY | Facility: CLINIC | Age: 63
End: 2023-10-03
Attending: INTERNAL MEDICINE
Payer: COMMERCIAL

## 2023-10-03 ENCOUNTER — HOSPITAL ENCOUNTER (OUTPATIENT)
Dept: RADIOLOGY | Facility: HOSPITAL | Age: 63
Discharge: HOME | End: 2023-10-03
Attending: INTERNAL MEDICINE
Payer: COMMERCIAL

## 2023-10-03 VITALS
RESPIRATION RATE: 18 BRPM | DIASTOLIC BLOOD PRESSURE: 80 MMHG | HEART RATE: 72 BPM | WEIGHT: 105.06 LBS | BODY MASS INDEX: 19.33 KG/M2 | SYSTOLIC BLOOD PRESSURE: 130 MMHG | HEIGHT: 62 IN | OXYGEN SATURATION: 100 %

## 2023-10-03 DIAGNOSIS — J18.9 PNEUMONIA OF RIGHT UPPER LOBE DUE TO INFECTIOUS ORGANISM: Primary | ICD-10-CM

## 2023-10-03 DIAGNOSIS — I10 ESSENTIAL HYPERTENSION: ICD-10-CM

## 2023-10-03 DIAGNOSIS — Z87.891 FORMER TOBACCO USE: ICD-10-CM

## 2023-10-03 DIAGNOSIS — J18.9 PNEUMONIA OF RIGHT UPPER LOBE DUE TO INFECTIOUS ORGANISM: ICD-10-CM

## 2023-10-03 PROCEDURE — 3008F PR BODY MASS INDEX (BMI) DOCUMENTED: ICD-10-PCS | Mod: CPTII,S$GLB,, | Performed by: PHYSICIAN ASSISTANT

## 2023-10-03 PROCEDURE — 3079F DIAST BP 80-89 MM HG: CPT | Mod: CPTII,S$GLB,, | Performed by: PHYSICIAN ASSISTANT

## 2023-10-03 PROCEDURE — 3075F SYST BP GE 130 - 139MM HG: CPT | Mod: CPTII,S$GLB,, | Performed by: PHYSICIAN ASSISTANT

## 2023-10-03 PROCEDURE — 71046 X-RAY EXAM CHEST 2 VIEWS: CPT | Mod: TC

## 2023-10-03 PROCEDURE — 1160F PR REVIEW ALL MEDS BY PRESCRIBER/CLIN PHARMACIST DOCUMENTED: ICD-10-PCS | Mod: CPTII,S$GLB,, | Performed by: PHYSICIAN ASSISTANT

## 2023-10-03 PROCEDURE — 71046 X-RAY EXAM CHEST 2 VIEWS: CPT | Mod: 26,,, | Performed by: RADIOLOGY

## 2023-10-03 PROCEDURE — 99214 OFFICE O/P EST MOD 30 MIN: CPT | Mod: S$GLB,,, | Performed by: PHYSICIAN ASSISTANT

## 2023-10-03 PROCEDURE — 1160F RVW MEDS BY RX/DR IN RCRD: CPT | Mod: CPTII,S$GLB,, | Performed by: PHYSICIAN ASSISTANT

## 2023-10-03 PROCEDURE — 1159F PR MEDICATION LIST DOCUMENTED IN MEDICAL RECORD: ICD-10-PCS | Mod: CPTII,S$GLB,, | Performed by: PHYSICIAN ASSISTANT

## 2023-10-03 PROCEDURE — 3075F PR MOST RECENT SYSTOLIC BLOOD PRESS GE 130-139MM HG: ICD-10-PCS | Mod: CPTII,S$GLB,, | Performed by: PHYSICIAN ASSISTANT

## 2023-10-03 PROCEDURE — 99999 PR PBB SHADOW E&M-EST. PATIENT-LVL V: ICD-10-PCS | Mod: PBBFAC,,, | Performed by: PHYSICIAN ASSISTANT

## 2023-10-03 PROCEDURE — 99214 PR OFFICE/OUTPT VISIT, EST, LEVL IV, 30-39 MIN: ICD-10-PCS | Mod: S$GLB,,, | Performed by: PHYSICIAN ASSISTANT

## 2023-10-03 PROCEDURE — 1159F MED LIST DOCD IN RCRD: CPT | Mod: CPTII,S$GLB,, | Performed by: PHYSICIAN ASSISTANT

## 2023-10-03 PROCEDURE — 71046 XR CHEST PA AND LATERAL: ICD-10-PCS | Mod: 26,,, | Performed by: RADIOLOGY

## 2023-10-03 PROCEDURE — 99999 PR PBB SHADOW E&M-EST. PATIENT-LVL V: CPT | Mod: PBBFAC,,, | Performed by: PHYSICIAN ASSISTANT

## 2023-10-03 PROCEDURE — 3008F BODY MASS INDEX DOCD: CPT | Mod: CPTII,S$GLB,, | Performed by: PHYSICIAN ASSISTANT

## 2023-10-03 PROCEDURE — 3079F PR MOST RECENT DIASTOLIC BLOOD PRESSURE 80-89 MM HG: ICD-10-PCS | Mod: CPTII,S$GLB,, | Performed by: PHYSICIAN ASSISTANT

## 2023-10-03 NOTE — ASSESSMENT & PLAN NOTE
Completed zpack 3 weeks ago  Completed doxycyline 2 days ago  Chest X Ray much improved, some faint residual opacities  She already has follow up scheduled on 10/25 with Chest X Ray and PFT; recommend keeping thid appointment to ensure resolution of opacities

## 2023-10-03 NOTE — PROGRESS NOTES
Subjective:       Patient ID: Yvonne Schmidt is a 63 y.o. female.    Chief Complaint: Cough and congestion      10/3/2023  Here for pneumonia follow up  Chest X Ray 9/1 with right upper lobe pneumonia, she completed zpack, was still feeling bad with cough/congestion and started doxycycline on 9/22. She is feeling much better. Mild residual cough, less sputum production. No SOB, chest pain or fever  Using albuterol neb once daily  Completed po steroids    Immunization History   Administered Date(s) Administered    COVID-19, MRNA, LN-S, PF (Pfizer) (Purple Cap) 09/01/2021, 09/22/2021      Tobacco Use: Medium Risk (10/3/2023)    Patient History     Smoking Tobacco Use: Former     Smokeless Tobacco Use: Never     Passive Exposure: Not on file      Past Medical History:   Diagnosis Date    Anxiety     Bradycardia 9/19/2019    GERD (gastroesophageal reflux disease)     Thyroid disease       Current Outpatient Medications on File Prior to Visit   Medication Sig Dispense Refill    acetaminophen (TYLENOL) 500 MG tablet Take 500 mg by mouth as needed for Pain.      albuterol (PROVENTIL) 2.5 mg /3 mL (0.083 %) nebulizer solution Take 3 mLs (2.5 mg total) by nebulization every 4 to 6 hours as needed for Wheezing or Shortness of Breath. 360 mL 11    albuterol (PROVENTIL/VENTOLIN HFA) 90 mcg/actuation inhaler Inhale 2 puffs into the lungs every 4 (four) hours as needed for Wheezing or Shortness of Breath. Rescue 18 g 11    BUDESONIDE 0.6 MG/NORMAL SALINE 50 ML NASAL IRRIGATION Patient to irrigate each nostril with 25ml twice daily.      calcium carbonate (TUMS) 300 mg (750 mg) Chew Take 750 mg by mouth.      cyclobenzaprine (FLEXERIL) 5 MG tablet Take 5 mg by mouth 3 (three) times daily as needed for Muscle spasms.      diclofenac (VOLTAREN) 75 MG EC tablet Take 1 tablet (75 mg total) by mouth 2 (two) times daily. 30 tablet 0    doxycycline (MONODOX) 100 MG capsule Take 1 capsule (100 mg total) by mouth every 12 (twelve)  hours. 20 capsule 1    ezetimibe (ZETIA) 10 mg tablet Take 1 tablet (10 mg total) by mouth once daily. 90 tablet 3    ferrous sulfate (FEROSUL) 220 mg (44 mg iron)/5 mL Elix Take 5 mLs (220 mg total) by mouth once daily. 120 mL 2    fluticasone-umeclidin-vilanter (TRELEGY ELLIPTA) 100-62.5-25 mcg DsDv Inhale 1 puff into the lungs once daily.      hydrOXYchloroQUINE (PLAQUENIL) 200 mg tablet Take 1 tablet (200 mg total) by mouth once daily. 90 tablet 1    indomethacin (INDOCIN) 25 MG capsule Take 1 capsule (25 mg total) by mouth 3 (three) times daily. 90 capsule 3    ketorolac (TORADOL) 10 mg tablet Take 1 tablet (10 mg total) by mouth every 6 (six) hours as needed for Pain. 12 tablet 0    Lactobacillus rhamnosus GG (CULTURELLE) 10 billion cell capsule Take 1 capsule by mouth once daily.      levothyroxine (SYNTHROID) 50 MCG tablet Take 50 mcg by mouth before breakfast.      methocarbamoL (ROBAXIN) 750 MG Tab Take 1 tablet (750 mg total) by mouth 4 (four) times daily. for 10 days 40 tablet 0    montelukast (SINGULAIR) 10 mg tablet Take 10 mg by mouth once daily.      ondansetron (ZOFRAN-ODT) 8 MG TbDL Take 1 tablet (8 mg total) by mouth every 8 (eight) hours as needed (nausea). 20 tablet 1    ALPRAZolam (XANAX) 0.5 MG tablet Take 1 tablet (0.5 mg total) by mouth 2 (two) times daily as needed (SIGNIFICANT ANXIETY). New Prescriber 60 tablet 2    augmented betamethasone dipropionate (DIPROLENE-AF) 0.05 % cream Apply topically 2 (two) times daily.      azithromycin (ZITHROMAX Z-BETO) 250 MG tablet 500 mg on day 1 (two tablets) followed by 250 mg once daily on days 2-5 6 tablet 0    busPIRone (BUSPAR) 10 MG tablet Take 1 tablet (10 mg total) by mouth 2 (two) times daily. 60 tablet 2    FLUoxetine 20 MG capsule Take 1 capsule (20 mg total) by mouth once daily. Take IN ADDITION to Prozac 40 mg supply 30 capsule 2    hydroCHLOROthiazide (HYDRODIURIL) 12.5 MG Tab Take 1 tablet (12.5 mg total) by mouth daily as needed (edema,  "swelling). Do not take if BP is low and feeling dry/dehydrated/dizzy (Patient not taking: Reported on 10/3/2023) 90 tablet 1    HYDROcodone-acetaminophen (NORCO) 5-325 mg per tablet Take 1 tablet by mouth every 6 (six) hours as needed for Pain. (Patient not taking: Reported on 10/3/2023) 12 tablet 0    HYDROcodone-acetaminophen (NORCO) 5-325 mg per tablet Take 1 tablet by mouth every 6 (six) hours as needed for Pain. (Patient not taking: Reported on 10/3/2023) 12 tablet 0    multivitamin (THERAGRAN) per tablet Take 1 tablet by mouth once daily.      omeprazole (PRILOSEC) 40 MG capsule Take 40 mg by mouth.      predniSONE (DELTASONE) 20 MG tablet Prednisone 60 mg/ day for 3 days, 40 mg/day for 3 days,20 mg/ day for 3 days, (1/2 tablet )10 mg a day for 3 days. 20 tablet 0    rimegepant (NURTEC) 75 mg odt Take 75 mg by mouth once as needed for Migraine. Place ODT tablet on the tongue; alternatively the ODT tablet may be placed under the tongue       No current facility-administered medications on file prior to visit.        Review of Systems   Constitutional:  Positive for fatigue. Negative for fever, chills and appetite change.   HENT:  Negative for congestion.    Respiratory:  Positive for cough, dyspnea on extertion and use of rescue inhaler. Negative for sputum production, chest tightness and wheezing.    Musculoskeletal:  Negative for gait problem.   Gastrointestinal:  Negative for vomiting.   Neurological:  Negative for dizziness and headaches.       Objective:       Vitals:    10/03/23 0957   BP: 130/80   Pulse: 72   Resp: 18   SpO2: 100%   Weight: 47.7 kg (105 lb 0.8 oz)   Height: 5' 2" (1.575 m)       Physical Exam   Constitutional: She is oriented to person, place, and time. She appears well-developed and well-nourished. No distress.   HENT:   Head: Normocephalic.   Nose: Nose normal.   Mouth/Throat: Oropharynx is clear and moist.   Cardiovascular: Normal rate and regular rhythm.   Pulmonary/Chest: Effort " normal. No respiratory distress. She has no wheezes. She has no rhonchi. She has no rales.   Musculoskeletal:         General: No edema.      Cervical back: Normal range of motion and neck supple.   Neurological: She is alert and oriented to person, place, and time. Gait normal.   Skin: Skin is warm and dry.   Psychiatric: She has a normal mood and affect.   Vitals reviewed.    Personal Diagnostic Review    X-Ray Chest PA And Lateral  Narrative: EXAMINATION:  XR CHEST PA AND LATERAL    CLINICAL HISTORY:  SOB; Pneumonia, unspecified organism    TECHNIQUE:  PA and lateral views of the chest were performed.    COMPARISON:  09/01/2023    FINDINGS:  The cardiac and mediastinal silhouettes appear within normal limits.   Previously described parenchymal opacities in the right upper lung have partially resolved with some persistent faint reticular opacities remaining.  Chronic scarring is again noted the bilateral apices.  No definite pleural effusion demonstrated.  No acute osseous findings demonstrated.  Impression: Interval improvement as above.  Continued radiographic follow-up to resolution is recommended.    Electronically signed by: Jaswant Shaw MD  Date:    10/03/2023  Time:    09:47                Assessment/Plan:       Problem List Items Addressed This Visit          Pulmonary    Pneumonia of right upper lobe due to infectious organism - Primary     Completed zpack 3 weeks ago  Completed doxycyline 2 days ago  Chest X Ray much improved, some faint residual opacities  She already has follow up scheduled on 10/25 with Chest X Ray and PFT; recommend keeping thid appointment to ensure resolution of opacities            Cardiac/Vascular    Essential hypertension     Stable, continue current medication management             Other    Former tobacco use     Quit 2019  26 pack year smoker  PFT scheduled 10/25              Discussed diagnosis, its evaluation, treatment and usual course. All questions  answered.    Patient verbalized understanding of plan and left in no acute distress    Thank you for the courtesy of participating in the care of this patient    Elizabeth G Blough, PA-C [facility]

## 2023-10-09 ENCOUNTER — OFFICE VISIT (OUTPATIENT)
Dept: OBSTETRICS AND GYNECOLOGY | Facility: CLINIC | Age: 63
End: 2023-10-09
Payer: COMMERCIAL

## 2023-10-09 ENCOUNTER — TELEPHONE (OUTPATIENT)
Dept: OBSTETRICS AND GYNECOLOGY | Facility: CLINIC | Age: 63
End: 2023-10-09
Payer: COMMERCIAL

## 2023-10-09 VITALS
WEIGHT: 102.31 LBS | BODY MASS INDEX: 18.83 KG/M2 | HEIGHT: 62 IN | SYSTOLIC BLOOD PRESSURE: 112 MMHG | DIASTOLIC BLOOD PRESSURE: 68 MMHG

## 2023-10-09 DIAGNOSIS — Z12.4 CERVICAL CANCER SCREENING: ICD-10-CM

## 2023-10-09 DIAGNOSIS — N81.2 CYSTOCELE AND RECTOCELE WITH INCOMPLETE UTEROVAGINAL PROLAPSE: Primary | ICD-10-CM

## 2023-10-09 DIAGNOSIS — Z12.31 SCREENING MAMMOGRAM FOR BREAST CANCER: ICD-10-CM

## 2023-10-09 DIAGNOSIS — N95.2 ATROPHIC VAGINITIS: ICD-10-CM

## 2023-10-09 PROBLEM — N81.4 PELVIC RELAXATION DUE TO UTERINE PROLAPSE: Status: ACTIVE | Noted: 2023-10-09

## 2023-10-09 PROCEDURE — 1160F RVW MEDS BY RX/DR IN RCRD: CPT | Mod: CPTII,S$GLB,, | Performed by: OBSTETRICS & GYNECOLOGY

## 2023-10-09 PROCEDURE — 99999 PR PBB SHADOW E&M-EST. PATIENT-LVL V: ICD-10-PCS | Mod: PBBFAC,,, | Performed by: OBSTETRICS & GYNECOLOGY

## 2023-10-09 PROCEDURE — 3078F DIAST BP <80 MM HG: CPT | Mod: CPTII,S$GLB,, | Performed by: OBSTETRICS & GYNECOLOGY

## 2023-10-09 PROCEDURE — 88175 CYTOPATH C/V AUTO FLUID REDO: CPT | Performed by: OBSTETRICS & GYNECOLOGY

## 2023-10-09 PROCEDURE — 1159F PR MEDICATION LIST DOCUMENTED IN MEDICAL RECORD: ICD-10-PCS | Mod: CPTII,S$GLB,, | Performed by: OBSTETRICS & GYNECOLOGY

## 2023-10-09 PROCEDURE — 99204 OFFICE O/P NEW MOD 45 MIN: CPT | Mod: S$GLB,,, | Performed by: OBSTETRICS & GYNECOLOGY

## 2023-10-09 PROCEDURE — 3078F PR MOST RECENT DIASTOLIC BLOOD PRESSURE < 80 MM HG: ICD-10-PCS | Mod: CPTII,S$GLB,, | Performed by: OBSTETRICS & GYNECOLOGY

## 2023-10-09 PROCEDURE — 3008F BODY MASS INDEX DOCD: CPT | Mod: CPTII,S$GLB,, | Performed by: OBSTETRICS & GYNECOLOGY

## 2023-10-09 PROCEDURE — 3074F SYST BP LT 130 MM HG: CPT | Mod: CPTII,S$GLB,, | Performed by: OBSTETRICS & GYNECOLOGY

## 2023-10-09 PROCEDURE — 99999 PR PBB SHADOW E&M-EST. PATIENT-LVL V: CPT | Mod: PBBFAC,,, | Performed by: OBSTETRICS & GYNECOLOGY

## 2023-10-09 PROCEDURE — 99204 PR OFFICE/OUTPT VISIT, NEW, LEVL IV, 45-59 MIN: ICD-10-PCS | Mod: S$GLB,,, | Performed by: OBSTETRICS & GYNECOLOGY

## 2023-10-09 PROCEDURE — 87624 HPV HI-RISK TYP POOLED RSLT: CPT | Performed by: OBSTETRICS & GYNECOLOGY

## 2023-10-09 PROCEDURE — 1159F MED LIST DOCD IN RCRD: CPT | Mod: CPTII,S$GLB,, | Performed by: OBSTETRICS & GYNECOLOGY

## 2023-10-09 PROCEDURE — 1160F PR REVIEW ALL MEDS BY PRESCRIBER/CLIN PHARMACIST DOCUMENTED: ICD-10-PCS | Mod: CPTII,S$GLB,, | Performed by: OBSTETRICS & GYNECOLOGY

## 2023-10-09 PROCEDURE — 3074F PR MOST RECENT SYSTOLIC BLOOD PRESSURE < 130 MM HG: ICD-10-PCS | Mod: CPTII,S$GLB,, | Performed by: OBSTETRICS & GYNECOLOGY

## 2023-10-09 PROCEDURE — 3008F PR BODY MASS INDEX (BMI) DOCUMENTED: ICD-10-PCS | Mod: CPTII,S$GLB,, | Performed by: OBSTETRICS & GYNECOLOGY

## 2023-10-09 RX ORDER — ESTRADIOL 0.1 MG/G
1 CREAM VAGINAL
Qty: 42.5 G | Refills: 1 | Status: SHIPPED | OUTPATIENT
Start: 2023-10-09 | End: 2024-01-11

## 2023-10-09 NOTE — TELEPHONE ENCOUNTER
----- Message from Latanya Neff sent at 10/6/2023  3:59 PM CDT -----  Contact: Yvonne  Patient is calling to speak with the nurse to schedule an appt for swelling in vaginal area. Would like to be seen by a female provider in the afternoon around 4:30pm.Please give patient a callback at [phone].

## 2023-10-09 NOTE — TELEPHONE ENCOUNTER
Rtn pt call in regards to needing an appt, Pt  states she had one already scheduled for 10/9 with Dr.Erin Wyattive advised pt that I do not see appt scheduled for her pt states she spoke with Registration on 10/6/23 stated ALLEY Neff from Registration scheduled appt for her       Shari HAGER LPN  OB/GYN

## 2023-10-09 NOTE — PROGRESS NOTES
Subjective:      Patient ID: Yvonne Schmidt is a 63 y.o. female.    Chief Complaint:  Vaginal Prolapse      History of Present Illness  HPI  Presents with possible prolapse.  After standing for awhile or after using the bathroom, she notices a ball of tissue bulging from the vaginal opening.  Has lower pelvic discomfort when that happens as well.  No urinary incontinence or urgency.  No difficulty voiding.  Pt struggles with chronic constipation.  Has resumed Miralax.  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  She is , and would like to be sexually active, but feels like she can't because of her symptoms.  Due for pap  MMG scheduled 23    GYN & OB History  No LMP recorded. Patient is postmenopausal.   Date of Last Pap: No result found    OB History    Para Term  AB Living   2 2 2 0 0 2   SAB IAB Ectopic Multiple Live Births   0 0 0 0 2      # Outcome Date GA Lbr Jeremi/2nd Weight Sex Delivery Anes PTL Lv   2 Term    3.289 kg (7 lb 4 oz)  Vag-Spont      1 Term    3.317 kg (7 lb 5 oz)  Vag-Spont          Review of Systems  Review of Systems       Objective:     Physical Exam:   Constitutional: She is oriented to person, place, and time. She appears well-developed and well-nourished. No distress.             Abdominal: Soft. She exhibits no distension and no mass. There is no abdominal tenderness. There is no rebound and no guarding. Hernia confirmed negative in the right inguinal area and confirmed negative in the left inguinal area.     Genitourinary:    Vagina normal.      Pelvic exam was performed with patient supine.   There is no rash, tenderness, lesion or injury on the right labia. There is no rash, tenderness, lesion or injury on the left labia. Right adnexum displays no mass, no tenderness and no fullness. Left adnexum displays no mass, no tenderness and no fullness. There is rectocele (Grade 2 with valsalva) and cystocele (Grade 3 with  Valsalva) in the vagina. No erythema,  no vaginal discharge, tenderness or bleeding in the vagina.    No foreign body in the vagina.      No signs of injury in the vagina.   Vaginal atrophy noted. Cervix exhibits no motion tenderness, no discharge and no friability. Uterus is uterine prolapse (cervix descends to 1cm proximal to the hymenal ring with valsalva). Uterus is not deviated, not enlarged, not fixed and not tender.               Neurological: She is alert and oriented to person, place, and time.     Psychiatric: She has a normal mood and affect.         Assessment:     1. Cystocele and rectocele with incomplete uterovaginal prolapse    2. Screening mammogram for breast cancer    3. Cervical cancer screening    4. Atrophic vaginitis               Plan:     Yvonne was seen today for vaginal prolapse.    Diagnoses and all orders for this visit:    Cystocele and rectocele with incomplete uterovaginal prolapse    Screening mammogram for breast cancer  -     Mammo Digital Screening Bilat; Future    Cervical cancer screening  -     Liquid-Based Pap Smear, Screening  -     HPV High Risk Genotypes, PCR    Atrophic vaginitis  -     estradioL (ESTRACE) 0.01 % (0.1 mg/gram) vaginal cream; Place 1 g vaginally twice a week.     Reviewed updated recommendations for pap smears (every 3 years) in low risk patients.   Recommend annual pelvic exams.  Reviewed recommendations for annual CBE and annual MMG.  Reviewed pathophysiology of pelvic organ prolapse.  Discussed continuing miralax to minimize constipation.  Reviewed pessary vs surgery.  Wants to try pessary first.  Explained she'll need to remove the pessary prior to intercourse if she wants to be sexually active.    RTC 1 month for pessary fitting.  Start vaginal estrace for atrophy.

## 2023-10-12 LAB
FINAL PATHOLOGIC DIAGNOSIS: NORMAL
Lab: NORMAL

## 2023-10-13 LAB
HPV HR 12 DNA SPEC QL NAA+PROBE: NEGATIVE
HPV16 AG SPEC QL: NEGATIVE
HPV18 DNA SPEC QL NAA+PROBE: NEGATIVE

## 2023-10-19 RX ORDER — HYDROCHLOROTHIAZIDE 12.5 MG/1
12.5 TABLET ORAL DAILY PRN
Qty: 90 TABLET | Refills: 1 | Status: SHIPPED | OUTPATIENT
Start: 2023-10-19 | End: 2023-11-16 | Stop reason: SDUPTHER

## 2023-10-23 ENCOUNTER — TELEPHONE (OUTPATIENT)
Dept: OBSTETRICS AND GYNECOLOGY | Facility: CLINIC | Age: 63
End: 2023-10-23
Payer: COMMERCIAL

## 2023-10-23 NOTE — TELEPHONE ENCOUNTER
Teresita Dugan MD Hebert, Jeanetta K, LPN  Caller: Unspecified (Today,  2:36 PM)  The degree of prolapse she has should not be causing severe pain, so if she's in that much pain, she may need to go to the ED to make sure nothing else is going on.  Needs to come for her pessary fitting on 11/9 as scheduled, and continue the estrace cream to help thicken the lining of the vagina.     Called patient and advised the above.  Patient stated she would go to ED for eval.

## 2023-10-23 NOTE — TELEPHONE ENCOUNTER
"Called patient and she stated she is really hurting and almost went to the hospital over the weekend with the pain she is having.  Patient states "I have tried everything (Tylenol, Ibuprofen)"  and started using the cream ED prescribed.  Patient asking for suggestions as to what she can do for pain because she cannot sleep and is miserable.    Advised patient message would be sent to provider.    Advised patient she has pessary fitting appointment 11/9.  Patient stated she did not know she had appointment.  "

## 2023-10-23 NOTE — TELEPHONE ENCOUNTER
----- Message from Steffi Collado sent at 10/23/2023 12:12 PM CDT -----  Contact: [phone]    Patient: Yvonne Blankenship-Major-    Reason: The patient is requesting a call back from the nurse to get advice of extreme rotating pain in     groin area.    Comments: Please call the patient back to advise.

## 2023-10-25 ENCOUNTER — OFFICE VISIT (OUTPATIENT)
Dept: PULMONOLOGY | Facility: CLINIC | Age: 63
End: 2023-10-25
Payer: COMMERCIAL

## 2023-10-25 ENCOUNTER — CLINICAL SUPPORT (OUTPATIENT)
Dept: PULMONOLOGY | Facility: CLINIC | Age: 63
End: 2023-10-25
Payer: COMMERCIAL

## 2023-10-25 ENCOUNTER — HOSPITAL ENCOUNTER (OUTPATIENT)
Dept: RADIOLOGY | Facility: HOSPITAL | Age: 63
Discharge: HOME | End: 2023-10-25
Attending: INTERNAL MEDICINE
Payer: COMMERCIAL

## 2023-10-25 VITALS
OXYGEN SATURATION: 97 % | BODY MASS INDEX: 18.83 KG/M2 | HEIGHT: 62 IN | RESPIRATION RATE: 17 BRPM | WEIGHT: 102.31 LBS | DIASTOLIC BLOOD PRESSURE: 72 MMHG | SYSTOLIC BLOOD PRESSURE: 120 MMHG | HEART RATE: 68 BPM

## 2023-10-25 DIAGNOSIS — J44.89 ASTHMA WITH COPD: ICD-10-CM

## 2023-10-25 DIAGNOSIS — R06.02 SOB (SHORTNESS OF BREATH) ON EXERTION: ICD-10-CM

## 2023-10-25 DIAGNOSIS — J18.9 PNEUMONIA OF RIGHT UPPER LOBE DUE TO INFECTIOUS ORGANISM: ICD-10-CM

## 2023-10-25 DIAGNOSIS — J44.1 COPD EXACERBATION: ICD-10-CM

## 2023-10-25 DIAGNOSIS — J01.80 OTHER SUBACUTE SINUSITIS: ICD-10-CM

## 2023-10-25 DIAGNOSIS — J44.89 ASTHMA WITH COPD: Primary | ICD-10-CM

## 2023-10-25 DIAGNOSIS — F06.4 ANXIETY DISORDER DUE TO KNOWN PHYSIOLOGICAL CONDITION: ICD-10-CM

## 2023-10-25 DIAGNOSIS — Z87.891 FORMER TOBACCO USE: ICD-10-CM

## 2023-10-25 DIAGNOSIS — J30.89 SEASONAL ALLERGIC RHINITIS DUE TO OTHER ALLERGIC TRIGGER: ICD-10-CM

## 2023-10-25 PROCEDURE — 99999 PR PBB SHADOW E&M-EST. PATIENT-LVL I: ICD-10-PCS | Mod: PBBFAC,,,

## 2023-10-25 PROCEDURE — 99215 OFFICE O/P EST HI 40 MIN: CPT | Mod: 25,S$GLB,, | Performed by: INTERNAL MEDICINE

## 2023-10-25 PROCEDURE — 3078F DIAST BP <80 MM HG: CPT | Mod: CPTII,S$GLB,, | Performed by: INTERNAL MEDICINE

## 2023-10-25 PROCEDURE — 94060 PR EVAL OF BRONCHOSPASM: ICD-10-PCS | Mod: S$GLB,,, | Performed by: INTERNAL MEDICINE

## 2023-10-25 PROCEDURE — 90686 IIV4 VACC NO PRSV 0.5 ML IM: CPT | Mod: S$GLB,,, | Performed by: INTERNAL MEDICINE

## 2023-10-25 PROCEDURE — 3008F PR BODY MASS INDEX (BMI) DOCUMENTED: ICD-10-PCS | Mod: CPTII,S$GLB,, | Performed by: INTERNAL MEDICINE

## 2023-10-25 PROCEDURE — 90472 IMMUNIZATION ADMIN EACH ADD: CPT | Mod: S$GLB,,, | Performed by: INTERNAL MEDICINE

## 2023-10-25 PROCEDURE — 94726 PLETHYSMOGRAPHY LUNG VOLUMES: CPT | Mod: S$GLB,,, | Performed by: INTERNAL MEDICINE

## 2023-10-25 PROCEDURE — 94726 PULM FUNCT TST PLETHYSMOGRAP: ICD-10-PCS | Mod: S$GLB,,, | Performed by: INTERNAL MEDICINE

## 2023-10-25 PROCEDURE — 99999 PR PBB SHADOW E&M-EST. PATIENT-LVL V: CPT | Mod: PBBFAC,,, | Performed by: INTERNAL MEDICINE

## 2023-10-25 PROCEDURE — 90686 FLU VACCINE (QUAD) GREATER THAN OR EQUAL TO 3YO PRESERVATIVE FREE IM: ICD-10-PCS | Mod: S$GLB,,, | Performed by: INTERNAL MEDICINE

## 2023-10-25 PROCEDURE — 3074F PR MOST RECENT SYSTOLIC BLOOD PRESSURE < 130 MM HG: ICD-10-PCS | Mod: CPTII,S$GLB,, | Performed by: INTERNAL MEDICINE

## 2023-10-25 PROCEDURE — 90732 PPSV23 VACC 2 YRS+ SUBQ/IM: CPT | Mod: S$GLB,,, | Performed by: INTERNAL MEDICINE

## 2023-10-25 PROCEDURE — 94729 PR C02/MEMBANE DIFFUSE CAPACITY: ICD-10-PCS | Mod: S$GLB,,, | Performed by: INTERNAL MEDICINE

## 2023-10-25 PROCEDURE — 3078F PR MOST RECENT DIASTOLIC BLOOD PRESSURE < 80 MM HG: ICD-10-PCS | Mod: CPTII,S$GLB,, | Performed by: INTERNAL MEDICINE

## 2023-10-25 PROCEDURE — 1159F PR MEDICATION LIST DOCUMENTED IN MEDICAL RECORD: ICD-10-PCS | Mod: CPTII,S$GLB,, | Performed by: INTERNAL MEDICINE

## 2023-10-25 PROCEDURE — 1160F RVW MEDS BY RX/DR IN RCRD: CPT | Mod: CPTII,S$GLB,, | Performed by: INTERNAL MEDICINE

## 2023-10-25 PROCEDURE — 94729 DIFFUSING CAPACITY: CPT | Mod: S$GLB,,, | Performed by: INTERNAL MEDICINE

## 2023-10-25 PROCEDURE — 90471 IMMUNIZATION ADMIN: CPT | Mod: S$GLB,,, | Performed by: INTERNAL MEDICINE

## 2023-10-25 PROCEDURE — 99999 PR PBB SHADOW E&M-EST. PATIENT-LVL V: ICD-10-PCS | Mod: PBBFAC,,, | Performed by: INTERNAL MEDICINE

## 2023-10-25 PROCEDURE — 1160F PR REVIEW ALL MEDS BY PRESCRIBER/CLIN PHARMACIST DOCUMENTED: ICD-10-PCS | Mod: CPTII,S$GLB,, | Performed by: INTERNAL MEDICINE

## 2023-10-25 PROCEDURE — 3074F SYST BP LT 130 MM HG: CPT | Mod: CPTII,S$GLB,, | Performed by: INTERNAL MEDICINE

## 2023-10-25 PROCEDURE — 99999 PR PBB SHADOW E&M-EST. PATIENT-LVL I: CPT | Mod: PBBFAC,,,

## 2023-10-25 PROCEDURE — 71046 XR CHEST PA AND LATERAL: ICD-10-PCS | Mod: 26,,, | Performed by: RADIOLOGY

## 2023-10-25 PROCEDURE — 90472 FLU VACCINE (QUAD) GREATER THAN OR EQUAL TO 3YO PRESERVATIVE FREE IM: ICD-10-PCS | Mod: S$GLB,,, | Performed by: INTERNAL MEDICINE

## 2023-10-25 PROCEDURE — 90732 PNEUMOCOCCAL POLYSACCHARIDE VACCINE 23-VALENT =>2YO SQ IM: ICD-10-PCS | Mod: S$GLB,,, | Performed by: INTERNAL MEDICINE

## 2023-10-25 PROCEDURE — 71046 X-RAY EXAM CHEST 2 VIEWS: CPT | Mod: 26,,, | Performed by: RADIOLOGY

## 2023-10-25 PROCEDURE — 3008F BODY MASS INDEX DOCD: CPT | Mod: CPTII,S$GLB,, | Performed by: INTERNAL MEDICINE

## 2023-10-25 PROCEDURE — 1159F MED LIST DOCD IN RCRD: CPT | Mod: CPTII,S$GLB,, | Performed by: INTERNAL MEDICINE

## 2023-10-25 PROCEDURE — 71046 X-RAY EXAM CHEST 2 VIEWS: CPT | Mod: TC

## 2023-10-25 PROCEDURE — 94060 EVALUATION OF WHEEZING: CPT | Mod: S$GLB,,, | Performed by: INTERNAL MEDICINE

## 2023-10-25 PROCEDURE — 99215 PR OFFICE/OUTPT VISIT, EST, LEVL V, 40-54 MIN: ICD-10-PCS | Mod: 25,S$GLB,, | Performed by: INTERNAL MEDICINE

## 2023-10-25 PROCEDURE — 90471 PNEUMOCOCCAL POLYSACCHARIDE VACCINE 23-VALENT =>2YO SQ IM: ICD-10-PCS | Mod: S$GLB,,, | Performed by: INTERNAL MEDICINE

## 2023-10-25 RX ORDER — PREDNISONE 20 MG/1
TABLET ORAL
Qty: 20 TABLET | Refills: 0 | Status: SHIPPED | OUTPATIENT
Start: 2023-10-25 | End: 2023-11-09

## 2023-10-25 RX ORDER — FLUTICASONE PROPIONATE AND SALMETEROL 250; 50 UG/1; UG/1
1 POWDER RESPIRATORY (INHALATION) 2 TIMES DAILY
Qty: 60 EACH | Refills: 11 | Status: SHIPPED | OUTPATIENT
Start: 2023-10-25 | End: 2024-10-24

## 2023-10-25 RX ORDER — ALBUTEROL SULFATE 90 UG/1
2 AEROSOL, METERED RESPIRATORY (INHALATION) EVERY 4 HOURS PRN
Qty: 18 G | Refills: 11 | Status: SHIPPED | OUTPATIENT
Start: 2023-10-25

## 2023-10-25 RX ORDER — LEVOFLOXACIN 25 MG/ML
500 SOLUTION ORAL DAILY
Qty: 200 ML | Refills: 0 | Status: SHIPPED | OUTPATIENT
Start: 2023-10-25 | End: 2023-11-04

## 2023-10-25 NOTE — PROGRESS NOTES
Subjective:     Patient ID: Yvonne Schmidt is a 63 y.o. female.    Chief Complaint:      HPI      Pain medication intermittently  Intermittent dysphagia  History of sinus surgery   History of migraines  Here for pneumonia follow up  Chest X Ray 9/1 with right upper lobe pneumonia, she completed zpack, was still feeling bad with cough/congestion and started doxycycline on 9/22. She is feeling much better. Mild residual cough, less sputum production. No SOB, chest pain or fever  Using albuterol neb once daily  Completed po steroids      Past Medical History:   Diagnosis Date    Anxiety     Bradycardia 9/19/2019    GERD (gastroesophageal reflux disease)     Thyroid disease      Past Surgical History:   Procedure Laterality Date    BREAST BIOPSY      DENTAL SURGERY      SINUS SURGERY       Review of patient's allergies indicates:   Allergen Reactions    Penicillins      Current Outpatient Medications on File Prior to Visit   Medication Sig Dispense Refill    acetaminophen (TYLENOL) 500 MG tablet Take 500 mg by mouth as needed for Pain.      albuterol (PROVENTIL) 2.5 mg /3 mL (0.083 %) nebulizer solution Take 3 mLs (2.5 mg total) by nebulization every 4 to 6 hours as needed for Wheezing or Shortness of Breath. 360 mL 11    ALPRAZolam (XANAX) 0.5 MG tablet Take 1 tablet (0.5 mg total) by mouth 2 (two) times daily as needed (SIGNIFICANT ANXIETY). New Prescriber 60 tablet 2    augmented betamethasone dipropionate (DIPROLENE-AF) 0.05 % cream Apply topically 2 (two) times daily.      busPIRone (BUSPAR) 10 MG tablet Take 1 tablet (10 mg total) by mouth 2 (two) times daily. 60 tablet 2    calcium carbonate (TUMS) 300 mg (750 mg) Chew Take 750 mg by mouth.      cyclobenzaprine (FLEXERIL) 5 MG tablet Take 5 mg by mouth 3 (three) times daily as needed for Muscle spasms.      diclofenac (VOLTAREN) 75 MG EC tablet Take 1 tablet (75 mg total) by mouth 2 (two) times daily. 30 tablet 0    estradioL (ESTRACE) 0.01 % (0.1  mg/gram) vaginal cream Place 1 g vaginally twice a week. 42.5 g 1    ezetimibe (ZETIA) 10 mg tablet Take 1 tablet (10 mg total) by mouth once daily. 90 tablet 3    ferrous sulfate (FEROSUL) 220 mg (44 mg iron)/5 mL Elix Take 5 mLs (220 mg total) by mouth once daily. 120 mL 2    FLUoxetine 20 MG capsule Take 1 capsule (20 mg total) by mouth once daily. Take IN ADDITION to Prozac 40 mg supply 30 capsule 2    hydroCHLOROthiazide (HYDRODIURIL) 12.5 MG Tab Take 1 tablet (12.5 mg total) by mouth daily as needed (edema, swelling). Do not take if BP is low and feeling dry/dehydrated/dizzy 90 tablet 1    HYDROcodone-acetaminophen (NORCO) 5-325 mg per tablet Take 1 tablet by mouth every 6 (six) hours as needed for Pain. 12 tablet 0    hydrOXYchloroQUINE (PLAQUENIL) 200 mg tablet Take 1 tablet (200 mg total) by mouth once daily. 90 tablet 1    indomethacin (INDOCIN) 25 MG capsule Take 1 capsule (25 mg total) by mouth 3 (three) times daily. 90 capsule 3    Lactobacillus rhamnosus GG (CULTURELLE) 10 billion cell capsule Take 1 capsule by mouth once daily.      levothyroxine (SYNTHROID) 50 MCG tablet Take 50 mcg by mouth before breakfast.      montelukast (SINGULAIR) 10 mg tablet Take 10 mg by mouth once daily.      multivitamin (THERAGRAN) per tablet Take 1 tablet by mouth once daily.      omeprazole (PRILOSEC) 40 MG capsule Take 40 mg by mouth.      ondansetron (ZOFRAN-ODT) 8 MG TbDL Take 1 tablet (8 mg total) by mouth every 8 (eight) hours as needed (nausea). 20 tablet 1    rimegepant (NURTEC) 75 mg odt Take 75 mg by mouth once as needed for Migraine. Place ODT tablet on the tongue; alternatively the ODT tablet may be placed under the tongue       No current facility-administered medications on file prior to visit.     Social History     Socioeconomic History    Marital status:    Tobacco Use    Smoking status: Former     Current packs/day: 0.00     Average packs/day: 0.8 packs/day for 37.0 years (27.8 ttl pk-yrs)      "Types: Cigarettes     Start date:      Quit date: 2019     Years since quittin.8    Smokeless tobacco: Never    Tobacco comments:     did not smoke for 2 years since beginning smoking.    Substance and Sexual Activity    Alcohol use: Never    Drug use: Never    Sexual activity: Yes     Partners: Male     Birth control/protection: None     Family History   Problem Relation Age of Onset    Heart attack Mother     Hypertension Mother     Heart disease Father     Heart attack Maternal Aunt     Heart attack Maternal Uncle        Review of Systems   Constitutional:  Positive for fatigue. Negative for fever.   HENT:  Positive for postnasal drip, rhinorrhea and congestion.    Eyes:  Negative for redness and itching.   Respiratory:  Positive for cough, sputum production, shortness of breath, dyspnea on extertion, use of rescue inhaler and Paroxysmal Nocturnal Dyspnea.    Cardiovascular:  Negative for chest pain, palpitations and leg swelling.   Genitourinary:  Negative for difficulty urinating and hematuria.   Endocrine:  Negative for cold intolerance and heat intolerance.    Musculoskeletal:  Positive for arthralgias.   Skin:  Negative for rash.   Gastrointestinal:  Negative for nausea and abdominal pain.   Neurological:  Negative for dizziness, syncope, weakness and light-headedness.   Hematological:  Negative for adenopathy. Does not bruise/bleed easily.   Psychiatric/Behavioral:  Negative for sleep disturbance. The patient is not nervous/anxious.        Objective:      /72   Pulse 68   Resp 17   Ht 5' 2" (1.575 m)   Wt 46.4 kg (102 lb 4.7 oz)   SpO2 97%   BMI 18.71 kg/m²   Physical Exam  Vitals and nursing note reviewed.   Constitutional:       Appearance: She is well-developed.   HENT:      Head: Normocephalic and atraumatic.      Right Ear: There is impacted cerumen.      Left Ear: There is impacted cerumen.      Nose: Nose normal.      Mouth/Throat:      Pharynx: No oropharyngeal exudate.   Eyes: " "     Conjunctiva/sclera: Conjunctivae normal.      Pupils: Pupils are equal, round, and reactive to light.   Neck:      Thyroid: No thyromegaly.      Vascular: No JVD.      Trachea: No tracheal deviation.   Cardiovascular:      Rate and Rhythm: Normal rate and regular rhythm.      Heart sounds: Normal heart sounds.   Pulmonary:      Effort: Pulmonary effort is normal. No respiratory distress.      Breath sounds: Examination of the right-lower field reveals wheezing. Examination of the left-lower field reveals wheezing. Decreased breath sounds and wheezing present. No rhonchi or rales.   Chest:      Chest wall: No tenderness.   Abdominal:      General: Bowel sounds are normal.      Palpations: Abdomen is soft.   Musculoskeletal:         General: Normal range of motion.      Cervical back: Neck supple.   Lymphadenopathy:      Cervical: No cervical adenopathy.   Skin:     General: Skin is warm and dry.   Neurological:      Mental Status: She is alert and oriented to person, place, and time.       Personal Diagnostic Review  Chest x-ray: improved          10/25/2023     3:36 PM   Pulmonary Studies Review   SpO2 97 %   Height 5' 2" (1.575 m)   Weight 46.4 kg (102 lb 4.7 oz)   BMI (Calculated) 18.7   Predicted Distance 405.68   Predicted Distance Meters (Calculated) 540.49 meters       X-Ray Chest PA And Lateral  Narrative: EXAM:  XR CHEST PA AND LATERAL    CLINICAL HISTORY: Pneumonia    COMPARISON: 2/28/23.    FINDINGS: No confluent airspace opacity or consolidation.  There is no evidence of pleural effusion, pneumothorax, or other acute pulmonary disease.  The cardiomediastinal silhouette is within normal limits.  No acute osseous abnormality is evident.   Moderate scattered degenerative change and atherosclerotic disease.  Impression:  No acute cardiopulmonary abnormality.    Finalized on: 10/25/2023 3:11 PM By:  Aime Ervin MD  [facility]# [id number]      2023-10-25 15:13:[id number]    [facility]      Office Spirometry Results:   " "      10/25/2023     3:36 PM 10/9/2023    12:58 PM 10/3/2023     9:57 AM 9/29/2023     5:16 PM 9/25/2023     5:01 PM 9/1/2023    10:25 AM 6/16/2023    12:22 PM   Pulmonary Function Tests   SpO2 97 %  100 % 99 % 98 % 97 % 99 %   Height 5' 2" (1.575 m) 5' 2" (1.575 m) 5' 2" (1.575 m) 5' 2" (1.575 m) 5' 2" (1.575 m) 5' 3" (1.6 m)    Weight 46.4 kg (102 lb 4.7 oz) 46.4 kg (102 lb 4.7 oz) 47.7 kg (105 lb 0.8 oz) 45.6 kg (100 lb 8.5 oz) 46.1 kg (101 lb 10.1 oz) 44.3 kg (97 lb 10.6 oz)    BMI (Calculated) 18.7 18.7 19.2 18.4 18.6 17.3          10/25/2023     3:36 PM   Pulmonary Studies Review   SpO2 97 %   Height 5' 2" (1.575 m)   Weight 46.4 kg (102 lb 4.7 oz)   BMI (Calculated) 18.7   Predicted Distance 405.68   Predicted Distance Meters (Calculated) 540.49 meters         Assessment:       Sinusitis  Saline rinse      Asthma with COPD  Start nebulizer - short acting B2-agonist  long acting B2- agonist and inhaled corticosteroid - advair    Asthma with COPD  -     fluticasone-salmeterol diskus inhaler 250-50 mcg; Inhale 1 puff into the lungs 2 (two) times daily. Wash out mouth after use.  Dispense: 60 each; Refill: 11  -     albuterol (PROVENTIL/VENTOLIN HFA) 90 mcg/actuation inhaler; Inhale 2 puffs into the lungs every 4 (four) hours as needed for Wheezing or Shortness of Breath. Rescue  Dispense: 18 g; Refill: 11  -     Ambulatory referral/consult to Allergy; Future; Expected date: 11/01/2023  -     Ambulatory referral/consult to Pulmonary Disease Management w/ Respiratory Therapist; Future; Expected date: 11/01/2023  -     (In Office Administered) Pneumococcal Polysaccharide Vaccine (23 Valent) (SQ/IM)    COPD exacerbation  -     predniSONE (DELTASONE) 20 MG tablet; Prednisone 60 mg/ day for 3 days, 40 mg/day for 3 days,20 mg/ day for 3 days, (1/2 tablet )10 mg a day for 3 days.  Dispense: 20 tablet; Refill: 0  -     levoFLOXacin (LEVAQUIN) 250 mg/10 mL Soln; Take 20 mLs (500 mg total) by mouth once daily. for 10 days  " Dispense: 200 mL; Refill: 0  -     Aspergillus fumagatus IgE; Future; Expected date: 10/25/2023  -     Bermuda grass IgE; Future; Expected date: 10/25/2023  -     Cat epithelium IgE; Future; Expected date: 10/25/2023  -     Cladosporium IgE; Future; Expected date: 10/25/2023  -     Cockroach, American IgE; Future; Expected date: 10/25/2023  -     [place], bald IgE; Future; Expected date: 10/25/2023  -     D. farinae IgE; Future; Expected date: 10/25/2023  -     D. pteronyssinus IgE; Future; Expected date: 10/25/2023  -     Dog dander IgE; Future; Expected date: 10/25/2023  -     Plantain, English IgE; Future; Expected date: 10/25/2023  -     [place] grass IgE; Future; Expected date: 10/25/2023  -     [place] elder, rough IgE; Future; Expected date: 10/25/2023  -     Mugwort IgE; Future; Expected date: 10/25/2023  -     Nettle IgE; Future; Expected date: 10/25/2023  -     Oak, white IgE; Future; Expected date: 10/25/2023  -     Penicillium IgE; Future; Expected date: 10/25/2023  -     Ragweed, short, common IgE; Future; Expected date: 10/25/2023  -     Darrel IgE; Future; Expected date: 10/25/2023  -     Allergen, Cocklebur; Future; Expected date: 10/25/2023  -     Allergen, Elm [place]edar; Future; Expected date: 10/25/2023  -     Allergen, Meadow Grass ([place] Blue); Future; Expected date: 10/25/2023  -     Allergen, Mucor Racemosus; Future; Expected date: 10/25/2023  -     Allergen, Pecan [place]ory IgE; Future; Expected date: 10/25/2023  -     Allergen, [place]; Future; Expected date: 10/25/2023  -     Allergen-Alternaria Alternata; Future; Expected date: 10/25/2023  -     Allergen-[place]; Future; Expected date: 10/25/2023  -     Allergen-Common Pigweed; Future; Expected date: 10/25/2023  -     Allergen-Silver Birch; Future; Expected date: 10/25/2023  -     Feather Panel #2; Future; Expected date: 10/25/2023  -     RAST Allergen for [place]; Future; Expected date: 10/25/2023  -     RAST Allergen Maple ([place]); Future; Expected date: 10/25/2023  -     RAST Allergen [place]; Future; Expected date: 10/25/2023  -     RAST Allergen, Lamb's Quarters; Future; Expected date: 10/25/2023  -     RAST Allergen, Sheep [place]); Future; Expected date: 10/25/2023    Other subacute sinusitis  -     BUDESONIDE 0.6 MG/NORMAL SALINE 50 ML NASAL IRRIGATION; Patient to irrigate each nostril with 25ml twice daily.  Dispense: 500 mL; Refill: 0    SOB (shortness of breath) on exertion  -     albuterol (PROVENTIL/VENTOLIN HFA) 90 mcg/actuation inhaler; Inhale 2 puffs into the lungs every 4 (four) hours as needed for Wheezing or Shortness of Breath. Rescue  Dispense: 18 g; Refill: 11    Seasonal allergic rhinitis due to other allergic trigger  -     Aspergillus fumagatus IgE; Future; Expected date: 10/25/2023  -     [place] grass IgE; Future; Expected date: 10/25/2023  -     Cat epithelium IgE; Future; Expected date: 10/25/2023  -     Cladosporium IgE; Future; Expected date: 10/25/2023  -     Cockroach, American IgE; Future; Expected date: 10/25/2023  -     [place], bald IgE; Future; Expected date: 10/25/2023  -     D. farinae IgE; Future; Expected date: 10/25/2023  -     D. pteronyssinus IgE; Future; Expected date: 10/25/2023  -     Dog dander IgE; Future; Expected date: 10/25/2023  -     Plantain, English IgE; Future; Expected date: 10/25/2023  -     Clarke grass IgE; Future; Expected date: 10/25/2023  -     [place] elder, rough IgE; Future; Expected date: 10/25/2023  -     Mugwort IgE; Future; Expected date: 10/25/2023  -     Nettle IgE; Future; Expected date: 10/25/2023  -     Oak, white IgE; Future; Expected date: 10/25/2023  -     Penicillium IgE; Future; Expected date: 10/25/2023  -     Ragweed, short, common IgE; Future; Expected date: 10/25/2023  -     Darrel IgE; Future; Expected date: 10/25/2023  -     Allergen, Cocklebur; Future; Expected date: 10/25/2023  -     Allergen, Elm Cedar; Future; Expected date:  10/25/2023  -     Allergen, Meadow Grass ([place] Blue); Future; Expected date: 10/25/2023  -     Allergen, Mucor Racemosus; Future; Expected date: 10/25/2023  -     Allergen, Pecan [place]ickory IgE; Future; Expected date: 10/25/2023  -     Allergen, [place]; Future; Expected date: 10/25/2023  -     Allergen-Alternaria Alternata; Future; Expected date: 10/25/2023  -     Allergen-[place]; Future; Expected date: 10/25/2023  -     Allergen-Common Pigweed; Future; Expected date: 10/25/2023  -     Allergen-Silver Birch; Future; Expected date: 10/25/2023  -     Feather Panel #2; Future; Expected date: 10/25/2023  -     RAST Allergen for [place]; Future; Expected date: 10/25/2023  -     RAST Allergen Maple ([place]); Future; Expected date: 10/25/2023  -     RAST Allergen [place]; Future; Expected date: 10/25/2023  -     RAST Allergen, Lamb's [place]arters; Future; Expected date: 10/25/2023  -     RAST Allergen, Sheep [place]); Future; Expected date: 10/25/2023  -     Ambulatory referral/consult to Allergy; Future; Expected date: 11/01/2023    Former tobacco use    Anxiety disorder due to known physiological condition    Other orders  -     Influenza - Quadrivalent (PF)          Outpatient Encounter Medications as of 10/25/2023   Medication Sig Dispense Refill    acetaminophen (TYLENOL) 500 MG tablet Take 500 mg by mouth as needed for Pain.      albuterol (PROVENTIL) 2.5 mg /3 mL (0.083 %) nebulizer solution Take 3 mLs (2.5 mg total) by nebulization every 4 to 6 hours as needed for Wheezing or Shortness of Breath. 360 mL 11    albuterol (PROVENTIL/VENTOLIN HFA) 90 mcg/actuation inhaler Inhale 2 puffs into the lungs every 4 (four) hours as needed for Wheezing or Shortness of Breath. Rescue 18 g 11    ALPRAZolam (XANAX) 0.5 MG tablet Take 1 tablet (0.5 mg total) by mouth 2 (two) times daily as needed (SIGNIFICANT ANXIETY). New Prescriber 60 tablet 2    augmented betamethasone dipropionate (DIPROLENE-AF) 0.05 %  cream Apply topically 2 (two) times daily.      BUDESONIDE 0.6 MG/NORMAL SALINE 50 ML NASAL IRRIGATION Patient to irrigate each nostril with 25ml twice daily. 500 mL 0    busPIRone (BUSPAR) 10 MG tablet Take 1 tablet (10 mg total) by mouth 2 (two) times daily. 60 tablet 2    calcium carbonate (TUMS) 300 mg (750 mg) Chew Take 750 mg by mouth.      cyclobenzaprine (FLEXERIL) 5 MG tablet Take 5 mg by mouth 3 (three) times daily as needed for Muscle spasms.      diclofenac (VOLTAREN) 75 MG EC tablet Take 1 tablet (75 mg total) by mouth 2 (two) times daily. 30 tablet 0    estradioL (ESTRACE) 0.01 % (0.1 mg/gram) vaginal cream Place 1 g vaginally twice a week. 42.5 g 1    ezetimibe (ZETIA) 10 mg tablet Take 1 tablet (10 mg total) by mouth once daily. 90 tablet 3    ferrous sulfate (FEROSUL) 220 mg (44 mg iron)/5 mL Elix Take 5 mLs (220 mg total) by mouth once daily. 120 mL 2    FLUoxetine 20 MG capsule Take 1 capsule (20 mg total) by mouth once daily. Take IN ADDITION to Prozac 40 mg supply 30 capsule 2    fluticasone-salmeterol diskus inhaler 250-50 mcg Inhale 1 puff into the lungs 2 (two) times daily. Wash out mouth after use. 60 each 11    hydroCHLOROthiazide (HYDRODIURIL) 12.5 MG Tab Take 1 tablet (12.5 mg total) by mouth daily as needed (edema, swelling). Do not take if BP is low and feeling dry/dehydrated/dizzy 90 tablet 1    HYDROcodone-acetaminophen (NORCO) 5-325 mg per tablet Take 1 tablet by mouth every 6 (six) hours as needed for Pain. 12 tablet 0    hydrOXYchloroQUINE (PLAQUENIL) 200 mg tablet Take 1 tablet (200 mg total) by mouth once daily. 90 tablet 1    indomethacin (INDOCIN) 25 MG capsule Take 1 capsule (25 mg total) by mouth 3 (three) times daily. 90 capsule 3    Lactobacillus rhamnosus GG (CULTURELLE) 10 billion cell capsule Take 1 capsule by mouth once daily.      levoFLOXacin (LEVAQUIN) 250 mg/10 mL Soln Take 20 mLs (500 mg total) by mouth once daily. for 10 days 200 mL 0    levothyroxine (SYNTHROID)  50 MCG tablet Take 50 mcg by mouth before breakfast.      [] methocarbamoL (ROBAXIN) 750 MG Tab Take 1 tablet (750 mg total) by mouth 4 (four) times daily. for 10 days 40 tablet 0    montelukast (SINGULAIR) 10 mg tablet Take 10 mg by mouth once daily.      multivitamin (THERAGRAN) per tablet Take 1 tablet by mouth once daily.      omeprazole (PRILOSEC) 40 MG capsule Take 40 mg by mouth.      ondansetron (ZOFRAN-ODT) 8 MG TbDL Take 1 tablet (8 mg total) by mouth every 8 (eight) hours as needed (nausea). 20 tablet 1    predniSONE (DELTASONE) 20 MG tablet Prednisone 60 mg/ day for 3 days, 40 mg/day for 3 days,20 mg/ day for 3 days, (1/2 tablet )10 mg a day for 3 days. 20 tablet 0    rimegepant (NURTEC) 75 mg odt Take 75 mg by mouth once as needed for Migraine. Place ODT tablet on the tongue; alternatively the ODT tablet may be placed under the tongue      [DISCONTINUED] albuterol (PROVENTIL/VENTOLIN HFA) 90 mcg/actuation inhaler Inhale 2 puffs into the lungs every 4 (four) hours as needed for Wheezing or Shortness of Breath. Rescue 18 g 11    [DISCONTINUED] azithromycin (ZITHROMAX Z-BETO) 250 MG tablet 500 mg on day 1 (two tablets) followed by 250 mg once daily on days 2-5 6 tablet 0    [DISCONTINUED] BUDESONIDE 0.6 MG/NORMAL SALINE 50 ML NASAL IRRIGATION Patient to irrigate each nostril with 25ml twice daily.      [DISCONTINUED] doxycycline (MONODOX) 100 MG capsule Take 1 capsule (100 mg total) by mouth every 12 (twelve) hours. 20 capsule 1    [DISCONTINUED] fluticasone-umeclidin-vilanter (TRELEGY ELLIPTA) 100-62.5-25 mcg DsDv Inhale 1 puff into the lungs once daily.      [DISCONTINUED] hydroCHLOROthiazide (HYDRODIURIL) 12.5 MG Tab Take 1 tablet (12.5 mg total) by mouth daily as needed (edema, swelling). Do not take if BP is low and feeling dry/dehydrated/dizzy 90 tablet 1    [DISCONTINUED] HYDROcodone-acetaminophen (NORCO) 5-325 mg per tablet Take 1 tablet by mouth every 6 (six) hours as needed for Pain. 12  tablet 0    [DISCONTINUED] ketorolac (TORADOL) 10 mg tablet Take 1 tablet (10 mg total) by mouth every 6 (six) hours as needed for Pain. 12 tablet 0    [DISCONTINUED] predniSONE (DELTASONE) 20 MG tablet Prednisone 60 mg/ day for 3 days, 40 mg/day for 3 days,20 mg/ day for 3 days, (1/2 tablet )10 mg a day for 3 days. 20 tablet 0     No facility-administered encounter medications on file as of 10/25/2023.     Plan:       Requested Prescriptions     Signed Prescriptions Disp Refills    fluticasone-salmeterol diskus inhaler 250-50 mcg 60 each 11     Sig: Inhale 1 puff into the lungs 2 (two) times daily. Wash out mouth after use.    predniSONE (DELTASONE) 20 MG tablet 20 tablet 0     Sig: Prednisone 60 mg/ day for 3 days, 40 mg/day for 3 days,20 mg/ day for 3 days, (1/2 tablet )10 mg a day for 3 days.    levoFLOXacin (LEVAQUIN) 250 mg/10 mL Soln 200 mL 0     Sig: Take 20 mLs (500 mg total) by mouth once daily. for 10 days    albuterol (PROVENTIL/VENTOLIN HFA) 90 mcg/actuation inhaler 18 g 11     Sig: Inhale 2 puffs into the lungs every 4 (four) hours as needed for Wheezing or Shortness of Breath. Rescue    BUDESONIDE 0.6 MG/NORMAL SALINE 50 ML NASAL IRRIGATION 500 mL 0     Sig: Patient to irrigate each nostril with 25ml twice daily.     Problem List Items Addressed This Visit       Anxiety disorder    Asthma with COPD - Primary    Current Assessment & Plan     Start nebulizer - short acting B2-agonist  long acting B2- agonist and inhaled corticosteroid - advair           Relevant Medications    fluticasone-salmeterol diskus inhaler 250-50 mcg    albuterol (PROVENTIL/VENTOLIN HFA) 90 mcg/actuation inhaler    Other Relevant Orders    Ambulatory referral/consult to Allergy    Ambulatory referral/consult to Pulmonary Disease Management w/ Respiratory Therapist    (In Office Administered) Pneumococcal Polysaccharide Vaccine (23 Valent) (SQ/IM) (Completed)    Former tobacco use    Sinusitis    Current Assessment & Plan      Saline rinse           Relevant Medications    BUDESONIDE 0.6 MG/NORMAL SALINE 50 ML NASAL IRRIGATION    SOB (shortness of breath) on exertion    Relevant Medications    albuterol (PROVENTIL/VENTOLIN HFA) 90 mcg/actuation inhaler     Other Visit Diagnoses       COPD exacerbation        Relevant Medications    predniSONE (DELTASONE) 20 MG tablet    levoFLOXacin (LEVAQUIN) 250 mg/10 mL Soln    Other Relevant Orders    Aspergillus fumagatus IgE    Berm[place] grass IgE    Cat epithelium IgE    Cladosporium IgE    Cockroach, American IgE    [place], bald IgE    D. farinae IgE    D. pteronyssinus IgE    Dog dander IgE    Plantain, English IgE    Clarke grass IgE    Marsh elder, rough IgE    Mugwort IgE    Nettle IgE    Oak, white IgE    Penicillium IgE    Ragweed, short, common IgE    Darrel IgE    Allergen, Cocklebur    Allergen, Elm [place]    Allergen, Meadow Grass ([place] Blue)    Allergen, Mucor Racemosus    Allergen, Pecan Hickory IgE    Allergen, [place]    Allergen-Alternaria Alternata    Allergen-[place]    Allergen-Common Pigweed    Allergen-Silver Birch    Feather Panel #2    RAST Allergen for [place]    RAST Allergen Maple ([place])    RAST Allergen [place]    RAST Allergen, Lamb's Quarters    RAST Allergen, Sheep [place])    Seasonal allergic rhinitis due to other allergic trigger        Relevant Orders    Aspergillus fumagatus IgE    [place]erm[place] grass IgE    Cat epithelium IgE    Cladosporium IgE    Cockroach, American IgE    [place], bald IgE    D. farinae IgE    D. pteronyssinus IgE    Dog dander IgE    Plantain, English IgE    Clarke grass IgE    Marsh elder, rough IgE    Mugwort IgE    Nettle IgE    Oak, white IgE    Penicillium IgE    Ragweed, short, common IgE    Darrel IgE    Allergen, Cocklebur    Allergen, Elm [place]    Allergen, Meadow Grass ([place] Blue)    Allergen, Mucor Racemosus    Allergen, Pecan Hickory IgE    Allergen, [place]    Allergen-Alternaria Alternata     Allergen-[place]    Allergen-Common Pigweed    Allergen-Silver Birch    Feather Panel #2    RAST Allergen for [place]    RAST Allergen Maple ([place])    RAST Allergen [place]    RAST Allergen, Lamb's Quarters    RAST Allergen, Sheep [place])    Ambulatory referral/consult to Allergy               Follow up in about 3 months (around 1/25/2024) for Review progress.    MEDICAL DECISION MAKING: Moderate to high complexity.  Overall, the multiple problems listed are of moderate to high severity that may impact quality of life and activities of daily living. Side effects of medications, treatment plan as well as options and alternatives reviewed and discussed with patient. There was counseling of patient concerning these issues.    Total time spent in counseling and coordination of care - 45  minutes of total time spent on the encounter, which includes face to face time and non-face to face time preparing to see the patient (eg, review of tests), Obtaining and/or reviewing separately obtained history, Documenting clinical information in the electronic or other health record, Independently interpreting results (not separately reported) and communicating results to the patient/family/caregiver, or Care coordination (not separately reported).    Time was used in discussion of prognosis, risks, benefits of treatment, instructions and compliance with regimen . Discussion with other physicians and/or health care providers - home health or for use of durable medical equipment (oxygen, nebulizers, CPAP, BiPAP) occurred.

## 2023-10-25 NOTE — PATIENT INSTRUCTIONS
Please read Warning before using.    USE ONLY AS DIRECTED, IF SYMPTOMS PERSIST SEE YOUR DOCTOR/HEALTHCARE PROFESSIONAL. ALWAYS READ THE LABEL. IMPORTANT: NeilMed® SINUS RINSE Mixture Packets should be used with NeilMed® 240 mL (8 fl oz) NASAFLO® to achieve the best results. You may use NeilMed® packets with other irrigation devices, as long as you mix with the correct volume of water. Our recommendation is to replace Neti Pot every three months.  Step 1  Please wash your hands and rinse the device. Fill the NASAFLO® with 240 mL (8 fl oz) of lukewarm distilled, filtered or previously boiled water. Please do not use tap or faucet water to dissolve the mixture unless it has been previously boiled and cooled down. You may warm the water in a microwave, but we recommend that you warm it in increments of 5 to 10 seconds to avoid overheating, damaging the device or scalding your nasal passage. Step 2  Cut the SINUS RINSE mixture packet at the corner and pour contents into the pot. Tighten the lid on the device securely. Place one finger over the hole of the cap and shake the device gently to dissolve the mixture. Step 3  Standing in front of a sink, bend forward to your comfort level and tilt your head to one side. Keeping your mouth open, and without holding your breath, apply the tip of the device snugly against your nasal passage and ALLOW THE SOLUTION TO GENTLY FLOW until the solution starts draining from the opposite nasal passage. Use roughly half the solution in the NASAFLO® (120 mL / 4 fl oz).  It should not enter your mouth unless you are tilting your head backwards. To adjust or stop the flow, you may place your finger over the hole of the cap, and depending on the seal, you may be able to control the flow. Step 4  Blow your nose very gently, without pinching nose completely to avoid pressure on eardrums. If tolerable, sniff in gently any residual solution remaining in the nasal passage once or twice, because  this may clean out the posterior nasopharyngeal area, which is the area at the back of your nasal passage. At times, some solution will reach the back of your throat, so please spit it out.  For NASAFLO® users, to help drain any residual solution, blow your nose gently while tilting your head forward and to the same side of the nasal passage you just rinsed. Step 5  Now repeat steps 3 & 4 on your other nasal passage.  If there is any solution left over, please discard it. We recommend you make a fresh solution each time you rinse. Rinse once or twice daily OR as directed by your physician. Saline is considered safe.  The U.[place]. FDA is recommending that common cough and cold over the counter medicines not be given to babies and toddlers, and that antihistamines should not be given to children under age 6. NeilMed® NASAFLO®: Please clean with soap & water and let air dry Please read Warning before using.    Our recommendation is to replace Neti Pot every three months.          DramaFever True HEPA Air Purifier with Allergen Remover - Black, [id number]    Honeywell Air Purifiers are the #1 brand recommended by allergists.  Captures up to 99.97% of microscopic allergens, 0.3 microns or larger from the air that passes through the filter.  Helps reduce odors, VOCs, and certain germs.  Activated carbon pre-filter helps reduce unpleasant odors.  Easy tap controls, with 4 air cleaning levels, including a Turbo Power Cleaning Level.  ENERGY STAR qualified.  2, 4, or 8 hour automatic shut-off timer.  Quiet operation.  Control panel light dimmer.  ]    Levoit Vital 100 True HEPA Air Purifier 119.99  Quick Purification: Breathe in fresh air where it matters most with the Vital 100. It purifies the air 3. 3 times per hour in a room as large as 300 sq. ft.    Reliable Coverage: The Vital 100 has a CADR of 130 CFM / 221 m³/h, which is perfect for living rooms, bedrooms, offices, nurseries, and brenton.    Compact & Efficient: The Vital  100s compact size allows you to save space in your home, while its large air intake makes it effective at trapping pet hair and other intrusive particles.    Ozone Free: Use your Vital 100 with peace of mind. Levoit air purifiers are always 100% ozone free and never use UV-C light or anion purification methods that can cause harm to the user.    Ideal for Home: Washable preliminary filter traps large particles such as pet fur, hair, and lint. Its activated carbon filter absorbs unpleasant odors such as cooking smells or smoke.     LED Display: You can easily turn off the Vital 100s LED display to create a darker sleeping environment.    Quiet Environment: Create a clean yet peaceful environment undisturbed by your air purifier. The Vital 100 makes as little as 23dB of sound, which is quieter than a whisper.    Safe & Certified: CARB Certified, FCC Certified, and ETL Listed. Note: please remove the filters plastic packaging before using your air purifier.    Levoit air purifiers filter airborne particles 0.3 micrometers (µm) in size. [employer] makes no claims that this air purifier helps eliminate the COVID-19 virus.

## 2023-10-26 ENCOUNTER — TELEPHONE (OUTPATIENT)
Dept: PULMONOLOGY | Facility: CLINIC | Age: 63
End: 2023-10-26
Payer: COMMERCIAL

## 2023-10-26 ENCOUNTER — PATIENT MESSAGE (OUTPATIENT)
Dept: GASTROENTEROLOGY | Facility: CLINIC | Age: 63
End: 2023-10-26
Payer: COMMERCIAL

## 2023-10-26 LAB
BRPFT: NORMAL
DLCO ADJ PRE: 7.65 ML/(MIN*MMHG)
DLCO SINGLE BREATH LLN: 15.51
DLCO SINGLE BREATH PRE REF: 36 %
DLCO SINGLE BREATH REF: 21.24
DLCOC SBVA LLN: 3.04
DLCOC SBVA PRE REF: 61.3 %
DLCOC SBVA REF: 4.65
DLCOC SINGLE BREATH LLN: 15.51
DLCOC SINGLE BREATH PRE REF: 36 %
DLCOC SINGLE BREATH REF: 21.24
DLCOVA LLN: 3.04
DLCOVA PRE REF: 61.3 %
DLCOVA PRE: 2.85 ML/(MIN*MMHG*L)
DLCOVA REF: 4.65
DLVAADJ PRE: 2.85 ML/(MIN*MMHG*L)
ERV LLN: -16449.24
ERV PRE REF: 153.9 %
ERV REF: 0.76
FEF 25 75 CHG: -8.3 %
FEF 25 75 LLN: 1.07
FEF 25 75 POST REF: 55.6 %
FEF 25 75 PRE REF: 60.6 %
FEF 25 75 REF: 2.12
FET100 CHG: -15.7 %
FEV1 CHG: -4 %
FEV1 FVC CHG: -1.3 %
FEV1 FVC LLN: 67
FEV1 FVC POST REF: 86.3 %
FEV1 FVC PRE REF: 87.5 %
FEV1 FVC REF: 80
FEV1 LLN: 1.73
FEV1 POST REF: 86.1 %
FEV1 PRE REF: 89.7 %
FEV1 REF: 2.29
FRCPLETH LLN: 1.76
FRCPLETH PREREF: 94.9 %
FRCPLETH REF: 2.58
FVC CHG: -2.7 %
FVC LLN: 2.19
FVC POST REF: 99.2 %
FVC PRE REF: 102 %
FVC REF: 2.9
IVC PRE: 2.13 L
IVC SINGLE BREATH LLN: 2.19
IVC SINGLE BREATH PRE REF: 73.7 %
IVC SINGLE BREATH REF: 2.9
MVV LLN: 71
MVV PRE REF: 61.4 %
MVV REF: 83
PEF CHG: 5.3 %
PEF LLN: 4.31
PEF POST REF: 68.6 %
PEF PRE REF: 65.1 %
PEF REF: 5.91
POST FEF 25 75: 1.18 L/S
POST FET 100: 9.12 SEC
POST FEV1 FVC: 68.63 %
POST FEV1: 1.97 L
POST FVC: 2.88 L
POST PEF: 4.05 L/S
PRE DLCO: 7.65 ML/(MIN*MMHG)
PRE ERV: 1.17 L
PRE FEF 25 75: 1.28 L/S
PRE FET 100: 10.82 SEC
PRE FEV1 FVC: 69.54 %
PRE FEV1: 2.06 L
PRE FRC PL: 2.45 L
PRE FVC: 2.96 L
PRE MVV: 51 L/MIN
PRE PEF: 3.85 L/S
PRE RV: 1.14 L
PRE TLC: 4.1 L
RAW LLN: 3.06
RAW PRE REF: 109.2 %
RAW PRE: 3.34 CMH2O*S/L
RAW REF: 3.06
RV LLN: 1.24
RV PRE REF: 62.8 %
RV REF: 1.82
RVTLC LLN: 30
RVTLC PRE REF: 70.2 %
RVTLC PRE: 27.87 %
RVTLC REF: 40
TLC LLN: 3.58
TLC PRE REF: 89.6 %
TLC REF: 4.57
VA PRE: 2.73 L
VA SINGLE BREATH LLN: 4.42
VA SINGLE BREATH PRE REF: 61.8 %
VA SINGLE BREATH REF: 4.42
VC LLN: 2.19
VC PRE REF: 102 %
VC PRE: 2.96 L
VC REF: 2.9
VTGRAWPRE: 2.55 L

## 2023-10-26 NOTE — TELEPHONE ENCOUNTER
----- Message from Steffi Collado sent at 10/26/2023 10:58 AM CDT -----  Contact: Aboorir-[phone]    Patient: EMILY FISHMAN LUNDBERG-GREGORIO-    Reason: The patient is requesting a call back from the nurse to get advice on getting medicates.    Comments: Please call the patient back to advise.

## 2023-10-27 PROBLEM — J44.89 ASTHMA WITH COPD: Status: ACTIVE | Noted: 2023-10-27

## 2023-10-27 NOTE — ASSESSMENT & PLAN NOTE
Start nebulizer - short acting B2-agonist  long acting B2- agonist and inhaled corticosteroid - advair

## 2023-11-01 ENCOUNTER — HOSPITAL ENCOUNTER (OUTPATIENT)
Dept: RADIOLOGY | Facility: HOSPITAL | Age: 63
Discharge: HOME | End: 2023-11-01
Attending: OBSTETRICS & GYNECOLOGY
Payer: COMMERCIAL

## 2023-11-01 DIAGNOSIS — Z12.31 SCREENING MAMMOGRAM FOR BREAST CANCER: ICD-10-CM

## 2023-11-01 PROCEDURE — 77067 SCR MAMMO BI INCL CAD: CPT | Mod: TC

## 2023-11-01 PROCEDURE — 77067 MAMMO DIGITAL SCREENING BILAT WITH TOMO: ICD-10-PCS | Mod: 26,,, | Performed by: RADIOLOGY

## 2023-11-01 PROCEDURE — 77067 SCR MAMMO BI INCL CAD: CPT | Mod: 26,,, | Performed by: RADIOLOGY

## 2023-11-01 PROCEDURE — 77063 MAMMO DIGITAL SCREENING BILAT WITH TOMO: ICD-10-PCS | Mod: 26,,, | Performed by: RADIOLOGY

## 2023-11-01 PROCEDURE — 77063 BREAST TOMOSYNTHESIS BI: CPT | Mod: 26,,, | Performed by: RADIOLOGY

## 2023-11-08 ENCOUNTER — LAB VISIT (OUTPATIENT)
Dept: LAB | Facility: HOSPITAL | Age: 63
End: 2023-11-08
Attending: STUDENT IN AN ORGANIZED HEALTH CARE EDUCATION/TRAINING PROGRAM
Payer: COMMERCIAL

## 2023-11-08 ENCOUNTER — OFFICE VISIT (OUTPATIENT)
Dept: ALLERGY | Facility: CLINIC | Age: 63
End: 2023-11-08
Payer: COMMERCIAL

## 2023-11-08 VITALS
HEART RATE: 63 BPM | HEIGHT: 62 IN | BODY MASS INDEX: 19.02 KG/M2 | DIASTOLIC BLOOD PRESSURE: 74 MMHG | WEIGHT: 103.38 LBS | TEMPERATURE: 98 F | SYSTOLIC BLOOD PRESSURE: 122 MMHG

## 2023-11-08 DIAGNOSIS — J32.8 OTHER CHRONIC SINUSITIS: ICD-10-CM

## 2023-11-08 DIAGNOSIS — J31.0 CHRONIC RHINITIS: ICD-10-CM

## 2023-11-08 DIAGNOSIS — H65.04 RECURRENT ACUTE SEROUS OTITIS MEDIA OF RIGHT EAR: ICD-10-CM

## 2023-11-08 DIAGNOSIS — J44.89 ASTHMA WITH COPD: ICD-10-CM

## 2023-11-08 DIAGNOSIS — B99.9 RECURRENT INFECTIONS: ICD-10-CM

## 2023-11-08 DIAGNOSIS — J31.0 CHRONIC RHINITIS: Primary | ICD-10-CM

## 2023-11-08 LAB
BASOPHILS # BLD AUTO: 0.02 K/UL (ref 0–0.2)
BASOPHILS NFR BLD: 0.2 % (ref 0–1.9)
DIFFERENTIAL METHOD: ABNORMAL
EOSINOPHIL # BLD AUTO: 0.2 K/UL (ref 0–0.5)
EOSINOPHIL NFR BLD: 1.8 % (ref 0–8)
ERYTHROCYTE [DISTWIDTH] IN BLOOD BY AUTOMATED COUNT: 14.2 % (ref 11.5–14.5)
HCT VFR BLD AUTO: 37 % (ref 37–48.5)
HGB BLD-MCNC: 11.5 G/DL (ref 12–16)
IMM GRANULOCYTES # BLD AUTO: 0.02 K/UL (ref 0–0.04)
IMM GRANULOCYTES NFR BLD AUTO: 0.2 % (ref 0–0.5)
LYMPHOCYTES # BLD AUTO: 1.6 K/UL (ref 1–4.8)
LYMPHOCYTES NFR BLD: 16.4 % (ref 18–48)
MCH RBC QN AUTO: 27.4 PG (ref 27–31)
MCHC RBC AUTO-ENTMCNC: 31.1 G/DL (ref 32–36)
MCV RBC AUTO: 88 FL (ref 82–98)
MONOCYTES # BLD AUTO: 0.8 K/UL (ref 0.3–1)
MONOCYTES NFR BLD: 8 % (ref 4–15)
NEUTROPHILS # BLD AUTO: 6.9 K/UL (ref 1.8–7.7)
NEUTROPHILS NFR BLD: 73.4 % (ref 38–73)
NRBC BLD-RTO: 0 /100 WBC
PLATELET # BLD AUTO: 245 K/UL (ref 150–450)
PMV BLD AUTO: 10.3 FL (ref 9.2–12.9)
RBC # BLD AUTO: 4.2 M/UL (ref 4–5.4)
WBC # BLD AUTO: 9.46 K/UL (ref 3.9–12.7)

## 2023-11-08 PROCEDURE — 3078F DIAST BP <80 MM HG: CPT | Mod: CPTII,S$GLB,, | Performed by: STUDENT IN AN ORGANIZED HEALTH CARE EDUCATION/TRAINING PROGRAM

## 2023-11-08 PROCEDURE — 86003 ALLG SPEC IGE CRUDE XTRC EA: CPT | Mod: 59 | Performed by: STUDENT IN AN ORGANIZED HEALTH CARE EDUCATION/TRAINING PROGRAM

## 2023-11-08 PROCEDURE — 82785 ASSAY OF IGE: CPT | Performed by: STUDENT IN AN ORGANIZED HEALTH CARE EDUCATION/TRAINING PROGRAM

## 2023-11-08 PROCEDURE — 3008F BODY MASS INDEX DOCD: CPT | Mod: CPTII,S$GLB,, | Performed by: STUDENT IN AN ORGANIZED HEALTH CARE EDUCATION/TRAINING PROGRAM

## 2023-11-08 PROCEDURE — 86317 IMMUNOASSAY INFECTIOUS AGENT: CPT | Mod: 59 | Performed by: STUDENT IN AN ORGANIZED HEALTH CARE EDUCATION/TRAINING PROGRAM

## 2023-11-08 PROCEDURE — 3008F PR BODY MASS INDEX (BMI) DOCUMENTED: ICD-10-PCS | Mod: CPTII,S$GLB,, | Performed by: STUDENT IN AN ORGANIZED HEALTH CARE EDUCATION/TRAINING PROGRAM

## 2023-11-08 PROCEDURE — 99204 OFFICE O/P NEW MOD 45 MIN: CPT | Mod: S$GLB,,, | Performed by: STUDENT IN AN ORGANIZED HEALTH CARE EDUCATION/TRAINING PROGRAM

## 2023-11-08 PROCEDURE — 82784 ASSAY IGA/IGD/IGG/IGM EACH: CPT | Performed by: STUDENT IN AN ORGANIZED HEALTH CARE EDUCATION/TRAINING PROGRAM

## 2023-11-08 PROCEDURE — 1159F MED LIST DOCD IN RCRD: CPT | Mod: CPTII,S$GLB,, | Performed by: STUDENT IN AN ORGANIZED HEALTH CARE EDUCATION/TRAINING PROGRAM

## 2023-11-08 PROCEDURE — 99999 PR PBB SHADOW E&M-EST. PATIENT-LVL V: CPT | Mod: PBBFAC,,, | Performed by: STUDENT IN AN ORGANIZED HEALTH CARE EDUCATION/TRAINING PROGRAM

## 2023-11-08 PROCEDURE — 82784 ASSAY IGA/IGD/IGG/IGM EACH: CPT | Mod: 59 | Performed by: STUDENT IN AN ORGANIZED HEALTH CARE EDUCATION/TRAINING PROGRAM

## 2023-11-08 PROCEDURE — 87389 HIV-1 AG W/HIV-1&-2 AB AG IA: CPT | Performed by: STUDENT IN AN ORGANIZED HEALTH CARE EDUCATION/TRAINING PROGRAM

## 2023-11-08 PROCEDURE — 1159F PR MEDICATION LIST DOCUMENTED IN MEDICAL RECORD: ICD-10-PCS | Mod: CPTII,S$GLB,, | Performed by: STUDENT IN AN ORGANIZED HEALTH CARE EDUCATION/TRAINING PROGRAM

## 2023-11-08 PROCEDURE — 85025 COMPLETE CBC W/AUTO DIFF WBC: CPT | Performed by: STUDENT IN AN ORGANIZED HEALTH CARE EDUCATION/TRAINING PROGRAM

## 2023-11-08 PROCEDURE — 3078F PR MOST RECENT DIASTOLIC BLOOD PRESSURE < 80 MM HG: ICD-10-PCS | Mod: CPTII,S$GLB,, | Performed by: STUDENT IN AN ORGANIZED HEALTH CARE EDUCATION/TRAINING PROGRAM

## 2023-11-08 PROCEDURE — 36415 COLL VENOUS BLD VENIPUNCTURE: CPT | Performed by: STUDENT IN AN ORGANIZED HEALTH CARE EDUCATION/TRAINING PROGRAM

## 2023-11-08 PROCEDURE — 3074F PR MOST RECENT SYSTOLIC BLOOD PRESSURE < 130 MM HG: ICD-10-PCS | Mod: CPTII,S$GLB,, | Performed by: STUDENT IN AN ORGANIZED HEALTH CARE EDUCATION/TRAINING PROGRAM

## 2023-11-08 PROCEDURE — 99204 PR OFFICE/OUTPT VISIT, NEW, LEVL IV, 45-59 MIN: ICD-10-PCS | Mod: S$GLB,,, | Performed by: STUDENT IN AN ORGANIZED HEALTH CARE EDUCATION/TRAINING PROGRAM

## 2023-11-08 PROCEDURE — 86003 ALLG SPEC IGE CRUDE XTRC EA: CPT | Performed by: STUDENT IN AN ORGANIZED HEALTH CARE EDUCATION/TRAINING PROGRAM

## 2023-11-08 PROCEDURE — 3074F SYST BP LT 130 MM HG: CPT | Mod: CPTII,S$GLB,, | Performed by: STUDENT IN AN ORGANIZED HEALTH CARE EDUCATION/TRAINING PROGRAM

## 2023-11-08 PROCEDURE — 99999 PR PBB SHADOW E&M-EST. PATIENT-LVL V: ICD-10-PCS | Mod: PBBFAC,,, | Performed by: STUDENT IN AN ORGANIZED HEALTH CARE EDUCATION/TRAINING PROGRAM

## 2023-11-08 RX ORDER — AZELASTINE 1 MG/ML
1 SPRAY, METERED NASAL 2 TIMES DAILY
Qty: 30 ML | Refills: 11 | Status: SHIPPED | OUTPATIENT
Start: 2023-11-08 | End: 2023-11-29 | Stop reason: SDUPTHER

## 2023-11-08 RX ORDER — AZITHROMYCIN 500 MG/1
500 TABLET, FILM COATED ORAL DAILY
Qty: 7 TABLET | Refills: 0 | Status: SHIPPED | OUTPATIENT
Start: 2023-11-08 | End: 2023-11-16

## 2023-11-08 RX ORDER — FLUTICASONE PROPIONATE 50 MCG
1 SPRAY, SUSPENSION (ML) NASAL 2 TIMES DAILY
Qty: 16 G | Refills: 11 | Status: SHIPPED | OUTPATIENT
Start: 2023-11-08 | End: 2023-11-29 | Stop reason: SDUPTHER

## 2023-11-08 NOTE — ASSESSMENT & PLAN NOTE
- Ordered IgA, IgG, IgM, and Pneumo 23 titers   - CT Sinus at this for CRS   - Recent Pneum 23 - repeat titers if indicated

## 2023-11-08 NOTE — PROGRESS NOTES
Allergy and Immunology  New Patient Clinic Note    Date: 11/8/2023  Chief Complaint   Patient presents with    Nasal Congestion     Referred by: Rickie Curry MD  [id number] [place] [address]    History  Yvonne Schmidt is a 63 y.o. female being seen as a New Patient today.    Chronic Rhinitis   - Onset: Multiple year hx   - Symptoms: Congestion, rhinorrhea, sneezing, PND   - Suspected triggers include: Environmental v sinus infection   - Pattern: Perennial   - Medications: Not on intranasal sprays at this time     Chronic or Inducible Urticaria  - No hx of chronic urticaria     Asthma   - Managed with pulm     CRSwNP  - No hx of CRSwNP     Eczema   - No hx of eczema     Eosinophilic Esophagitis  - No hx of eosinophilic esophagitis     Food Allergy  - No hx of food allergy     Drug Allergy  - PCN - reaction as a child   - Patient is open to oral challenge at next appointment     Recurrent Infections  - Recurrent sinus infections - multiple in the past   - Recent PNA - completed treatment with pulm   - Concern for otitis media - more than 1 in the past   - No issues with infection until 50-59 yo   - No hx of bacteremia, fungal infections, or viral infections     Venom Allergy  - No hx of venom allergy     Allergies, PMH, PSH, Social, and Family History were reviewed.    Review of patient's allergies indicates:   Allergen Reactions    Penicillins       Past Medical History:   Diagnosis Date    Anxiety     Bradycardia 9/19/2019    GERD (gastroesophageal reflux disease)     Thyroid disease      Past Surgical History:   Procedure Laterality Date    BREAST BIOPSY Left     DENTAL SURGERY      SINUS SURGERY       Social History     Social History Narrative    Not on file     S/he reports that she quit smoking about 4 years ago. Her smoking use included cigarettes. She started smoking about 41 years ago. She has a 27.8 pack-year smoking history. She has never used smokeless tobacco. She reports that  she does not drink alcohol and does not use drugs.    Current Outpatient Medications on File Prior to Visit   Medication Sig Dispense Refill    albuterol (PROVENTIL) 2.5 mg /3 mL (0.083 %) nebulizer solution Take 3 mLs (2.5 mg total) by nebulization every 4 to 6 hours as needed for Wheezing or Shortness of Breath. 360 mL 11    albuterol (PROVENTIL/VENTOLIN HFA) 90 mcg/actuation inhaler Inhale 2 puffs into the lungs every 4 (four) hours as needed for Wheezing or Shortness of Breath. Rescue 18 g 11    ALPRAZolam (XANAX) 0.5 MG tablet Take 1 tablet (0.5 mg total) by mouth 2 (two) times daily as needed (SIGNIFICANT ANXIETY). New Prescriber 60 tablet 2    BUDESONIDE 0.6 MG/NORMAL SALINE 50 ML NASAL IRRIGATION Patient to irrigate each nostril with 25ml twice daily. 500 mL 0    calcium carbonate (TUMS) 300 mg (750 mg) Chew Take 750 mg by mouth.      estradioL (ESTRACE) 0.01 % (0.1 mg/gram) vaginal cream Place 1 g vaginally twice a week. 42.5 g 1    ezetimibe (ZETIA) 10 mg tablet Take 1 tablet (10 mg total) by mouth once daily. 90 tablet 3    ferrous sulfate (FEROSUL) 220 mg (44 mg iron)/5 mL Elix Take 5 mLs (220 mg total) by mouth once daily. 120 mL 2    fluticasone-salmeterol diskus inhaler 250-50 mcg Inhale 1 puff into the lungs 2 (two) times daily. Wash out mouth after use. 60 each 11    hydroCHLOROthiazide (HYDRODIURIL) 12.5 MG Tab Take 1 tablet (12.5 mg total) by mouth daily as needed (edema, swelling). Do not take if BP is low and feeling dry/dehydrated/dizzy 90 tablet 1    levothyroxine (SYNTHROID) 50 MCG tablet Take 50 mcg by mouth before breakfast.      montelukast (SINGULAIR) 10 mg tablet Take 10 mg by mouth once daily.      multivitamin (THERAGRAN) per tablet Take 1 tablet by mouth once daily.      ondansetron (ZOFRAN-ODT) 8 MG TbDL Take 1 tablet (8 mg total) by mouth every 8 (eight) hours as needed (nausea). 20 tablet 1    acetaminophen (TYLENOL) 500 MG tablet Take 500 mg by mouth as needed for Pain.       augmented betamethasone dipropionate (DIPROLENE-AF) 0.05 % cream Apply topically 2 (two) times daily.      busPIRone (BUSPAR) 10 MG tablet Take 1 tablet (10 mg total) by mouth 2 (two) times daily. (Patient not taking: Reported on 11/8/2023) 60 tablet 2    cyclobenzaprine (FLEXERIL) 5 MG tablet Take 5 mg by mouth 3 (three) times daily as needed for Muscle spasms.      diclofenac (VOLTAREN) 75 MG EC tablet Take 1 tablet (75 mg total) by mouth 2 (two) times daily. (Patient not taking: Reported on 11/8/2023) 30 tablet 0    FLUoxetine 20 MG capsule Take 1 capsule (20 mg total) by mouth once daily. Take IN ADDITION to Prozac 40 mg supply 30 capsule 2    HYDROcodone-acetaminophen (NORCO) 5-325 mg per tablet Take 1 tablet by mouth every 6 (six) hours as needed for Pain. (Patient not taking: Reported on 11/8/2023) 12 tablet 0    hydrOXYchloroQUINE (PLAQUENIL) 200 mg tablet Take 1 tablet (200 mg total) by mouth once daily. (Patient not taking: Reported on 11/8/2023) 90 tablet 1    indomethacin (INDOCIN) 25 MG capsule Take 1 capsule (25 mg total) by mouth 3 (three) times daily. 90 capsule 3    Lactobacillus rhamnosus GG (CULTURELLE) 10 billion cell capsule Take 1 capsule by mouth once daily.      omeprazole (PRILOSEC) 40 MG capsule Take 40 mg by mouth.      predniSONE (DELTASONE) 20 MG tablet Prednisone 60 mg/ day for 3 days, 40 mg/day for 3 days,20 mg/ day for 3 days, (1/2 tablet )10 mg a day for 3 days. (Patient not taking: Reported on 11/8/2023) 20 tablet 0    rimegepant (NURTEC) 75 mg odt Take 75 mg by mouth once as needed for Migraine. Place ODT tablet on the tongue; alternatively the ODT tablet may be placed under the tongue       No current facility-administered medications on file prior to visit.     Physical Examination  Vitals:    11/08/23 1612   BP: 122/74   Pulse: 63   Temp: 98.1 °F (36.7 °C)     GENERAL:  female in no apparent distress and well developed and well nourished  HEAD:  Normocephalic, without obvious  abnormality, atraumatic  EYES: sclera anicteric, conjunctiva normochromic  EARS: Right TM with budging and effusion   NOSE: without erythema or discharge, clear discharge, turbinates normal    OROPHARYNX: moist mucous membranes without erythema, exudates or petechiae  LYMPH NODES: normal, supple, no lymphadenopathy  LUNGS: clear to auscultation, no wheezes, rales or rhonchi, symmetric air entry.  HEART: normal rate, regular rhythm, normal S1, S2, no murmurs, rubs, clicks or gallops.  ABDOMEN: soft, nontender, nondistended, no masses or organomegaly.  MUSCULOSKELETAL: no gross joint deformity or swelling.  NEURO: alert, oriented, normal speech, no focal findings or movement disorder noted.  SKIN: normal coloration and turgor, no rashes, no suspicious skin lesions noted.     Assessment/Plan:   Problem List Items Addressed This Visit          ENT    Sinusitis    Relevant Medications    fluticasone propionate (FLONASE) 50 mcg/actuation nasal spray    azelastine (ASTELIN) 137 mcg (0.1 %) nasal spray    Other Relevant Orders    CT Sinuses without Contrast    Chronic rhinitis - Primary    Current Assessment & Plan     - Not controlled at this time   - Ordered Flonase 1 SEN BID   - Ordered Astelin 1 SEN BID   - Ordered Serum IgE to Aeroallergens   - Will continue to monitor and reassess          Relevant Medications    fluticasone propionate (FLONASE) 50 mcg/actuation nasal spray    azelastine (ASTELIN) 137 mcg (0.1 %) nasal spray    Other Relevant Orders    HIV 1/2 Ag/Ab (4th Gen)    CBC Auto Differential    Streptococcus Pneumoniae IgG Antibody (23 Serotypes), MAID    IgE    IgG    IgM    IgA    Aspergillus fumagatus IgE    Bermuda grass IgE    Cat epithelium IgE    Cladosporium IgE    Cockroach, American IgE    Hollsopple, bald IgE    D. farinae IgE    D. pteronyssinus IgE    Dog dander IgE    Plantain, English IgE    Clarke grass IgE    Marsh elder, rough IgE    Mugwort IgE    Nettle IgE    Oak, white IgE    Penicillium  IgE    Ragweed, short, common IgE    Darrel IgE    Allergen, Cocklebur    Allergen, Elm Lonedell    Allergen, Meadow Grass ([place] Blue)    Allergen, Mucor Racemosus    Allergen, Pecan Tree IgE    Allergen, White Ilan    Allergen-Alternaria Alternata    Allergen-Lonedell    Allergen-Common Pigweed    Allergen-Silver Birch    Feather Panel #2    RAST Allergen for Eastern Melrude    RAST Allergen Maple ([place])    RAST Allergen Fleetwood    RAST Allergen, Lamb's Quarters    RAST Allergen, Sheep Harding([place])       Pulmonary    Asthma with COPD       ID    Recurrent infections    Overview     - Recurrent sinus infections - multiple in the past   - Recent PNA - completed treatment with pulm   - Concern for otitis media - more than 1 in the past   - No issues with infection until 50-61 yo   - No hx of bacteremia, fungal infections, or viral infections          Current Assessment & Plan     - Ordered IgA, IgG, IgM, and Pneumo 23 titers   - CT Sinus at this for CRS   - Recent Pneum 23 - repeat titers if indicated           Follow up:  Follow up in about 4 weeks (around 12/6/2023).    MD Chica Fern[facility]esR[facility]  Allergy and Immunology

## 2023-11-08 NOTE — LETTER
November 8, 2023      [facility]  [id number] [place] [id number]  Phone: [phone]  Fax: [phone]       Patient: Yvonne Schmidt   YOB: 1960  Date of Visit: 11/08/2023    To Whom It May Concern:    Alia Schmidt  was at [facility] on 11/08/2023. The patient may return to work/school on 11/08/2023 with no restrictions. If you have any questions or concerns, or if I can be of further assistance, please do not hesitate to contact me.    Sincerely,    Grayson Guaman MD      not examined

## 2023-11-08 NOTE — ASSESSMENT & PLAN NOTE
- Not controlled at this time   - Ordered Flonase 1 SEN BID   - Ordered Astelin 1 SEN BID   - Ordered Serum IgE to Aeroallergens   - Will continue to monitor and reassess

## 2023-11-09 ENCOUNTER — PROCEDURE VISIT (OUTPATIENT)
Dept: OBSTETRICS AND GYNECOLOGY | Facility: CLINIC | Age: 63
End: 2023-11-09
Payer: COMMERCIAL

## 2023-11-09 VITALS
DIASTOLIC BLOOD PRESSURE: 58 MMHG | HEIGHT: 62 IN | SYSTOLIC BLOOD PRESSURE: 116 MMHG | WEIGHT: 102.75 LBS | BODY MASS INDEX: 18.91 KG/M2

## 2023-11-09 DIAGNOSIS — N81.2 CYSTOCELE AND RECTOCELE WITH INCOMPLETE UTEROVAGINAL PROLAPSE: Primary | ICD-10-CM

## 2023-11-09 LAB
HIV 1+2 AB+HIV1 P24 AG SERPL QL IA: NORMAL
IGA SERPL-MCNC: 281 MG/DL (ref 40–350)
IGE SERPL-ACNC: 113 IU/ML (ref 0–100)
IGG SERPL-MCNC: 940 MG/DL (ref 650–1600)
IGM SERPL-MCNC: 83 MG/DL (ref 50–300)

## 2023-11-09 PROCEDURE — 99213 PR OFFICE/OUTPT VISIT, EST, LEVL III, 20-29 MIN: ICD-10-PCS | Mod: S$GLB,,, | Performed by: OBSTETRICS & GYNECOLOGY

## 2023-11-09 PROCEDURE — 99213 OFFICE O/P EST LOW 20 MIN: CPT | Mod: S$GLB,,, | Performed by: OBSTETRICS & GYNECOLOGY

## 2023-11-09 NOTE — PROGRESS NOTES
"  Subjective:      Patient ID: Yvonne Schmidt is a 63 y.o. female.    Chief Complaint:  Prolapse    History of Present Illness  HPI  Presents for pessary fitting; however, after considering her options, pt declines pessary and desires surgical management of prolapse.  To recap from her last visit:   " After standing for awhile or after using the bathroom, she notices a ball of tissue bulging from the vaginal opening.  Has lower pelvic discomfort when that happens as well.  No urinary incontinence or urgency.  No difficulty voiding.  Pt struggles with chronic constipation.  Has resumed Miralax.  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  She is , and would like to be sexually active, but feels like she can't because of her symptoms.  Due for pap  MMG scheduled 23"    On exam: pt with vaginal atrophy.  Has been using estrace cream 2x/week.  Had Grade 2 rectocele, grade 3 cystocele, and uterine prolapse with cervical descent to 1cm proximal to hymenal ring    GYN & OB History  No LMP recorded. Patient is postmenopausal.   Date of Last Pap: 10/12/2023    OB History    Para Term  AB Living   2 2 2 0 0 2   SAB IAB Ectopic Multiple Live Births   0 0 0 0 2      # Outcome Date GA Lbr Jeremi/2nd Weight Sex Delivery Anes PTL Lv   2 Term    3.289 kg (7 lb 4 oz)  Vag-Spont      1 Term    3.317 kg (7 lb 5 oz)  Vag-Spont          Review of Systems  Review of Systems       Objective:     Physical Exam:   Constitutional: She is oriented to person, place, and time. She appears well-developed and well-nourished. No distress.                           Neurological: She is alert and oriented to person, place, and time.     Psychiatric: She has a normal mood and affect. Her behavior is normal. Judgment and thought content normal.         Assessment:     1. Cystocele and rectocele with incomplete uterovaginal prolapse               Plan:     Yvonne was seen today for " consult.    Diagnoses and all orders for this visit:    Cystocele and rectocele with incomplete uterovaginal prolapse     Discussed TVH/Anterior and posterior colporraphy/and SSLF.  I reviewed the risks of hysterectomy with the patient including, but not limited to, disfigurement from scars, pain, bleeding, infection, and potential damage to internal organs including muscles, nerves, blood vessels, small bowel, large bowel, bladder, ureters, and kidneys.  I discussed the potential need for conversion to an open abdominal procedure or the need to extend the original incision/incisions for completion of the case or to address any complications that may arise.  I also reviewed potential risks of venous-thrombotic events and potential air embolism. I reviewed the risk of blood loss with the patient, and discussed potential need for blood transfusion if a significant hemorrhage occurs.  Reviewed potential significant risks of blood transfusion including, but not limited to, a transfusion reaction and possible transmission of blood-borne pathogens including, but not limited to, HIV, hepatitis, and CMV.  Discussed potential risk of prolapse recurrence.  Surgical consents were reviewed in detail with the patient and were signed.  All questions were answered.    Will schedule TVH/A&P repair/SSLF.

## 2023-11-09 NOTE — Clinical Note
Please schedule TVH/A&P repair/SSLF.  I signed consents with her.  Wants it done before the end of the year if possible.  If so, does not need pre-op visit.  If in 2024, will need pre-op visit with me.

## 2023-11-10 DIAGNOSIS — N81.2 CYSTOCELE AND RECTOCELE WITH INCOMPLETE UTEROVAGINAL PROLAPSE: Primary | ICD-10-CM

## 2023-11-10 LAB
AMER SYCAMORE IGE QN: <0.1 KU/L
FEATHER PANEL #2: <0.1 KU/L

## 2023-11-14 ENCOUNTER — TELEPHONE (OUTPATIENT)
Dept: ALLERGY | Facility: CLINIC | Age: 63
End: 2023-11-14
Payer: COMMERCIAL

## 2023-11-14 ENCOUNTER — OFFICE VISIT (OUTPATIENT)
Dept: ALLERGY | Facility: CLINIC | Age: 63
End: 2023-11-14
Payer: COMMERCIAL

## 2023-11-14 VITALS — BODY MASS INDEX: 18.83 KG/M2 | WEIGHT: 102.94 LBS

## 2023-11-14 DIAGNOSIS — R68.84 JAW PAIN: Primary | ICD-10-CM

## 2023-11-14 LAB
A ALTERNATA IGE QN: <0.1 KU/L
A FUMIGATUS IGE QN: <0.1 KU/L
ALLERGEN BOXELDER MAPLE TREE IGE: <0.1 KU/L
ALLERGEN MAPLE (BOX ELDER) CLASS: NORMAL
ALLERGEN MULBERRY CLASS: NORMAL
ALLERGEN MULBERRY TREE IGE: <0.1 KU/L
ALLERGEN PIGWEED IGE: <0.1 KU/L
ALLERGEN WHITE ASH TREE IGE: <0.1 KU/L
BALD CYPRESS IGE QN: <0.1 KU/L
BERMUDA GRASS IGE QN: <0.1 KU/L
C HERBARUM IGE QN: <0.1 KU/L
CAT DANDER IGE QN: <0.1 KU/L
CEDAR IGE QN: <0.1 KU/L
COCKLEBUR IGE QN: <0.1 KU/L
COMMON PIGWEED CLASS: NORMAL
COMMON RAGWEED IGE QN: <0.1 KU/L
D FARINAE IGE QN: <0.1 KU/L
D PTERONYSS IGE QN: <0.1 KU/L
DEPRECATED A ALTERNATA IGE RAST QL: NORMAL
DEPRECATED A FUMIGATUS IGE RAST QL: NORMAL
DEPRECATED BALD CYPRESS IGE RAST QL: NORMAL
DEPRECATED BERMUDA GRASS IGE RAST QL: NORMAL
DEPRECATED C HERBARUM IGE RAST QL: NORMAL
DEPRECATED CAT DANDER IGE RAST QL: NORMAL
DEPRECATED CEDAR IGE RAST QL: NORMAL
DEPRECATED COCKLEBUR IGE RAST QL: NORMAL
DEPRECATED COMMON RAGWEED IGE RAST QL: NORMAL
DEPRECATED D FARINAE IGE RAST QL: NORMAL
DEPRECATED D PTERONYSS IGE RAST QL: NORMAL
DEPRECATED DOG DANDER IGE RAST QL: NORMAL
DEPRECATED ELDER IGE RAST QL: NORMAL
DEPRECATED ENGL PLANTAIN IGE RAST QL: NORMAL
DEPRECATED GOOSEFOOT IGE RAST QL: NORMAL
DEPRECATED JOHNSON GRASS IGE RAST QL: NORMAL
DEPRECATED KENT BLUE GRASS IGE RAST QL: NORMAL
DEPRECATED M RACEMOSUS IGE RAST QL: NORMAL
DEPRECATED MUGWORT IGE RAST QL: NORMAL
DEPRECATED NETTLE IGE RAST QL: NORMAL
DEPRECATED P NOTATUM IGE RAST QL: NORMAL
DEPRECATED PECAN/HICK TREE IGE RAST QL: NORMAL
DEPRECATED ROACH IGE RAST QL: NORMAL
DEPRECATED SHEEP SORREL IGE RAST QL: NORMAL
DEPRECATED SILVER BIRCH IGE RAST QL: NORMAL
DEPRECATED TIMOTHY IGE RAST QL: NORMAL
DEPRECATED WHITE OAK IGE RAST QL: NORMAL
DOG DANDER IGE QN: <0.1 KU/L
ELDER IGE QN: <0.1 KU/L
ELM CEDAR CLASS: NORMAL
ELM CEDAR, IGE: <0.1 KU/L
ENGL PLANTAIN IGE QN: <0.1 KU/L
GOOSEFOOT IGE QN: <0.1 KU/L
JOHNSON GRASS IGE QN: <0.1 KU/L
KENT BLUE GRASS IGE QN: <0.1 KU/L
M RACEMOSUS IGE QN: <0.1 KU/L
MUGWORT IGE QN: <0.1 KU/L
NETTLE IGE QN: <0.1 KU/L
P NOTATUM IGE QN: <0.1 KU/L
PECAN/HICK TREE IGE QN: <0.1 KU/L
ROACH IGE QN: <0.1 KU/L
SHEEP SORREL IGE QN: <0.1 KU/L
SILVER BIRCH IGE QN: <0.1 KU/L
TIMOTHY IGE QN: <0.1 KU/L
WHITE ASH CLASS: NORMAL
WHITE OAK IGE QN: <0.1 KU/L

## 2023-11-14 PROCEDURE — 99999 PR PBB SHADOW E&M-EST. PATIENT-LVL II: ICD-10-PCS | Mod: PBBFAC,,, | Performed by: STUDENT IN AN ORGANIZED HEALTH CARE EDUCATION/TRAINING PROGRAM

## 2023-11-14 PROCEDURE — 1160F RVW MEDS BY RX/DR IN RCRD: CPT | Mod: CPTII,S$GLB,, | Performed by: STUDENT IN AN ORGANIZED HEALTH CARE EDUCATION/TRAINING PROGRAM

## 2023-11-14 PROCEDURE — 99214 OFFICE O/P EST MOD 30 MIN: CPT | Mod: S$GLB,,, | Performed by: STUDENT IN AN ORGANIZED HEALTH CARE EDUCATION/TRAINING PROGRAM

## 2023-11-14 PROCEDURE — 99214 PR OFFICE/OUTPT VISIT, EST, LEVL IV, 30-39 MIN: ICD-10-PCS | Mod: S$GLB,,, | Performed by: STUDENT IN AN ORGANIZED HEALTH CARE EDUCATION/TRAINING PROGRAM

## 2023-11-14 PROCEDURE — 1159F MED LIST DOCD IN RCRD: CPT | Mod: CPTII,S$GLB,, | Performed by: STUDENT IN AN ORGANIZED HEALTH CARE EDUCATION/TRAINING PROGRAM

## 2023-11-14 PROCEDURE — 3008F PR BODY MASS INDEX (BMI) DOCUMENTED: ICD-10-PCS | Mod: CPTII,S$GLB,, | Performed by: STUDENT IN AN ORGANIZED HEALTH CARE EDUCATION/TRAINING PROGRAM

## 2023-11-14 PROCEDURE — 1159F PR MEDICATION LIST DOCUMENTED IN MEDICAL RECORD: ICD-10-PCS | Mod: CPTII,S$GLB,, | Performed by: STUDENT IN AN ORGANIZED HEALTH CARE EDUCATION/TRAINING PROGRAM

## 2023-11-14 PROCEDURE — 1160F PR REVIEW ALL MEDS BY PRESCRIBER/CLIN PHARMACIST DOCUMENTED: ICD-10-PCS | Mod: CPTII,S$GLB,, | Performed by: STUDENT IN AN ORGANIZED HEALTH CARE EDUCATION/TRAINING PROGRAM

## 2023-11-14 PROCEDURE — 99999 PR PBB SHADOW E&M-EST. PATIENT-LVL II: CPT | Mod: PBBFAC,,, | Performed by: STUDENT IN AN ORGANIZED HEALTH CARE EDUCATION/TRAINING PROGRAM

## 2023-11-14 PROCEDURE — 3008F BODY MASS INDEX DOCD: CPT | Mod: CPTII,S$GLB,, | Performed by: STUDENT IN AN ORGANIZED HEALTH CARE EDUCATION/TRAINING PROGRAM

## 2023-11-14 RX ORDER — PREDNISONE 20 MG/1
TABLET ORAL
Qty: 15 TABLET | Refills: 0 | Status: SHIPPED | OUTPATIENT
Start: 2023-11-14 | End: 2023-11-24

## 2023-11-14 RX ORDER — CLINDAMYCIN HYDROCHLORIDE 300 MG/1
300 CAPSULE ORAL 2 TIMES DAILY
Qty: 20 CAPSULE | Refills: 0 | Status: SHIPPED | OUTPATIENT
Start: 2023-11-14 | End: 2023-11-24

## 2023-11-14 NOTE — ASSESSMENT & PLAN NOTE
- Etiology infection v TMJ   - Ordered Prednisone burst and Clindamycin   - Recommend dental examination   - ED precautions discussed

## 2023-11-14 NOTE — TELEPHONE ENCOUNTER
----- Message from Rafi Mares sent at 11/14/2023  1:00 PM CST -----  Contact: Yvonne  #3rd Attempt#      Pt is calling to speak with someone in office regarding concerns. Please all back at [phone]                Thanks  SW

## 2023-11-14 NOTE — TELEPHONE ENCOUNTER
Patient said ear and jaw is hurting twice as bad and antibiotics has done nothing.Please advise. Ty

## 2023-11-14 NOTE — PROGRESS NOTES
Allergy and Immunology  Established Patient Clinic Note    Date: 11/14/2023  Chief Complaint   Patient presents with    Other     Jaw/ear pain     History  Yvonne Schmidt is a 63 y.o. female being seen for follow-up today.    Jaw Pain   - Etiology unclear - TM with mild effusion but not bulging  - Recent screw in jaw for tooth implant   - Screw rejected and need for patch testing in the future when off steroids   - R/O dental abscess and will treat   - TMJ planning a role - patient has a mouth guard though     Allergies, PMH, PSH, Social, and Family History were reviewed.    Current Outpatient Medications on File Prior to Visit   Medication Sig Dispense Refill    acetaminophen (TYLENOL) 500 MG tablet Take 500 mg by mouth as needed for Pain.      albuterol (PROVENTIL) 2.5 mg /3 mL (0.083 %) nebulizer solution Take 3 mLs (2.5 mg total) by nebulization every 4 to 6 hours as needed for Wheezing or Shortness of Breath. 360 mL 11    albuterol (PROVENTIL/VENTOLIN HFA) 90 mcg/actuation inhaler Inhale 2 puffs into the lungs every 4 (four) hours as needed for Wheezing or Shortness of Breath. Rescue 18 g 11    ALPRAZolam (XANAX) 0.5 MG tablet Take 1 tablet (0.5 mg total) by mouth 2 (two) times daily as needed (SIGNIFICANT ANXIETY). New Prescriber 60 tablet 2    azelastine (ASTELIN) 137 mcg (0.1 %) nasal spray 1 spray (137 mcg total) by Nasal route 2 (two) times daily. 30 mL 11    azithromycin (ZITHROMAX) 500 MG tablet Take 1 tablet (500 mg total) by mouth once daily. for 7 days 7 tablet 0    calcium carbonate (TUMS) 300 mg (750 mg) Chew Take 750 mg by mouth.      estradioL (ESTRACE) 0.01 % (0.1 mg/gram) vaginal cream Place 1 g vaginally twice a week. 42.5 g 1    ezetimibe (ZETIA) 10 mg tablet Take 1 tablet (10 mg total) by mouth once daily. 90 tablet 3    ferrous sulfate (FEROSUL) 220 mg (44 mg iron)/5 mL Elix Take 5 mLs (220 mg total) by mouth once daily. 120 mL 2    FLUoxetine 20 MG capsule Take 1 capsule (20 mg  total) by mouth once daily. Take IN ADDITION to Prozac 40 mg supply 30 capsule 2    fluticasone propionate (FLONASE) 50 mcg/actuation nasal spray 1 spray (50 mcg total) by Each Nostril route 2 (two) times a day. 16 g 11    fluticasone-salmeterol diskus inhaler 250-50 mcg Inhale 1 puff into the lungs 2 (two) times daily. Wash out mouth after use. 60 each 11    hydroCHLOROthiazide (HYDRODIURIL) 12.5 MG Tab Take 1 tablet (12.5 mg total) by mouth daily as needed (edema, swelling). Do not take if BP is low and feeling dry/dehydrated/dizzy 90 tablet 1    indomethacin (INDOCIN) 25 MG capsule Take 1 capsule (25 mg total) by mouth 3 (three) times daily. 90 capsule 3    Lactobacillus rhamnosus GG (CULTURELLE) 10 billion cell capsule Take 1 capsule by mouth once daily.      levothyroxine (SYNTHROID) 50 MCG tablet Take 50 mcg by mouth before breakfast.      montelukast (SINGULAIR) 10 mg tablet Take 10 mg by mouth once daily.      multivitamin (THERAGRAN) per tablet Take 1 tablet by mouth once daily.      omeprazole (PRILOSEC) 40 MG capsule Take 40 mg by mouth.      ondansetron (ZOFRAN-ODT) 8 MG TbDL Take 1 tablet (8 mg total) by mouth every 8 (eight) hours as needed (nausea). 20 tablet 1     No current facility-administered medications on file prior to visit.     Physical Examination  There were no vitals filed for this visit.  Temp - afebrile   GENERAL:  female in no apparent distress and well developed and well nourished  HEAD:  Normocephalic, without obvious abnormality, atraumatic  EYES: sclera anicteric, conjunctiva normochromic  EARS: normal TM's and external ear canals both ears - mild effusion on L TM   NOSE: without erythema or discharge, clear discharge, turbinates normal    OROPHARYNX: moist mucous membranes without erythema, exudates or petechiae - no visualize abscess   LYMPH NODES: normal, supple, no lymphadenopathy  LUNGS: clear to auscultation, no wheezes, rales or rhonchi, symmetric air entry.  HEART: normal  rate, regular rhythm, normal S1, S2, no murmurs, rubs, clicks or gallops.  ABDOMEN: soft, nontender, nondistended, no masses or organomegaly.  MUSCULOSKELETAL: no gross joint deformity or swelling.  NEURO: alert, oriented, normal speech, no focal findings or movement disorder noted.  SKIN: normal coloration and turgor, no rashes, no suspicious skin lesions noted.     Assessment/Plan:   Problem List Items Addressed This Visit          ENT    Jaw pain - Primary    Current Assessment & Plan     - Etiology infection v TMJ   - Ordered Prednisone burst and Clindamycin   - Recommend dental examination   - ED precautions discussed           Follow up:  Follow up in about 2 years (around 11/14/2025).    Grayson Guaman MD   [facility]  Allergy and Immunology

## 2023-11-16 ENCOUNTER — TELEPHONE (OUTPATIENT)
Dept: PULMONOLOGY | Facility: CLINIC | Age: 63
End: 2023-11-16
Payer: COMMERCIAL

## 2023-11-16 ENCOUNTER — OFFICE VISIT (OUTPATIENT)
Dept: CARDIOLOGY | Facility: CLINIC | Age: 63
End: 2023-11-16
Payer: COMMERCIAL

## 2023-11-16 ENCOUNTER — HOSPITAL ENCOUNTER (EMERGENCY)
Facility: HOSPITAL | Age: 63
Discharge: HOME | End: 2023-11-16
Attending: EMERGENCY MEDICINE
Payer: COMMERCIAL

## 2023-11-16 VITALS
RESPIRATION RATE: 18 BRPM | HEIGHT: 62 IN | OXYGEN SATURATION: 100 % | WEIGHT: 102.19 LBS | HEART RATE: 73 BPM | TEMPERATURE: 98 F | BODY MASS INDEX: 18.8 KG/M2 | DIASTOLIC BLOOD PRESSURE: 88 MMHG | SYSTOLIC BLOOD PRESSURE: 147 MMHG

## 2023-11-16 VITALS
HEART RATE: 73 BPM | WEIGHT: 103.63 LBS | DIASTOLIC BLOOD PRESSURE: 86 MMHG | HEIGHT: 62 IN | SYSTOLIC BLOOD PRESSURE: 130 MMHG | OXYGEN SATURATION: 100 % | BODY MASS INDEX: 19.07 KG/M2

## 2023-11-16 DIAGNOSIS — K21.9 GASTROESOPHAGEAL REFLUX DISEASE, UNSPECIFIED WHETHER ESOPHAGITIS PRESENT: ICD-10-CM

## 2023-11-16 DIAGNOSIS — R07.89 OTHER CHEST PAIN: ICD-10-CM

## 2023-11-16 DIAGNOSIS — R00.2 PALPITATIONS: ICD-10-CM

## 2023-11-16 DIAGNOSIS — I10 ESSENTIAL HYPERTENSION: Primary | ICD-10-CM

## 2023-11-16 DIAGNOSIS — E78.49 OTHER HYPERLIPIDEMIA: ICD-10-CM

## 2023-11-16 DIAGNOSIS — R06.09 OTHER FORM OF DYSPNEA: ICD-10-CM

## 2023-11-16 DIAGNOSIS — G89.29 CHRONIC RIGHT-SIDED LOW BACK PAIN WITH RIGHT-SIDED SCIATICA: Primary | ICD-10-CM

## 2023-11-16 DIAGNOSIS — R00.1 BRADYCARDIA: ICD-10-CM

## 2023-11-16 DIAGNOSIS — Z87.891 FORMER TOBACCO USE: ICD-10-CM

## 2023-11-16 DIAGNOSIS — R06.02 SHORTNESS OF BREATH: ICD-10-CM

## 2023-11-16 DIAGNOSIS — M54.41 CHRONIC RIGHT-SIDED LOW BACK PAIN WITH RIGHT-SIDED SCIATICA: Primary | ICD-10-CM

## 2023-11-16 DIAGNOSIS — Z01.810 PREOP CARDIOVASCULAR EXAM: ICD-10-CM

## 2023-11-16 PROCEDURE — 1159F PR MEDICATION LIST DOCUMENTED IN MEDICAL RECORD: ICD-10-PCS | Mod: CPTII,S$GLB,, | Performed by: INTERNAL MEDICINE

## 2023-11-16 PROCEDURE — 1160F RVW MEDS BY RX/DR IN RCRD: CPT | Mod: CPTII,S$GLB,, | Performed by: INTERNAL MEDICINE

## 2023-11-16 PROCEDURE — 99214 PR OFFICE/OUTPT VISIT, EST, LEVL IV, 30-39 MIN: ICD-10-PCS | Mod: S$GLB,,, | Performed by: INTERNAL MEDICINE

## 2023-11-16 PROCEDURE — 63600175 PHARM REV CODE 636 W HCPCS: Performed by: PHYSICIAN ASSISTANT

## 2023-11-16 PROCEDURE — 96372 THER/PROPH/DIAG INJ SC/IM: CPT | Performed by: PHYSICIAN ASSISTANT

## 2023-11-16 PROCEDURE — 3075F PR MOST RECENT SYSTOLIC BLOOD PRESS GE 130-139MM HG: ICD-10-PCS | Mod: CPTII,S$GLB,, | Performed by: INTERNAL MEDICINE

## 2023-11-16 PROCEDURE — 99999 PR PBB SHADOW E&M-EST. PATIENT-LVL V: ICD-10-PCS | Mod: PBBFAC,,, | Performed by: INTERNAL MEDICINE

## 2023-11-16 PROCEDURE — 3075F SYST BP GE 130 - 139MM HG: CPT | Mod: CPTII,S$GLB,, | Performed by: INTERNAL MEDICINE

## 2023-11-16 PROCEDURE — 1160F PR REVIEW ALL MEDS BY PRESCRIBER/CLIN PHARMACIST DOCUMENTED: ICD-10-PCS | Mod: CPTII,S$GLB,, | Performed by: INTERNAL MEDICINE

## 2023-11-16 PROCEDURE — 3008F PR BODY MASS INDEX (BMI) DOCUMENTED: ICD-10-PCS | Mod: CPTII,S$GLB,, | Performed by: INTERNAL MEDICINE

## 2023-11-16 PROCEDURE — 99999 PR PBB SHADOW E&M-EST. PATIENT-LVL V: CPT | Mod: PBBFAC,,, | Performed by: INTERNAL MEDICINE

## 2023-11-16 PROCEDURE — 99214 OFFICE O/P EST MOD 30 MIN: CPT | Mod: S$GLB,,, | Performed by: INTERNAL MEDICINE

## 2023-11-16 PROCEDURE — 1159F MED LIST DOCD IN RCRD: CPT | Mod: CPTII,S$GLB,, | Performed by: INTERNAL MEDICINE

## 2023-11-16 PROCEDURE — 3079F PR MOST RECENT DIASTOLIC BLOOD PRESSURE 80-89 MM HG: ICD-10-PCS | Mod: CPTII,S$GLB,, | Performed by: INTERNAL MEDICINE

## 2023-11-16 PROCEDURE — 3079F DIAST BP 80-89 MM HG: CPT | Mod: CPTII,S$GLB,, | Performed by: INTERNAL MEDICINE

## 2023-11-16 PROCEDURE — 99284 EMERGENCY DEPT VISIT MOD MDM: CPT

## 2023-11-16 PROCEDURE — 3008F BODY MASS INDEX DOCD: CPT | Mod: CPTII,S$GLB,, | Performed by: INTERNAL MEDICINE

## 2023-11-16 RX ORDER — EZETIMIBE 10 MG/1
10 TABLET ORAL DAILY
Qty: 90 TABLET | Refills: 3 | Status: ON HOLD | OUTPATIENT
Start: 2023-11-16 | End: 2023-12-26

## 2023-11-16 RX ORDER — KETOROLAC TROMETHAMINE 30 MG/ML
15 INJECTION, SOLUTION INTRAMUSCULAR; INTRAVENOUS
Status: COMPLETED | OUTPATIENT
Start: 2023-11-16 | End: 2023-11-16

## 2023-11-16 RX ORDER — HYDROCHLOROTHIAZIDE 12.5 MG/1
12.5 TABLET ORAL DAILY PRN
Qty: 90 TABLET | Refills: 1 | Status: SHIPPED | OUTPATIENT
Start: 2023-11-16 | End: 2024-11-15

## 2023-11-16 RX ORDER — ONDANSETRON 2 MG/ML
4 INJECTION INTRAMUSCULAR; INTRAVENOUS
Status: COMPLETED | OUTPATIENT
Start: 2023-11-16 | End: 2023-11-16

## 2023-11-16 RX ORDER — MORPHINE SULFATE 4 MG/ML
4 INJECTION, SOLUTION INTRAMUSCULAR; INTRAVENOUS
Status: COMPLETED | OUTPATIENT
Start: 2023-11-16 | End: 2023-11-16

## 2023-11-16 RX ADMIN — KETOROLAC TROMETHAMINE 15 MG: 30 INJECTION, SOLUTION INTRAMUSCULAR; INTRAVENOUS at 12:11

## 2023-11-16 RX ADMIN — ONDANSETRON 4 MG: 2 INJECTION INTRAMUSCULAR; INTRAVENOUS at 12:11

## 2023-11-16 RX ADMIN — MORPHINE SULFATE 4 MG: 4 INJECTION INTRAVENOUS at 12:11

## 2023-11-16 NOTE — TELEPHONE ENCOUNTER
----- Message from Rafi Mares sent at 11/16/2023  1:47 PM CST -----  Contact: Yvonne Fairchild is calling in regards to rs appt on 1/26 to a different date no later that 4:30p. Please call back at [phone]                    Thanks  SW

## 2023-11-16 NOTE — PROGRESS NOTES
Subjective:   Patient ID:  Yvonne Schmidt is a 63 y.o. female who presents for cardiac consult of No chief complaint on file.        The patient came in today for cardiac consult of No chief complaint on file.        Yvonne Schmidt is a 63 y.o. female pt with HTN, HLD, GERD, Anxiety, tobacco use presents for follow up CV eval.     9/11/19  Pt presented to the ER yesterday for CP. ECG with sinus julian V rate 49. Enzymes neg x 2, NTG caused worsening of CP.   She has been having intermittent chest pain for 2 weeks. Chest pain can lasts for a few sec, took BC which helped. She just started Nexium as well.   She does get lightheaded and feels like she may pass out.     2/28/23  She had adverse side effects of statin so DC'd and changed to Zetia. Had back pain and chest pain went to ER twice and followed up with Dr. Justice in Dec 2022 - ECHO and Nuclear stress ordered but was rescheduled and not completed still. Her CP seems more MSK/atypical.   She will have sinus surgery with Dr. Lundberg 3/10/23    11/16/23  ECHO 3/2023 with normal bi V function and valves, trivial peric effusion, PASP 12 mmHg.     She has upcoming hysterectomy for uterine prolapse in Dec '23 with Dr. Dugan.   She went to the ER today for back pain, received morphine, Toradol, and phenergan and DCd    BP and HR stable now. BMI 18 - 103 lbs. She had PNA in Sept, thought initially bronchitis, had CP.   She has then been to allergist, and had issues after sinus surgery.     Patient is compliant with medications.    FH - mother - MI s/p stents    Results for orders placed during the hospital encounter of 03/29/23    Echo    Interpretation Summary  · The left ventricle is normal in size with concentric remodeling and normal systolic function.  · The estimated ejection fraction is 60%.  · Normal left ventricular diastolic function.  · Normal right ventricular size with normal right ventricular systolic function.  · Normal central venous  pressure (3 mmHg).  · The estimated PA systolic pressure is 12 mmHg.  · Trivial pericardial effusion.      Cholesterol 100 - 199 mg/dL 258 High     Triglycerides 0 - 149 mg/dL 212 High     HDL >39 mg/dL 47    VLDL Cholesterol Bora 5 - 40 mg/dL 40    LDL Calculated 0 - 99 mg/dL 171 High         11/9/20 CT chest  Impression:    No evidence of pulmonary embolism.  Cardiomegaly.  Reflux into the IVC and hepatic veins suggest some component of right heart dysfunction.  Consider cardiac echo  Small left-sided pleural effusion with left lower lobe atelectasis.      TEST DESCRIPTION   PREDOMINANT RHYTHM  1. Sinus rhythm with heart rates varying between 47 and 103 bpm with an average of 61 bpm.   VENTRICULAR ARRHYTHMIAS  1. There were no PVCs. There was no ventricular ectopic activity.  SUPRA VENTRICULAR ARRHYTHMIAS  1. There were very rare PACs totalling 59 and averaging 1 per hour.  There were 4 monomorphic couplets.  2. There were no episodes of sustained supraventricular tachycardia.    EKG Conclusions:    1. The EKG portion of this study is negative for ischemia at a moderate workload, and peak heart rate of 89 bpm (57% of predicted).   2. Exercise capacity is average.   3. Blood pressure response to exercise was normal (Presenting BP: 150/86 Peak BP: 144/91).   4. No significant arrhythmias were present.   5. There were no symptoms of chest discomfort or significant dyspnea throughout the protocol.   6. The Duke treadmill score was 6 suggesting a low probability for future cardiovascular events.    This document has been electronically    SIGNED BY: Pablo Hua MD On: 10/10/2019 12:59    Past Medical History:   Diagnosis Date    Anxiety     Bradycardia 9/19/2019    GERD (gastroesophageal reflux disease)     Thyroid disease        Past Surgical History:   Procedure Laterality Date    BREAST BIOPSY Left     DENTAL SURGERY      SINUS SURGERY         Social History     Tobacco Use    Smoking status: Former     Current  packs/day: 0.00     Average packs/day: 0.8 packs/day for 37.0 years (27.8 ttl pk-yrs)     Types: Cigarettes     Start date:      Quit date: 2019     Years since quittin.8    Smokeless tobacco: Never    Tobacco comments:     did not smoke for 2 years since beginning smoking.    Substance Use Topics    Alcohol use: Not Currently    Drug use: Never       Family History   Problem Relation Age of Onset    Heart attack Mother     Hypertension Mother     Heart disease Father     Heart attack Maternal Aunt     Heart attack Maternal Uncle        Patient's Medications   New Prescriptions    No medications on file   Previous Medications    ACETAMINOPHEN (TYLENOL) 500 MG TABLET    Take 500 mg by mouth as needed for Pain.    ALBUTEROL (PROVENTIL) 2.5 MG /3 ML (0.083 %) NEBULIZER SOLUTION    Take 3 mLs (2.5 mg total) by nebulization every 4 to 6 hours as needed for Wheezing or Shortness of Breath.    ALBUTEROL (PROVENTIL/VENTOLIN HFA) 90 MCG/ACTUATION INHALER    Inhale 2 puffs into the lungs every 4 (four) hours as needed for Wheezing or Shortness of Breath. Rescue    ALPRAZOLAM (XANAX) 0.5 MG TABLET    Take 1 tablet (0.5 mg total) by mouth 2 (two) times daily as needed (SIGNIFICANT ANXIETY). New Prescriber    AZELASTINE (ASTELIN) 137 MCG (0.1 %) NASAL SPRAY    1 spray (137 mcg total) by Nasal route 2 (two) times daily.    CALCIUM CARBONATE (TUMS) 300 MG (750 MG) CHEW    Take 750 mg by mouth.    CLINDAMYCIN (CLEOCIN) 300 MG CAPSULE    Take 1 capsule (300 mg total) by mouth 2 (two) times a day. for 10 days    ESTRADIOL (ESTRACE) 0.01 % (0.1 MG/GRAM) VAGINAL CREAM    Place 1 g vaginally twice a week.    EZETIMIBE (ZETIA) 10 MG TABLET    Take 1 tablet (10 mg total) by mouth once daily.    FERROUS SULFATE (FEROSUL) 220 MG (44 MG IRON)/5 ML ELIX    Take 5 mLs (220 mg total) by mouth once daily.    FLUOXETINE 20 MG CAPSULE    Take 1 capsule (20 mg total) by mouth once daily. Take IN ADDITION to Prozac 40 mg supply     FLUTICASONE PROPIONATE (FLONASE) 50 MCG/ACTUATION NASAL SPRAY    1 spray (50 mcg total) by Each Nostril route 2 (two) times a day.    FLUTICASONE-SALMETEROL DISKUS INHALER 250-50 MCG    Inhale 1 puff into the lungs 2 (two) times daily. Wash out mouth after use.    HYDROCHLOROTHIAZIDE (HYDRODIURIL) 12.5 MG TAB    Take 1 tablet (12.5 mg total) by mouth daily as needed (edema, swelling). Do not take if BP is low and feeling dry/dehydrated/dizzy    INDOMETHACIN (INDOCIN) 25 MG CAPSULE    Take 1 capsule (25 mg total) by mouth 3 (three) times daily.    LACTOBACILLUS RHAMNOSUS GG (CULTURELLE) 10 BILLION CELL CAPSULE    Take 1 capsule by mouth once daily.    LEVOTHYROXINE (SYNTHROID) 50 MCG TABLET    Take 50 mcg by mouth before breakfast.    MONTELUKAST (SINGULAIR) 10 MG TABLET    Take 10 mg by mouth once daily.    MULTIVITAMIN (THERAGRAN) PER TABLET    Take 1 tablet by mouth once daily.    OMEPRAZOLE (PRILOSEC) 40 MG CAPSULE    Take 40 mg by mouth.    ONDANSETRON (ZOFRAN-ODT) 8 MG TBDL    Take 1 tablet (8 mg total) by mouth every 8 (eight) hours as needed (nausea).    PREDNISONE (DELTASONE) 20 MG TABLET    Take 2 tablets (40 mg total) by mouth once daily for 5 days, THEN 1 tablet (20 mg total) once daily for 5 days.   Modified Medications    No medications on file   Discontinued Medications    No medications on file       Review of Systems   Constitutional: Negative.    HENT: Negative.     Eyes: Negative.    Respiratory:  Positive for shortness of breath.    Cardiovascular:  Positive for chest pain.   Gastrointestinal: Negative.    Genitourinary: Negative.    Musculoskeletal: Negative.    Skin: Negative.    Neurological:  Positive for dizziness.   Endo/Heme/Allergies: Negative.    Psychiatric/Behavioral:  The patient is nervous/anxious.    All 12 systems otherwise negative.      Wt Readings from Last 3 Encounters:   11/16/23 46.3 kg (102 lb 2.9 oz)   11/14/23 46.7 kg (102 lb 15.3 oz)   11/09/23 46.6 kg (102 lb 11.8 oz)      Temp Readings from Last 3 Encounters:   11/16/23 98.1 °F (36.7 °C) (Oral)   11/08/23 98.1 °F (36.7 °C)   09/29/23 98.2 °F (36.8 °C) (Oral)     BP Readings from Last 3 Encounters:   11/16/23 (!) 147/88   11/09/23 (!) 116/58   11/08/23 122/74     Pulse Readings from Last 3 Encounters:   11/16/23 73   11/08/23 63   10/25/23 68       There were no vitals taken for this visit.    Objective:   Physical Exam  Vitals and nursing note reviewed.   Constitutional:       General: She is not in acute distress.     Appearance: She is well-developed. She is not diaphoretic.   HENT:      Head: Normocephalic and atraumatic.      Nose: Nose normal.   Eyes:      General: No scleral icterus.     Conjunctiva/sclera: Conjunctivae normal.   Neck:      Thyroid: No thyromegaly.      Vascular: No JVD.   Cardiovascular:      Rate and Rhythm: Regular rhythm. Bradycardia present.      Heart sounds: S1 normal and S2 normal. No murmur heard.     No friction rub. No gallop. No S3 or S4 sounds.   Pulmonary:      Effort: Pulmonary effort is normal. No respiratory distress.      Breath sounds: Normal breath sounds. No stridor. No wheezing or rales.   Chest:      Chest wall: No tenderness.   Abdominal:      General: Bowel sounds are normal. There is no distension.      Palpations: Abdomen is soft. There is no mass.      Tenderness: There is no abdominal tenderness. There is no rebound.   Genitourinary:     Comments: Deferred  Musculoskeletal:         General: No tenderness or deformity. Normal range of motion.      Cervical back: Normal range of motion and neck supple.   Lymphadenopathy:      Cervical: No cervical adenopathy.   Skin:     General: Skin is warm and dry.      Coloration: Skin is not pale.      Findings: No erythema or rash.   Neurological:      Mental Status: She is alert and oriented to person, place, and time.      Motor: No abnormal muscle tone.      Coordination: Coordination normal.   Psychiatric:         Behavior: Behavior  normal.         Thought Content: Thought content normal.         Judgment: Judgment normal.         Lab Results   Component Value Date     06/16/2023    K 3.7 06/16/2023     06/16/2023    CO2 23 06/16/2023    BUN 13 06/16/2023    CREATININE 1.0 06/16/2023     06/16/2023    MG 3.7 (H) 08/12/2021    AST 16 06/16/2023    ALT 8 (L) 06/16/2023    ALBUMIN 3.7 06/16/2023    PROT 7.1 06/16/2023    BILITOT 0.3 06/16/2023    WBC 9.46 11/08/2023    HGB 11.5 (L) 11/08/2023    HCT 37.0 11/08/2023    MCV 88 11/08/2023     11/08/2023    INR 1.1 09/10/2019    TSH 7.230 (H) 10/04/2022    TSH 9.597 (H) 11/19/2020    CHOL 258 (H) 10/04/2022    HDL 47 10/04/2022    LDLCALC 171 (H) 10/04/2022    TRIG 212 (H) 10/04/2022    BNP 45 06/16/2023     Assessment:      1. Essential hypertension    2. Palpitations    3. Shortness of breath    4. Preop cardiovascular exam    5. Bradycardia    6. Other chest pain    7. Former tobacco use    8. Other form of dyspnea    9. Gastroesophageal reflux disease, unspecified whether esophagitis present    10. Other hyperlipidemia              Plan:   1. Chest pain, recurrent sec to inflammation   - f/u rheum eval   - cont motrin PRN or liquid advil   - ECHO 3/2023 with normal bi V function and valves, trivial peric effusion, PASP 12 mmHg.     2. GERD  - cont PPI  - f/u GI    3. Bradycardia with occ palpitations   - prior Holter - negative  -TSH, T4 - normal   - off BB    4.h/o Tobacco use   - has quit smoking     5. Anxiety  - referred to psych    6. Pleural effusion  - f/u pulm     7. HTN - well controlled  - cont meds and titrate    8. Hypothyroidism   - cont Synthroid    9. HLD, elevated TGs  - Lipitor 40mg - adverse side effects of statin so DC'd and changed to Zetia.   - repeat labs in 3 months   - takes fish oils     10.  Pre-OP CV evaluation prior to hysterectomy in Dec '23 with Dr. Dugan.   Low periop risk of CV events for moderate risk procedure.  Ok to proceed to the  scheduled surgery without further cardiac study.      Thank you for allowing me to participate in this patient's care. Please do not hesitate to contact me with any questions or concerns. Consult note has been forwarded to the referral physician.     Pablo Hua MD, [facility]  Cardiovascular Disease  [facility], [place]  [phone] (P)

## 2023-11-17 LAB
IMMUNOLOGIST REVIEW: NORMAL
S PN DA SERO 19F IGG SER-MCNC: 5.1 MCG/ML
S PNEUM DA 1 IGG SER-MCNC: 0.5 MCG/ML
S PNEUM DA 10A IGG SER-MCNC: 0.6 MCG/ML
S PNEUM DA 11A IGG SER-MCNC: 1.3 MCG/ML
S PNEUM DA 12F IGG SER-MCNC: 0.3 MCG/ML
S PNEUM DA 14 IGG SER-MCNC: 0.9 MCG/ML
S PNEUM DA 15B IGG SER-MCNC: 0.8 MCG/ML
S PNEUM DA 17F IGG SER-MCNC: 0.9 MCG/ML
S PNEUM DA 18C IGG SER-MCNC: 1 MCG/ML
S PNEUM DA 19A IGG SER-MCNC: 2.8 MCG/ML
S PNEUM DA 2 IGG SER-MCNC: 26.8 MCG/ML
S PNEUM DA 20A IGG SER-MCNC: 3 MCG/ML
S PNEUM DA 22F IGG SER-MCNC: 0.7 MCG/ML
S PNEUM DA 23F IGG SER-MCNC: 3.3 MCG/ML
S PNEUM DA 3 IGG SER-MCNC: 0.1 MCG/ML
S PNEUM DA 33F IGG SER-MCNC: 4.4 MCG/ML
S PNEUM DA 4 IGG SER-MCNC: 0.4 MCG/ML
S PNEUM DA 5 IGG SER-MCNC: 22 MCG/ML
S PNEUM DA 6B IGG SER-MCNC: 0.8 MCG/ML
S PNEUM DA 7F IGG SER-MCNC: 1.2 MCG/ML
S PNEUM DA 8 IGG SER-MCNC: 0.6 MCG/ML
S PNEUM DA 9N IGG SER-MCNC: 0.3 MCG/ML
S PNEUM DA 9V IGG SER-MCNC: 0.1 MCG/ML

## 2023-11-17 NOTE — ED PROVIDER NOTES
History      Chief Complaint   Patient presents with    Back Pain     Pt complaining of tailbone pain x1 year. Pt has chronic sciatic and back/tailbone pain       Review of patient's allergies indicates:   Allergen Reactions    Penicillins         HPI   HPI    2023, 4:27 PM   History obtained from the patient      History of Present Illness: Yvonne Schmidt is a 63 y.o. female patient who presents to the Emergency Department for acute on chronic low back pain.   Denies bladder/bowel dysfunction, fever, saddle anesthesia, or focal weakness.   No further complaints or concerns at this time.           PCP: Nafisa Irving, [facility]       Past Medical History:  Past Medical History:   Diagnosis Date    Anxiety     Bradycardia 2019    GERD (gastroesophageal reflux disease)     Thyroid disease          Past Surgical History:  Past Surgical History:   Procedure Laterality Date    BREAST BIOPSY Left     DENTAL SURGERY      SINUS SURGERY             Family History:  Family History   Problem Relation Age of Onset    Heart attack Mother     Hypertension Mother     Heart disease Father     Heart attack Maternal Aunt     Heart attack Maternal Uncle            Social History:  Social History     Tobacco Use    Smoking status: Former     Current packs/day: 0.00     Average packs/day: 0.8 packs/day for 37.0 years (27.8 ttl pk-yrs)     Types: Cigarettes     Start date:      Quit date: 2019     Years since quittin.8    Smokeless tobacco: Never    Tobacco comments:     did not smoke for 2 years since beginning smoking.    Substance and Sexual Activity    Alcohol use: Not Currently    Drug use: Never    Sexual activity: Yes     Partners: Male     Birth control/protection: None       ROS   Review of Systems   Review of Systems   Constitutional:  Negative for fever.   Genitourinary:  Negative for dysuria.   Musculoskeletal:  Positive for back pain.       Physical Exam      Initial Vitals [23 1156]   BP  "Pulse Resp Temp SpO2   (!) 147/88 73 18 98.1 °F (36.7 °C) 100 %      MAP       --         Physical Exam  Vital signs and nursing notes reviewed.  Constitutional: Patient is in NAD. Awake and alert. Well-developed and well-nourished.  Head: Atraumatic. Normocephalic.  Eyes: PERRL. EOM intact. Conjunctivae nl. No scleral icterus.  ENT: Mucous membranes are moist. Oropharynx is clear.  Neck: Supple. No JVD. No lymphadenopathy.  No meningismus.  Nontender  Cardiovascular: Regular rate and rhythm. No murmurs, rubs, or gallops. Distal pulses are 2+ and symmetric.  Pulmonary/Chest: No respiratory distress. Clear to auscultation bilaterally. No wheezing, rales, or rhonchi.  Abdominal: Soft. Non-distended. No TTP. No rebound, guarding, or rigidity. Good bowel sounds.  Genitourinary: No CVA tenderness  Musculoskeletal: Moves all extremities. No edema.   Non tender c/t spine.  Lumbar spine with mild bilateral paraspinous ttp, no midline ttp or step.  Skin: Warm and dry.  Neurological: Awake and alert. No acute focal neurological deficits are appreciated.  5/5 x 4 strength.  Strong and equal foot plantar and dorsiflexion.  Psychiatric: Normal affect. Good eye contact. Appropriate in content.      ED Course      Procedures  ED Vital Signs:  Vitals:    11/16/23 1156 11/16/23 1247   BP: (!) 147/88    Pulse: 73    Resp: 18 18   Temp: 98.1 °F (36.7 °C)    TempSrc: Oral    SpO2: 100%    Weight: 46.3 kg (102 lb 2.9 oz)    Height: 5' 2" (1.575 m)                  Imaging Results:  Imaging Results    None            The Emergency Provider reviewed the vital signs and test results, which are outlined above.    ED Discussion         Medication(s) given in the ER:  Medications   ketorolac injection 15 mg (15 mg Intramuscular Given 11/16/23 1248)   morphine injection 4 mg (4 mg Intramuscular Given 11/16/23 1247)   ondansetron injection 4 mg (4 mg Intramuscular Given 11/16/23 1247)            Follow-up Information       Nafisa Irving, " [facility] In 2 days.    Specialty: Family Medicine  Contact information:  [address] [facility] [address]  [phone]                                 Discharge Medication List as of 11/16/2023 12:55 PM             Medical Decision Making        All findings were reviewed with the patient/family in detail.   All remaining questions and concerns were addressed at that time.  Patient/family has been counseled regarding the need for follow-up as well as the indication to return to the emergency room should new or worrisome developments occur.          MDM                 Clinical Impression:        ICD-10-CM ICD-9-CM   1. Chronic right-sided low back pain with right-sided sciatica  M54.41 724.2    G89.29 724.3     338.29               Whitney Chand, PA-C  11/17/23 1628

## 2023-11-21 ENCOUNTER — TELEPHONE (OUTPATIENT)
Dept: PAIN MEDICINE | Facility: CLINIC | Age: 63
End: 2023-11-21
Payer: COMMERCIAL

## 2023-11-29 ENCOUNTER — LAB VISIT (OUTPATIENT)
Dept: LAB | Facility: HOSPITAL | Age: 63
End: 2023-11-29
Attending: NURSE PRACTITIONER
Payer: COMMERCIAL

## 2023-11-29 ENCOUNTER — OFFICE VISIT (OUTPATIENT)
Dept: ALLERGY | Facility: CLINIC | Age: 63
End: 2023-11-29
Payer: COMMERCIAL

## 2023-11-29 VITALS
BODY MASS INDEX: 19.47 KG/M2 | WEIGHT: 105.81 LBS | TEMPERATURE: 98 F | SYSTOLIC BLOOD PRESSURE: 125 MMHG | HEART RATE: 70 BPM | HEIGHT: 62 IN | DIASTOLIC BLOOD PRESSURE: 68 MMHG

## 2023-11-29 DIAGNOSIS — J31.0 CHRONIC NONALLERGIC RHINITIS: ICD-10-CM

## 2023-11-29 DIAGNOSIS — D80.6 SPECIFIC ANTIBODY DEFICIENCY WITH NORMAL IG CONCENTRATION AND NORMAL NUMBER OF B CELLS: ICD-10-CM

## 2023-11-29 DIAGNOSIS — Z88.0 PENICILLIN ALLERGY: ICD-10-CM

## 2023-11-29 DIAGNOSIS — H92.01 OTALGIA OF RIGHT EAR: Primary | ICD-10-CM

## 2023-11-29 DIAGNOSIS — R42 DIZZINESS: ICD-10-CM

## 2023-11-29 DIAGNOSIS — B99.9 RECURRENT INFECTIONS: ICD-10-CM

## 2023-11-29 DIAGNOSIS — T50.905D ADVERSE EFFECT OF DRUG, SUBSEQUENT ENCOUNTER: ICD-10-CM

## 2023-11-29 DIAGNOSIS — J32.8 OTHER CHRONIC SINUSITIS: ICD-10-CM

## 2023-11-29 DIAGNOSIS — J31.0 CHRONIC RHINITIS: ICD-10-CM

## 2023-11-29 PROCEDURE — 86317 IMMUNOASSAY INFECTIOUS AGENT: CPT | Mod: 59 | Performed by: STUDENT IN AN ORGANIZED HEALTH CARE EDUCATION/TRAINING PROGRAM

## 2023-11-29 PROCEDURE — 99999 PR PBB SHADOW E&M-EST. PATIENT-LVL V: CPT | Mod: PBBFAC,,, | Performed by: STUDENT IN AN ORGANIZED HEALTH CARE EDUCATION/TRAINING PROGRAM

## 2023-11-29 PROCEDURE — 3074F SYST BP LT 130 MM HG: CPT | Mod: CPTII,S$GLB,, | Performed by: STUDENT IN AN ORGANIZED HEALTH CARE EDUCATION/TRAINING PROGRAM

## 2023-11-29 PROCEDURE — 3078F DIAST BP <80 MM HG: CPT | Mod: CPTII,S$GLB,, | Performed by: STUDENT IN AN ORGANIZED HEALTH CARE EDUCATION/TRAINING PROGRAM

## 2023-11-29 PROCEDURE — 3078F PR MOST RECENT DIASTOLIC BLOOD PRESSURE < 80 MM HG: ICD-10-PCS | Mod: CPTII,S$GLB,, | Performed by: STUDENT IN AN ORGANIZED HEALTH CARE EDUCATION/TRAINING PROGRAM

## 2023-11-29 PROCEDURE — 1159F MED LIST DOCD IN RCRD: CPT | Mod: CPTII,S$GLB,, | Performed by: STUDENT IN AN ORGANIZED HEALTH CARE EDUCATION/TRAINING PROGRAM

## 2023-11-29 PROCEDURE — 99999 PR PBB SHADOW E&M-EST. PATIENT-LVL V: ICD-10-PCS | Mod: PBBFAC,,, | Performed by: STUDENT IN AN ORGANIZED HEALTH CARE EDUCATION/TRAINING PROGRAM

## 2023-11-29 PROCEDURE — 1159F PR MEDICATION LIST DOCUMENTED IN MEDICAL RECORD: ICD-10-PCS | Mod: CPTII,S$GLB,, | Performed by: STUDENT IN AN ORGANIZED HEALTH CARE EDUCATION/TRAINING PROGRAM

## 2023-11-29 PROCEDURE — 99214 PR OFFICE/OUTPT VISIT, EST, LEVL IV, 30-39 MIN: ICD-10-PCS | Mod: S$GLB,,, | Performed by: STUDENT IN AN ORGANIZED HEALTH CARE EDUCATION/TRAINING PROGRAM

## 2023-11-29 PROCEDURE — 99214 OFFICE O/P EST MOD 30 MIN: CPT | Mod: S$GLB,,, | Performed by: STUDENT IN AN ORGANIZED HEALTH CARE EDUCATION/TRAINING PROGRAM

## 2023-11-29 PROCEDURE — 1160F PR REVIEW ALL MEDS BY PRESCRIBER/CLIN PHARMACIST DOCUMENTED: ICD-10-PCS | Mod: CPTII,S$GLB,, | Performed by: STUDENT IN AN ORGANIZED HEALTH CARE EDUCATION/TRAINING PROGRAM

## 2023-11-29 PROCEDURE — 36415 COLL VENOUS BLD VENIPUNCTURE: CPT | Performed by: STUDENT IN AN ORGANIZED HEALTH CARE EDUCATION/TRAINING PROGRAM

## 2023-11-29 PROCEDURE — 3008F BODY MASS INDEX DOCD: CPT | Mod: CPTII,S$GLB,, | Performed by: STUDENT IN AN ORGANIZED HEALTH CARE EDUCATION/TRAINING PROGRAM

## 2023-11-29 PROCEDURE — 1160F RVW MEDS BY RX/DR IN RCRD: CPT | Mod: CPTII,S$GLB,, | Performed by: STUDENT IN AN ORGANIZED HEALTH CARE EDUCATION/TRAINING PROGRAM

## 2023-11-29 PROCEDURE — 3074F PR MOST RECENT SYSTOLIC BLOOD PRESSURE < 130 MM HG: ICD-10-PCS | Mod: CPTII,S$GLB,, | Performed by: STUDENT IN AN ORGANIZED HEALTH CARE EDUCATION/TRAINING PROGRAM

## 2023-11-29 PROCEDURE — 3008F PR BODY MASS INDEX (BMI) DOCUMENTED: ICD-10-PCS | Mod: CPTII,S$GLB,, | Performed by: STUDENT IN AN ORGANIZED HEALTH CARE EDUCATION/TRAINING PROGRAM

## 2023-11-29 RX ORDER — FLUTICASONE PROPIONATE 50 MCG
1 SPRAY, SUSPENSION (ML) NASAL 2 TIMES DAILY
Qty: 16 G | Refills: 11 | Status: SHIPPED | OUTPATIENT
Start: 2023-11-29 | End: 2024-11-28

## 2023-11-29 RX ORDER — DICLOFENAC SODIUM 75 MG/1
75 TABLET, DELAYED RELEASE ORAL 2 TIMES DAILY
COMMUNITY
End: 2024-03-18 | Stop reason: SDUPTHER

## 2023-11-29 RX ORDER — METHOCARBAMOL 750 MG/1
500 TABLET, FILM COATED ORAL 4 TIMES DAILY
COMMUNITY

## 2023-11-29 RX ORDER — AZELASTINE 1 MG/ML
1 SPRAY, METERED NASAL 2 TIMES DAILY
Qty: 30 ML | Refills: 11 | Status: SHIPPED | OUTPATIENT
Start: 2023-11-29 | End: 2024-11-28

## 2023-11-29 RX ORDER — AMOXICILLIN 400 MG/5ML
500 POWDER, FOR SUSPENSION ORAL ONCE
Qty: 7 ML | Refills: 0 | Status: SHIPPED | OUTPATIENT
Start: 2023-11-29 | End: 2023-11-29

## 2023-11-29 NOTE — LETTER
November 29, 2023    Yvonne Schmidt  [address] [place] [address]             [facility]  [address] [facility] [address]  Phone: [phone]  Fax: [phone] To whom it may concern,           Yvonne Schmidt was seen in [facility] on 11/29/2023. She is cleared to return to work on 11/30/2023 without restrictions.           Grayson Guaman MD   [facility]  Allergy and Clinical Immunology

## 2023-11-29 NOTE — ASSESSMENT & PLAN NOTE
- No acute complaints  - Continue Flonase 1 SEN BID   - Continue Astelin 1 SEN BID   - Will continue to monitor and reassess

## 2023-11-29 NOTE — PROGRESS NOTES
Allergy and Immunology  Established Patient Clinic Note    Date: 11/29/2023  Chief Complaint   Patient presents with    Follow-up       History  Yvonne Schmidt is a 63 y.o. female being seen for follow-up today.    Chronic Non-Allergic Rhinitis   - Interval improvement with no acute complaints   - Patient is using Flonase and Astelin    Recurrent Infections/Specific Antibody Deficiency   - Ordering repeat pneumo titers at this time   - Spoke with patient regarding plan   - No infections since prior appointment     Otalgia/Dizziness   - Referring to ENT for assistance  - Seeing dentist tomorrow (11/30) - TMJ? But dizziness?     Allergies, PMH, PSH, Social, and Family History were reviewed.    Current Outpatient Medications on File Prior to Visit   Medication Sig Dispense Refill    acetaminophen (TYLENOL) 500 MG tablet Take 500 mg by mouth as needed for Pain.      albuterol (PROVENTIL) 2.5 mg /3 mL (0.083 %) nebulizer solution Take 3 mLs (2.5 mg total) by nebulization every 4 to 6 hours as needed for Wheezing or Shortness of Breath. 360 mL 11    albuterol (PROVENTIL/VENTOLIN HFA) 90 mcg/actuation inhaler Inhale 2 puffs into the lungs every 4 (four) hours as needed for Wheezing or Shortness of Breath. Rescue 18 g 11    calcium carbonate (TUMS) 300 mg (750 mg) Chew Take 750 mg by mouth.      diclofenac (VOLTAREN) 75 MG EC tablet Take 75 mg by mouth 2 (two) times daily.      estradioL (ESTRACE) 0.01 % (0.1 mg/gram) vaginal cream Place 1 g vaginally twice a week. 42.5 g 1    ezetimibe (ZETIA) 10 mg tablet Take 1 tablet (10 mg total) by mouth once daily. 90 tablet 3    ferrous sulfate (FEROSUL) 220 mg (44 mg iron)/5 mL Elix Take 5 mLs (220 mg total) by mouth once daily. 120 mL 2    fluticasone-salmeterol diskus inhaler 250-50 mcg Inhale 1 puff into the lungs 2 (two) times daily. Wash out mouth after use. 60 each 11    hydroCHLOROthiazide (HYDRODIURIL) 12.5 MG Tab Take 1 tablet (12.5 mg total) by mouth daily as  needed (edema, swelling). Do not take if BP is low and feeling dry/dehydrated/dizzy 90 tablet 1    indomethacin (INDOCIN) 25 MG capsule Take 1 capsule (25 mg total) by mouth 3 (three) times daily. 90 capsule 3    Lactobacillus rhamnosus GG (CULTURELLE) 10 billion cell capsule Take 1 capsule by mouth once daily.      levothyroxine (SYNTHROID) 50 MCG tablet Take 50 mcg by mouth before breakfast.      methocarbamoL (ROBAXIN) 750 MG Tab Take 500 mg by mouth 4 (four) times daily.      montelukast (SINGULAIR) 10 mg tablet Take 10 mg by mouth once daily.      multivitamin (THERAGRAN) per tablet Take 1 tablet by mouth once daily.      ondansetron (ZOFRAN-ODT) 8 MG TbDL Take 1 tablet (8 mg total) by mouth every 8 (eight) hours as needed (nausea). 20 tablet 1    [DISCONTINUED] azelastine (ASTELIN) 137 mcg (0.1 %) nasal spray 1 spray (137 mcg total) by Nasal route 2 (two) times daily. 30 mL 11    [DISCONTINUED] fluticasone propionate (FLONASE) 50 mcg/actuation nasal spray 1 spray (50 mcg total) by Each Nostril route 2 (two) times a day. 16 g 11    ALPRAZolam (XANAX) 0.5 MG tablet Take 1 tablet (0.5 mg total) by mouth 2 (two) times daily as needed (SIGNIFICANT ANXIETY). New Prescriber 60 tablet 2    FLUoxetine 20 MG capsule Take 1 capsule (20 mg total) by mouth once daily. Take IN ADDITION to Prozac 40 mg supply 30 capsule 2    omeprazole (PRILOSEC) 40 MG capsule Take 40 mg by mouth.       No current facility-administered medications on file prior to visit.     Physical Examination  Vitals:    11/29/23 1631   BP: 125/68   Pulse: 70   Temp: 98.1 °F (36.7 °C)     GENERAL:  female in no apparent distress and well developed and well nourished  HEAD:  Normocephalic, without obvious abnormality, atraumatic  EYES: sclera anicteric, conjunctiva normochromic  EARS: normal TM's and external ear canals both ears  NOSE: without erythema or discharge, clear discharge, turbinates normal    OROPHARYNX: moist mucous membranes without erythema,  exudates or petechiae  LYMPH NODES: normal, supple, no lymphadenopathy  LUNGS: clear to auscultation, no wheezes, rales or rhonchi, symmetric air entry.  HEART: normal rate, regular rhythm, normal S1, S2, no murmurs, rubs, clicks or gallops.  ABDOMEN: soft, nontender, nondistended, no masses or organomegaly.  MUSCULOSKELETAL: no gross joint deformity or swelling.  NEURO: alert, oriented, normal speech, no focal findings or movement disorder noted.  SKIN: normal coloration and turgor, no rashes, no suspicious skin lesions noted.     Assessment/Plan:   Problem List Items Addressed This Visit          ENT    Sinusitis    Relevant Medications    fluticasone propionate (FLONASE) 50 mcg/actuation nasal spray    azelastine (ASTELIN) 137 mcg (0.1 %) nasal spray    Chronic nonallergic rhinitis    Overview     - 11/08/2023: Serum IgE to Aeroallergens negative          Current Assessment & Plan     - No acute complaints  - Continue Flonase 1 SEN BID   - Continue Astelin 1 SEN BID   - Will continue to monitor and reassess         RESOLVED: Chronic rhinitis    Relevant Medications    fluticasone propionate (FLONASE) 50 mcg/actuation nasal spray    azelastine (ASTELIN) 137 mcg (0.1 %) nasal spray       ID    Recurrent infections    Overview     - 10/25/2023: PPSV23 vaccination   - 11/08/2023: 8/23 pneumococcal titers protective  - Recurrent sinus infections - multiple in the past   - Recent PNA - completed treatment with pulm   - Concern for otitis media - more than 1 in the past   - No issues with infection until 50-59 yo   - No hx of bacteremia, fungal infections, or viral infections          Current Assessment & Plan     - No infection since prior appointment   - Repeat pneumo titers at this time          Relevant Orders    Streptococcus pneumoniae IgG Antibody (23 Serotypes), MAID       Immunology/Multi System    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    Overview     -  10/25/2023: PPSV23 vaccination   - 11/08/2023: 8/23 pneumococcal titers protective           Relevant Orders    Streptococcus pneumoniae IgG Antibody (23 Serotypes), MAID     Other Visit Diagnoses       Otalgia of right ear    -  Primary    Relevant Orders    Ambulatory referral/consult to ENT    Dizziness        Relevant Orders    Ambulatory referral/consult to ENT    Adverse effect of drug, subsequent encounter        Relevant Medications    amoxicillin (AMOXIL) 400 mg/5 mL suspension    Penicillin allergy        Relevant Medications    amoxicillin (AMOXIL) 400 mg/5 mL suspension          Follow up:  Follow up in about 1 week (around 12/6/2023). Amoxicillin oral challenge     Grayson Guaman MD   [facility]  Allergy and Immunology

## 2023-12-07 ENCOUNTER — TELEPHONE (OUTPATIENT)
Dept: OBSTETRICS AND GYNECOLOGY | Facility: CLINIC | Age: 63
End: 2023-12-07
Payer: COMMERCIAL

## 2023-12-07 LAB
IMMUNOLOGIST REVIEW: NORMAL
S PN DA SERO 19F IGG SER-MCNC: 7.2 MCG/ML
S PNEUM DA 1 IGG SER-MCNC: 0.6 MCG/ML
S PNEUM DA 10A IGG SER-MCNC: 0.6 MCG/ML
S PNEUM DA 11A IGG SER-MCNC: NORMAL MCG/ML
S PNEUM DA 12F IGG SER-MCNC: 0.4 MCG/ML
S PNEUM DA 14 IGG SER-MCNC: 1.1 MCG/ML
S PNEUM DA 15B IGG SER-MCNC: 1 MCG/ML
S PNEUM DA 17F IGG SER-MCNC: 1.1 MCG/ML
S PNEUM DA 18C IGG SER-MCNC: 1 MCG/ML
S PNEUM DA 19A IGG SER-MCNC: 2.7 MCG/ML
S PNEUM DA 2 IGG SER-MCNC: 46.8 MCG/ML
S PNEUM DA 20A IGG SER-MCNC: 3.5 MCG/ML
S PNEUM DA 22F IGG SER-MCNC: 0.8 MCG/ML
S PNEUM DA 23F IGG SER-MCNC: NORMAL MCG/ML
S PNEUM DA 3 IGG SER-MCNC: 0.2 MCG/ML
S PNEUM DA 33F IGG SER-MCNC: 6.1 MCG/ML
S PNEUM DA 4 IGG SER-MCNC: 0.4 MCG/ML
S PNEUM DA 5 IGG SER-MCNC: 27.6 MCG/ML
S PNEUM DA 6B IGG SER-MCNC: 0.9 MCG/ML
S PNEUM DA 7F IGG SER-MCNC: 1.2 MCG/ML
S PNEUM DA 8 IGG SER-MCNC: 0.6 MCG/ML
S PNEUM DA 9N IGG SER-MCNC: 0.3 MCG/ML
S PNEUM DA 9V IGG SER-MCNC: 0.2 MCG/ML

## 2023-12-07 NOTE — TELEPHONE ENCOUNTER
Called patient and she stated her questions had been answered yesterday, but wanted to know what time to arrive for surgery?  Advised patient the Friday before surgery she should hear from pre-admit.  Patient verbalized understanding.

## 2023-12-07 NOTE — TELEPHONE ENCOUNTER
----- Message from Teresita Dugan MD sent at 12/7/2023  9:52 AM CST -----  Contact: Yvonne  Please call pt and see what her concerns are, and help answer her questions.  ----- Message -----  From: Trent Nguyen  Sent: 12/6/2023   4:45 PM CST  To: Teresita Dugan MD    Pt is calling in regards to procedure information. Please call back at [phone].      Thanks  [email or web address]

## 2023-12-08 ENCOUNTER — OFFICE VISIT (OUTPATIENT)
Dept: ALLERGY | Facility: CLINIC | Age: 63
End: 2023-12-08
Payer: COMMERCIAL

## 2023-12-08 VITALS
SYSTOLIC BLOOD PRESSURE: 114 MMHG | BODY MASS INDEX: 18.99 KG/M2 | TEMPERATURE: 98 F | DIASTOLIC BLOOD PRESSURE: 72 MMHG | WEIGHT: 103.81 LBS | HEART RATE: 57 BPM

## 2023-12-08 DIAGNOSIS — D80.6 SPECIFIC ANTIBODY DEFICIENCY WITH NORMAL IG CONCENTRATION AND NORMAL NUMBER OF B CELLS: Primary | ICD-10-CM

## 2023-12-08 DIAGNOSIS — J31.0 CHRONIC NONALLERGIC RHINITIS: ICD-10-CM

## 2023-12-08 PROCEDURE — 90471 PNEUMOCOCCAL CONJUGATE VACCINE 20-VALENT: ICD-10-PCS | Mod: S$GLB,,, | Performed by: STUDENT IN AN ORGANIZED HEALTH CARE EDUCATION/TRAINING PROGRAM

## 2023-12-08 PROCEDURE — 1160F PR REVIEW ALL MEDS BY PRESCRIBER/CLIN PHARMACIST DOCUMENTED: ICD-10-PCS | Mod: CPTII,S$GLB,, | Performed by: STUDENT IN AN ORGANIZED HEALTH CARE EDUCATION/TRAINING PROGRAM

## 2023-12-08 PROCEDURE — 3078F PR MOST RECENT DIASTOLIC BLOOD PRESSURE < 80 MM HG: ICD-10-PCS | Mod: CPTII,S$GLB,, | Performed by: STUDENT IN AN ORGANIZED HEALTH CARE EDUCATION/TRAINING PROGRAM

## 2023-12-08 PROCEDURE — 3008F PR BODY MASS INDEX (BMI) DOCUMENTED: ICD-10-PCS | Mod: CPTII,S$GLB,, | Performed by: STUDENT IN AN ORGANIZED HEALTH CARE EDUCATION/TRAINING PROGRAM

## 2023-12-08 PROCEDURE — 3008F BODY MASS INDEX DOCD: CPT | Mod: CPTII,S$GLB,, | Performed by: STUDENT IN AN ORGANIZED HEALTH CARE EDUCATION/TRAINING PROGRAM

## 2023-12-08 PROCEDURE — 90471 IMMUNIZATION ADMIN: CPT | Mod: S$GLB,,, | Performed by: STUDENT IN AN ORGANIZED HEALTH CARE EDUCATION/TRAINING PROGRAM

## 2023-12-08 PROCEDURE — 1159F PR MEDICATION LIST DOCUMENTED IN MEDICAL RECORD: ICD-10-PCS | Mod: CPTII,S$GLB,, | Performed by: STUDENT IN AN ORGANIZED HEALTH CARE EDUCATION/TRAINING PROGRAM

## 2023-12-08 PROCEDURE — 1160F RVW MEDS BY RX/DR IN RCRD: CPT | Mod: CPTII,S$GLB,, | Performed by: STUDENT IN AN ORGANIZED HEALTH CARE EDUCATION/TRAINING PROGRAM

## 2023-12-08 PROCEDURE — 99999 PR PBB SHADOW E&M-EST. PATIENT-LVL III: ICD-10-PCS | Mod: PBBFAC,,, | Performed by: STUDENT IN AN ORGANIZED HEALTH CARE EDUCATION/TRAINING PROGRAM

## 2023-12-08 PROCEDURE — 3074F SYST BP LT 130 MM HG: CPT | Mod: CPTII,S$GLB,, | Performed by: STUDENT IN AN ORGANIZED HEALTH CARE EDUCATION/TRAINING PROGRAM

## 2023-12-08 PROCEDURE — 1159F MED LIST DOCD IN RCRD: CPT | Mod: CPTII,S$GLB,, | Performed by: STUDENT IN AN ORGANIZED HEALTH CARE EDUCATION/TRAINING PROGRAM

## 2023-12-08 PROCEDURE — 99214 OFFICE O/P EST MOD 30 MIN: CPT | Mod: 25,S$GLB,, | Performed by: STUDENT IN AN ORGANIZED HEALTH CARE EDUCATION/TRAINING PROGRAM

## 2023-12-08 PROCEDURE — 99999 PR PBB SHADOW E&M-EST. PATIENT-LVL III: CPT | Mod: PBBFAC,,, | Performed by: STUDENT IN AN ORGANIZED HEALTH CARE EDUCATION/TRAINING PROGRAM

## 2023-12-08 PROCEDURE — 90677 PCV20 VACCINE IM: CPT | Mod: S$GLB,,, | Performed by: STUDENT IN AN ORGANIZED HEALTH CARE EDUCATION/TRAINING PROGRAM

## 2023-12-08 PROCEDURE — 90677 PNEUMOCOCCAL CONJUGATE VACCINE 20-VALENT: ICD-10-PCS | Mod: S$GLB,,, | Performed by: STUDENT IN AN ORGANIZED HEALTH CARE EDUCATION/TRAINING PROGRAM

## 2023-12-08 PROCEDURE — 3078F DIAST BP <80 MM HG: CPT | Mod: CPTII,S$GLB,, | Performed by: STUDENT IN AN ORGANIZED HEALTH CARE EDUCATION/TRAINING PROGRAM

## 2023-12-08 PROCEDURE — 99214 PR OFFICE/OUTPT VISIT, EST, LEVL IV, 30-39 MIN: ICD-10-PCS | Mod: 25,S$GLB,, | Performed by: STUDENT IN AN ORGANIZED HEALTH CARE EDUCATION/TRAINING PROGRAM

## 2023-12-08 PROCEDURE — 3074F PR MOST RECENT SYSTOLIC BLOOD PRESSURE < 130 MM HG: ICD-10-PCS | Mod: CPTII,S$GLB,, | Performed by: STUDENT IN AN ORGANIZED HEALTH CARE EDUCATION/TRAINING PROGRAM

## 2023-12-08 NOTE — ASSESSMENT & PLAN NOTE
- No acute complaints   - Continue current management   - Recurrent sinus infections playing role   - Will continue to monitor and reassess

## 2023-12-08 NOTE — LETTER
December 8, 2023      [facility]  [id number] [place] [address]  Phone: [phone]  Fax: [phone]       Patient: Yvonne Schmidt   YOB: 1960  Date of Visit: 12/08/2023    To Whom It May Concern:    Alia Schmidt  was at [facility] on 12/08/2023. The patient may return to work/school on 12/09/2023 with no restrictions. If you have any questions or concerns, or if I can be of further assistance, please do not hesitate to contact me.    Sincerely,          Grayson Guaman MD

## 2023-12-08 NOTE — PROGRESS NOTES
Allergy and Immunology  Established Patient Clinic Note    Date: 12/8/2023  Chief Complaint   Patient presents with    Follow-up     History  Yvonne Schmidt is a 63 y.o. female being seen for follow-up today.    Acute Sinus Infection   - Levofloxacin per PCP     Chronic Non-Allergic Rhinitis   - Baseline symptoms with no acute complaints or changes     Allergies, PMH, PSH, Social, and Family History were reviewed.    Current Outpatient Medications on File Prior to Visit   Medication Sig Dispense Refill    acetaminophen (TYLENOL) 500 MG tablet Take 500 mg by mouth as needed for Pain.      albuterol (PROVENTIL) 2.5 mg /3 mL (0.083 %) nebulizer solution Take 3 mLs (2.5 mg total) by nebulization every 4 to 6 hours as needed for Wheezing or Shortness of Breath. 360 mL 11    albuterol (PROVENTIL/VENTOLIN HFA) 90 mcg/actuation inhaler Inhale 2 puffs into the lungs every 4 (four) hours as needed for Wheezing or Shortness of Breath. Rescue 18 g 11    azelastine (ASTELIN) 137 mcg (0.1 %) nasal spray 1 spray (137 mcg total) by Nasal route 2 (two) times daily. 30 mL 11    calcium carbonate (TUMS) 300 mg (750 mg) Chew Take 750 mg by mouth.      diclofenac (VOLTAREN) 75 MG EC tablet Take 75 mg by mouth 2 (two) times daily.      estradioL (ESTRACE) 0.01 % (0.1 mg/gram) vaginal cream Place 1 g vaginally twice a week. 42.5 g 1    ezetimibe (ZETIA) 10 mg tablet Take 1 tablet (10 mg total) by mouth once daily. 90 tablet 3    ferrous sulfate (FEROSUL) 220 mg (44 mg iron)/5 mL Elix Take 5 mLs (220 mg total) by mouth once daily. 120 mL 2    fluticasone propionate (FLONASE) 50 mcg/actuation nasal spray 1 spray (50 mcg total) by Each Nostril route 2 (two) times a day. 16 g 11    fluticasone-salmeterol diskus inhaler 250-50 mcg Inhale 1 puff into the lungs 2 (two) times daily. Wash out mouth after use. 60 each 11    hydroCHLOROthiazide (HYDRODIURIL) 12.5 MG Tab Take 1 tablet (12.5 mg total) by mouth daily as needed (edema,  swelling). Do not take if BP is low and feeling dry/dehydrated/dizzy 90 tablet 1    indomethacin (INDOCIN) 25 MG capsule Take 1 capsule (25 mg total) by mouth 3 (three) times daily. 90 capsule 3    Lactobacillus rhamnosus GG (CULTURELLE) 10 billion cell capsule Take 1 capsule by mouth once daily.      levothyroxine (SYNTHROID) 50 MCG tablet Take 50 mcg by mouth before breakfast.      methocarbamoL (ROBAXIN) 750 MG Tab Take 500 mg by mouth 4 (four) times daily.      montelukast (SINGULAIR) 10 mg tablet Take 10 mg by mouth once daily.      multivitamin (THERAGRAN) per tablet Take 1 tablet by mouth once daily.      ondansetron (ZOFRAN-ODT) 8 MG TbDL Take 1 tablet (8 mg total) by mouth every 8 (eight) hours as needed (nausea). 20 tablet 1    ALPRAZolam (XANAX) 0.5 MG tablet Take 1 tablet (0.5 mg total) by mouth 2 (two) times daily as needed (SIGNIFICANT ANXIETY). New Prescriber 60 tablet 2    FLUoxetine 20 MG capsule Take 1 capsule (20 mg total) by mouth once daily. Take IN ADDITION to Prozac 40 mg supply 30 capsule 2    omeprazole (PRILOSEC) 40 MG capsule Take 40 mg by mouth.       No current facility-administered medications on file prior to visit.       Physical Examination  Vitals:    12/08/23 1622   BP: 114/72   Pulse: (!) 57   Temp: 97.5 °F (36.4 °C)     GENERAL:  female in no apparent distress and well developed and well nourished  HEAD:  Normocephalic, without obvious abnormality, atraumatic  EYES: sclera anicteric, conjunctiva normochromic  EARS: normal TM's and external ear canals both ears  NOSE: without erythema or discharge, clear and copious discharge, turbinates pink, swollen    OROPHARYNX: moist mucous membranes without erythema, exudates or petechiae, clear post-nasal drainage present  LYMPH NODES: normal, supple, no lymphadenopathy  LUNGS: clear to auscultation, no wheezes, rales or rhonchi, symmetric air entry.  HEART: normal rate, regular rhythm, normal S1, S2, no murmurs, rubs, clicks or  gallops.  ABDOMEN: soft, nontender, nondistended, no masses or organomegaly.  MUSCULOSKELETAL: no gross joint deformity or swelling.  NEURO: alert, oriented, normal speech, no focal findings or movement disorder noted.  SKIN: normal coloration and turgor, no rashes, no suspicious skin lesions noted.     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    Grayson Guaman MD   [facility]  Allergy and Immunology

## 2023-12-15 ENCOUNTER — OFFICE VISIT (OUTPATIENT)
Dept: ALLERGY | Facility: CLINIC | Age: 63
End: 2023-12-15
Payer: COMMERCIAL

## 2023-12-15 VITALS
BODY MASS INDEX: 19.07 KG/M2 | WEIGHT: 104.25 LBS | SYSTOLIC BLOOD PRESSURE: 102 MMHG | DIASTOLIC BLOOD PRESSURE: 74 MMHG | TEMPERATURE: 98 F | HEART RATE: 66 BPM

## 2023-12-15 DIAGNOSIS — D80.6 SPECIFIC ANTIBODY DEFICIENCY WITH NORMAL IG CONCENTRATION AND NORMAL NUMBER OF B CELLS: Primary | ICD-10-CM

## 2023-12-15 DIAGNOSIS — J31.0 CHRONIC NONALLERGIC RHINITIS: ICD-10-CM

## 2023-12-15 DIAGNOSIS — B99.9 RECURRENT INFECTIONS: ICD-10-CM

## 2023-12-15 DIAGNOSIS — T50.905D ADVERSE EFFECT OF DRUG, SUBSEQUENT ENCOUNTER: ICD-10-CM

## 2023-12-15 PROBLEM — T50.905A DRUG REACTION: Status: ACTIVE | Noted: 2023-12-15

## 2023-12-15 PROCEDURE — 1160F PR REVIEW ALL MEDS BY PRESCRIBER/CLIN PHARMACIST DOCUMENTED: ICD-10-PCS | Mod: CPTII,S$GLB,, | Performed by: STUDENT IN AN ORGANIZED HEALTH CARE EDUCATION/TRAINING PROGRAM

## 2023-12-15 PROCEDURE — 99214 PR OFFICE/OUTPT VISIT, EST, LEVL IV, 30-39 MIN: ICD-10-PCS | Mod: 25,S$GLB,, | Performed by: STUDENT IN AN ORGANIZED HEALTH CARE EDUCATION/TRAINING PROGRAM

## 2023-12-15 PROCEDURE — 99999 PR PBB SHADOW E&M-EST. PATIENT-LVL V: ICD-10-PCS | Mod: PBBFAC,,, | Performed by: STUDENT IN AN ORGANIZED HEALTH CARE EDUCATION/TRAINING PROGRAM

## 2023-12-15 PROCEDURE — 1160F RVW MEDS BY RX/DR IN RCRD: CPT | Mod: CPTII,S$GLB,, | Performed by: STUDENT IN AN ORGANIZED HEALTH CARE EDUCATION/TRAINING PROGRAM

## 2023-12-15 PROCEDURE — 3078F PR MOST RECENT DIASTOLIC BLOOD PRESSURE < 80 MM HG: ICD-10-PCS | Mod: CPTII,S$GLB,, | Performed by: STUDENT IN AN ORGANIZED HEALTH CARE EDUCATION/TRAINING PROGRAM

## 2023-12-15 PROCEDURE — 99999 PR PBB SHADOW E&M-EST. PATIENT-LVL V: CPT | Mod: PBBFAC,,, | Performed by: STUDENT IN AN ORGANIZED HEALTH CARE EDUCATION/TRAINING PROGRAM

## 2023-12-15 PROCEDURE — 3008F BODY MASS INDEX DOCD: CPT | Mod: CPTII,S$GLB,, | Performed by: STUDENT IN AN ORGANIZED HEALTH CARE EDUCATION/TRAINING PROGRAM

## 2023-12-15 PROCEDURE — 1159F MED LIST DOCD IN RCRD: CPT | Mod: CPTII,S$GLB,, | Performed by: STUDENT IN AN ORGANIZED HEALTH CARE EDUCATION/TRAINING PROGRAM

## 2023-12-15 PROCEDURE — 3008F PR BODY MASS INDEX (BMI) DOCUMENTED: ICD-10-PCS | Mod: CPTII,S$GLB,, | Performed by: STUDENT IN AN ORGANIZED HEALTH CARE EDUCATION/TRAINING PROGRAM

## 2023-12-15 PROCEDURE — 3074F SYST BP LT 130 MM HG: CPT | Mod: CPTII,S$GLB,, | Performed by: STUDENT IN AN ORGANIZED HEALTH CARE EDUCATION/TRAINING PROGRAM

## 2023-12-15 PROCEDURE — 95076 PR INGESTION CHALLENGE TEST; INITIAL 120 MIN: ICD-10-PCS | Mod: S$GLB,,, | Performed by: STUDENT IN AN ORGANIZED HEALTH CARE EDUCATION/TRAINING PROGRAM

## 2023-12-15 PROCEDURE — 3074F PR MOST RECENT SYSTOLIC BLOOD PRESSURE < 130 MM HG: ICD-10-PCS | Mod: CPTII,S$GLB,, | Performed by: STUDENT IN AN ORGANIZED HEALTH CARE EDUCATION/TRAINING PROGRAM

## 2023-12-15 PROCEDURE — 95076 INGEST CHALLENGE INI 120 MIN: CPT | Mod: S$GLB,,, | Performed by: STUDENT IN AN ORGANIZED HEALTH CARE EDUCATION/TRAINING PROGRAM

## 2023-12-15 PROCEDURE — 1159F PR MEDICATION LIST DOCUMENTED IN MEDICAL RECORD: ICD-10-PCS | Mod: CPTII,S$GLB,, | Performed by: STUDENT IN AN ORGANIZED HEALTH CARE EDUCATION/TRAINING PROGRAM

## 2023-12-15 PROCEDURE — 99214 OFFICE O/P EST MOD 30 MIN: CPT | Mod: 25,S$GLB,, | Performed by: STUDENT IN AN ORGANIZED HEALTH CARE EDUCATION/TRAINING PROGRAM

## 2023-12-15 PROCEDURE — 3078F DIAST BP <80 MM HG: CPT | Mod: CPTII,S$GLB,, | Performed by: STUDENT IN AN ORGANIZED HEALTH CARE EDUCATION/TRAINING PROGRAM

## 2023-12-15 RX ORDER — LANOLIN ALCOHOL/MO/W.PET/CERES
5000 CREAM (GRAM) TOPICAL DAILY
COMMUNITY

## 2023-12-15 RX ORDER — LANOLIN ALCOHOL/MO/W.PET/CERES
100 CREAM (GRAM) TOPICAL DAILY
COMMUNITY

## 2023-12-15 RX ORDER — AMOXICILLIN 500 MG
1 CAPSULE ORAL NIGHTLY
COMMUNITY

## 2023-12-15 RX ORDER — LOVASTATIN 10 MG/1
10 TABLET ORAL NIGHTLY
COMMUNITY

## 2023-12-15 NOTE — PROGRESS NOTES
Allergy and Immunology  Established Patient Clinic Note    Date: 12/15/2023  Chief Complaint   Patient presents with    Follow-up    Other     Continued issues/complaint of sinus infection   On Levaquin - discuss plan      History  Yvonne Schmidt is a 63 y.o. female being seen for follow-up today.    Specific Antibody Deficiency   Sinus Infection   - Improving since prior appointment   - Patient is on Levaquin   - She does have specific antibody deficiency but wanting PCV20 titers prior to decision    Allergy Oral Challenge  Date:  12/15/2023  Medications within the last 12 hours: No   Is a psychological component likely? No   Challenge method: Open-labeled   Placebo: No   Active Material: Amoxicillin   Preparation: 400 mg/ 5 mL     Dose Given Time Given Time Evaluated Resulting Reaction   80 mg 7:20 AM  7:50 AM  No reaction - Tolerated    400 mg  7:50 AM  8:50 AM  No reaction - Tolerated      Conclusion: The patient performed an office oral challenge to Amoxicillin. The challenge was passed without any adverse effects. The patient is NOT at increased risk of reaction to this substance. For full details of challenge, see flowsheet above.     Allergies, PMH, PSH, Social, and Family History were reviewed.    Current Outpatient Medications on File Prior to Visit   Medication Sig Dispense Refill    acetaminophen (TYLENOL) 500 MG tablet Take 500 mg by mouth as needed for Pain.      albuterol (PROVENTIL) 2.5 mg /3 mL (0.083 %) nebulizer solution Take 3 mLs (2.5 mg total) by nebulization every 4 to 6 hours as needed for Wheezing or Shortness of Breath. 360 mL 11    albuterol (PROVENTIL/VENTOLIN HFA) 90 mcg/actuation inhaler Inhale 2 puffs into the lungs every 4 (four) hours as needed for Wheezing or Shortness of Breath. Rescue 18 g 11    ALPRAZolam (XANAX) 0.5 MG tablet Take 1 tablet (0.5 mg total) by mouth 2 (two) times daily as needed (SIGNIFICANT ANXIETY). New Prescriber 60 tablet 2    azelastine (ASTELIN) 137  mcg (0.1 %) nasal spray 1 spray (137 mcg total) by Nasal route 2 (two) times daily. 30 mL 11    calcium carbonate (TUMS) 300 mg (750 mg) Chew Take 750 mg by mouth.      diclofenac (VOLTAREN) 75 MG EC tablet Take 75 mg by mouth 2 (two) times daily.      estradioL (ESTRACE) 0.01 % (0.1 mg/gram) vaginal cream Place 1 g vaginally twice a week. 42.5 g 1    ezetimibe (ZETIA) 10 mg tablet Take 1 tablet (10 mg total) by mouth once daily. 90 tablet 3    ferrous sulfate (FEROSUL) 220 mg (44 mg iron)/5 mL Elix Take 5 mLs (220 mg total) by mouth once daily. 120 mL 2    fluticasone propionate (FLONASE) 50 mcg/actuation nasal spray 1 spray (50 mcg total) by Each Nostril route 2 (two) times a day. 16 g 11    fluticasone-salmeterol diskus inhaler 250-50 mcg Inhale 1 puff into the lungs 2 (two) times daily. Wash out mouth after use. 60 each 11    hydroCHLOROthiazide (HYDRODIURIL) 12.5 MG Tab Take 1 tablet (12.5 mg total) by mouth daily as needed (edema, swelling). Do not take if BP is low and feeling dry/dehydrated/dizzy 90 tablet 1    indomethacin (INDOCIN) 25 MG capsule Take 1 capsule (25 mg total) by mouth 3 (three) times daily. 90 capsule 3    Lactobacillus rhamnosus GG (CULTURELLE) 10 billion cell capsule Take 1 capsule by mouth once daily.      levothyroxine (SYNTHROID) 50 MCG tablet Take 50 mcg by mouth before breakfast.      methocarbamoL (ROBAXIN) 750 MG Tab Take 500 mg by mouth 4 (four) times daily.      montelukast (SINGULAIR) 10 mg tablet Take 10 mg by mouth once daily.      multivitamin (THERAGRAN) per tablet Take 1 tablet by mouth once daily.      ondansetron (ZOFRAN-ODT) 8 MG TbDL Take 1 tablet (8 mg total) by mouth every 8 (eight) hours as needed (nausea). 20 tablet 1    FLUoxetine 20 MG capsule Take 1 capsule (20 mg total) by mouth once daily. Take IN ADDITION to Prozac 40 mg supply 30 capsule 2    omeprazole (PRILOSEC) 40 MG capsule Take 40 mg by mouth.       No current facility-administered medications on file  prior to visit.     Physical Examination  Vitals:    12/15/23 0709   BP: 102/74   Pulse: 66   Temp: 97.8 °F (36.6 °C)     GENERAL:  female in no apparent distress and well developed and well nourished  HEAD:  Normocephalic, without obvious abnormality, atraumatic  EYES: sclera anicteric, conjunctiva normochromic  EARS: normal TM's and external ear canals both ears  NOSE: pale and boggy, clear discharge, turbinates swollen    OROPHARYNX: moist mucous membranes without erythema, exudates or petechiae  LYMPH NODES: normal, supple, no lymphadenopathy  LUNGS: clear to auscultation, no wheezes, rales or rhonchi, symmetric air entry.  HEART: normal rate, regular rhythm, normal S1, S2, no murmurs, rubs, clicks or gallops.  ABDOMEN: soft, nontender, nondistended, no masses or organomegaly.  MUSCULOSKELETAL: no gross joint deformity or swelling.  NEURO: alert, oriented, normal speech, no focal findings or movement disorder noted.  SKIN: normal coloration and turgor, no rashes, no suspicious skin lesions noted.     Assessment/Plan:   Problem List Items Addressed This Visit          ENT    Chronic nonallergic rhinitis    Overview     - 11/08/2023: Serum IgE to Aeroallergens negative             ID    Recurrent infections    Overview     - 10/25/2023: PPSV23 vaccination   - 11/08/2023: 8/23 pneumococcal titers protective  - Recurrent sinus infections - multiple in the past   - Recent PNA - completed treatment with pulm   - Concern for otitis media - more than 1 in the past   - No issues with infection until 50-59 yo   - No hx of bacteremia, fungal infections, or viral infections             Immunology/Multi System    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 - Primary    Overview     - 11/08/2023: 8/23 pneumococcal titers protective  - 10/25/2023: PPSV23 vaccination   - 11/29/2023: 6/23 pneumococcal titers protective  - 12/08/2023: PCV20 vaccination   - Patient wanting PCV 20 with  re-evaluation prior to consenting to IVIG          Current Assessment & Plan     - Repeat titers for pneumococcal at future appointment   - Re-discuss benefit of IVIG             Other    Drug reaction    Overview     - 12/15/2023: Completed oral challenge to Amoxicillin without reaction; patient is at AVERAGE risk for allergic reaction to PCN/Amoxicillin           Follow up:  Follow up in about 2 months (around 2/15/2024) for Pneumococcal titers.    Grayson Guaman MD   [facility]  [facility] and Immunology

## 2023-12-15 NOTE — LETTER
December 15, 2023      [facility]  [address] [facility] [address]  Phone: [phone]  Fax: [phone]       Patient: Yvonne Schmidt   YOB: 1960  Date of Visit: 12/15/2023    To Whom It May Concern:    Alia Schmidt  was at [facility] on 12/15/2023. The patient may return to work/school on 12/15/2023 with no restrictions. If you have any questions or concerns, or if I can be of further assistance, please do not hesitate to contact me.    Sincerely,            Grayson Guamna MD

## 2023-12-15 NOTE — PRE-PROCEDURE INSTRUCTIONS
Pre op instructions reviewed with patient per phone on 12/15/23: Spoke about pre op process and surgery instructions, verbalized understanding.    Surgery is scheduled on 12/26/23.  We will call you the business day prior to surgery to confirm arrival time (after 2:30 pm), as it is subject to change due to cancellations & emergencies.    Please report to the [facility] (1st Floor) at [facility] located off of [facility] (2nd building on the left, in front of the [facility]),address: [address] [place] [address] [place] [address]    INSTRUCTIONS IMPORTANT!!!  Do Not Eat, Drink, or Smoke after 12 midnight! NO WATER after midnight! OK to brush teeth, no gum, candy or mints!    Take only these medicines with a small swallow of water-morning of surgery:  Xanax  Synthroid  Inhalers, if needed  Nasal Sprays, if needed    PLEASE HOLD THE FOLLOWING MEDICATIONS 7 DAYS PRIOR TO SURGERY:  - DICLOFENAC (VOLTAREN)  - MULTI-VITAMIN  - FISH OIL  - ZINC      ____  NO Acrylic/fake nails or nail polish worn day of surgery (specifically hand/arm & foot surgeries).  ____  NO powder, lotions, deodorants, oils or creams on body.  ____  Please Remove All jewelry & piercings prior to surgery.  ____  Please Remove Dentures, Hearing Aids & Contact Lens prior to the start of surgery.  ____  Please bring photo ID and insurance information to hospital (Leave Valuables at Home).  ____  If going home the same day, arrange for a ride home. You will not be able to drive 24 hrs if Anesthesia was used.   ____  Females (ages 11-60) may need to give a urine sample the morning of surgery; please see Pre op Nurse prior to voiding.  ____  Wear clean, loose fitting clothing. Allow for dressings, bandages.  ____  Stop all Aspirin products, Ibuprofen, Advil, Motrin & Aleve at least 5-7 days before surgery, unless otherwise instructed by your doctor, or the nurse.   ____  Blood Thinners are stopped based on your Provider's recommendation; Call Surgeon's  Office to inquire when to stop/hold.  ____  Stop taking any Fish Oil supplements or Vitamins at least 5 days prior to surgery, unless instructed otherwise by your Doctor.            Bathing Instructions:    -Do not shave your face or body the day before or the day of surgery.              -Shower & Rinse your body as usual with anti-bacterial Soap (Dial), on the evening prior to surgery and the morning of surgery.               -Rinse your body thoroughly.                -Pat yourself day with a clean, soft towel.               -Do not use lotion, cream, powder or deodorant               -Dress in clean clothes     [facility] Visitor/Ride Policy:  Only 2 adults allowed (over the age of 18) to accompany you to the Hospital. You Must have a ride home from a responsible adult that you know and trust. Medical Transport, Uber or Lyft can only be used if patient has a responsible adult to accompany them during ride home.    Post-Op Instructions: You will receive Post-op/Discharge instructions by your Discharge Nurse prior to going home. Please call your Surgeon's office with any post-surgery questions/concerns.    *Call [facility] with surgery instruction questions @ [phone]   or [phone]  (Mon-Fri 8 am to 4 pm)    *If you are running late or have questions the morning of surgery, please call the Surgery Dept @ [phone]  *Insurance/ Financial Questions, please call [phone].

## 2023-12-15 NOTE — LETTER
[facility]  [id number] [place] [address]  Phone: [phone]  Fax: [phone] December 15, 2023     Patient: Yvonne Schmidt   YOB: 1960   Date of Visit: 12/15/2023       To whom it may concern:     Yvonne Schmidt (1960) is a patient established with [facility] for the management of adverse drug reactions. On 12/15/2023, patient presented to the office for an oral challenge to Amoxicillin. Patient successfully completed a 2 step oral challenge to Amoxicillin without reaction. Patient is at AVERAGE risk for an IgE-mediated, immediate allergic reaction to Penicillin and Amoxicillin. Penicillin/Amoxicillin allergy removed from chart.     If there are any questions or concerns, please feel free to reach out to our office.             Grayson Guaman MD   [facility]

## 2023-12-22 ENCOUNTER — LAB VISIT (OUTPATIENT)
Dept: LAB | Facility: HOSPITAL | Age: 63
End: 2023-12-22
Attending: PHYSICIAN ASSISTANT
Payer: COMMERCIAL

## 2023-12-22 ENCOUNTER — ANESTHESIA EVENT (OUTPATIENT)
Dept: SURGERY | Facility: HOSPITAL | Age: 63
End: 2023-12-22
Payer: COMMERCIAL

## 2023-12-22 ENCOUNTER — TELEPHONE (OUTPATIENT)
Dept: PREADMISSION TESTING | Facility: HOSPITAL | Age: 63
End: 2023-12-22
Payer: COMMERCIAL

## 2023-12-22 DIAGNOSIS — N81.2 CYSTOCELE AND RECTOCELE WITH INCOMPLETE UTEROVAGINAL PROLAPSE: ICD-10-CM

## 2023-12-22 LAB
ANION GAP SERPL CALC-SCNC: 6 MMOL/L (ref 8–16)
BASOPHILS # BLD AUTO: 0.02 K/UL (ref 0–0.2)
BASOPHILS NFR BLD: 0.3 % (ref 0–1.9)
BUN SERPL-MCNC: 14 MG/DL (ref 8–23)
CALCIUM SERPL-MCNC: 9.4 MG/DL (ref 8.7–10.5)
CHLORIDE SERPL-SCNC: 107 MMOL/L (ref 95–110)
CO2 SERPL-SCNC: 29 MMOL/L (ref 23–29)
CREAT SERPL-MCNC: 1 MG/DL (ref 0.5–1.4)
DIFFERENTIAL METHOD: ABNORMAL
EOSINOPHIL # BLD AUTO: 0.1 K/UL (ref 0–0.5)
EOSINOPHIL NFR BLD: 1.3 % (ref 0–8)
ERYTHROCYTE [DISTWIDTH] IN BLOOD BY AUTOMATED COUNT: 14.2 % (ref 11.5–14.5)
EST. GFR  (NO RACE VARIABLE): >60 ML/MIN/1.73 M^2
GLUCOSE SERPL-MCNC: 93 MG/DL (ref 70–110)
HCT VFR BLD AUTO: 31.7 % (ref 37–48.5)
HGB BLD-MCNC: 10.6 G/DL (ref 12–16)
IMM GRANULOCYTES # BLD AUTO: 0.02 K/UL (ref 0–0.04)
IMM GRANULOCYTES NFR BLD AUTO: 0.3 % (ref 0–0.5)
LYMPHOCYTES # BLD AUTO: 1.6 K/UL (ref 1–4.8)
LYMPHOCYTES NFR BLD: 26 % (ref 18–48)
MCH RBC QN AUTO: 27.8 PG (ref 27–31)
MCHC RBC AUTO-ENTMCNC: 33.4 G/DL (ref 32–36)
MCV RBC AUTO: 83 FL (ref 82–98)
MONOCYTES # BLD AUTO: 0.7 K/UL (ref 0.3–1)
MONOCYTES NFR BLD: 10.8 % (ref 4–15)
NEUTROPHILS # BLD AUTO: 3.8 K/UL (ref 1.8–7.7)
NEUTROPHILS NFR BLD: 61.3 % (ref 38–73)
NRBC BLD-RTO: 0 /100 WBC
PLATELET # BLD AUTO: 300 K/UL (ref 150–450)
PMV BLD AUTO: 9.7 FL (ref 9.2–12.9)
POTASSIUM SERPL-SCNC: 3.6 MMOL/L (ref 3.5–5.1)
RBC # BLD AUTO: 3.81 M/UL (ref 4–5.4)
SODIUM SERPL-SCNC: 142 MMOL/L (ref 136–145)
WBC # BLD AUTO: 6.27 K/UL (ref 3.9–12.7)

## 2023-12-22 PROCEDURE — 85025 COMPLETE CBC W/AUTO DIFF WBC: CPT | Performed by: PHYSICIAN ASSISTANT

## 2023-12-22 PROCEDURE — 80048 BASIC METABOLIC PNL TOTAL CA: CPT | Performed by: PHYSICIAN ASSISTANT

## 2023-12-22 PROCEDURE — 36415 COLL VENOUS BLD VENIPUNCTURE: CPT | Performed by: PHYSICIAN ASSISTANT

## 2023-12-22 NOTE — TELEPHONE ENCOUNTER
Called and spoke with Pt about the following:     Please arrive to [facility] at 5:30 am on 12/26/23 for your scheduled procedure.  Address: [address] [place] [address] [place]. [id number] (2nd Building on left, 1st Floor Lobby)  >>>NO eating or drinking after midnight unless instructed otherwise by your Surgeon<<<

## 2023-12-22 NOTE — ANESTHESIA PREPROCEDURE EVALUATION
12/22/2023  Yvonne R Roxana is a 63 y.o., female    Patient Active Problem List   Diagnosis    GERD (gastroesophageal reflux disease)    Former tobacco use    Palpitations    Chest pain    Other dysphagia    Body mass index (BMI) 19.9 or less, adult    Bradycardia    Essential hypertension    Colitis    Constipation    SOB (shortness of breath) on exertion    Generalized anxiety disorder with panic attacks    Sinusitis    Panic attacks    Depression, major, recurrent, moderate    Anxiety disorder    Pneumonia of right upper lobe due to infectious organism - resolved on CXR     Pelvic relaxation due to uterine prolapse    Cystocele and rectocele with incomplete uterovaginal prolapse    Asthma with COPD    Recurrent infections    Recurrent acute serous otitis media of right ear    Jaw pain    Chronic nonallergic rhinitis    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    Drug reaction     Past Medical History:   Diagnosis Date    Anxiety     Bradycardia 9/19/2019    GERD (gastroesophageal reflux disease)     Thyroid disease      Past Surgical History:   Procedure Laterality Date    BREAST BIOPSY Left     DENTAL SURGERY      SINUS SURGERY         Chemistry        Component Value Date/Time     06/16/2023 0922    K 3.7 06/16/2023 0922     06/16/2023 0922    CO2 23 06/16/2023 0922    BUN 13 06/16/2023 0922    CREATININE 1.0 06/16/2023 0922     06/16/2023 0922        Component Value Date/Time    CALCIUM 9.4 06/16/2023 0922    ALKPHOS 72 06/16/2023 0922    AST 16 06/16/2023 0922    ALT 8 (L) 06/16/2023 0922    BILITOT 0.3 06/16/2023 0922    ESTGFRAFRICA >60 08/12/2021 0051    EGFRNONAA 55 (A) 08/12/2021 0051        Lab Results   Component Value Date    WBC 9.46 11/08/2023    HGB 11.5 (L) 11/08/2023    HCT 37.0 11/08/2023    MCV 88 11/08/2023    PLT  245 11/08/2023     ECHO: (2/28/23)  Summary    The left ventricle is normal in size with concentric remodeling and normal systolic function.  The estimated ejection fraction is 60%.  Normal left ventricular diastolic function.  Normal right ventricular size with normal right ventricular systolic function.  Normal central venous pressure (3 mmHg).  The estimated PA systolic pressure is 12 mmHg.  Trivial pericardial effusion.    Pre-op Assessment    I have reviewed the Patient Summary Reports.    I have reviewed the NPO Status.   I have reviewed the Medications.     Review of Systems  Anesthesia Hx:  No problems with previous Anesthesia   History of prior surgery of interest to airway management or planning:  Previous anesthesia: General           Social:  Non-Smoker       Hematology/Oncology:  Hematology Normal   Oncology Normal                                   Cardiovascular:     Hypertension            MORRIS  ECG has been reviewed. Sinus bradycardia (57)  Otherwise normal ECG   When compared with ECG of 28-FEB-2023 14:00,   No significant change was found   Confirmed by AP DE LA CRUZ, DAHLIA ([id number]) on 6/17/2023 6:28:48 AM     Seen by cards:  Pre-OP CV evaluation prior to hysterectomy in Dec '23 with Dr. Dugan.   Low periop risk of CV events for moderate risk procedure.  Ok to proceed to the scheduled surgery without further cardiac study.                           Pulmonary:   COPD Asthma  Shortness of breath   Recent pneumonia (last month).  Has improved since antibiotics.               Renal/:  Renal/ Normal                 Hepatic/GI:     GERD             Musculoskeletal:  Musculoskeletal Normal                Neurological:  Neurology Normal                                      Endocrine:  Endocrine Normal    BMI 19      Denies Obesity / BMI > 30  Psych:  Psychiatric History anxiety depression MILTON w/ panic attack               Physical Exam  General: Well nourished    Airway:  Mallampati: II   Mouth Opening:  Normal  TM Distance: Normal  Neck ROM: Normal ROM    Dental:  Intact        Anesthesia Plan  Type of Anesthesia, risks & benefits discussed:    Anesthesia Type: Gen ETT  Intra-op Monitoring Plan: Standard ASA Monitors  Post Op Pain Control Plan: multimodal analgesia and IV/PO Opioids PRN  Induction:  IV  Airway Plan: Direct, Post-Induction  Informed Consent: Informed consent signed with the Patient and all parties understand the risks and agree with anesthesia plan.  All questions answered.   ASA Score: 3  Day of Surgery Review of History & Physical: H&P Update referred to the surgeon/provider.    Ready For Surgery From Anesthesia Perspective.     .    Chemistry        Component Value Date/Time     12/22/2023 1618    K 3.6 12/22/2023 1618     12/22/2023 1618    CO2 29 12/22/2023 1618    BUN 14 12/22/2023 1618    CREATININE 1.0 12/22/2023 1618    GLU 93 12/22/2023 1618        Component Value Date/Time    CALCIUM 9.4 12/22/2023 1618    ALKPHOS 72 06/16/2023 0922    AST 16 06/16/2023 0922    ALT 8 (L) 06/16/2023 0922    BILITOT 0.3 06/16/2023 0922    ESTGFRAFRICA >60 08/12/2021 0051    EGFRNONAA 55 (A) 08/12/2021 0051        Lab Results   Component Value Date    WBC 6.27 12/22/2023    HGB 10.6 (L) 12/22/2023    HCT 31.7 (L) 12/22/2023    MCV 83 12/22/2023     12/22/2023

## 2023-12-26 ENCOUNTER — HOSPITAL ENCOUNTER (OUTPATIENT)
Facility: HOSPITAL | Age: 63
Discharge: HOME | End: 2023-12-27
Attending: OBSTETRICS & GYNECOLOGY | Admitting: OBSTETRICS & GYNECOLOGY
Payer: COMMERCIAL

## 2023-12-26 ENCOUNTER — ANESTHESIA (OUTPATIENT)
Dept: SURGERY | Facility: HOSPITAL | Age: 63
End: 2023-12-26
Payer: COMMERCIAL

## 2023-12-26 ENCOUNTER — TELEPHONE (OUTPATIENT)
Dept: OBSTETRICS AND GYNECOLOGY | Facility: CLINIC | Age: 63
End: 2023-12-26
Payer: COMMERCIAL

## 2023-12-26 DIAGNOSIS — Z90.710 STATUS POST VAGINAL HYSTERECTOMY: Primary | ICD-10-CM

## 2023-12-26 DIAGNOSIS — N81.2 CYSTOCELE AND RECTOCELE WITH INCOMPLETE UTEROVAGINAL PROLAPSE: ICD-10-CM

## 2023-12-26 PROCEDURE — 58260 VAGINAL HYSTERECTOMY: CPT | Mod: ,,, | Performed by: OBSTETRICS & GYNECOLOGY

## 2023-12-26 PROCEDURE — 37000009 HC ANESTHESIA EA ADD 15 MINS: Performed by: OBSTETRICS & GYNECOLOGY

## 2023-12-26 PROCEDURE — 71000039 HC RECOVERY, EACH ADD'L HOUR: Performed by: OBSTETRICS & GYNECOLOGY

## 2023-12-26 PROCEDURE — 63600175 PHARM REV CODE 636 W HCPCS: Performed by: OBSTETRICS & GYNECOLOGY

## 2023-12-26 PROCEDURE — C2631 REP DEV, URINARY, W/O SLING: HCPCS | Performed by: OBSTETRICS & GYNECOLOGY

## 2023-12-26 PROCEDURE — 71000033 HC RECOVERY, INTIAL HOUR: Performed by: OBSTETRICS & GYNECOLOGY

## 2023-12-26 PROCEDURE — 94799 UNLISTED PULMONARY SVC/PX: CPT

## 2023-12-26 PROCEDURE — 88305 TISSUE EXAM BY PATHOLOGIST: CPT | Mod: 26,,, | Performed by: STUDENT IN AN ORGANIZED HEALTH CARE EDUCATION/TRAINING PROGRAM

## 2023-12-26 PROCEDURE — 57282 COLPOPEXY EXTRAPERITONEAL: CPT | Mod: 59,,, | Performed by: OBSTETRICS & GYNECOLOGY

## 2023-12-26 PROCEDURE — 36000708 HC OR TIME LEV III 1ST 15 MIN: Performed by: OBSTETRICS & GYNECOLOGY

## 2023-12-26 PROCEDURE — 57260 CMBN ANT PST COLPRHY: CPT | Mod: 51,,, | Performed by: OBSTETRICS & GYNECOLOGY

## 2023-12-26 PROCEDURE — 25000003 PHARM REV CODE 250: Performed by: OBSTETRICS & GYNECOLOGY

## 2023-12-26 PROCEDURE — 63600175 PHARM REV CODE 636 W HCPCS: Performed by: ANESTHESIOLOGY

## 2023-12-26 PROCEDURE — 25000003 PHARM REV CODE 250: Performed by: NURSE ANESTHETIST, CERTIFIED REGISTERED

## 2023-12-26 PROCEDURE — 88305 TISSUE EXAM BY PATHOLOGIST: CPT | Performed by: STUDENT IN AN ORGANIZED HEALTH CARE EDUCATION/TRAINING PROGRAM

## 2023-12-26 PROCEDURE — 37000008 HC ANESTHESIA 1ST 15 MINUTES: Performed by: OBSTETRICS & GYNECOLOGY

## 2023-12-26 PROCEDURE — 36000709 HC OR TIME LEV III EA ADD 15 MIN: Performed by: OBSTETRICS & GYNECOLOGY

## 2023-12-26 PROCEDURE — 63600175 PHARM REV CODE 636 W HCPCS: Performed by: NURSE ANESTHETIST, CERTIFIED REGISTERED

## 2023-12-26 RX ORDER — MIDAZOLAM HYDROCHLORIDE 1 MG/ML
INJECTION, SOLUTION INTRAMUSCULAR; INTRAVENOUS
Status: DISCONTINUED | OUTPATIENT
Start: 2023-12-26 | End: 2023-12-26

## 2023-12-26 RX ORDER — ALBUTEROL SULFATE 0.83 MG/ML
2.5 SOLUTION RESPIRATORY (INHALATION) EVERY 6 HOURS PRN
Status: DISCONTINUED | OUTPATIENT
Start: 2023-12-26 | End: 2023-12-27 | Stop reason: HOSPADM

## 2023-12-26 RX ORDER — DIPHENHYDRAMINE HCL 25 MG
25 CAPSULE ORAL EVERY 4 HOURS PRN
Status: DISCONTINUED | OUTPATIENT
Start: 2023-12-26 | End: 2023-12-27 | Stop reason: HOSPADM

## 2023-12-26 RX ORDER — ACETAMINOPHEN 500 MG
1000 TABLET ORAL
Status: DISCONTINUED | OUTPATIENT
Start: 2023-12-26 | End: 2023-12-26

## 2023-12-26 RX ORDER — TRIPROLIDINE/PSEUDOEPHEDRINE 2.5MG-60MG
600 TABLET ORAL EVERY 8 HOURS
Status: DISCONTINUED | OUTPATIENT
Start: 2023-12-26 | End: 2023-12-27 | Stop reason: SDUPTHER

## 2023-12-26 RX ORDER — MONTELUKAST SODIUM 10 MG/1
10 TABLET ORAL NIGHTLY
Status: DISCONTINUED | OUTPATIENT
Start: 2023-12-26 | End: 2023-12-27 | Stop reason: HOSPADM

## 2023-12-26 RX ORDER — OXYCODONE AND ACETAMINOPHEN 5; 325 MG/1; MG/1
1 TABLET ORAL
Status: DISCONTINUED | OUTPATIENT
Start: 2023-12-26 | End: 2023-12-26 | Stop reason: HOSPADM

## 2023-12-26 RX ORDER — CEFAZOLIN SODIUM 2 G/50ML
2 SOLUTION INTRAVENOUS
Status: DISCONTINUED | OUTPATIENT
Start: 2023-12-26 | End: 2023-12-26

## 2023-12-26 RX ORDER — PROCHLORPERAZINE EDISYLATE 5 MG/ML
5 INJECTION INTRAMUSCULAR; INTRAVENOUS EVERY 6 HOURS PRN
Status: DISCONTINUED | OUTPATIENT
Start: 2023-12-26 | End: 2023-12-27 | Stop reason: HOSPADM

## 2023-12-26 RX ORDER — CEFAZOLIN SODIUM 1 G/3ML
INJECTION, POWDER, FOR SOLUTION INTRAMUSCULAR; INTRAVENOUS
Status: DISCONTINUED | OUTPATIENT
Start: 2023-12-26 | End: 2023-12-26

## 2023-12-26 RX ORDER — ROCURONIUM BROMIDE 10 MG/ML
INJECTION, SOLUTION INTRAVENOUS
Status: DISCONTINUED | OUTPATIENT
Start: 2023-12-26 | End: 2023-12-26

## 2023-12-26 RX ORDER — LEVOTHYROXINE SODIUM 50 UG/1
50 TABLET ORAL
Status: DISCONTINUED | OUTPATIENT
Start: 2023-12-27 | End: 2023-12-27 | Stop reason: HOSPADM

## 2023-12-26 RX ORDER — FENTANYL CITRATE 50 UG/ML
INJECTION, SOLUTION INTRAMUSCULAR; INTRAVENOUS
Status: DISCONTINUED | OUTPATIENT
Start: 2023-12-26 | End: 2023-12-26

## 2023-12-26 RX ORDER — HYDROCODONE BITARTRATE AND ACETAMINOPHEN 5; 325 MG/1; MG/1
1 TABLET ORAL EVERY 4 HOURS PRN
Status: DISCONTINUED | OUTPATIENT
Start: 2023-12-26 | End: 2023-12-27 | Stop reason: HOSPADM

## 2023-12-26 RX ORDER — IBUPROFEN 600 MG/1
600 TABLET ORAL EVERY 8 HOURS
Status: DISCONTINUED | OUTPATIENT
Start: 2023-12-26 | End: 2023-12-26

## 2023-12-26 RX ORDER — SODIUM CHLORIDE, SODIUM LACTATE, POTASSIUM CHLORIDE, CALCIUM CHLORIDE 600; 310; 30; 20 MG/100ML; MG/100ML; MG/100ML; MG/100ML
INJECTION, SOLUTION INTRAVENOUS CONTINUOUS
Status: DISCONTINUED | OUTPATIENT
Start: 2023-12-26 | End: 2023-12-26

## 2023-12-26 RX ORDER — ONDANSETRON 8 MG/1
8 TABLET, ORALLY DISINTEGRATING ORAL EVERY 8 HOURS PRN
Status: DISCONTINUED | OUTPATIENT
Start: 2023-12-26 | End: 2023-12-27 | Stop reason: HOSPADM

## 2023-12-26 RX ORDER — DEXAMETHASONE SODIUM PHOSPHATE 4 MG/ML
INJECTION, SOLUTION INTRA-ARTICULAR; INTRALESIONAL; INTRAMUSCULAR; INTRAVENOUS; SOFT TISSUE
Status: DISCONTINUED | OUTPATIENT
Start: 2023-12-26 | End: 2023-12-26

## 2023-12-26 RX ORDER — HYDROCODONE BITARTRATE AND ACETAMINOPHEN 10; 325 MG/1; MG/1
1 TABLET ORAL EVERY 4 HOURS PRN
Status: DISCONTINUED | OUTPATIENT
Start: 2023-12-26 | End: 2023-12-27 | Stop reason: HOSPADM

## 2023-12-26 RX ORDER — SIMETHICONE 80 MG
80 TABLET,CHEWABLE ORAL EVERY 4 HOURS PRN
Status: DISCONTINUED | OUTPATIENT
Start: 2023-12-26 | End: 2023-12-27 | Stop reason: HOSPADM

## 2023-12-26 RX ORDER — VASOPRESSIN 20 [USP'U]/ML
INJECTION, SOLUTION INTRAMUSCULAR; SUBCUTANEOUS
Status: DISCONTINUED | OUTPATIENT
Start: 2023-12-26 | End: 2023-12-26 | Stop reason: HOSPADM

## 2023-12-26 RX ORDER — SODIUM CHLORIDE, SODIUM LACTATE, POTASSIUM CHLORIDE, CALCIUM CHLORIDE 600; 310; 30; 20 MG/100ML; MG/100ML; MG/100ML; MG/100ML
INJECTION, SOLUTION INTRAVENOUS CONTINUOUS
Status: DISCONTINUED | OUTPATIENT
Start: 2023-12-26 | End: 2023-12-27 | Stop reason: HOSPADM

## 2023-12-26 RX ORDER — ACETAMINOPHEN 325 MG/1
650 TABLET ORAL EVERY 4 HOURS PRN
Status: DISCONTINUED | OUTPATIENT
Start: 2023-12-26 | End: 2023-12-27 | Stop reason: HOSPADM

## 2023-12-26 RX ORDER — ALBUTEROL SULFATE 90 UG/1
2 AEROSOL, METERED RESPIRATORY (INHALATION) EVERY 6 HOURS PRN
Status: DISCONTINUED | OUTPATIENT
Start: 2023-12-26 | End: 2023-12-26

## 2023-12-26 RX ORDER — ONDANSETRON 2 MG/ML
4 INJECTION INTRAMUSCULAR; INTRAVENOUS DAILY PRN
Status: DISCONTINUED | OUTPATIENT
Start: 2023-12-26 | End: 2023-12-26 | Stop reason: HOSPADM

## 2023-12-26 RX ORDER — HYDROMORPHONE HYDROCHLORIDE 2 MG/ML
1 INJECTION, SOLUTION INTRAMUSCULAR; INTRAVENOUS; SUBCUTANEOUS EVERY 4 HOURS PRN
Status: DISCONTINUED | OUTPATIENT
Start: 2023-12-26 | End: 2023-12-27 | Stop reason: HOSPADM

## 2023-12-26 RX ORDER — LIDOCAINE HYDROCHLORIDE 20 MG/ML
INJECTION, SOLUTION EPIDURAL; INFILTRATION; INTRACAUDAL; PERINEURAL
Status: DISCONTINUED | OUTPATIENT
Start: 2023-12-26 | End: 2023-12-26

## 2023-12-26 RX ORDER — PROPOFOL 10 MG/ML
VIAL (ML) INTRAVENOUS
Status: DISCONTINUED | OUTPATIENT
Start: 2023-12-26 | End: 2023-12-26

## 2023-12-26 RX ORDER — HYDROMORPHONE HYDROCHLORIDE 2 MG/ML
0.2 INJECTION, SOLUTION INTRAMUSCULAR; INTRAVENOUS; SUBCUTANEOUS EVERY 5 MIN PRN
Status: DISCONTINUED | OUTPATIENT
Start: 2023-12-26 | End: 2023-12-26 | Stop reason: HOSPADM

## 2023-12-26 RX ORDER — KETOROLAC TROMETHAMINE 30 MG/ML
15 INJECTION, SOLUTION INTRAMUSCULAR; INTRAVENOUS ONCE
Status: COMPLETED | OUTPATIENT
Start: 2023-12-26 | End: 2023-12-26

## 2023-12-26 RX ORDER — ONDANSETRON 2 MG/ML
INJECTION INTRAMUSCULAR; INTRAVENOUS
Status: DISCONTINUED | OUTPATIENT
Start: 2023-12-26 | End: 2023-12-26

## 2023-12-26 RX ORDER — FAMOTIDINE 20 MG/1
20 TABLET, FILM COATED ORAL
Status: DISCONTINUED | OUTPATIENT
Start: 2023-12-26 | End: 2023-12-26

## 2023-12-26 RX ADMIN — HYDROCODONE BITARTRATE AND ACETAMINOPHEN 1 TABLET: 10; 325 TABLET ORAL at 11:12

## 2023-12-26 RX ADMIN — MONTELUKAST 10 MG: 10 TABLET, FILM COATED ORAL at 09:12

## 2023-12-26 RX ADMIN — LIDOCAINE HYDROCHLORIDE 50 MG: 20 INJECTION, SOLUTION EPIDURAL; INFILTRATION; INTRACAUDAL; PERINEURAL at 06:12

## 2023-12-26 RX ADMIN — HYDROMORPHONE HYDROCHLORIDE 0.2 MG: 2 INJECTION INTRAMUSCULAR; INTRAVENOUS; SUBCUTANEOUS at 09:12

## 2023-12-26 RX ADMIN — HYDROMORPHONE HYDROCHLORIDE 0.2 MG: 2 INJECTION INTRAMUSCULAR; INTRAVENOUS; SUBCUTANEOUS at 10:12

## 2023-12-26 RX ADMIN — SODIUM CHLORIDE, POTASSIUM CHLORIDE, SODIUM LACTATE AND CALCIUM CHLORIDE: 600; 310; 30; 20 INJECTION, SOLUTION INTRAVENOUS at 03:12

## 2023-12-26 RX ADMIN — MIDAZOLAM 1 MG: 1 INJECTION INTRAMUSCULAR; INTRAVENOUS at 06:12

## 2023-12-26 RX ADMIN — PROPOFOL 100 MG: 10 INJECTION, EMULSION INTRAVENOUS at 06:12

## 2023-12-26 RX ADMIN — SODIUM CHLORIDE, POTASSIUM CHLORIDE, SODIUM LACTATE AND CALCIUM CHLORIDE: 600; 310; 30; 20 INJECTION, SOLUTION INTRAVENOUS at 06:12

## 2023-12-26 RX ADMIN — ONDANSETRON 4 MG: 2 INJECTION INTRAMUSCULAR; INTRAVENOUS at 07:12

## 2023-12-26 RX ADMIN — SODIUM CHLORIDE, POTASSIUM CHLORIDE, SODIUM LACTATE AND CALCIUM CHLORIDE: 600; 310; 30; 20 INJECTION, SOLUTION INTRAVENOUS at 11:12

## 2023-12-26 RX ADMIN — KETOROLAC TROMETHAMINE 15 MG: 30 INJECTION, SOLUTION INTRAMUSCULAR; INTRAVENOUS at 09:12

## 2023-12-26 RX ADMIN — HYDROCODONE BITARTRATE AND ACETAMINOPHEN 1 TABLET: 10; 325 TABLET ORAL at 07:12

## 2023-12-26 RX ADMIN — IBUPROFEN 600 MG: 100 SUSPENSION ORAL at 03:12

## 2023-12-26 RX ADMIN — ROCURONIUM BROMIDE 40 MG: 10 INJECTION, SOLUTION INTRAVENOUS at 06:12

## 2023-12-26 RX ADMIN — DEXAMETHASONE SODIUM PHOSPHATE 4 MG: 4 INJECTION, SOLUTION INTRA-ARTICULAR; INTRALESIONAL; INTRAMUSCULAR; INTRAVENOUS; SOFT TISSUE at 07:12

## 2023-12-26 RX ADMIN — FENTANYL CITRATE 100 MCG: 50 INJECTION, SOLUTION INTRAMUSCULAR; INTRAVENOUS at 06:12

## 2023-12-26 RX ADMIN — HYDROMORPHONE HYDROCHLORIDE 1 MG: 2 INJECTION INTRAMUSCULAR; INTRAVENOUS; SUBCUTANEOUS at 01:12

## 2023-12-26 RX ADMIN — SUGAMMADEX 200 MG: 100 INJECTION, SOLUTION INTRAVENOUS at 08:12

## 2023-12-26 RX ADMIN — FENTANYL CITRATE 25 MCG: 50 INJECTION, SOLUTION INTRAMUSCULAR; INTRAVENOUS at 08:12

## 2023-12-26 RX ADMIN — CEFAZOLIN 2 G: 330 INJECTION, POWDER, FOR SOLUTION INTRAMUSCULAR; INTRAVENOUS at 07:12

## 2023-12-26 RX ADMIN — IBUPROFEN 600 MG: 100 SUSPENSION ORAL at 09:12

## 2023-12-26 NOTE — OP NOTE
Operative Note       SURGERY DATE:  12/26/2023     PRE-OP DIAGNOSIS:  Cystocele and rectocele with incomplete uterovaginal prolapse [N81.2]    POST-OP DIAGNOSIS:  Cystocele and rectocele with incomplete uterovaginal prolapse [N81.2]    Procedure(s) (LRB):  HYSTERECTOMY, TOTAL, VAGINAL (N/A)  COLPORRHAPHY, COMBINED ANTEROPOSTERIOR (N/A)  FIXATION, LIGAMENT, SACROSPINOUS (N/A)    Surgeon(s) and Role:     * Teresita Dugan MD - Primary    ASSISTANT:   first Soledad assist; assistance necessary for completion of the case    TASKS PERFORMED BY ASSISTANT:   retraction, exposure    ANESTHESIA: General    FINDINGS: Uterus 6 week size with prolapse to the introitus.  Grade 3 cystocele.  Grade 1 rectocele    GRAFTS/IMPLANTS:  None    ESTIMATED BLOOD LOSS:  100  mL              COMPLICATIONS:  None    SPECIMEN:   Uterus and cervix    DESCRIPTION OF PROCEDURE:    The patient was taken to the operating room where general endotracheal anesthesia was induced and found to be adequate. Pre-operative antibiotics were given. She was placed in the dorsal lithotomy position with her legs in the Ramsey stirrups. The perineum was prepped and draped in the normal sterile fashion, and the bladder was drained in an in and out fashion. Time out was performed.    A weighted sterile speculum was placed in the vagina, and the cervix was grasped with a tenaculum. Petrussin was then injected in a circumferential fashion around the cervix beneath the vaginal mucosa. A circumferential incision was made in the vaginal mucosa around the cervix. The vaginal mucosa was bluntly dissected away from the cervix. The posterior peritoneum was identified and incised with the phillip scissors. A long weight speculum was then placed intraperitoneally. The bilateral uterosacral ligaments were then clamped, cut, and suture ligated with 2-0 Vicryl. These pedicles were tagged with hemostats. The anterior peritoneum was then identified and entered sharply with the  "metzenbaum scissors and a right angle retractor was placed intraperitoneally to assist with better exposure. The bilateral cardinal ligaments were clamped, cut, and suture ligated with 2-0 vicryl. The bilateral uterine arteries were then clamped, cut, and suture ligated with 2-0 vicryl. The bilateral uteroovarian ligaments were identified, clamped, cut, then suture ligated. These pedicles were also tagged with hemostats. Examination of the ovaries revealed the findings stated above.  All pedicles were then examined and excellent hemostasis was noted.       Long weighted speculum was then replaced with the short weighted speculum.  The posterior peritoneum was identified, and dissection of the posterior cuff done to the sacrospinous ligament on the patient's right.  Zero Vicryl suture placed in the sacrospinous ligament using the Darrell Device.  2 sutures placed 1 cm medial to the spine.   The peritoneum was then closed in a running fashion with 2-0 Vicryl.     Winston catheter placed in the bladder.    We then proceeded with cystocele repair.    The vaginal mucosa was grasped bilaterally on the anterior cuff edge.  Using the metzenbaum scissors, the underlying bladder was dissected away from the vaginal mucosa, and the vaginal incision was extended in the midline to about 2cm proximal to the urethral meatus.  The vagina was dissected away from the bladder using the metzenbaum scissors and blunt dissection in the usual fashion.   0-Vicryl was then placed in a "u stitch" interrupted fashion to plicate the pubocervical fascia and reduce the cystocele.  The excess vaginal mucosa was then trimmed, and the vaginal mucosa was reapproximated in an interrupted fashion with 2-0 vicryl.      The sacrospinous sutures used to connect the anterior cuff and left angle, then the right angle and posterior cuff.  Vaginal cuff reapproximated with 0 vicryl figure of 8 sutures.  The SSLF sutures are tied, pulling the vaginal cuff to the " ligament on the patient's right.     Posterior vaginal repair started by grasping the vaginal mucosa at the hymenal remnant posterior and lateral to the midline.  Incision made inside the clamps transversely.   Underlying fascia dissected of the mucosa to appropriate level in the pelvis.   Reapproximation of the levator ani remnant done with interrupted Zero Vicryl suture.  Mucosa trimmed and closed with running locked 2-0 Vicryl suture.   Interrupted Zero Vicryl placed at the perineal body to lift the opening of the vagina.  Perineal skin closed with sq  2-0 vicryl.  Digital rectal exam revealed no suture material in the rectum.    Vaginal packing placed,   To recovery with Winston cath in place             CONDITION: Good    DISPOSITION: PACU - hemodynamically stable.

## 2023-12-26 NOTE — ASSESSMENT & PLAN NOTE
Desires surgical management with hysterectomy and prolapse repair.  Surgical consents were reviewed in the office in detail and signed.  Proceed with surgery as scheduled.

## 2023-12-26 NOTE — PLAN OF CARE
Discussed poc with pt, pt verbalized understanding    Purposeful rounding every 2hours    VS wnl  Fall precautions in place, remains injury free  Pt denies c/o nausea   Pain under control with PRN meds    IVFs  LR @100mL  Accurate I&Os  Bed locked at lowest position  Call light within reach    Chart check complete  Will cont with POC

## 2023-12-26 NOTE — HPI
Presents for scheduled, elective total vaginal hysterectomy/anterior and posterior colporraphy/and possible SSLF.     After standing for awhile or after using the bathroom, she notices a ball of tissue bulging from the vaginal opening.  Has lower pelvic discomfort when that happens as well.  No urinary incontinence or urgency.  No difficulty voiding.  Pt struggles with chronic constipation.  Has resumed Miralax.  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  She is , and would like to be sexually active, but feels like she can't because of her symptoms.  Pap 10/9/23: neg, HPV neg  MMG scheduled 11/1/23     On exam: pt with vaginal atrophy.  Has been using estrace cream 2x/week.  Had Grade 2 rectocele, grade 3 cystocele, and uterine prolapse with cervical descent to 1cm proximal to hymenal ring

## 2023-12-26 NOTE — ANESTHESIA POSTPROCEDURE EVALUATION
Anesthesia Post Evaluation    Patient: Yvonne Blankenship-Major    Procedure(s) Performed: Procedure(s) (LRB):  HYSTERECTOMY, TOTAL, VAGINAL (N/A)  COLPORRHAPHY, COMBINED ANTEROPOSTERIOR (N/A)  FIXATION, LIGAMENT, SACROSPINOUS (N/A)    Final Anesthesia Type: general      Patient location during evaluation: PACU  Patient participation: Yes- Able to Participate  Level of consciousness: awake and alert  Post-procedure vital signs: reviewed and stable  Pain management: adequate  Airway patency: patent  WON mitigation strategies: Verification of full reversal of neuromuscular block  PONV status at discharge: No PONV  Anesthetic complications: no      Cardiovascular status: hemodynamically stable  Respiratory status: spontaneous ventilation  Hydration status: euvolemic  Follow-up not needed.              Vitals Value Taken Time   /84 12/26/23 1057   Temp 35.2 °C (95.3 °F) 12/26/23 1057   Pulse 63 12/26/23 1057   Resp 17 12/26/23 1110   SpO2 100 % 12/26/23 1057         Event Time   Out of Recovery 10:33:09         Pain/Ld Score: Pain Rating Prior to Med Admin: 10 (12/26/2023 11:10 AM)  Ld Score: 9 (12/26/2023 10:15 AM)

## 2023-12-26 NOTE — H&P
[facility] ([facility])  Obstetrics & Gynecology  History & Physical    Patient Name: Yvonne Schmidt  MRN: [id number]  Admission Date: 2023  Primary Care Provider: Nafisa Irving APRN    Subjective:     Chief Complaint/Reason for Admission: pelvic organ prolapse    History of Present Illness:  Presents for scheduled, elective total vaginal hysterectomy/anterior and posterior colporraphy/and possible SSLF.     After standing for awhile or after using the bathroom, she notices a ball of tissue bulging from the vaginal opening.  Has lower pelvic discomfort when that happens as well.  No urinary incontinence or urgency.  No difficulty voiding.  Pt struggles with chronic constipation.  Has resumed Miralax.  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  She is , and would like to be sexually active, but feels like she can't because of her symptoms.  Pap 10/9/23: neg, HPV neg  MMG scheduled 23     On exam: pt with vaginal atrophy.  Has been using estrace cream 2x/week.  Had Grade 2 rectocele, grade 3 cystocele, and uterine prolapse with cervical descent to 1cm proximal to hymenal ring        OB History    Para Term  AB Living   2 2 2 0 0 2   SAB IAB Ectopic Multiple Live Births   0 0 0 0 2      # Outcome Date GA Lbr Jeremi/2nd Weight Sex Delivery Anes PTL Lv   2 Term    3.289 kg (7 lb 4 oz)  Vag-Spont      1 Term    3.317 kg (7 lb 5 oz)  Vag-Spont        Past Medical History:   Diagnosis Date    Anxiety     Bradycardia 2019    GERD (gastroesophageal reflux disease)     Thyroid disease      Past Surgical History:   Procedure Laterality Date    BREAST BIOPSY Left     DENTAL SURGERY      SINUS SURGERY         PTA Medications   Medication Sig    albuterol (PROVENTIL) 2.5 mg /3 mL (0.083 %) nebulizer solution Take 3 mLs (2.5 mg total) by nebulization every 4 to 6 hours as needed for Wheezing or Shortness of Breath.    albuterol (PROVENTIL/VENTOLIN HFA)  90 mcg/actuation inhaler Inhale 2 puffs into the lungs every 4 (four) hours as needed for Wheezing or Shortness of Breath. Rescue    ALPRAZolam (XANAX) 0.5 MG tablet Take 1 tablet (0.5 mg total) by mouth 2 (two) times daily as needed (SIGNIFICANT ANXIETY). New Prescriber    azelastine (ASTELIN) 137 mcg (0.1 %) nasal spray 1 spray (137 mcg total) by Nasal route 2 (two) times daily.    calcium carbonate (TUMS) 300 mg (750 mg) Chew Take 750 mg by mouth.    cyanocobalamin (VITAMIN B-12) 1000 MCG tablet Take 5,000 mcg by mouth once daily.    diclofenac (VOLTAREN) 75 MG EC tablet Take 75 mg by mouth 2 (two) times daily.    ferrous sulfate (FEROSUL) 220 mg (44 mg iron)/5 mL Elix Take 5 mLs (220 mg total) by mouth once daily.    fluticasone propionate (FLONASE) 50 mcg/actuation nasal spray 1 spray (50 mcg total) by Each Nostril route 2 (two) times a day.    fluticasone-salmeterol diskus inhaler 250-50 mcg Inhale 1 puff into the lungs 2 (two) times daily. Wash out mouth after use.    hydroCHLOROthiazide (HYDRODIURIL) 12.5 MG Tab Take 1 tablet (12.5 mg total) by mouth daily as needed (edema, swelling). Do not take if BP is low and feeling dry/dehydrated/dizzy (Patient taking differently: Take 12.5 mg by mouth once daily. Do not take if BP is low and feeling dry/dehydrated/dizzy)    Lactobacillus rhamnosus GG (CULTURELLE) 10 billion cell capsule Take 1 capsule by mouth once daily.    levothyroxine (SYNTHROID) 50 MCG tablet Take 50 mcg by mouth before breakfast.    lovastatin (MEVACOR) 10 MG tablet Take 10 mg by mouth every evening.    methocarbamoL (ROBAXIN) 750 MG Tab Take 500 mg by mouth 4 (four) times daily.    montelukast (SINGULAIR) 10 mg tablet Take 10 mg by mouth every evening.    multivitamin (THERAGRAN) per tablet Take 1 tablet by mouth once daily.    omega-3 fatty acids/fish oil (FISH OIL-OMEGA-3 FATTY ACIDS) 300-1,000 mg capsule Take 1 capsule by mouth every evening.    ondansetron (ZOFRAN-ODT) 8 MG TbDL Take 1  tablet (8 mg total) by mouth every 8 (eight) hours as needed (nausea).    zinc sulfate (ZINC-15 ORAL) Take by mouth Daily. Unknown dose    acetaminophen (TYLENOL) 500 MG tablet Take 500 mg by mouth as needed for Pain.    cyanocobalamin (VITAMIN B-12) 1000 MCG tablet Take 100 mcg by mouth once daily.    estradioL (ESTRACE) 0.01 % (0.1 mg/gram) vaginal cream Place 1 g vaginally twice a week.    ezetimibe (ZETIA) 10 mg tablet Take 1 tablet (10 mg total) by mouth once daily. (Patient taking differently: Take 10 mg by mouth every evening.)    FLUoxetine 20 MG capsule Take 1 capsule (20 mg total) by mouth once daily. Take IN ADDITION to Prozac 40 mg supply    indomethacin (INDOCIN) 25 MG capsule Take 1 capsule (25 mg total) by mouth 3 (three) times daily.    omeprazole (PRILOSEC) 40 MG capsule Take 40 mg by mouth.       Review of patient's allergies indicates:  No Known Allergies     Family History       Problem Relation (Age of Onset)    Heart attack Mother, Maternal Aunt, Maternal Uncle    Heart disease Father    Hypertension Mother          Tobacco Use    Smoking status: Former     Current packs/day: 0.00     Average packs/day: 0.8 packs/day for 37.0 years (27.8 ttl pk-yrs)     Types: Cigarettes     Start date:      Quit date: 2019     Years since quittin.9    Smokeless tobacco: Never    Tobacco comments:     did not smoke for 2 years since beginning smoking.    Substance and Sexual Activity    Alcohol use: Never    Drug use: Never    Sexual activity: Yes     Partners: Male     Birth control/protection: None     Review of Systems   Constitutional:  Negative for fatigue, fever and unexpected weight change.   Gastrointestinal:  Positive for constipation (chronic). Negative for abdominal pain, diarrhea, nausea and vomiting.   Genitourinary:  Positive for pelvic pain (pressure and discomfort (see HPI)). Negative for dysuria, frequency, urgency, vaginal bleeding, vaginal discharge, vaginal pain, postcoital bleeding  and vaginal odor.      Objective:     Vital Signs (Most Recent):  Temp: 97.7 °F (36.5 °C) (12/26/23 0551)  Pulse: 62 (12/26/23 0551)  Resp: 16 (12/26/23 0551)  BP: 136/69 (12/26/23 0551)  SpO2: 100 % (12/26/23 0551) Vital Signs (24h Range):  Temp:  [97.7 °F (36.5 °C)] 97.7 °F (36.5 °C)  Pulse:  [62] 62  Resp:  [16] 16  SpO2:  [100 %] 100 %  BP: (136)/(69) 136/69        Body mass index is 19.07 kg/m².    No LMP recorded. Patient is postmenopausal.     Physical Exam:   Constitutional: She is oriented to person, place, and time. She appears well-developed and well-nourished. No distress.    HENT:   Head: Normocephalic and atraumatic.     Neck: No thyromegaly present.     Pulmonary/Chest: Effort normal.        Abdominal: Soft. She exhibits no distension and no mass. There is no abdominal tenderness. There is no rebound and no guarding.             Musculoskeletal: No edema.       Neurological: She is alert and oriented to person, place, and time.    Skin: No rash noted.    Psychiatric: She has a normal mood and affect. Her behavior is normal. Judgment and thought content normal.        Laboratory:  Recent Lab Results       None          Lab Results   Component Value Date    WBC 6.27 12/22/2023    HGB 10.6 (L) 12/22/2023    HCT 31.7 (L) 12/22/2023    MCV 83 12/22/2023     12/22/2023       BMP  Lab Results   Component Value Date     12/22/2023    K 3.6 12/22/2023     12/22/2023    CO2 29 12/22/2023    BUN 14 12/22/2023    CREATININE 1.0 12/22/2023    CALCIUM 9.4 12/22/2023    ANIONGAP 6 (L) 12/22/2023    EGFRNORACEVR >60.0 12/22/2023         Diagnostic Results:  Labs: Reviewed  Assessment/Plan:     Pulmonary  Asthma with COPD  Continue home meds    Cardiac/Vascular  Essential hypertension  Resume home meds    Renal/  * Cystocele and rectocele with incomplete uterovaginal prolapse  Desires surgical management with hysterectomy and prolapse repair.  Surgical consents were reviewed in the office in  detail and signed.  Proceed with surgery as scheduled.        Teresita Dugan MD  Obstetrics & Gynecology  [facility] ([facility])

## 2023-12-26 NOTE — TELEPHONE ENCOUNTER
Attempted to contact patient. No answer. Left message for patient to return call to clinic. Wanted to let patient know her post op appointment has been scheduled for 01/25/24 at 10:15am a the [facility] location with Dr. SOO Dugan.

## 2023-12-26 NOTE — TELEPHONE ENCOUNTER
----- Message from Teresita Duagn MD sent at 12/26/2023 12:09 PM CST -----  Regarding: post-op visit  Please schedule post-op visit with me in 4 weeks.  Is s/p vaginal hysterectomy today.

## 2023-12-26 NOTE — ANESTHESIA PROCEDURE NOTES
Intubation    Date/Time: 12/26/2023 6:59 AM    Performed by: Thomas Bergeron CRNA  Authorized by: Thomas Bergeron CRNA    Intubation:     Induction:  Intravenous    Mask Ventilation:  Easy mask    Attempts:  1    Attempted By:  CRNA    Method of Intubation:  Direct    Blade:  Sandra 3    Laryngeal View Grade: Grade IIA - cords partially seen      Difficult Airway Encountered?: No      Complications:  None    Airway Device:  Oral endotracheal tube    Airway Device Size:  7.5    Style/Cuff Inflation:  Cuffed (inflated to minimal occlusive pressure)    Tube secured:  21    Secured at:  The lips    Placement Verified By:  Capnometry    Complicating Factors:  None    Findings Post-Intubation:  BS equal bilateral

## 2023-12-26 NOTE — BRIEF OP NOTE
[facility] ([facility]ospital)  Brief Operative Note    SUMMARY     Surgery Date: 12/26/2023     Surgeon(s) and Role:     * Yogi Simon MD - Primary    Assistant:  first Soledad assist, assistance necessary for completion of the surgery    Pre-op Diagnosis:  Cystocele and rectocele with incomplete uterovaginal prolapse [N81.2]    Post-op Diagnosis:  Post-Op Diagnosis Codes:     * Cystocele and rectocele with incomplete uterovaginal prolapse [N81.2]    Procedure(s) (LRB):  HYSTERECTOMY, TOTAL, VAGINAL (N/A)  COLPORRHAPHY, COMBINED ANTEROPOSTERIOR (N/A)  FIXATION, LIGAMENT, SACROSPINOUS (N/A)    Anesthesia: General    Implants:  * No implants in log *    Operative Findings: Uterus 6 week size with prolapse to the introitus.  Grade 3 cystocele.  Grade 1 rectocele    Estimated Blood Loss: 100 mL    Estimated Blood Loss has been documented.         Specimens:   Specimen (24h ago, onward)       Start     Ordered    Pending  Specimen to Pathology, Surgery Gynecology and Obstetrics  Once        Comments: Pre-op Diagnosis: Cystocele and rectocele with incomplete uterovaginal prolapse [N81.2]Procedure(s):HYSTERECTOMY, TOTAL, VAGINALCOLPORRHAPHY, COMBINED ANTEROPOSTERIORFIXATION, LIGAMENT, SACROSPINOUS Number of specimens: 1Name of specimens: 1. Uterus and cervix-perm     References:    Click here for ordering Quick Tip   Question Answer Comment   Procedure Type: Gynecology and Obstetrics    Specimen Class: Routine/Screening    Which provider would you like to cc? YOGI SIMON    Release to patient Immediate        Pending                    [id number]

## 2023-12-26 NOTE — TRANSFER OF CARE
"Anesthesia Transfer of Care Note    Patient: Yvonne Blankenship-Major    Procedure(s) Performed: Procedure(s) (LRB):  HYSTERECTOMY, TOTAL, VAGINAL (N/A)  COLPORRHAPHY, COMBINED ANTEROPOSTERIOR (N/A)  FIXATION, LIGAMENT, SACROSPINOUS (N/A)    Patient location: PACU    Anesthesia Type: general    Transport from OR: Transported from OR on room air with adequate spontaneous ventilation    Post pain: adequate analgesia    Post assessment: no apparent anesthetic complications    Post vital signs: stable    Level of consciousness: sedated    Nausea/Vomiting: no nausea/vomiting    Complications: none    Transfer of care protocol was followed      Last vitals: Visit Vitals  /69   Pulse 62   Temp 36.5 °C (97.7 °F) (Temporal)   Resp 16   Ht 5' 2" (1.575 m)   SpO2 100%   Breastfeeding No   BMI 19.07 kg/m²     "

## 2023-12-26 NOTE — SUBJECTIVE & OBJECTIVE
OB History    Para Term  AB Living   2 2 2 0 0 2   SAB IAB Ectopic Multiple Live Births   0 0 0 0 2      # Outcome Date GA Lbr Jeremi/2nd Weight Sex Delivery Anes PTL Lv   2 Term    3.289 kg (7 lb 4 oz)  Vag-Spont      1 Term    3.317 kg (7 lb 5 oz)  Vag-Spont        Past Medical History:   Diagnosis Date    Anxiety     Bradycardia 2019    GERD (gastroesophageal reflux disease)     Thyroid disease      Past Surgical History:   Procedure Laterality Date    BREAST BIOPSY Left     DENTAL SURGERY      SINUS SURGERY         PTA Medications   Medication Sig    albuterol (PROVENTIL) 2.5 mg /3 mL (0.083 %) nebulizer solution Take 3 mLs (2.5 mg total) by nebulization every 4 to 6 hours as needed for Wheezing or Shortness of Breath.    albuterol (PROVENTIL/VENTOLIN HFA) 90 mcg/actuation inhaler Inhale 2 puffs into the lungs every 4 (four) hours as needed for Wheezing or Shortness of Breath. Rescue    ALPRAZolam (XANAX) 0.5 MG tablet Take 1 tablet (0.5 mg total) by mouth 2 (two) times daily as needed (SIGNIFICANT ANXIETY). New Prescriber    azelastine (ASTELIN) 137 mcg (0.1 %) nasal spray 1 spray (137 mcg total) by Nasal route 2 (two) times daily.    calcium carbonate (TUMS) 300 mg (750 mg) Chew Take 750 mg by mouth.    cyanocobalamin (VITAMIN B-12) 1000 MCG tablet Take 5,000 mcg by mouth once daily.    diclofenac (VOLTAREN) 75 MG EC tablet Take 75 mg by mouth 2 (two) times daily.    ferrous sulfate (FEROSUL) 220 mg (44 mg iron)/5 mL Elix Take 5 mLs (220 mg total) by mouth once daily.    fluticasone propionate (FLONASE) 50 mcg/actuation nasal spray 1 spray (50 mcg total) by Each Nostril route 2 (two) times a day.    fluticasone-salmeterol diskus inhaler 250-50 mcg Inhale 1 puff into the lungs 2 (two) times daily. Wash out mouth after use.    hydroCHLOROthiazide (HYDRODIURIL) 12.5 MG Tab Take 1 tablet (12.5 mg total) by mouth daily as needed (edema, swelling). Do not take if BP is low and feeling  dry/dehydrated/dizzy (Patient taking differently: Take 12.5 mg by mouth once daily. Do not take if BP is low and feeling dry/dehydrated/dizzy)    Lactobacillus rhamnosus GG (CULTURELLE) 10 billion cell capsule Take 1 capsule by mouth once daily.    levothyroxine (SYNTHROID) 50 MCG tablet Take 50 mcg by mouth before breakfast.    lovastatin (MEVACOR) 10 MG tablet Take 10 mg by mouth every evening.    methocarbamoL (ROBAXIN) 750 MG Tab Take 500 mg by mouth 4 (four) times daily.    montelukast (SINGULAIR) 10 mg tablet Take 10 mg by mouth every evening.    multivitamin (THERAGRAN) per tablet Take 1 tablet by mouth once daily.    omega-3 fatty acids/fish oil (FISH OIL-OMEGA-3 FATTY ACIDS) 300-1,000 mg capsule Take 1 capsule by mouth every evening.    ondansetron (ZOFRAN-ODT) 8 MG TbDL Take 1 tablet (8 mg total) by mouth every 8 (eight) hours as needed (nausea).    zinc sulfate (ZINC-15 ORAL) Take by mouth Daily. Unknown dose    acetaminophen (TYLENOL) 500 MG tablet Take 500 mg by mouth as needed for Pain.    cyanocobalamin (VITAMIN B-12) 1000 MCG tablet Take 100 mcg by mouth once daily.    estradioL (ESTRACE) 0.01 % (0.1 mg/gram) vaginal cream Place 1 g vaginally twice a week.    ezetimibe (ZETIA) 10 mg tablet Take 1 tablet (10 mg total) by mouth once daily. (Patient taking differently: Take 10 mg by mouth every evening.)    FLUoxetine 20 MG capsule Take 1 capsule (20 mg total) by mouth once daily. Take IN ADDITION to Prozac 40 mg supply    indomethacin (INDOCIN) 25 MG capsule Take 1 capsule (25 mg total) by mouth 3 (three) times daily.    omeprazole (PRILOSEC) 40 MG capsule Take 40 mg by mouth.       Review of patient's allergies indicates:  No Known Allergies     Family History       Problem Relation (Age of Onset)    Heart attack Mother, Maternal Aunt, Maternal Uncle    Heart disease Father    Hypertension Mother          Tobacco Use    Smoking status: Former     Current packs/day: 0.00     Average packs/day: 0.8  packs/day for 37.0 years (27.8 ttl pk-yrs)     Types: Cigarettes     Start date:      Quit date: 2019     Years since quittin.9    Smokeless tobacco: Never    Tobacco comments:     did not smoke for 2 years since beginning smoking.    Substance and Sexual Activity    Alcohol use: Never    Drug use: Never    Sexual activity: Yes     Partners: Male     Birth control/protection: None     Review of Systems   Constitutional:  Negative for fatigue, fever and unexpected weight change.   Gastrointestinal:  Positive for constipation (chronic). Negative for abdominal pain, diarrhea, nausea and vomiting.   Genitourinary:  Positive for pelvic pain (pressure and discomfort (see HPI)). Negative for dysuria, frequency, urgency, vaginal bleeding, vaginal discharge, vaginal pain, postcoital bleeding and vaginal odor.      Objective:     Vital Signs (Most Recent):  Temp: 97.7 °F (36.5 °C) (23)  Pulse: 62 (23)  Resp: 16 (23 05)  BP: 136/69 (23 05)  SpO2: 100 % (23) Vital Signs (24h Range):  Temp:  [97.7 °F (36.5 °C)] 97.7 °F (36.5 °C)  Pulse:  [62] 62  Resp:  [16] 16  SpO2:  [100 %] 100 %  BP: (136)/(69) 136/69        Body mass index is 19.07 kg/m².    No LMP recorded. Patient is postmenopausal.     Physical Exam:   Constitutional: She is oriented to person, place, and time. She appears well-developed and well-nourished. No distress.    HENT:   Head: Normocephalic and atraumatic.     Neck: No thyromegaly present.     Pulmonary/Chest: Effort normal.        Abdominal: Soft. She exhibits no distension and no mass. There is no abdominal tenderness. There is no rebound and no guarding.             Musculoskeletal: No edema.       Neurological: She is alert and oriented to person, place, and time.    Skin: No rash noted.    Psychiatric: She has a normal mood and affect. Her behavior is normal. Judgment and thought content normal.        Laboratory:  Recent Lab Results       None           Lab Results   Component Value Date    WBC 6.27 12/22/2023    HGB 10.6 (L) 12/22/2023    HCT 31.7 (L) 12/22/2023    MCV 83 12/22/2023     12/22/2023       BMP  Lab Results   Component Value Date     12/22/2023    K 3.6 12/22/2023     12/22/2023    CO2 29 12/22/2023    BUN 14 12/22/2023    CREATININE 1.0 12/22/2023    CALCIUM 9.4 12/22/2023    ANIONGAP 6 (L) 12/22/2023    EGFRNORACEVR >60.0 12/22/2023         Diagnostic Results:  Labs: Reviewed

## 2023-12-27 VITALS
BODY MASS INDEX: 19.07 KG/M2 | OXYGEN SATURATION: 99 % | SYSTOLIC BLOOD PRESSURE: 143 MMHG | TEMPERATURE: 98 F | HEIGHT: 62 IN | DIASTOLIC BLOOD PRESSURE: 80 MMHG | HEART RATE: 68 BPM | RESPIRATION RATE: 18 BRPM

## 2023-12-27 PROBLEM — Z90.710 STATUS POST VAGINAL HYSTERECTOMY: Status: ACTIVE | Noted: 2023-12-27

## 2023-12-27 PROCEDURE — 25000003 PHARM REV CODE 250: Performed by: OBSTETRICS & GYNECOLOGY

## 2023-12-27 PROCEDURE — 94799 UNLISTED PULMONARY SVC/PX: CPT

## 2023-12-27 RX ORDER — HYDROCODONE BITARTRATE AND ACETAMINOPHEN 7.5; 325 MG/15ML; MG/15ML
15 SOLUTION ORAL EVERY 4 HOURS PRN
Qty: 118 ML | Refills: 0 | Status: SHIPPED | OUTPATIENT
Start: 2023-12-27 | End: 2024-01-02 | Stop reason: SDUPTHER

## 2023-12-27 RX ORDER — TRIPROLIDINE/PSEUDOEPHEDRINE 2.5MG-60MG
600 TABLET ORAL EVERY 6 HOURS
Status: DISCONTINUED | OUTPATIENT
Start: 2023-12-27 | End: 2023-12-27 | Stop reason: HOSPADM

## 2023-12-27 RX ORDER — TRIPROLIDINE/PSEUDOEPHEDRINE 2.5MG-60MG
600 TABLET ORAL EVERY 6 HOURS
Qty: 354 ML | Refills: 1 | Status: SHIPPED | OUTPATIENT
Start: 2023-12-27 | End: 2024-01-02 | Stop reason: SDUPTHER

## 2023-12-27 RX ADMIN — HYDROCODONE BITARTRATE AND ACETAMINOPHEN 1 TABLET: 10; 325 TABLET ORAL at 08:12

## 2023-12-27 RX ADMIN — IBUPROFEN 600 MG: 100 SUSPENSION ORAL at 07:12

## 2023-12-27 RX ADMIN — HYDROCODONE BITARTRATE AND ACETAMINOPHEN 1 TABLET: 10; 325 TABLET ORAL at 03:12

## 2023-12-27 RX ADMIN — LEVOTHYROXINE SODIUM 50 MCG: 50 TABLET ORAL at 07:12

## 2023-12-27 NOTE — HOSPITAL COURSE
Pt was admitted for scheduled hysterectomy.  TVH/A+P repair/SSLF was performed without complications.  Post-operative course was unremarkable and pt recovered well.  Pt was discharged on POD#1 upon meeting all discharge criteria.  Pt was counseled on post-operative care and warning sign instructions prior to her discharge.

## 2023-12-27 NOTE — PLAN OF CARE
[facility] - Med Surg  Discharge Final Note    Primary Care Provider: Nafisa Irving, [facility]    Expected Discharge Date: 12/27/2023    Final Discharge Note (most recent)       Final Note - 12/27/23 0854          Final Note    Assessment Type Final Discharge Note     Anticipated Discharge Disposition Home or Self Care     Hospital Resources/Appts/Education Provided Appointments scheduled and added to AVS                                Contact Info       Teresita Dugan MD   Specialty: Obstetrics and Gynecology, Obstetrics    [address] [place] [address] [address] [place] [address]   Phone: [phone]       Next Steps: Follow up in 4 week(s)    Instructions: Post-operative visit

## 2023-12-27 NOTE — DISCHARGE SUMMARY
[facility] Surg  Obstetrics & Gynecology  Discharge Summary    Patient Name: Yvonne Blankenship-Austin  MRN: [id number]  Admission Date: 12/26/2023  Hospital Length of Stay: 0 days  Discharge Date and Time:  12/27/2023 8:53 AM  Attending Physician: Teresita Dugan MD   Discharging Provider: Eric Murphy MD  Primary Care Provider: Nafisa Irving APRN    HPI:  Presents for scheduled, elective total vaginal hysterectomy/anterior and posterior colporraphy/and possible SSLF.     After standing for awhile or after using the bathroom, she notices a ball of tissue bulging from the vaginal opening.  Has lower pelvic discomfort when that happens as well.  No urinary incontinence or urgency.  No difficulty voiding.  Pt struggles with chronic constipation.  Has resumed Miralax.  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  She is , and would like to be sexually active, but feels like she can't because of her symptoms.  Pap 10/9/23: neg, HPV neg  MMG scheduled 11/1/23     On exam: pt with vaginal atrophy.  Has been using estrace cream 2x/week.  Had Grade 2 rectocele, grade 3 cystocele, and uterine prolapse with cervical descent to 1cm proximal to hymenal ring    Hospital Course:  Pt was admitted for scheduled hysterectomy.  TVH/A+P repair/SSLF was performed without complications.  Post-operative course was unremarkable and pt recovered well.  Pt was discharged on POD#1 upon meeting all discharge criteria.  Pt was counseled on post-operative care and warning sign instructions prior to her discharge.    Goals of Care Treatment Preferences:  Code Status: Full Code      Procedure(s) (LRB):  HYSTERECTOMY, TOTAL, VAGINAL (N/A)  COLPORRHAPHY, COMBINED ANTEROPOSTERIOR (N/A)  FIXATION, LIGAMENT, SACROSPINOUS (N/A)         Significant Diagnostic Studies: N/A      Pending Diagnostic Studies:       Procedure Component Value Units Date/Time    Specimen to Pathology, Surgery Gynecology and Obstetrics  [[id number]] Collected: 12/26/23 0909    Order Status: Sent Lab Status: In process Updated: 12/26/23 1008    Specimen: Tissue           Final Active Diagnoses:    Diagnosis Date Noted POA    PRINCIPAL PROBLEM:  Status post vaginal hysterectomy [Z90.710] 12/27/2023 No    Asthma with COPD [J44.89] 10/27/2023 Yes    Cystocele and rectocele with incomplete uterovaginal prolapse [N81.2] 10/09/2023 Yes    Essential hypertension [I10] 11/19/2020 Yes      Problems Resolved During this Admission:        Discharged Condition: stable    Disposition: Home or Self Care    Follow Up:   Follow-up Information       Teresita Dugan MD Follow up in 4 week(s).    Specialties: Obstetrics and Gynecology, Obstetrics  Why: Post-operative visit  Contact information:  [address] [place] [address] [address] [place] [address] [phone]                           Patient Instructions:      Diet Adult Regular     Other restrictions (specify):   Order Comments: Light activity and pelvic rest x 6 weeks     Notify your health care provider if you experience any of the following:  increased confusion or weakness     Notify your health care provider if you experience any of the following:  persistent dizziness, light-headedness, or visual disturbances     Notify your health care provider if you experience any of the following:  worsening rash     Notify your health care provider if you experience any of the following:  severe persistent headache     Notify your health care provider if you experience any of the following:  difficulty breathing or increased cough     Notify your health care provider if you experience any of the following:  redness, tenderness, or signs of infection (pain, swelling, redness, odor or green/yellow discharge around incision site)     Notify your health care provider if you experience any of the following:  severe uncontrolled pain     Notify your health care provider if you experience any of the following:  persistent nausea and  vomiting or diarrhea     Notify your health care provider if you experience any of the following:  temperature >100.4     No dressing needed     Medications:  Reconciled Home Medications:      Medication List        START taking these medications      hydrocodone-apap 7.5-325 MG/15 ML oral solution  Commonly known as: HYCET  Take 15 mLs by mouth every 4 (four) hours as needed for Pain.     ibuprofen 20 mg/mL oral liquid  Take 30 mLs (600 mg total) by mouth every 6 (six) hours.            CONTINUE taking these medications      * albuterol 2.5 mg /3 mL (0.083 %) nebulizer solution  Commonly known as: PROVENTIL  Take 3 mLs (2.5 mg total) by nebulization every 4 to 6 hours as needed for Wheezing or Shortness of Breath.     * albuterol 90 mcg/actuation inhaler  Commonly known as: PROVENTIL/VENTOLIN HFA  Inhale 2 puffs into the lungs every 4 (four) hours as needed for Wheezing or Shortness of Breath. Rescue     ALPRAZolam 0.5 MG tablet  Commonly known as: XANAX  Take 1 tablet (0.5 mg total) by mouth 2 (two) times daily as needed (SIGNIFICANT ANXIETY). New Prescriber     azelastine 137 mcg (0.1 %) nasal spray  Commonly known as: ASTELIN  1 spray (137 mcg total) by Nasal route 2 (two) times daily.     calcium carbonate 300 mg (750 mg) Chew  Commonly known as: TUMS  Take 750 mg by mouth.     * cyanocobalamin 1000 MCG tablet  Commonly known as: VITAMIN B-12  Take 100 mcg by mouth once daily.     * VITAMIN B-12 1000 MCG tablet  Generic drug: cyanocobalamin  Take 5,000 mcg by mouth once daily.     diclofenac 75 MG EC tablet  Commonly known as: VOLTAREN  Take 75 mg by mouth 2 (two) times daily.     estradioL 0.01 % (0.1 mg/gram) vaginal cream  Commonly known as: ESTRACE  Place 1 g vaginally twice a week.     ferrous sulfate 220 mg (44 mg iron)/5 mL Elix  Commonly known as: FEROSUL  Take 5 mLs (220 mg total) by mouth once daily.     fish oil-omega-3 fatty acids 300-1,000 mg capsule  Take 1 capsule by mouth every evening.      FLUoxetine 20 MG capsule  Take 1 capsule (20 mg total) by mouth once daily. Take IN ADDITION to Prozac 40 mg supply     fluticasone propionate 50 mcg/actuation nasal spray  Commonly known as: FLONASE  1 spray (50 mcg total) by Each Nostril route 2 (two) times a day.     fluticasone-salmeterol 250-50 mcg/dose 250-50 mcg/dose diskus inhaler  Commonly known as: ADVAIR DISKUS  Inhale 1 puff into the lungs 2 (two) times daily. Wash out mouth after use.     hydroCHLOROthiazide 12.5 MG Tab  Commonly known as: HYDRODIURIL  Take 1 tablet (12.5 mg total) by mouth daily as needed (edema, swelling). Do not take if BP is low and feeling dry/dehydrated/dizzy     indomethacin 25 MG capsule  Commonly known as: INDOCIN  Take 1 capsule (25 mg total) by mouth 3 (three) times daily.     Lactobacillus rhamnosus GG 10 billion cell capsule  Commonly known as: CULTURELLE  Take 1 capsule by mouth once daily.     levothyroxine 50 MCG tablet  Commonly known as: SYNTHROID  Take 50 mcg by mouth before breakfast.     lovastatin 10 MG tablet  Commonly known as: MEVACOR  Take 10 mg by mouth every evening.     methocarbamoL 750 MG Tab  Commonly known as: ROBAXIN  Take 500 mg by mouth 4 (four) times daily.     montelukast 10 mg tablet  Commonly known as: SINGULAIR  Take 10 mg by mouth every evening.     multivitamin per tablet  Commonly known as: THERAGRAN  Take 1 tablet by mouth once daily.     omeprazole 40 MG capsule  Commonly known as: PRILOSEC  Take 40 mg by mouth.     ondansetron 8 MG Tbdl  Commonly known as: ZOFRAN-ODT  Take 1 tablet (8 mg total) by mouth every 8 (eight) hours as needed (nausea).     ZINC-15 ORAL  Take by mouth Daily. Unknown dose           * This list has 4 medication(s) that are the same as other medications prescribed for you. Read the directions carefully, and ask your doctor or other care provider to review them with you.                  Eric Murphy MD  Obstetrics & Gynecology  [facility]

## 2023-12-27 NOTE — PLAN OF CARE
Discussed poc with pt, pt verbalized understanding     Purposeful rounding every 2hours     VS wnl  Fall precautions in place, remains injury free  Pt denies c/o nausea   Pain under control with PRN meds     IVFs  LR @100mL  Accurate I&Os  Bed locked at lowest position  Call light within reach     Chart check complete  Patient is ready for discharge

## 2023-12-27 NOTE — PROGRESS NOTES
[facility] Surg  Obstetrics & Gynecology  Progress Note    Patient Name: Yvonne Blankenship-Austin  MRN: [id number]  Admission Date: 12/26/2023  Primary Care Provider: Nafisa Irving APRN  Principal Problem: Status post vaginal hysterectomy    Subjective:     HPI:  Presents for scheduled, elective total vaginal hysterectomy/anterior and posterior colporraphy/and possible SSLF.     After standing for awhile or after using the bathroom, she notices a ball of tissue bulging from the vaginal opening.  Has lower pelvic discomfort when that happens as well.  No urinary incontinence or urgency.  No difficulty voiding.  Pt struggles with chronic constipation.  Has resumed Miralax.  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  She is , and would like to be sexually active, but feels like she can't because of her symptoms.  Pap 10/9/23: neg, HPV neg  MMG scheduled 11/1/23     On exam: pt with vaginal atrophy.  Has been using estrace cream 2x/week.  Had Grade 2 rectocele, grade 3 cystocele, and uterine prolapse with cervical descent to 1cm proximal to hymenal ring    Interval History:   Pt reports that she is doing well.  Pains are well controlled with medications.  Is tolerating PO diet well without nausea or vomiting.  Has passed flatus but no BM yet.  Is voiding and ambulating without difficulty.  Scant vaginal bleeding.  Is ready to go home.      Scheduled Meds:   ibuprofen  600 mg Oral Q6H    levothyroxine  50 mcg Oral Before breakfast    montelukast  10 mg Oral QHS     Continuous Infusions:   lactated ringers Stopped (12/26/23 9699)     PRN Meds:acetaminophen, albuterol **AND** MDI Q6H PRN, diphenhydrAMINE, HYDROcodone-acetaminophen, HYDROcodone-acetaminophen, HYDROmorphone, ondansetron, prochlorperazine, simethicone    Review of patient's allergies indicates:  No Known Allergies    Objective:     Vital Signs (Most Recent):  Temp: 97.8 °F (36.6 °C) (12/27/23 0728)  Pulse: 68 (12/27/23  0728)  Resp: 18 (12/27/23 0814)  BP: (!) 143/80 (12/27/23 0728)  SpO2: 99 % (12/27/23 0728) Vital Signs (24h Range):  Temp:  [95.3 °F (35.2 °C)-99 °F (37.2 °C)] 97.8 °F (36.6 °C)  Pulse:  [59-72] 68  Resp:  [12-27] 18  SpO2:  [95 %-100 %] 99 %  BP: (109-147)/(67-87) 143/80        Body mass index is 19.07 kg/m².  No LMP recorded. Patient is postmenopausal.    I&O (Last 24H):    Intake/Output Summary (Last 24 hours) at 12/27/2023 0840  Last data filed at 12/27/2023 0000  Gross per 24 hour   Intake 1400 ml   Output 1750 ml   Net -350 ml         Laboratory:  I have personallly reviewed all pertinent lab results from the last 24 hours.    Diagnostic Results:  Labs: Reviewed     Physical Exam:   Constitutional: She is oriented to person, place, and time. She appears well-developed and well-nourished. No distress.       Cardiovascular:  Normal rate, regular rhythm and normal heart sounds.             Pulmonary/Chest: Effort normal and breath sounds normal.        Abdominal: Soft. Bowel sounds are normal. She exhibits no distension. There is abdominal tenderness (appropriate). There is no rebound and no guarding.             Musculoskeletal: Normal range of motion and moves all extremeties. No tenderness or edema.       Neurological: She is alert and oriented to person, place, and time.    Skin: Skin is warm and dry.    Psychiatric: She has a normal mood and affect. Her behavior is normal. Thought content normal.        Review of Systems    Assessment/Plan:     * Status post vaginal hysterectomy  POD # 1 s/p TVH/A+P Repair/SSLF  Pt is doing well and is ready and stable for discharge to home.  Pt was counseled on post-operative care and warning sign instructions.    Asthma with COPD  Continue home meds    Cystocele and rectocele with incomplete uterovaginal prolapse  Desires surgical management with hysterectomy and prolapse repair.  Surgical consents were reviewed in the office in detail and signed.  Proceed with surgery as  scheduled.    Essential hypertension  Resume home meds        Eric Murphy MD  Obstetrics & Gynecology  [facility]

## 2023-12-27 NOTE — ASSESSMENT & PLAN NOTE
POD # 1 s/p TVH/A+P Repair/SSLF  Pt is doing well and is ready and stable for discharge to home.  Pt was counseled on post-operative care and warning sign instructions.

## 2023-12-27 NOTE — SUBJECTIVE & OBJECTIVE
Interval History:   Pt reports that she is doing well.  Pains are well controlled with medications.  Is tolerating PO diet well without nausea or vomiting.  Has passed flatus but no BM yet.  Is voiding and ambulating without difficulty.  Scant vaginal bleeding.  Is ready to go home.      Scheduled Meds:   ibuprofen  600 mg Oral Q6H    levothyroxine  50 mcg Oral Before breakfast    montelukast  10 mg Oral QHS     Continuous Infusions:   lactated ringers Stopped (12/26/23 2353)     PRN Meds:acetaminophen, albuterol **AND** MDI Q6H PRN, diphenhydrAMINE, HYDROcodone-acetaminophen, HYDROcodone-acetaminophen, HYDROmorphone, ondansetron, prochlorperazine, simethicone    Review of patient's allergies indicates:  No Known Allergies    Objective:     Vital Signs (Most Recent):  Temp: 97.8 °F (36.6 °C) (12/27/23 0728)  Pulse: 68 (12/27/23 0728)  Resp: 18 (12/27/23 0814)  BP: (!) 143/80 (12/27/23 0728)  SpO2: 99 % (12/27/23 0728) Vital Signs (24h Range):  Temp:  [95.3 °F (35.2 °C)-99 °F (37.2 °C)] 97.8 °F (36.6 °C)  Pulse:  [59-72] 68  Resp:  [12-27] 18  SpO2:  [95 %-100 %] 99 %  BP: (109-147)/(67-87) 143/80        Body mass index is 19.07 kg/m².  No LMP recorded. Patient is postmenopausal.    I&O (Last 24H):    Intake/Output Summary (Last 24 hours) at 12/27/2023 0840  Last data filed at 12/27/2023 0000  Gross per 24 hour   Intake 1400 ml   Output 1750 ml   Net -350 ml         Laboratory:  I have personallly reviewed all pertinent lab results from the last 24 hours.    Diagnostic Results:  Labs: Reviewed     Physical Exam:   Constitutional: She is oriented to person, place, and time. She appears well-developed and well-nourished. No distress.       Cardiovascular:  Normal rate, regular rhythm and normal heart sounds.             Pulmonary/Chest: Effort normal and breath sounds normal.        Abdominal: Soft. Bowel sounds are normal. She exhibits no distension. There is abdominal tenderness (appropriate). There is no rebound and  no guarding.             Musculoskeletal: Normal range of motion and moves all extremeties. No tenderness or edema.       Neurological: She is alert and oriented to person, place, and time.    Skin: Skin is warm and dry.    Psychiatric: She has a normal mood and affect. Her behavior is normal. Thought content normal.        Review of Systems

## 2023-12-29 LAB
FINAL PATHOLOGIC DIAGNOSIS: NORMAL
GROSS: NORMAL
Lab: NORMAL

## 2024-01-02 ENCOUNTER — TELEPHONE (OUTPATIENT)
Dept: OBSTETRICS AND GYNECOLOGY | Facility: CLINIC | Age: 64
End: 2024-01-02
Payer: COMMERCIAL

## 2024-01-02 ENCOUNTER — OFFICE VISIT (OUTPATIENT)
Dept: OBSTETRICS AND GYNECOLOGY | Facility: CLINIC | Age: 64
End: 2024-01-02
Payer: COMMERCIAL

## 2024-01-02 VITALS
WEIGHT: 103.81 LBS | DIASTOLIC BLOOD PRESSURE: 62 MMHG | HEIGHT: 62 IN | SYSTOLIC BLOOD PRESSURE: 112 MMHG | BODY MASS INDEX: 19.1 KG/M2

## 2024-01-02 DIAGNOSIS — N32.81 OAB (OVERACTIVE BLADDER): ICD-10-CM

## 2024-01-02 DIAGNOSIS — G89.18 POST-OP PAIN: ICD-10-CM

## 2024-01-02 DIAGNOSIS — Z90.710 STATUS POST VAGINAL HYSTERECTOMY: Primary | ICD-10-CM

## 2024-01-02 PROBLEM — N81.2 CYSTOCELE AND RECTOCELE WITH INCOMPLETE UTEROVAGINAL PROLAPSE: Status: RESOLVED | Noted: 2023-10-09 | Resolved: 2024-01-02

## 2024-01-02 LAB
BILIRUB UR QL STRIP: NEGATIVE
CLARITY UR REFRACT.AUTO: CLEAR
COLOR UR AUTO: YELLOW
GLUCOSE UR QL STRIP: NEGATIVE
HGB UR QL STRIP: ABNORMAL
KETONES UR QL STRIP: NEGATIVE
LEUKOCYTE ESTERASE UR QL STRIP: ABNORMAL
MICROSCOPIC COMMENT: ABNORMAL
NITRITE UR QL STRIP: NEGATIVE
PH UR STRIP: 6 [PH] (ref 5–8)
PROT UR QL STRIP: NEGATIVE
RBC #/AREA URNS AUTO: 9 /HPF (ref 0–4)
SP GR UR STRIP: 1.02 (ref 1–1.03)
SQUAMOUS #/AREA URNS AUTO: 2 /HPF
URN SPEC COLLECT METH UR: ABNORMAL
WBC #/AREA URNS AUTO: 16 /HPF (ref 0–5)

## 2024-01-02 PROCEDURE — 1159F MED LIST DOCD IN RCRD: CPT | Mod: CPTII,S$GLB,, | Performed by: OBSTETRICS & GYNECOLOGY

## 2024-01-02 PROCEDURE — 3078F DIAST BP <80 MM HG: CPT | Mod: CPTII,S$GLB,, | Performed by: OBSTETRICS & GYNECOLOGY

## 2024-01-02 PROCEDURE — 99999 PR PBB SHADOW E&M-EST. PATIENT-LVL III: CPT | Mod: PBBFAC,,, | Performed by: OBSTETRICS & GYNECOLOGY

## 2024-01-02 PROCEDURE — 81001 URINALYSIS AUTO W/SCOPE: CPT | Performed by: OBSTETRICS & GYNECOLOGY

## 2024-01-02 PROCEDURE — 99024 POSTOP FOLLOW-UP VISIT: CPT | Mod: S$GLB,,, | Performed by: OBSTETRICS & GYNECOLOGY

## 2024-01-02 PROCEDURE — 3074F SYST BP LT 130 MM HG: CPT | Mod: CPTII,S$GLB,, | Performed by: OBSTETRICS & GYNECOLOGY

## 2024-01-02 PROCEDURE — 87086 URINE CULTURE/COLONY COUNT: CPT | Performed by: OBSTETRICS & GYNECOLOGY

## 2024-01-02 RX ORDER — TRIPROLIDINE/PSEUDOEPHEDRINE 2.5MG-60MG
600 TABLET ORAL EVERY 6 HOURS
Qty: 354 ML | Refills: 1 | Status: SHIPPED | OUTPATIENT
Start: 2024-01-02

## 2024-01-02 RX ORDER — HYDROCODONE BITARTRATE AND ACETAMINOPHEN 7.5; 325 MG/15ML; MG/15ML
15 SOLUTION ORAL EVERY 6 HOURS PRN
Qty: 118 ML | Refills: 0 | Status: SHIPPED | OUTPATIENT
Start: 2024-01-02 | End: 2024-01-03 | Stop reason: SDUPTHER

## 2024-01-02 RX ORDER — LEVOFLOXACIN 25 MG/ML
500 SOLUTION ORAL
COMMUNITY
Start: 2023-12-08 | End: 2024-02-09

## 2024-01-02 RX ORDER — LEVOTHYROXINE SODIUM 25 UG/1
25 TABLET ORAL
COMMUNITY
Start: 2023-12-30

## 2024-01-02 RX ORDER — OXYBUTYNIN CHLORIDE 5 MG/1
5 TABLET, EXTENDED RELEASE ORAL DAILY
Qty: 30 TABLET | Refills: 11 | Status: SHIPPED | OUTPATIENT
Start: 2024-01-02 | End: 2024-01-04

## 2024-01-02 RX ORDER — BROMPHENIRAMINE MALEATE, PSEUDOEPHEDRINE HYDROCHLORIDE, AND DEXTROMETHORPHAN HYDROBROMIDE 2; 30; 10 MG/5ML; MG/5ML; MG/5ML
10 SYRUP ORAL
COMMUNITY
Start: 2023-12-07 | End: 2024-02-09

## 2024-01-02 NOTE — TELEPHONE ENCOUNTER
----- Message from Teresita Dugan MD sent at 12/30/2023  9:18 AM CST -----  Needs a post-op visit in about 1 month s/p vaginal hysterectomy and prolapse repair.  Please schedule.

## 2024-01-02 NOTE — TELEPHONE ENCOUNTER
----- Message from Rojas Arnold sent at 1/2/2024 10:16 AM CST -----  Contact: [phone]  Patient is calling in regards to getting a refill on her pain medication. Please call pt back at [phone].          [facility] Replaced by [facility] [address] [facility] [address] [facility]  [address] [facility] [address]  Phone: [phone] Fax: [phone]         Thanks KB

## 2024-01-02 NOTE — TELEPHONE ENCOUNTER
Spoke to patient and she had a total vaginal hysterectomy sx on 12/26/23. Patient complains of pain in her groin area and she rate it at a 9-10 on a scale of 0-10, 10 being the worst pain and 0 being no pain. She states she would like a refill of ibuprofen liquid and hydrocodone-acetaminophen. She also states she has been unable to sleep and every time she gets up she urinates. She says she will not feel the urge to urinate, urine will just come out. Informed her that I will route this message to Dr. SOO Dugan. Patient verbalized understanding.     Please advise.

## 2024-01-02 NOTE — TELEPHONE ENCOUNTER
Spoke to patient and scheduled post op appt for 02/05/24 at the [facility] location w/ Dr. Teresita Dugan. Patient verbalized understanding.

## 2024-01-02 NOTE — TELEPHONE ENCOUNTER
Called pt. Scheduled pt an appointment today with Dr. Lo at 2:30pm. Pt verbalized understanding and agreed to appointment.

## 2024-01-03 ENCOUNTER — TELEPHONE (OUTPATIENT)
Dept: OBSTETRICS AND GYNECOLOGY | Facility: CLINIC | Age: 64
End: 2024-01-03
Payer: COMMERCIAL

## 2024-01-03 DIAGNOSIS — N32.81 OVERACTIVE BLADDER: Primary | ICD-10-CM

## 2024-01-03 DIAGNOSIS — Z90.710 STATUS POST VAGINAL HYSTERECTOMY: ICD-10-CM

## 2024-01-03 RX ORDER — HYDROCODONE BITARTRATE AND ACETAMINOPHEN 7.5; 325 MG/15ML; MG/15ML
15 SOLUTION ORAL EVERY 6 HOURS PRN
Qty: 118 ML | Refills: 0 | Status: SHIPPED | OUTPATIENT
Start: 2024-01-03 | End: 2024-02-09

## 2024-01-03 NOTE — TELEPHONE ENCOUNTER
----- Message from Jhonny Alanis sent at 1/3/2024  9:30 AM CST -----  Contact: lázaro Cueva is requesting that her prescription hydrodone  from yesterday visit is called in to [facility]. Please call her back once sent in at [phone]       Thanks  DD

## 2024-01-03 NOTE — TELEPHONE ENCOUNTER
----- Message from Ivone Chang sent at 1/3/2024  3:10 PM CST -----  Contact: Yvonne  Patient is calling to speak with the nurse regarding her script foroxybutynin (DITROPAN-XL) 5 MG TR24. Reports unable to swallow pills and needs it called in as a liquid form. Please call patient at .[phone] oxybutynin (DITROPAN-XL) 5 MG TR24

## 2024-01-03 NOTE — TELEPHONE ENCOUNTER
Patient is calling to speak with the nurse regarding her script foroxybutynin (DITROPAN-XL) 5 MG TR24. Reports unable to swallow pills and needs it called in as a liquid form.     Spoke to patient and informed her I will route this message to Dr SOO Dugan. Patient verbalized understanding.

## 2024-01-03 NOTE — TELEPHONE ENCOUNTER
Called patient and medication prescribed for pain is on back order at her pharmacy.  Patient asking if medication can be sent to [facility] today.  Advised patient message would be sent to provider.

## 2024-01-04 LAB — BACTERIA UR CULT: NO GROWTH

## 2024-01-04 RX ORDER — OXYBUTYNIN CHLORIDE 5 MG/5ML
5 SYRUP ORAL 2 TIMES DAILY
Qty: 473 ML | Refills: 1 | Status: SHIPPED | OUTPATIENT
Start: 2024-01-04 | End: 2025-01-03

## 2024-01-04 NOTE — TELEPHONE ENCOUNTER
Rx sent.  I was not even aware it came in a syrup, but was able to find the order in EPIC.  Please notify her that I sent it to her pharmacy.

## 2024-01-11 ENCOUNTER — TELEPHONE (OUTPATIENT)
Dept: OBSTETRICS AND GYNECOLOGY | Facility: CLINIC | Age: 64
End: 2024-01-11
Payer: COMMERCIAL

## 2024-01-11 DIAGNOSIS — N95.2 VAGINAL ATROPHY: Primary | ICD-10-CM

## 2024-01-11 NOTE — TELEPHONE ENCOUNTER
Called patient and she stated her estrace cream is now cost her $125 with insurance.  Patient asking if there is any medication she can get that may be cheaper?  Advised message would be sent to ED.

## 2024-01-11 NOTE — TELEPHONE ENCOUNTER
----- Message from Kristen Ceron sent at 1/11/2024 11:26 AM CST -----  Pt ask to get a medication that is cheaper. The cream that was prescribed is 125.00 and that is with insurance.Please call back at  [phone]      Pharmacy [facility]

## 2024-01-16 ENCOUNTER — TELEPHONE (OUTPATIENT)
Dept: OBSTETRICS AND GYNECOLOGY | Facility: CLINIC | Age: 64
End: 2024-01-16
Payer: COMMERCIAL

## 2024-01-16 DIAGNOSIS — N95.2 VAGINAL ATROPHY: Primary | ICD-10-CM

## 2024-01-16 RX ORDER — ESTRADIOL 0.1 MG/G
1 CREAM VAGINAL
Qty: 36 G | Refills: 3 | Status: SHIPPED | OUTPATIENT
Start: 2024-01-17 | End: 2025-01-16

## 2024-01-16 NOTE — TELEPHONE ENCOUNTER
----- Message from Chelsy Mendez sent at 1/12/2024  8:35 AM CST -----  Contact: Yvonne [facility] called in to speak with Dr Dugan's staff in regards of medication prescribed (conjugated estrogens (PREMARIN) vaginal cream). She stated her insurance will not fully cover it and she could not afford to pay the amount requested which is about $500. Yvonne would like a call back to discuss any over the counter cream she can use in replace. The best call back number is [phone] (home)

## 2024-01-16 NOTE — TELEPHONE ENCOUNTER
Called patient and she believes if estrace cream could be sent to [facility] pharmacy ([facility]) it would much cheaper.  Will send message to provider.

## 2024-01-16 NOTE — TELEPHONE ENCOUNTER
----- Message from Reba Hollis sent at 1/16/2024  3:01 PM CST -----  States she is calling regarding her cream is $120.00. States her  called the insurance and they told him the nurse or someone has to call [facility]. States it has to be recommended by the doctor. Please call pt [phone]. Thank you

## 2024-01-16 NOTE — TELEPHONE ENCOUNTER
"Called patient and her  and was given [phone] [employer].      Teresita Dugan MD Hebert, Jeanetta K, LPN  Caller: Unspecified (Today,  8:38 AM)  Can someone contact her insurance company to see what vaginal estrogens are covered?    Called insurance company, was transferred several times and then was told that the account is "restricted" and they cannot get into the account.    Called patient back and advised she and her  would have to call and inquire about vaginal estrogen creams that my be covered because account is "restricted" and patient verbalized understanding.  "

## 2024-01-17 ENCOUNTER — TELEPHONE (OUTPATIENT)
Dept: OBSTETRICS AND GYNECOLOGY | Facility: CLINIC | Age: 64
End: 2024-01-17
Payer: COMMERCIAL

## 2024-01-17 NOTE — TELEPHONE ENCOUNTER
Called patient and advised ED sent script to [facility] for estrace.  Patient verbalized understanding.

## 2024-01-29 PROBLEM — J18.9 PNEUMONIA OF RIGHT UPPER LOBE DUE TO INFECTIOUS ORGANISM: Status: RESOLVED | Noted: 2023-09-01 | Resolved: 2024-01-29

## 2024-02-06 ENCOUNTER — OFFICE VISIT (OUTPATIENT)
Dept: OBSTETRICS AND GYNECOLOGY | Facility: CLINIC | Age: 64
End: 2024-02-06
Payer: COMMERCIAL

## 2024-02-06 ENCOUNTER — TELEPHONE (OUTPATIENT)
Dept: OBSTETRICS AND GYNECOLOGY | Facility: CLINIC | Age: 64
End: 2024-02-06
Payer: COMMERCIAL

## 2024-02-06 VITALS
SYSTOLIC BLOOD PRESSURE: 102 MMHG | DIASTOLIC BLOOD PRESSURE: 84 MMHG | HEIGHT: 62 IN | WEIGHT: 102.06 LBS | BODY MASS INDEX: 18.78 KG/M2

## 2024-02-06 DIAGNOSIS — Z90.710 STATUS POST VAGINAL HYSTERECTOMY: Primary | ICD-10-CM

## 2024-02-06 PROCEDURE — 3074F SYST BP LT 130 MM HG: CPT | Mod: CPTII,S$GLB,, | Performed by: PHYSICIAN ASSISTANT

## 2024-02-06 PROCEDURE — 99024 POSTOP FOLLOW-UP VISIT: CPT | Mod: S$GLB,,, | Performed by: PHYSICIAN ASSISTANT

## 2024-02-06 PROCEDURE — 99999 PR PBB SHADOW E&M-EST. PATIENT-LVL III: CPT | Mod: PBBFAC,,, | Performed by: PHYSICIAN ASSISTANT

## 2024-02-06 PROCEDURE — 3079F DIAST BP 80-89 MM HG: CPT | Mod: CPTII,S$GLB,, | Performed by: PHYSICIAN ASSISTANT

## 2024-02-06 NOTE — PROGRESS NOTES
[facility]  History and Physical    Patient Name: Yvonne Schmidt  YOB: 1960 (63 y.o.)  MRN: [id number]  Today's Date: 2024    Referring Md:   No referring provider defined for this encounter.    SUBJECTIVE:     Chief Complaint: Post-op     History of Present Illness:  Yvonne Schmidt is a 63 y.o. female  who presents to the clinic today for post-op evaluation. S/p TVH with A&P repair on 23. Unfortunately missed her post-op appointment with Dr. Dugan yesterday, but is wanting to go back to work next week. 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 Patient reports that overall she has been feeling well since surgery. Her urinary symptoms have improved since her last clinic visit. She also reports that she has been using her estrace cream nightly prior to bed. States that she is having some lower back pain that has been ongoing since before surgery, and is scheduled to see a specialist this week. Also reported low energy and poor sleep. States that she is taking vitamins including B12.       Review of patient's allergies indicates:  No Known Allergies    Past Medical History:   Diagnosis Date    Anxiety     Bradycardia 2019    GERD (gastroesophageal reflux disease)     Thyroid disease      Past Surgical History:   Procedure Laterality Date    BREAST BIOPSY Left     COLPORRHAPHY, COMBINED ANTEROPOSTERIOR N/A 2023    Procedure: COLPORRHAPHY, COMBINED ANTEROPOSTERIOR;  Surgeon: Teresita Dugan MD;  Location: [facility] OR;  Service: OB/GYN;  Laterality: N/A;    DENTAL SURGERY      SACROSPINOUS LIGAMENT FIXATION N/A 2023    Procedure: FIXATION, LIGAMENT, SACROSPINOUS;  Surgeon: Teresita Dugan MD;  Location: [facility] OR;  Service: OB/GYN;  Laterality: N/A;    SINUS SURGERY      TOTAL VAGINAL HYSTERECTOMY N/A 2023    Procedure: HYSTERECTOMY, TOTAL, VAGINAL;  Surgeon: Teresita Dugan MD;  Location: [facility] OR;  Service: OB/GYN;  Laterality: N/A;  Request two  "assistants for this case     Family History   Problem Relation Age of Onset    Heart attack Mother     Hypertension Mother     Heart disease Father     Heart attack Maternal Aunt     Heart attack Maternal Uncle      Social History     Tobacco Use    Smoking status: Former     Current packs/day: 0.00     Average packs/day: 0.8 packs/day for 37.0 years (27.8 ttl pk-yrs)     Types: Cigarettes     Start date:      Quit date: 2019     Years since quittin.1    Smokeless tobacco: Never    Tobacco comments:     did not smoke for 2 years since beginning smoking.    Substance Use Topics    Alcohol use: Never    Drug use: Never        Review of Systems:  Review of Systems   Constitutional:  Negative for chills and fever.   HENT:  Negative for congestion and sore throat.    Respiratory:  Negative for cough and shortness of breath.    Cardiovascular:  Negative for chest pain and leg swelling.   Gastrointestinal:  Negative for abdominal pain, nausea and vomiting.   Genitourinary:  Negative for dysuria.   Musculoskeletal:  Positive for back pain (chronic).   Skin:  Negative for rash.   Neurological:  Negative for weakness and headaches.       OBJECTIVE:     Vital Signs (Most Recent)  /84   Ht 5' 2" (1.575 m)   Wt 46.3 kg (102 lb 1.2 oz)   BMI 18.67 kg/m²     Physical Exam  Vitals reviewed.   Constitutional:       General: She is not in acute distress.     Appearance: Normal appearance. She is not ill-appearing or toxic-appearing.   HENT:      Head: Normocephalic and atraumatic.   Eyes:      Extraocular Movements: Extraocular movements intact.      Conjunctiva/sclera: Conjunctivae normal.   Pulmonary:      Effort: Pulmonary effort is normal. No respiratory distress.   Abdominal:      Palpations: Abdomen is soft.   Musculoskeletal:         General: Normal range of motion.      Cervical back: Normal range of motion.   Neurological:      General: No focal deficit present.      Mental Status: She is alert and oriented " to person, place, and time.   Psychiatric:         Mood and Affect: Mood normal.         Behavior: Behavior normal.         Thought Content: Thought content normal.         Judgment: Judgment normal.             ASSESSMENT/PLAN:     Yvonne Schmidt is a 63 y.o. female was seen today for post-op eval. She is now 6 weeks s/p TVH with A&P repair. Wants to go back to work next week at 7 weeks post-op. Reviewed restrictions and limitations with patient, including gradually increasing activity, avoiding heavy lifting over 20lbs until 8 weeks post-op, and pelvic rest for 12 weeks post-op. Reviewed the importance of a healthy, balanced diet with adequate protein for energy as well as adequate sleep. Will provide patient with note for work. Patient verbalized understanding.     Yvonne was seen today for post-op evaluation.    Diagnoses and all orders for this visit:    Status post vaginal hysterectomy         - No heavy lifting over 20lbs for 8 weeks post-op       - Pelvic rest (no tampon use, douching, or intercourse) for 12 weeks post-op       - RTC PRN      Kesha Phipps [facility], PA-C  OBGYN - Surgery  [facility]

## 2024-02-06 NOTE — PROGRESS NOTES
New Patient Chronic Pain Note (Initial Visit)    Referring Physician: Valentino Lora MD    PCP: Nafisa Irving APRN    Chief Complaint:   Chief Complaint   Patient presents with    Low-back Pain     Into buttocks         SUBJECTIVE:    Yvonne Schmidt is a 63 y.o. female with past medical history significant for anxiety/depression, COPD, hypertension GERD, nicotine dependence who presents to the clinic for the evaluation of tailbone pain.  Patient reports pain has been present over the last 10 years but has been particularly severe over the last 1 year.  She does recall mechanical fall within the last year, landing onto her buttocks on the cement.  to today she reports pain which is intermittent which is rated a 10/10.  Pain is described as hurts so bad.  patient reports pain along the sacral hiatus which can radiate towards the right buttock.  Pain is exacerbated with prolonged sitting, bending, lifting.  Patient does report associated sensation of weakness or her legs giving out. .  She reports there are times when she is unable to stand or walk secondary to this weakness.  Pain is improved with a heating pad, oral diclofenac.  She reports frequently her  brings her to the emergency room for shots of morphine, steroid which significantly help with her pain.  Chart review demonstrates last ER visit 11/17/2023 with morphine 4 mg, Zofran 4 mg and ketorolac 15 mg given.  Today she denies more distal radiculopathy into the lower extremities or feet.  Patient has not received prior intervention or trialed membrane stabilizing agents in the past.  Patient has performed physician directed at home physical therapy over the last 8 weeks from 12/08/2023 through 02/08/2024 without significant improvement in pain, range of motion or strength.      Patient reports significant motor weakness.  Patient denies night fever/night sweats, urinary incontinence, bowel incontinence, significant weight loss, and  loss of sensations.      Pain Disability Index Review:         2/8/2024     7:26 AM   Last 3 PDI Scores   Pain Disability Index (PDI) 70       Non-Pharmacologic Treatments:  Physical Therapy/Home Exercise: yes  Ice/Heat:yes  TENS: no  Acupuncture: no  Massage: yes  Chiropractic: no    Other: no      Pain Medications:  - Adjuvant Medications: Advil,Motrin ( Ibuprofen), Robaxin ( Methocarbamol), and Xanax (Alprazolam)    Pain Procedures:   None    Past Medical History:   Diagnosis Date    Anxiety     Bradycardia 9/19/2019    GERD (gastroesophageal reflux disease)     Thyroid disease      Past Surgical History:   Procedure Laterality Date    BREAST BIOPSY Left     COLPORRHAPHY, COMBINED ANTEROPOSTERIOR N/A 12/26/2023    Procedure: COLPORRHAPHY, COMBINED ANTEROPOSTERIOR;  Surgeon: Teresita Dugan MD;  Location: [facility] OR;  Service: OB/GYN;  Laterality: N/A;    DENTAL SURGERY      SACROSPINOUS LIGAMENT FIXATION N/A 12/26/2023    Procedure: FIXATION, LIGAMENT, SACROSPINOUS;  Surgeon: Teresita Dugan MD;  Location: [facility] OR;  Service: OB/GYN;  Laterality: N/A;    SINUS SURGERY      TOTAL VAGINAL HYSTERECTOMY N/A 12/26/2023    Procedure: HYSTERECTOMY, TOTAL, VAGINAL;  Surgeon: Teresita Dugan MD;  Location: [facility] OR;  Service: OB/GYN;  Laterality: N/A;  Request two assistants for this case     Review of patient's allergies indicates:  No Known Allergies    Current Outpatient Medications   Medication Sig    albuterol (PROVENTIL) 2.5 mg /3 mL (0.083 %) nebulizer solution Take 3 mLs (2.5 mg total) by nebulization every 4 to 6 hours as needed for Wheezing or Shortness of Breath.    albuterol (PROVENTIL/VENTOLIN HFA) 90 mcg/actuation inhaler Inhale 2 puffs into the lungs every 4 (four) hours as needed for Wheezing or Shortness of Breath. Rescue    ALPRAZolam (XANAX) 0.5 MG tablet Take 1 tablet (0.5 mg total) by mouth 2 (two) times daily as needed (SIGNIFICANT ANXIETY). New Prescriber    azelastine (ASTELIN) 137 mcg (0.1 %) nasal  spray 1 spray (137 mcg total) by Nasal route 2 (two) times daily.    brompheniramine-pseudoeph-DM (BROMFED DM) 2-30-10 mg/5 mL Syrp Take 10 mLs by mouth.    calcium carbonate (TUMS) 300 mg (750 mg) Chew Take 750 mg by mouth.    cyanocobalamin (VITAMIN B-12) 1000 MCG tablet Take 100 mcg by mouth once daily.    cyanocobalamin (VITAMIN B-12) 1000 MCG tablet Take 5,000 mcg by mouth once daily.    diclofenac (VOLTAREN) 75 MG EC tablet Take 75 mg by mouth 2 (two) times daily.    diclofenac sodium (VOLTAREN) 1 % Gel Apply 2 g topically 4 (four) times daily.    estradioL (ESTRACE) 0.01 % (0.1 mg/gram) vaginal cream Place 1 g vaginally 3 (three) times a week. (Patient not taking: Reported on 2/6/2024)    EUTHYROX 25 mcg tablet Take 25 mcg by mouth.    ferrous sulfate (FEROSUL) 220 mg (44 mg iron)/5 mL Elix Take 5 mLs (220 mg total) by mouth once daily.    FLUoxetine 20 MG capsule Take 1 capsule (20 mg total) by mouth once daily. Take IN ADDITION to Prozac 40 mg supply    fluticasone propionate (FLONASE) 50 mcg/actuation nasal spray 1 spray (50 mcg total) by Each Nostril route 2 (two) times a day.    fluticasone-salmeterol diskus inhaler 250-50 mcg Inhale 1 puff into the lungs 2 (two) times daily. Wash out mouth after use.    hydroCHLOROthiazide (HYDRODIURIL) 12.5 MG Tab Take 1 tablet (12.5 mg total) by mouth daily as needed (edema, swelling). Do not take if BP is low and feeling dry/dehydrated/dizzy (Patient taking differently: Take 12.5 mg by mouth once daily. Do not take if BP is low and feeling dry/dehydrated/dizzy)    hydrocodone-acetaminophen (HYCET) solution 7.5-325 mg/15mL Take 15 mLs by mouth every 6 (six) hours as needed for Pain. (Patient not taking: Reported on 2/6/2024)    ibuprofen 20 mg/mL oral liquid Take 30 mLs (600 mg total) by mouth every 6 (six) hours. (Patient not taking: Reported on 2/6/2024)    indomethacin (INDOCIN) 25 MG capsule Take 1 capsule (25 mg total) by mouth 3 (three) times daily. (Patient not  taking: Reported on 2/6/2024)    Lactobacillus rhamnosus GG (CULTURELLE) 10 billion cell capsule Take 1 capsule by mouth once daily.    levoFLOXacin (LEVAQUIN) 250 mg/10 mL Soln Take 500 mg by mouth.    levothyroxine (SYNTHROID) 50 MCG tablet Take 50 mcg by mouth before breakfast.    lovastatin (MEVACOR) 10 MG tablet Take 10 mg by mouth every evening.    methocarbamoL (ROBAXIN) 750 MG Tab Take 500 mg by mouth 4 (four) times daily.    montelukast (SINGULAIR) 10 mg tablet Take 10 mg by mouth every evening.    multivitamin (THERAGRAN) per tablet Take 1 tablet by mouth once daily.    omega-3 fatty acids/fish oil (FISH OIL-OMEGA-3 FATTY ACIDS) 300-1,000 mg capsule Take 1 capsule by mouth every evening.    omeprazole (PRILOSEC) 40 MG capsule Take 40 mg by mouth.    ondansetron (ZOFRAN-ODT) 8 MG TbDL Take 1 tablet (8 mg total) by mouth every 8 (eight) hours as needed (nausea).    oxybutynin (DITROPAN) 5 mg/5 mL syrup Take 5 mLs (5 mg total) by mouth 2 (two) times daily.    pregabalin (LYRICA) 75 MG capsule Take 1 capsule (75 mg total) by mouth every evening for 10 days, THEN 2 capsules (150 mg total) every evening for 10 days, THEN 3 capsules (225 mg total) every evening for 10 days.    zinc sulfate (ZINC-15 ORAL) Take by mouth Daily. Unknown dose     No current facility-administered medications for this visit.       Review of Systems     GENERAL:  No weight loss, malaise or fevers.  HEENT:   No recent changes in vision or hearing  NECK:  Negative for lumps, no difficulty with swallowing.  RESPIRATORY:  Negative for cough, wheezing or shortness of breath, patient denies any recent URI.  CARDIOVASCULAR:  Negative for chest pain or palpitations.  GI:  Negative for abdominal discomfort, blood in stools or black stools or change in bowel habits.  MUSCULOSKELETAL:  See HPI.  SKIN:  Negative for lesions, rash, and itching.  PSYCH:  No mood disorder or recent psychosocial stressors.   HEMATOLOGY/LYMPHOLOGY:  Negative for  "prolonged bleeding, bruising easily or swollen nodes.    NEURO:   No history of syncope, paralysis, seizures or tremors.  All other reviewed and negative other than HPI.    OBJECTIVE:    /83   Pulse 62   Resp 17   Ht 5' 2" (1.575 m)   Wt 47 kg (103 lb 9.9 oz)   BMI 18.95 kg/m²       Physical Exam    GENERAL: Well appearing, in no acute distress, alert and oriented x3.  PSYCH:  Mood and affect appropriate.  SKIN: Skin color, texture, turgor normal, no rashes or lesions.  HEAD/FACE:  Normocephalic, atraumatic. Cranial nerves grossly intact.    CV: RRR with palpation of the radial artery.  PULM: No evidence of respiratory difficulty, symmetric chest rise.  GI:  Soft and non-tender.    BACK: Straight leg raising in the sitting and supine positions is negative to radicular pain.  pain to palpation over the facet joints of the lumbar spine on R or spinous processes. Normal range of motion without pain reproduction.  EXTREMITIES: Peripheral joint ROM is full and pain free without obvious instability or laxity in all four extremities. No deformities, edema, or skin discoloration. Good capillary refill.  MUSCULOSKELETAL: Able to stand on heels & toes.   hip, and knee provocative maneuvers are negative.  pain with palpation over the sacroiliac joints on R.  Gaenslen's, Distraction/Compression and  FABERs test is negative.  Facet loading test is positive on R.   Bilateral upper and lower extremity strength is normal and symmetric.  No atrophy or tone abnormalities are noted.    RIGHT Lower extremity: Hip flexion 5/5, Hip Abduction 5/5, Hip Adduction 5/5, Knee extension 5/5, Knee flexion 5/5, Ankle dorsiflexion5/5, Extensor hallucis longus 5/5, Ankle plantarflexion 5/5  LEFT Lower extremity:  Hip flexion 5/5, Hip Abduction 5/5,Hip Adduction 5/5, Knee extension 5/5, Knee flexion 5/5, Ankle dorsiflexion 5/5, Extensor hallucis longus 5/5, Ankle plantarflexion 5/5  -Normal testing knee (patellar) jerk and ankle " (achilles) jerk    NEURO: Bilateral upper and lower extremity coordination and muscle stretch reflexes are physiologic and symmetric. No loss of sensation is noted.  GAIT: normal.    Imagin23    X-Ray Lumbar Spine Ap And Lateral  FINDINGS:  Multiple radiographic views  were obtained.    No evidence of acute fracture or dislocation.  Bony mineralization is normal.  Soft tissues are unremarkable. Suggestion of degenerative joint disease          ASSESSMENT: 63 y.o. year old female with     1. Lumbar spondylosis        2. Lumbar facet arthropathy        3. Coccydynia  X-Ray Sacrum And Coccyx    Case Request-RAD/Other Procedure Area: Sacrococcygeal ligament and ganglion impar injection    HME - OTHER            PLAN:   - Interventions:  Schedule for sacrococcygeal ligament/ganglion impar injection to see if this helps with coccydynia. Explained the risks and benefits of the procedure in detail with the patient today in clinic along with alternative treatment options, and the patient elected to pursue the intervention at this time.      - Anticoagulation use: No no anticoagulation     report:  Reviewed and consistent with medication use as prescribed.    - Medications:  --I will start the patient on a Lyrica titration to see if this helps with neuropathic pain.  We discussed increasing the dose gradually to reach a therapeutic goal according to the following algorithm.  We discussed potential side effects of this medication which may include drowsiness,dizziness, dry mouth, constipation or peripheral edema.    -Week 1: Take 1 capsule, 75 mg q.h.s.  -Week 2: Take 2 capsules, 150 mg q.h.s.  -Week 3: Take 3 capsules, 225 mg q.h.s.    - Start diclofenac 1% gel to apply 2g topically up to 4 times per day.      - Therapy:   We discussed continuing at home physician directed physical therapy to help manage the patient/s painful condition. The patient was counseled that muscle strengthening will improve the long  term prognosis in regards to pain and may also help increase range of motion and mobility.     - Imaging: Reviewed available imaging with patient and answered any questions they had regarding study.  X-ray sacrum and coccyx to better evaluate pain    -HME:  Donut ordered to help offset pressure from sacral hiatus    - Follow up visit: return to clinic in 4-6 weeks post injection      The above plan and management options were discussed at length with patient. Patient is in agreement with the above and verbalized understanding.    - I discussed the goals of interventional chronic pain management with the patient on today's visit. We discussed a multimodal and systematic approach to pain.  This includes diagnostic and therapeutic injections, adjuvant pharmacologic treatment, physical therapy, and at times psychiatry.  I emphasized the importance of regular exercise, core strengthening and stretching, diet and weight loss as a cornerstone of long-term pain management.    - This condition does not require this patient to take time off of work, and the primary goal of our Pain Management services is to improve the patient's functional capacity.  - Patient Questions: Answered all of the patient's questions regarding diagnoses, therapy, treatment and next steps        Elizabet Siu MD  Interventional Pain Management  [facility]    Disclaimer:  This note was prepared using voice recognition system and is likely to have sound alike errors that may have been overlooked even after proof reading.  Please call me with any questions

## 2024-02-06 NOTE — TELEPHONE ENCOUNTER
Called patient and she thought her appointment was today with ED and it was yesterday.  Patient stated she needs to be seen this week because she has to go back to work on 2/12.  Unable to reschedule patient this week with ED.  Rescheduled with PA.

## 2024-02-06 NOTE — H&P (VIEW-ONLY)
New Patient Chronic Pain Note (Initial Visit)    Referring Physician: Valentino Lora MD    PCP: Nafisa Irving APRN    Chief Complaint:   Chief Complaint   Patient presents with    Low-back Pain     Into buttocks         SUBJECTIVE:    Yvonne Schmidt is a 63 y.o. female with past medical history significant for anxiety/depression, COPD, hypertension GERD, nicotine dependence who presents to the clinic for the evaluation of tailbone pain.  Patient reports pain has been present over the last 10 years but has been particularly severe over the last 1 year.  She does recall mechanical fall within the last year, landing onto her buttocks on the cement.  to today she reports pain which is intermittent which is rated a 10/10.  Pain is described as hurts so bad.  patient reports pain along the sacral hiatus which can radiate towards the right buttock.  Pain is exacerbated with prolonged sitting, bending, lifting.  Patient does report associated sensation of weakness or her legs giving out. .  She reports there are times when she is unable to stand or walk secondary to this weakness.  Pain is improved with a heating pad, oral diclofenac.  She reports frequently her  brings her to the emergency room for shots of morphine, steroid which significantly help with her pain.  Chart review demonstrates last ER visit 11/17/2023 with morphine 4 mg, Zofran 4 mg and ketorolac 15 mg given.  Today she denies more distal radiculopathy into the lower extremities or feet.  Patient has not received prior intervention or trialed membrane stabilizing agents in the past.  Patient has performed physician directed at home physical therapy over the last 8 weeks from 12/08/2023 through 02/08/2024 without significant improvement in pain, range of motion or strength.      Patient reports significant motor weakness.  Patient denies night fever/night sweats, urinary incontinence, bowel incontinence, significant weight loss, and  loss of sensations.      Pain Disability Index Review:         2/8/2024     7:26 AM   Last 3 PDI Scores   Pain Disability Index (PDI) 70       Non-Pharmacologic Treatments:  Physical Therapy/Home Exercise: yes  Ice/Heat:yes  TENS: no  Acupuncture: no  Massage: yes  Chiropractic: no    Other: no      Pain Medications:  - Adjuvant Medications: Advil,Motrin ( Ibuprofen), Robaxin ( Methocarbamol), and Xanax (Alprazolam)    Pain Procedures:   None    Past Medical History:   Diagnosis Date    Anxiety     Bradycardia 9/19/2019    GERD (gastroesophageal reflux disease)     Thyroid disease      Past Surgical History:   Procedure Laterality Date    BREAST BIOPSY Left     COLPORRHAPHY, COMBINED ANTEROPOSTERIOR N/A 12/26/2023    Procedure: COLPORRHAPHY, COMBINED ANTEROPOSTERIOR;  Surgeon: Teresita Dugan MD;  Location: [facility] OR;  Service: OB/GYN;  Laterality: N/A;    DENTAL SURGERY      SACROSPINOUS LIGAMENT FIXATION N/A 12/26/2023    Procedure: FIXATION, LIGAMENT, SACROSPINOUS;  Surgeon: Teresita Dugan MD;  Location: [facility] OR;  Service: OB/GYN;  Laterality: N/A;    SINUS SURGERY      TOTAL VAGINAL HYSTERECTOMY N/A 12/26/2023    Procedure: HYSTERECTOMY, TOTAL, VAGINAL;  Surgeon: Teresita Dugan MD;  Location: [facility] OR;  Service: OB/GYN;  Laterality: N/A;  Request two assistants for this case     Review of patient's allergies indicates:  No Known Allergies    Current Outpatient Medications   Medication Sig    albuterol (PROVENTIL) 2.5 mg /3 mL (0.083 %) nebulizer solution Take 3 mLs (2.5 mg total) by nebulization every 4 to 6 hours as needed for Wheezing or Shortness of Breath.    albuterol (PROVENTIL/VENTOLIN HFA) 90 mcg/actuation inhaler Inhale 2 puffs into the lungs every 4 (four) hours as needed for Wheezing or Shortness of Breath. Rescue    ALPRAZolam (XANAX) 0.5 MG tablet Take 1 tablet (0.5 mg total) by mouth 2 (two) times daily as needed (SIGNIFICANT ANXIETY). New Prescriber    azelastine (ASTELIN) 137 mcg (0.1 %) nasal  spray 1 spray (137 mcg total) by Nasal route 2 (two) times daily.    brompheniramine-pseudoeph-DM (BROMFED DM) 2-30-10 mg/5 mL Syrp Take 10 mLs by mouth.    calcium carbonate (TUMS) 300 mg (750 mg) Chew Take 750 mg by mouth.    cyanocobalamin (VITAMIN B-12) 1000 MCG tablet Take 100 mcg by mouth once daily.    cyanocobalamin (VITAMIN B-12) 1000 MCG tablet Take 5,000 mcg by mouth once daily.    diclofenac (VOLTAREN) 75 MG EC tablet Take 75 mg by mouth 2 (two) times daily.    diclofenac sodium (VOLTAREN) 1 % Gel Apply 2 g topically 4 (four) times daily.    estradioL (ESTRACE) 0.01 % (0.1 mg/gram) vaginal cream Place 1 g vaginally 3 (three) times a week. (Patient not taking: Reported on 2/6/2024)    EUTHYROX 25 mcg tablet Take 25 mcg by mouth.    ferrous sulfate (FEROSUL) 220 mg (44 mg iron)/5 mL Elix Take 5 mLs (220 mg total) by mouth once daily.    FLUoxetine 20 MG capsule Take 1 capsule (20 mg total) by mouth once daily. Take IN ADDITION to Prozac 40 mg supply    fluticasone propionate (FLONASE) 50 mcg/actuation nasal spray 1 spray (50 mcg total) by Each Nostril route 2 (two) times a day.    fluticasone-salmeterol diskus inhaler 250-50 mcg Inhale 1 puff into the lungs 2 (two) times daily. Wash out mouth after use.    hydroCHLOROthiazide (HYDRODIURIL) 12.5 MG Tab Take 1 tablet (12.5 mg total) by mouth daily as needed (edema, swelling). Do not take if BP is low and feeling dry/dehydrated/dizzy (Patient taking differently: Take 12.5 mg by mouth once daily. Do not take if BP is low and feeling dry/dehydrated/dizzy)    hydrocodone-acetaminophen (HYCET) solution 7.5-325 mg/15mL Take 15 mLs by mouth every 6 (six) hours as needed for Pain. (Patient not taking: Reported on 2/6/2024)    ibuprofen 20 mg/mL oral liquid Take 30 mLs (600 mg total) by mouth every 6 (six) hours. (Patient not taking: Reported on 2/6/2024)    indomethacin (INDOCIN) 25 MG capsule Take 1 capsule (25 mg total) by mouth 3 (three) times daily. (Patient not  taking: Reported on 2/6/2024)    Lactobacillus rhamnosus GG (CULTURELLE) 10 billion cell capsule Take 1 capsule by mouth once daily.    levoFLOXacin (LEVAQUIN) 250 mg/10 mL Soln Take 500 mg by mouth.    levothyroxine (SYNTHROID) 50 MCG tablet Take 50 mcg by mouth before breakfast.    lovastatin (MEVACOR) 10 MG tablet Take 10 mg by mouth every evening.    methocarbamoL (ROBAXIN) 750 MG Tab Take 500 mg by mouth 4 (four) times daily.    montelukast (SINGULAIR) 10 mg tablet Take 10 mg by mouth every evening.    multivitamin (THERAGRAN) per tablet Take 1 tablet by mouth once daily.    omega-3 fatty acids/fish oil (FISH OIL-OMEGA-3 FATTY ACIDS) 300-1,000 mg capsule Take 1 capsule by mouth every evening.    omeprazole (PRILOSEC) 40 MG capsule Take 40 mg by mouth.    ondansetron (ZOFRAN-ODT) 8 MG TbDL Take 1 tablet (8 mg total) by mouth every 8 (eight) hours as needed (nausea).    oxybutynin (DITROPAN) 5 mg/5 mL syrup Take 5 mLs (5 mg total) by mouth 2 (two) times daily.    pregabalin (LYRICA) 75 MG capsule Take 1 capsule (75 mg total) by mouth every evening for 10 days, THEN 2 capsules (150 mg total) every evening for 10 days, THEN 3 capsules (225 mg total) every evening for 10 days.    zinc sulfate (ZINC-15 ORAL) Take by mouth Daily. Unknown dose     No current facility-administered medications for this visit.       Review of Systems     GENERAL:  No weight loss, malaise or fevers.  HEENT:   No recent changes in vision or hearing  NECK:  Negative for lumps, no difficulty with swallowing.  RESPIRATORY:  Negative for cough, wheezing or shortness of breath, patient denies any recent URI.  CARDIOVASCULAR:  Negative for chest pain or palpitations.  GI:  Negative for abdominal discomfort, blood in stools or black stools or change in bowel habits.  MUSCULOSKELETAL:  See HPI.  SKIN:  Negative for lesions, rash, and itching.  PSYCH:  No mood disorder or recent psychosocial stressors.   HEMATOLOGY/LYMPHOLOGY:  Negative for  "prolonged bleeding, bruising easily or swollen nodes.    NEURO:   No history of syncope, paralysis, seizures or tremors.  All other reviewed and negative other than HPI.    OBJECTIVE:    /83   Pulse 62   Resp 17   Ht 5' 2" (1.575 m)   Wt 47 kg (103 lb 9.9 oz)   BMI 18.95 kg/m²       Physical Exam    GENERAL: Well appearing, in no acute distress, alert and oriented x3.  PSYCH:  Mood and affect appropriate.  SKIN: Skin color, texture, turgor normal, no rashes or lesions.  HEAD/FACE:  Normocephalic, atraumatic. Cranial nerves grossly intact.    CV: RRR with palpation of the radial artery.  PULM: No evidence of respiratory difficulty, symmetric chest rise.  GI:  Soft and non-tender.    BACK: Straight leg raising in the sitting and supine positions is negative to radicular pain.  pain to palpation over the facet joints of the lumbar spine on R or spinous processes. Normal range of motion without pain reproduction.  EXTREMITIES: Peripheral joint ROM is full and pain free without obvious instability or laxity in all four extremities. No deformities, edema, or skin discoloration. Good capillary refill.  MUSCULOSKELETAL: Able to stand on heels & toes.   hip, and knee provocative maneuvers are negative.  pain with palpation over the sacroiliac joints on R.  Gaenslen's, Distraction/Compression and  FABERs test is negative.  Facet loading test is positive on R.   Bilateral upper and lower extremity strength is normal and symmetric.  No atrophy or tone abnormalities are noted.    RIGHT Lower extremity: Hip flexion 5/5, Hip Abduction 5/5, Hip Adduction 5/5, Knee extension 5/5, Knee flexion 5/5, Ankle dorsiflexion5/5, Extensor hallucis longus 5/5, Ankle plantarflexion 5/5  LEFT Lower extremity:  Hip flexion 5/5, Hip Abduction 5/5,Hip Adduction 5/5, Knee extension 5/5, Knee flexion 5/5, Ankle dorsiflexion 5/5, Extensor hallucis longus 5/5, Ankle plantarflexion 5/5  -Normal testing knee (patellar) jerk and ankle " (achilles) jerk    NEURO: Bilateral upper and lower extremity coordination and muscle stretch reflexes are physiologic and symmetric. No loss of sensation is noted.  GAIT: normal.    Imagin23    X-Ray Lumbar Spine Ap And Lateral  FINDINGS:  Multiple radiographic views  were obtained.    No evidence of acute fracture or dislocation.  Bony mineralization is normal.  Soft tissues are unremarkable. Suggestion of degenerative joint disease          ASSESSMENT: 63 y.o. year old female with     1. Lumbar spondylosis        2. Lumbar facet arthropathy        3. Coccydynia  X-Ray Sacrum And Coccyx    Case Request-RAD/Other Procedure Area: Sacrococcygeal ligament and ganglion impar injection    HME - OTHER            PLAN:   - Interventions:  Schedule for sacrococcygeal ligament/ganglion impar injection to see if this helps with coccydynia. Explained the risks and benefits of the procedure in detail with the patient today in clinic along with alternative treatment options, and the patient elected to pursue the intervention at this time.      - Anticoagulation use: No no anticoagulation     report:  Reviewed and consistent with medication use as prescribed.    - Medications:  --I will start the patient on a Lyrica titration to see if this helps with neuropathic pain.  We discussed increasing the dose gradually to reach a therapeutic goal according to the following algorithm.  We discussed potential side effects of this medication which may include drowsiness,dizziness, dry mouth, constipation or peripheral edema.    -Week 1: Take 1 capsule, 75 mg q.h.s.  -Week 2: Take 2 capsules, 150 mg q.h.s.  -Week 3: Take 3 capsules, 225 mg q.h.s.    - Start diclofenac 1% gel to apply 2g topically up to 4 times per day.      - Therapy:   We discussed continuing at home physician directed physical therapy to help manage the patient/s painful condition. The patient was counseled that muscle strengthening will improve the long  term prognosis in regards to pain and may also help increase range of motion and mobility.     - Imaging: Reviewed available imaging with patient and answered any questions they had regarding study.  X-ray sacrum and coccyx to better evaluate pain    -HME:  Donut ordered to help offset pressure from sacral hiatus    - Follow up visit: return to clinic in 4-6 weeks post injection      The above plan and management options were discussed at length with patient. Patient is in agreement with the above and verbalized understanding.    - I discussed the goals of interventional chronic pain management with the patient on today's visit. We discussed a multimodal and systematic approach to pain.  This includes diagnostic and therapeutic injections, adjuvant pharmacologic treatment, physical therapy, and at times psychiatry.  I emphasized the importance of regular exercise, core strengthening and stretching, diet and weight loss as a cornerstone of long-term pain management.    - This condition does not require this patient to take time off of work, and the primary goal of our Pain Management services is to improve the patient's functional capacity.  - Patient Questions: Answered all of the patient's questions regarding diagnoses, therapy, treatment and next steps        Elizabet Siu MD  Interventional Pain Management  [facility]    Disclaimer:  This note was prepared using voice recognition system and is likely to have sound alike errors that may have been overlooked even after proof reading.  Please call me with any questions

## 2024-02-06 NOTE — TELEPHONE ENCOUNTER
----- Message from Carlota Guerin sent at 2/6/2024 10:42 AM CST -----  Regarding: pt called  Name of Who is Calling: EMILY BISWAS [[id number]]      What is the request in detail: requesting to reschedule her post op appt. Please advise       Can the clinic reply by [facility]: No      What Number to Call Back [facility]: Telephone Information:           [phone]

## 2024-02-06 NOTE — LETTER
February 6, 2024      [facility]  [id number] [place] [id number]  Phone: [phone]  Fax: [phone]       Patient: Yvonne Schmidt   YOB: 1960  Date of Visit: 02/06/2024    To Whom It May Concern:    Yvonne Schmidt was under our care at [facility] on 02/06/2024. The patient may return to work/school on Monday 2/12/24 with restrictions. Patient may not lift more than 20lbs until 2/21/24. Patient should avoid strenuous activity until 2/13/24. Additionally, patient should be permitted adequate breaks to ensure proper recovery. If you have any questions or concerns, or if I can be of further assistance, please do not hesitate to contact me.    Sincerely,    WILLY Vickers, PAMARCIAL  OBGYN - Surgery  [facility]

## 2024-02-08 ENCOUNTER — OFFICE VISIT (OUTPATIENT)
Dept: PAIN MEDICINE | Facility: CLINIC | Age: 64
End: 2024-02-08
Payer: COMMERCIAL

## 2024-02-08 ENCOUNTER — HOSPITAL ENCOUNTER (OUTPATIENT)
Dept: RADIOLOGY | Facility: HOSPITAL | Age: 64
Discharge: HOME | End: 2024-02-08
Attending: ANESTHESIOLOGY
Payer: COMMERCIAL

## 2024-02-08 VITALS
SYSTOLIC BLOOD PRESSURE: 122 MMHG | DIASTOLIC BLOOD PRESSURE: 83 MMHG | RESPIRATION RATE: 17 BRPM | BODY MASS INDEX: 19.07 KG/M2 | WEIGHT: 103.63 LBS | HEIGHT: 62 IN | HEART RATE: 62 BPM

## 2024-02-08 DIAGNOSIS — M53.3 COCCYDYNIA: ICD-10-CM

## 2024-02-08 DIAGNOSIS — M47.816 LUMBAR SPONDYLOSIS: Primary | ICD-10-CM

## 2024-02-08 DIAGNOSIS — M47.816 LUMBAR FACET ARTHROPATHY: ICD-10-CM

## 2024-02-08 PROCEDURE — 72220 X-RAY EXAM SACRUM TAILBONE: CPT | Mod: TC

## 2024-02-08 PROCEDURE — 99204 OFFICE O/P NEW MOD 45 MIN: CPT | Mod: S$GLB,,, | Performed by: ANESTHESIOLOGY

## 2024-02-08 PROCEDURE — 99213 OFFICE O/P EST LOW 20 MIN: CPT | Mod: PBBFAC,25 | Performed by: ANESTHESIOLOGY

## 2024-02-08 PROCEDURE — 72220 X-RAY EXAM SACRUM TAILBONE: CPT | Mod: 26,,, | Performed by: RADIOLOGY

## 2024-02-08 PROCEDURE — 99999 PR PBB SHADOW E&M-EST. PATIENT-LVL III: CPT | Mod: PBBFAC,,, | Performed by: ANESTHESIOLOGY

## 2024-02-08 RX ORDER — PREGABALIN 75 MG/1
CAPSULE ORAL
Qty: 60 CAPSULE | Refills: 0 | Status: SHIPPED | OUTPATIENT
Start: 2024-02-08 | End: 2024-03-09

## 2024-02-08 RX ORDER — DICLOFENAC SODIUM 10 MG/G
2 GEL TOPICAL 4 TIMES DAILY
Qty: 1 EACH | Refills: 3 | Status: SHIPPED | OUTPATIENT
Start: 2024-02-08 | End: 2024-02-09

## 2024-02-09 ENCOUNTER — LAB VISIT (OUTPATIENT)
Dept: LAB | Facility: HOSPITAL | Age: 64
End: 2024-02-09
Payer: COMMERCIAL

## 2024-02-09 ENCOUNTER — OFFICE VISIT (OUTPATIENT)
Dept: ALLERGY | Facility: CLINIC | Age: 64
End: 2024-02-09
Payer: COMMERCIAL

## 2024-02-09 VITALS
HEART RATE: 63 BPM | BODY MASS INDEX: 19.27 KG/M2 | DIASTOLIC BLOOD PRESSURE: 75 MMHG | WEIGHT: 104.75 LBS | HEIGHT: 62 IN | SYSTOLIC BLOOD PRESSURE: 108 MMHG

## 2024-02-09 DIAGNOSIS — J31.0 CHRONIC NONALLERGIC RHINITIS: ICD-10-CM

## 2024-02-09 DIAGNOSIS — B99.9 RECURRENT INFECTIONS: ICD-10-CM

## 2024-02-09 DIAGNOSIS — B99.9 RECURRENT INFECTIONS: Primary | ICD-10-CM

## 2024-02-09 DIAGNOSIS — D80.6 SPECIFIC ANTIBODY DEFICIENCY WITH NORMAL IG CONCENTRATION AND NORMAL NUMBER OF B CELLS: ICD-10-CM

## 2024-02-09 PROCEDURE — 86317 IMMUNOASSAY INFECTIOUS AGENT: CPT | Performed by: STUDENT IN AN ORGANIZED HEALTH CARE EDUCATION/TRAINING PROGRAM

## 2024-02-09 PROCEDURE — 99213 OFFICE O/P EST LOW 20 MIN: CPT | Mod: PBBFAC | Performed by: STUDENT IN AN ORGANIZED HEALTH CARE EDUCATION/TRAINING PROGRAM

## 2024-02-09 PROCEDURE — 99214 OFFICE O/P EST MOD 30 MIN: CPT | Mod: S$GLB,,, | Performed by: STUDENT IN AN ORGANIZED HEALTH CARE EDUCATION/TRAINING PROGRAM

## 2024-02-09 PROCEDURE — 36415 COLL VENOUS BLD VENIPUNCTURE: CPT | Performed by: STUDENT IN AN ORGANIZED HEALTH CARE EDUCATION/TRAINING PROGRAM

## 2024-02-09 PROCEDURE — 99999 PR PBB SHADOW E&M-EST. PATIENT-LVL III: CPT | Mod: PBBFAC,,, | Performed by: STUDENT IN AN ORGANIZED HEALTH CARE EDUCATION/TRAINING PROGRAM

## 2024-02-09 RX ORDER — AMOXICILLIN 500 MG/1
500 CAPSULE ORAL DAILY
Qty: 30 CAPSULE | Refills: 2 | Status: SHIPPED | OUTPATIENT
Start: 2024-02-09 | End: 2024-05-09

## 2024-02-09 NOTE — PROGRESS NOTES
Allergy and Immunology  Established Patient Clinic Note    Date: 2/9/2024  Chief Complaint   Patient presents with    Follow-up     History  Yvonne Schmidt is a 63 y.o. female being seen for follow-up today.    Specific Antibody Deficiency   - No infection reported since prior appointment   - Spoke with patient regarding tx options   - Trial of ppx Abx with Amoxicillin at this time   - Patient wants to delay IVIG due to work schedule    Chronic Rhinitis   - Some complaint of right sided congestion   - Continue current medications    Allergies, PMH, PSH, Social, and Family History were reviewed.    Current Outpatient Medications on File Prior to Visit   Medication Sig Dispense Refill    albuterol (PROVENTIL) 2.5 mg /3 mL (0.083 %) nebulizer solution Take 3 mLs (2.5 mg total) by nebulization every 4 to 6 hours as needed for Wheezing or Shortness of Breath. 360 mL 11    ALPRAZolam (XANAX) 0.5 MG tablet Take 1 tablet (0.5 mg total) by mouth 2 (two) times daily as needed (SIGNIFICANT ANXIETY). New Prescriber 60 tablet 2    azelastine (ASTELIN) 137 mcg (0.1 %) nasal spray 1 spray (137 mcg total) by Nasal route 2 (two) times daily. 30 mL 11    diclofenac (VOLTAREN) 75 MG EC tablet Take 75 mg by mouth 2 (two) times daily.      EUTHYROX 25 mcg tablet Take 25 mcg by mouth.      ferrous sulfate (FEROSUL) 220 mg (44 mg iron)/5 mL Elix Take 5 mLs (220 mg total) by mouth once daily. 120 mL 2    fluticasone propionate (FLONASE) 50 mcg/actuation nasal spray 1 spray (50 mcg total) by Each Nostril route 2 (two) times a day. 16 g 11    fluticasone-salmeterol diskus inhaler 250-50 mcg Inhale 1 puff into the lungs 2 (two) times daily. Wash out mouth after use. 60 each 11    hydroCHLOROthiazide (HYDRODIURIL) 12.5 MG Tab Take 1 tablet (12.5 mg total) by mouth daily as needed (edema, swelling). Do not take if BP is low and feeling dry/dehydrated/dizzy (Patient taking differently: Take 12.5 mg by mouth once daily. Do not take if BP is  low and feeling dry/dehydrated/dizzy) 90 tablet 1    lovastatin (MEVACOR) 10 MG tablet Take 10 mg by mouth every evening.      montelukast (SINGULAIR) 10 mg tablet Take 10 mg by mouth every evening.      ondansetron (ZOFRAN-ODT) 8 MG TbDL Take 1 tablet (8 mg total) by mouth every 8 (eight) hours as needed (nausea). 20 tablet 1    oxybutynin (DITROPAN) 5 mg/5 mL syrup Take 5 mLs (5 mg total) by mouth 2 (two) times daily. 473 mL 1    [DISCONTINUED] brompheniramine-pseudoeph-DM (BROMFED DM) 2-30-10 mg/5 mL Syrp Take 10 mLs by mouth.      albuterol (PROVENTIL/VENTOLIN HFA) 90 mcg/actuation inhaler Inhale 2 puffs into the lungs every 4 (four) hours as needed for Wheezing or Shortness of Breath. Rescue (Patient not taking: Reported on 2/9/2024) 18 g 11    calcium carbonate (TUMS) 300 mg (750 mg) Chew Take 750 mg by mouth.      cyanocobalamin (VITAMIN B-12) 1000 MCG tablet Take 100 mcg by mouth once daily.      cyanocobalamin (VITAMIN B-12) 1000 MCG tablet Take 5,000 mcg by mouth once daily.      estradioL (ESTRACE) 0.01 % (0.1 mg/gram) vaginal cream Place 1 g vaginally 3 (three) times a week. (Patient not taking: Reported on 2/6/2024) 36 g 3    FLUoxetine 20 MG capsule Take 1 capsule (20 mg total) by mouth once daily. Take IN ADDITION to Prozac 40 mg supply 30 capsule 2    ibuprofen 20 mg/mL oral liquid Take 30 mLs (600 mg total) by mouth every 6 (six) hours. (Patient not taking: Reported on 2/6/2024) 354 mL 1    indomethacin (INDOCIN) 25 MG capsule Take 1 capsule (25 mg total) by mouth 3 (three) times daily. (Patient not taking: Reported on 2/6/2024) 90 capsule 3    Lactobacillus rhamnosus GG (CULTURELLE) 10 billion cell capsule Take 1 capsule by mouth once daily.      levothyroxine (SYNTHROID) 50 MCG tablet Take 50 mcg by mouth before breakfast.      methocarbamoL (ROBAXIN) 750 MG Tab Take 500 mg by mouth 4 (four) times daily.      multivitamin (THERAGRAN) per tablet Take 1 tablet by mouth once daily.      omega-3 fatty  acids/fish oil (FISH OIL-OMEGA-3 FATTY ACIDS) 300-1,000 mg capsule Take 1 capsule by mouth every evening.      omeprazole (PRILOSEC) 40 MG capsule Take 40 mg by mouth.      pregabalin (LYRICA) 75 MG capsule Take 1 capsule (75 mg total) by mouth every evening for 10 days, THEN 2 capsules (150 mg total) every evening for 10 days, THEN 3 capsules (225 mg total) every evening for 10 days. 60 capsule 0    zinc sulfate (ZINC-15 ORAL) Take by mouth Daily. Unknown dose      [DISCONTINUED] diclofenac sodium (VOLTAREN) 1 % Gel Apply 2 g topically 4 (four) times daily. (Patient not taking: Reported on 2/9/2024) 1 each 3    [DISCONTINUED] hydrocodone-acetaminophen (HYCET) solution 7.5-325 mg/15mL Take 15 mLs by mouth every 6 (six) hours as needed for Pain. (Patient not taking: Reported on 2/6/2024) 118 mL 0    [DISCONTINUED] levoFLOXacin (LEVAQUIN) 250 mg/10 mL Soln Take 500 mg by mouth.       No current facility-administered medications on file prior to visit.     Physical Examination  Vitals:    02/09/24 0913   BP: 108/75   Pulse: 63     GENERAL:  female in no apparent distress and well developed and well nourished  HEAD:  Normocephalic, without obvious abnormality, atraumatic  EYES: sclera anicteric, conjunctiva normochromic  EARS: normal TM's and external ear canals both ears  NOSE: without erythema or discharge, clear and copious discharge, turbinates normal    OROPHARYNX: moist mucous membranes without erythema, exudates or petechiae, clear post-nasal drainage present  LYMPH NODES: normal, supple, no lymphadenopathy  LUNGS: clear to auscultation, no wheezes, rales or rhonchi, symmetric air entry.  HEART: normal rate, regular rhythm, normal S1, S2, no murmurs, rubs, clicks or gallops.  ABDOMEN: soft, nontender, nondistended, no masses or organomegaly.  MUSCULOSKELETAL: no gross joint deformity or swelling.  NEURO: alert, oriented, normal speech, no focal findings or movement disorder noted.  SKIN: normal coloration and  turgor, no rashes, no suspicious skin lesions noted.     Assessment/Plan:   Problem List Items Addressed This Visit          ENT    Chronic nonallergic rhinitis    Overview     - 11/08/2023: Serum IgE to Aeroallergens negative          Current Assessment & Plan     - Improved, continue current medications             ID    Recurrent infections - Primary    Overview     - 10/25/2023: PPSV23 vaccination   - 11/08/2023: 8/23 pneumococcal titers protective  - Recurrent sinus infections - multiple in the past   - Recent PNA - completed treatment with pulm   - Concern for otitis media - more than 1 in the past   - No issues with infection until 50-59 yo   - No hx of bacteremia, fungal infections, or viral infections          Relevant Medications    amoxicillin (AMOXIL) 500 MG capsule    Other Relevant Orders    Streptococcus Pneumoniae IgG Antibody (23 Serotypes), MAID       Immunology/Multi System    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    Overview     - 02/2024: Starting ppx Amoxicillin 500 mg daily instead of IVIG per shared decision making with patient   - 12/2023: Sinus infection tx with Levofloxacin   - 12/08/2023: PCV20 vaccination   - 11/29/2023: 6/23 pneumococcal titers protective  - 11/08/2023: 8/23 pneumococcal titers protective  - 10/25/2023: PPSV23 vaccination            Current Assessment & Plan     - No infection reported since prior appointment   - Spoke with patient regarding tx options   - Trial of ppx Abx with Amoxicillin at this time   - Patient wants to delay IVIG due to work schedule         Relevant Medications    amoxicillin (AMOXIL) 500 MG capsule    Other Relevant Orders    Streptococcus Pneumoniae IgG Antibody (23 Serotypes), MAID     Follow up:  Follow up in about 3 months (around 5/9/2024).    Grayson Guaman MD   [facility]  Allergy and Immunology

## 2024-02-09 NOTE — ASSESSMENT & PLAN NOTE
- No infection reported since prior appointment   - Spoke with patient regarding tx options   - Trial of ppx Abx with Amoxicillin at this time   - Patient wants to delay IVIG due to work schedule

## 2024-02-12 ENCOUNTER — TELEPHONE (OUTPATIENT)
Dept: PAIN MEDICINE | Facility: CLINIC | Age: 64
End: 2024-02-12
Payer: COMMERCIAL

## 2024-02-12 NOTE — TELEPHONE ENCOUNTER
Reach out to pt to see how often she is taking her Lyrica 75mg because she reported that it was to strong. Pt took it once in the am and it cause her to run into walls. She said that she will stop taking it as of today and will wait to see if the provider will change up her medication. Inform pt that  is out until Thursday, pt understood.

## 2024-02-12 NOTE — TELEPHONE ENCOUNTER
----- Message from Sheela Amos sent at 2/12/2024  1:03 PM CST -----  Regarding: Medication  too strong  Meds too strong    2/12/24-Pt called, stating the medicine (Pragabalin-75 mg/capsule)  she's taking is too strong and need something else milder. Please contact pt at [phone]

## 2024-02-14 ENCOUNTER — TELEPHONE (OUTPATIENT)
Dept: PAIN MEDICINE | Facility: CLINIC | Age: 64
End: 2024-02-14
Payer: COMMERCIAL

## 2024-02-14 LAB
IMMUNOLOGIST REVIEW: NORMAL
S PN DA SERO 19F IGG SER-MCNC: 6.2 MCG/ML
S PNEUM DA 1 IGG SER-MCNC: 0.6 MCG/ML
S PNEUM DA 10A IGG SER-MCNC: 0.8 MCG/ML
S PNEUM DA 11A IGG SER-MCNC: 1.6 MCG/ML
S PNEUM DA 12F IGG SER-MCNC: NORMAL MCG/ML
S PNEUM DA 14 IGG SER-MCNC: 1.8 MCG/ML
S PNEUM DA 15B IGG SER-MCNC: 4.2 MCG/ML
S PNEUM DA 17F IGG SER-MCNC: 0.9 MCG/ML
S PNEUM DA 18C IGG SER-MCNC: 0.7 MCG/ML
S PNEUM DA 19A IGG SER-MCNC: 9 MCG/ML
S PNEUM DA 2 IGG SER-MCNC: 46.5 MCG/ML
S PNEUM DA 20A IGG SER-MCNC: 2.9 MCG/ML
S PNEUM DA 22F IGG SER-MCNC: 3.5 MCG/ML
S PNEUM DA 23F IGG SER-MCNC: 8.3 MCG/ML
S PNEUM DA 3 IGG SER-MCNC: 0.2 MCG/ML
S PNEUM DA 33F IGG SER-MCNC: 6.7 MCG/ML
S PNEUM DA 4 IGG SER-MCNC: 0.5 MCG/ML
S PNEUM DA 5 IGG SER-MCNC: 24.4 MCG/ML
S PNEUM DA 6B IGG SER-MCNC: 1.6 MCG/ML
S PNEUM DA 7F IGG SER-MCNC: 1.8 MCG/ML
S PNEUM DA 8 IGG SER-MCNC: 5 MCG/ML
S PNEUM DA 9N IGG SER-MCNC: 0.3 MCG/ML
S PNEUM DA 9V IGG SER-MCNC: 0.2 MCG/ML

## 2024-02-15 ENCOUNTER — PATIENT MESSAGE (OUTPATIENT)
Dept: PAIN MEDICINE | Facility: CLINIC | Age: 64
End: 2024-02-15
Payer: COMMERCIAL

## 2024-02-20 NOTE — PRE-PROCEDURE INSTRUCTIONS
Spoke with patient regarding procedure scheduled on 2.23     Arrival time 0830     Has patient been sick with fever or on antibiotics within the last 7 days? Yes, prophylactic. Asymptomatic. Ok per dr howell     Does the patient have any open wounds, sores or rashes? No     Does the patient have any recent fractures? no     Has patient received a vaccination within the last 7 days? No     Received the COVID vaccination? yes     Has the patient stopped all medications as directed? na     Does patient have a pacemaker, defibrillator, or implantable stimulator? No     Does the patient have a ride to and from procedure and someone reliable to remain with patient?        Is the patient diabetic? no     Does the patient have sleep apnea? Or use O2 at home? no     Is the patient receiving sedation? yes     Is the patient instructed to remain NPO beginning at midnight the night before their procedure? yes     Procedure location confirmed with patient? Yes     Covid- Denies signs/symptoms. Instructed to notify PAT/MD if any changes.

## 2024-02-23 ENCOUNTER — HOSPITAL ENCOUNTER (OUTPATIENT)
Facility: HOSPITAL | Age: 64
Discharge: HOME | End: 2024-02-23
Attending: ANESTHESIOLOGY | Admitting: ANESTHESIOLOGY
Payer: COMMERCIAL

## 2024-02-23 VITALS
HEIGHT: 62 IN | SYSTOLIC BLOOD PRESSURE: 132 MMHG | DIASTOLIC BLOOD PRESSURE: 80 MMHG | RESPIRATION RATE: 18 BRPM | BODY MASS INDEX: 19.37 KG/M2 | OXYGEN SATURATION: 96 % | WEIGHT: 105.25 LBS | TEMPERATURE: 97 F | HEART RATE: 74 BPM

## 2024-02-23 DIAGNOSIS — M53.3 COCCYDYNIA: ICD-10-CM

## 2024-02-23 PROCEDURE — 25000003 PHARM REV CODE 250: Performed by: ANESTHESIOLOGY

## 2024-02-23 PROCEDURE — 64999 UNLISTED PX NERVOUS SYSTEM: CPT | Performed by: ANESTHESIOLOGY

## 2024-02-23 PROCEDURE — 64999 UNLISTED PX NERVOUS SYSTEM: CPT | Mod: ,,, | Performed by: ANESTHESIOLOGY

## 2024-02-23 PROCEDURE — 25500020 PHARM REV CODE 255: Performed by: ANESTHESIOLOGY

## 2024-02-23 PROCEDURE — 63600175 PHARM REV CODE 636 W HCPCS: Performed by: ANESTHESIOLOGY

## 2024-02-23 RX ORDER — INDOMETHACIN 25 MG/1
CAPSULE ORAL
Status: DISCONTINUED | OUTPATIENT
Start: 2024-02-23 | End: 2024-02-23 | Stop reason: HOSPADM

## 2024-02-23 RX ORDER — LIDOCAINE HYDROCHLORIDE 10 MG/ML
INJECTION, SOLUTION EPIDURAL; INFILTRATION; INTRACAUDAL; PERINEURAL
Status: DISCONTINUED | OUTPATIENT
Start: 2024-02-23 | End: 2024-02-23 | Stop reason: HOSPADM

## 2024-02-23 RX ORDER — MIDAZOLAM HYDROCHLORIDE 1 MG/ML
INJECTION, SOLUTION INTRAMUSCULAR; INTRAVENOUS
Status: DISCONTINUED | OUTPATIENT
Start: 2024-02-23 | End: 2024-02-23 | Stop reason: HOSPADM

## 2024-02-23 RX ORDER — DEXAMETHASONE SODIUM PHOSPHATE 10 MG/ML
INJECTION INTRAMUSCULAR; INTRAVENOUS
Status: DISCONTINUED | OUTPATIENT
Start: 2024-02-23 | End: 2024-02-23 | Stop reason: HOSPADM

## 2024-02-23 RX ORDER — BUPIVACAINE HYDROCHLORIDE 2.5 MG/ML
INJECTION, SOLUTION EPIDURAL; INFILTRATION; INTRACAUDAL
Status: DISCONTINUED | OUTPATIENT
Start: 2024-02-23 | End: 2024-02-23 | Stop reason: HOSPADM

## 2024-02-23 RX ORDER — FENTANYL CITRATE 50 UG/ML
INJECTION, SOLUTION INTRAMUSCULAR; INTRAVENOUS
Status: DISCONTINUED | OUTPATIENT
Start: 2024-02-23 | End: 2024-02-23 | Stop reason: HOSPADM

## 2024-02-23 NOTE — DISCHARGE INSTRUCTIONS

## 2024-02-23 NOTE — DISCHARGE SUMMARY
Discharge Note  Short Stay      SUMMARY     Admit Date: 2/23/2024    Attending Physician: Elizabet Siu MD        Discharge Physician: Elizabet Siu MD        Discharge Date: 2/23/2024 9:10 AM    Procedure(s) (LRB):  Sacrococcygeal ligament and ganglion impar injection (N/A)    Final Diagnosis: Coccydynia [M53.3]    Disposition: Home or self care    Patient Instructions:   Current Discharge Medication List        CONTINUE these medications which have NOT CHANGED    Details   !! cyanocobalamin (VITAMIN B-12) 1000 MCG tablet Take 100 mcg by mouth once daily.      !! cyanocobalamin (VITAMIN B-12) 1000 MCG tablet Take 5,000 mcg by mouth once daily.      estradioL (ESTRACE) 0.01 % (0.1 mg/gram) vaginal cream Place 1 g vaginally 3 (three) times a week.  Qty: 36 g, Refills: 3    Associated Diagnoses: Vaginal atrophy      ferrous sulfate (FEROSUL) 220 mg (44 mg iron)/5 mL Elix Take 5 mLs (220 mg total) by mouth once daily.  Qty: 120 mL, Refills: 2    Associated Diagnoses: Iron deficiency anemia, unspecified iron deficiency anemia type      !! levothyroxine (SYNTHROID) 50 MCG tablet Take 50 mcg by mouth before breakfast.      lovastatin (MEVACOR) 10 MG tablet Take 10 mg by mouth every evening.      methocarbamoL (ROBAXIN) 750 MG Tab Take 500 mg by mouth 4 (four) times daily.      albuterol (PROVENTIL) 2.5 mg /3 mL (0.083 %) nebulizer solution Take 3 mLs (2.5 mg total) by nebulization every 4 to 6 hours as needed for Wheezing or Shortness of Breath.  Qty: 360 mL, Refills: 11    Comments: ChronicObstructivePulmonaryDiseaseCode:[id number]  Associated Diagnoses: Asthma with COPD; COPD exacerbation      albuterol (PROVENTIL/VENTOLIN HFA) 90 mcg/actuation inhaler Inhale 2 puffs into the lungs every 4 (four) hours as needed for Wheezing or Shortness of Breath. Rescue  Qty: 18 g, Refills: 11    Associated Diagnoses: Asthma with COPD; SOB (shortness of breath) on exertion      ALPRAZolam (XANAX) 0.5 MG tablet Take 1  tablet (0.5 mg total) by mouth 2 (two) times daily as needed (SIGNIFICANT ANXIETY). New Prescriber  Qty: 60 tablet, Refills: 2    Associated Diagnoses: Panic attacks      amoxicillin (AMOXIL) 500 MG capsule Take 1 capsule (500 mg total) by mouth once daily.  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  Qty: 30 mL, Refills: 11    Associated Diagnoses: Chronic rhinitis; Other chronic sinusitis      calcium carbonate (TUMS) 300 mg (750 mg) Chew Take 750 mg by mouth.      diclofenac (VOLTAREN) 75 MG EC tablet Take 75 mg by mouth 2 (two) times daily.      !! EUTHYROX 25 mcg tablet Take 25 mcg by mouth.      FLUoxetine 20 MG capsule Take 1 capsule (20 mg total) by mouth once daily. Take IN ADDITION to Prozac 40 mg supply  Qty: 30 capsule, Refills: 2    Associated Diagnoses: Panic attacks      fluticasone propionate (FLONASE) 50 mcg/actuation nasal spray 1 spray (50 mcg total) by Each Nostril route 2 (two) times a day.  Qty: 16 g, Refills: 11    Associated Diagnoses: Chronic rhinitis; Other chronic sinusitis      fluticasone-salmeterol diskus inhaler 250-50 mcg Inhale 1 puff into the lungs 2 (two) times daily. Wash out mouth after use.  Qty: 60 each, Refills: 11    Associated Diagnoses: Asthma with COPD      hydroCHLOROthiazide (HYDRODIURIL) 12.5 MG Tab Take 1 tablet (12.5 mg total) by mouth daily as needed (edema, swelling). Do not take if BP is low and feeling dry/dehydrated/dizzy  Qty: 90 tablet, Refills: 1    Comments: .      ibuprofen 20 mg/mL oral liquid Take 30 mLs (600 mg total) by mouth every 6 (six) hours.  Qty: 354 mL, Refills: 1    Associated Diagnoses: Status post vaginal hysterectomy      indomethacin (INDOCIN) 25 MG capsule Take 1 capsule (25 mg total) by mouth 3 (three) times  daily.  Qty: 90 capsule, Refills: 3      Lactobacillus rhamnosus GG (CULTURELLE) 10 billion cell capsule Take 1 capsule by mouth once daily.      montelukast (SINGULAIR) 10 mg tablet Take 10 mg by mouth every evening.      multivitamin (THERAGRAN) per tablet Take 1 tablet by mouth once daily.      omega-3 fatty acids/fish oil (FISH OIL-OMEGA-3 FATTY ACIDS) 300-1,000 mg capsule Take 1 capsule by mouth every evening.      omeprazole (PRILOSEC) 40 MG capsule Take 40 mg by mouth.      ondansetron (ZOFRAN-ODT) 8 MG TbDL Take 1 tablet (8 mg total) by mouth every 8 (eight) hours as needed (nausea).  Qty: 20 tablet, Refills: 1      oxybutynin (DITROPAN) 5 mg/5 mL syrup Take 5 mLs (5 mg total) by mouth 2 (two) times daily.  Qty: 473 mL, Refills: 1    Associated Diagnoses: Overactive bladder      pregabalin (LYRICA) 75 MG capsule Take 1 capsule (75 mg total) by mouth every evening for 10 days, THEN 2 capsules (150 mg total) every evening for 10 days, THEN 3 capsules (225 mg total) every evening for 10 days.  Qty: 60 capsule, Refills: 0      zinc sulfate (ZINC-15 ORAL) Take by mouth Daily. Unknown dose       !! - Potential duplicate medications found. Please discuss with provider.              Discharge Diagnosis: Coccydynia [M53.3]  Condition on Discharge: Stable with no complications to procedure   Diet on Discharge: Same as before.  Activity: as per instruction sheet.  Discharge to: Home with a responsible adult.  Follow up: 2-4 weeks       Please call the office at [phone] if you experience any weakness or loss of sensation, fever > 101.5, pain uncontrolled with oral medications, persistent nausea/vomiting/or diarrhea, redness or drainage from the incisions, or any other worrisome concerns. If physician on call was not reached or could not communicate with our office for any reason please go to the nearest emergency department

## 2024-02-23 NOTE — OP NOTE
Yvonne Blankenship-Major    02/23/2024    Procedure: Ganglion Impar Block     Pre-op diagnosis: Coccydynia [M53.3]     Post-op diagnosis: Coccydynia [M53.3]     Surgeon: Elizabet Siu MD    Assistants: None    EBL: None     Specimens: None     Sedation:  Conscious sedation provided by M.D    The patient was monitored with continuous pulse oximetry, EKG, and intermittent blood pressure monitors.  The patient was hemodynamically stable throughout the entire process was responsive to voice, and breathing spontaneously.  Supplemental O2 was provided at 2L/min via nasal cannula.  Patient was comfortable for the duration of the procedure. (See nurse documentation and case log for sedation time)    There was a total of 2mg IV Midazolam and 100mcg Fentanyl titrated for the procedure    Complications: None     Description of procedure:     After written consent was obtained, patient was placed in the prone position on the xray table. The area overlying the coccyx was identified using anatomical landmarks and fluoroscopy in the lateral view. The area overlying the skin was prepped and draped in usual sterile fashion using chlorhexidine. The skin was then anesthetized through a 25 gauge needle with 3mL of 1% lidocaine. A 3.5 inch 22 gauge spinal quincke needle was then advanced until the coccygeal ligament was encountered and entered. Using fluoroscopy in the lateral view, the needle was advanced through the coccygeal ligament.  Then 3 mL of 300mg/ml radioopaque contrast dye was injected under live fluoroscopy, and seen to go into the area of the ganglion impar. After negative aspiration, negative paresthesias, there was injection of 4 mL of 0.25% bupivacaine + 1 mL of 10 mg dexamethasone for a total volume of 5mL was injected. Displacement of the contrast after injection of the medication confirmed that the medication went into the area of the ganglion impar. Patient tolerated the procedure well and was taken to the recovery room  in stable condition.

## 2024-02-27 ENCOUNTER — PATIENT MESSAGE (OUTPATIENT)
Dept: ALLERGY | Facility: CLINIC | Age: 64
End: 2024-02-27
Payer: COMMERCIAL

## 2024-02-28 ENCOUNTER — TELEPHONE (OUTPATIENT)
Dept: ALLERGY | Facility: CLINIC | Age: 64
End: 2024-02-28
Payer: COMMERCIAL

## 2024-02-28 NOTE — TELEPHONE ENCOUNTER
----- Message from Neda Charles MA sent at 2/27/2024  2:14 PM CST -----  Regarding: FW: Patient Advice    ----- Message -----  From: Nina Byers  Sent: 2/27/2024  12:45 PM CST  To: [facility] Galicia Staff  Subject: Patient Advice                                                     Name of Who is Calling: Yvonne Schmidt    Who Left The Message: Yvonne Schmidt      What is the request in detail:  Patient called requesting a call pertaining to the medication amoxicillin (AMOXIL) 500 MG capsule. Patient states she Googled and was advised not to open the capsule ;but to swallow it whole.  Please further advise.    Thank you      Reply by [facility]: NO      Preferred Call Back :  [phone] (m)

## 2024-03-01 ENCOUNTER — TELEPHONE (OUTPATIENT)
Dept: PAIN MEDICINE | Facility: CLINIC | Age: 64
End: 2024-03-01
Payer: COMMERCIAL

## 2024-03-01 NOTE — TELEPHONE ENCOUNTER
----- Message from Cleopatra Mott sent at 3/1/2024 12:52 PM CST -----  Contact: Yvonne Fairchild is calling in regards to not having relief from injection. Pt stated taking tylenol but not helping and is now vomiting due to taking so much. Please call back  [phone]                              Thanks  KT

## 2024-03-01 NOTE — TELEPHONE ENCOUNTER
Reach out to pt to inform her that, unfortunately  it is common to have increase pain after receiving an injection. This can be cause due to the numbing medication wearing off. It may take up to 2-3 weeks for the injection to take full affect.   For your pain, continue taking your medication as prescribed, you can alternate between Tylenol and Ibuprofen every 8 hours do not exceed 3G (3'000mg) a day, if you are able to take it. Ice the pain site 20min on 20 min off and rest, if possible.  Generally  we do not prescribe any stronger or new medication as we will not know if it the the procedure working or the medication. Give it some time to subside and check back in with us if pain worsens. Pt understood. All questions answered.

## 2024-03-06 ENCOUNTER — TELEPHONE (OUTPATIENT)
Dept: OBSTETRICS AND GYNECOLOGY | Facility: CLINIC | Age: 64
End: 2024-03-06
Payer: COMMERCIAL

## 2024-03-06 NOTE — TELEPHONE ENCOUNTER
----- Message from Carlota Guerin sent at 3/6/2024  3:42 PM CST -----   Pharmacy Calling to Clarify an RX    Name of Caller Titi     [facility]    Prescription NameestradioL (ESTRACE) 0.01 % (0.1 mg/gram) vaginal cream    What do they need to clarify?    Best Call Back Number [phone]    Additional Information:requesting callback about medication

## 2024-03-06 NOTE — TELEPHONE ENCOUNTER
Message  Received: Today  Teresita Dugan MD Hebert, Jeanetta K, LPN  Caller: Unspecified (Today,  1:53 PM)  Tell her to drop it down to 3x per week.    Called patient and she stated that we would need to call [facility] to send refill since patient was using it every day she is out.    Spoke to 3 different people with [facility] and issue was not resolved.  Had to hang up after 45 minutes.

## 2024-03-06 NOTE — TELEPHONE ENCOUNTER
[facility]oke with [facility]. They approved refill to be made early. Will take 3-5 days shipping. Attempted to contact pt to inform her but lvm for pt to return call.

## 2024-03-06 NOTE — TELEPHONE ENCOUNTER
----- Message from Genevieve Leon sent at 3/6/2024 12:53 PM CST -----  Contact: Patient, [phone]  Calling regarding Rx estradioL (ESTRACE) 0.01 % (0.1 mg/gram) vaginal cream, the prescription is for 3 times a week. But she was told to use it everyday. Please advise. Thanks.

## 2024-03-08 ENCOUNTER — PATIENT MESSAGE (OUTPATIENT)
Dept: ALLERGY | Facility: CLINIC | Age: 64
End: 2024-03-08
Payer: COMMERCIAL

## 2024-03-18 RX ORDER — DICLOFENAC SODIUM 75 MG/1
75 TABLET, DELAYED RELEASE ORAL 3 TIMES DAILY
COMMUNITY
End: 2024-03-18 | Stop reason: DRUGHIGH

## 2024-03-18 NOTE — TELEPHONE ENCOUNTER
Pt is requesting for a refill of: diclofenac (VOLTAREN) 75 MG EC tablet   Last filed:9/29/23  Last encounter: 2/08/24  Last proc: 2/23/24   Up coming apt: 3/21/24   Pharmacy:[facility] [address] [facility]   Is this something you can do?

## 2024-03-18 NOTE — TELEPHONE ENCOUNTER
----- Message from Pooja Tang sent at 3/18/2024 12:46 PM CDT -----  Contact: pt  Pt is calling in regard to her back still hurting and she taking another prescription, Diclofenac 75mg which is the only thing helping her back.  She would like to get a refill on this.  If any questions, call [phone]       Type:  RX Refill Request    Who Called: pt  Refill or New Rx: refill  RX Name and Strength: diclofenac (VOLTAREN) 75 MG EC tablet  How is the patient currently taking it? (ex. 1XDay):  Is this a 30 day or 90 day RX:  Preferred Pharmacy with phone number: [phone]  Local or Mail Order: local  Ordering Provider: howell  Would the patient rather a call back or a response via [facility]?   Best Call Back Number:  Additional Information:         [facility] [address] [place] [address] [id number] [place] [address]  [id number] [place] [id number]  Phone: [phone] Fax: [phone]

## 2024-03-19 RX ORDER — DICLOFENAC SODIUM 75 MG/1
75 TABLET, DELAYED RELEASE ORAL 2 TIMES DAILY
Qty: 30 TABLET | Refills: 0 | Status: SHIPPED | OUTPATIENT
Start: 2024-03-19 | End: 2024-06-11

## 2024-03-20 NOTE — PROGRESS NOTES
Established Patient Chronic Pain Note    The patient location is: home  The chief complaint leading to consultation is: tailbone  Visit type: Virtual visit with  audio   Total time spent with patient: 11-15m  Each patient to whom he or she provides medical services by telemedicine is: (1) informed of the relationship between the physician and patient and the respective role of any other health care provider with respect to management of the patient; and (2) notified that he or she may decline to receive medical services by telemedicine and may withdraw from such care at any time.    Referring Physician: No ref. provider found    PCP: Nafisa Irving, [facility]    Chief Complaint:   Tailbone pain       SUBJECTIVE:  Interval history 03/21/2024  Patient presents status post sacrococcygeal ligament ganglion impar injection 02/23/2024.  Patient reports no discernible improvement following her procedure. Patient reports this completely alleviates her pain.  She emphasizes taking care of her grandchildren with heavy lifting and bending and lifting motions, which can significantly exacerbate her pain.  She again reiterates periods of weakness in the lower extremities which can impair duration of standing or with ambulation.  She has continued physician directed physical therapy exercises from 12/08/2022 through 02/08/2024 without significant improvement in pain, range of motion or strength.  Patient reports she discontinued Lyrica medication as this was too strong. She reports significant improvement with prescription of Voltaren tablets, 75 mg, which she takes up to twice daily.        HPI 02/06/2024  Yvonne Avila is a 63 y.o. female with past medical history significant for anxiety/depression, COPD, hypertension GERD, nicotine dependence who presents to the clinic for the evaluation of tailbone pain.  Patient reports pain has been present over the last 10 years but has been particularly severe over the last 1 year.   She does recall mechanical fall within the last year, landing onto her buttocks on the cement.  to today she reports pain which is intermittent which is rated a 10/10.  Pain is described as hurts so bad.  patient reports pain along the sacral hiatus which can radiate towards the right buttock.  Pain is exacerbated with prolonged sitting, bending, lifting.  Patient does report associated sensation of weakness or her legs giving out. .  She reports there are times when she is unable to stand or walk secondary to this weakness.  Pain is improved with a heating pad, oral diclofenac.  She reports frequently her  brings her to the emergency room for shots of morphine, steroid which significantly help with her pain.  Chart review demonstrates last ER visit 11/17/2023 with morphine 4 mg, Zofran 4 mg and ketorolac 15 mg given.  Today she denies more distal radiculopathy into the lower extremities or feet.  Patient has not received prior intervention or trialed membrane stabilizing agents in the past.  Patient has performed physician directed at home physical therapy over the last 8 weeks from 12/08/2023 through 02/08/2024 without significant improvement in pain, range of motion or strength.      Patient reports significant motor weakness.  Patient denies night fever/night sweats, urinary incontinence, bowel incontinence, significant weight loss, and loss of sensations.      Pain Disability Index Review:         2/8/2024     7:26 AM   Last 3 PDI Scores   Pain Disability Index (PDI) 70       Non-Pharmacologic Treatments:  Physical Therapy/Home Exercise: yes  Ice/Heat:yes  TENS: no  Acupuncture: no  Massage: yes  Chiropractic: no    Other: no      Pain Medications:  - Adjuvant Medications: Advil,Motrin ( Ibuprofen), Robaxin ( Methocarbamol), and Xanax (Alprazolam)    Pain Procedures:   -02/23/2024: Sacrococcygeal ligament ganglion impar injection    Past Medical History:   Diagnosis Date    Anxiety      Bradycardia 9/19/2019    GERD (gastroesophageal reflux disease)     Thyroid disease      Past Surgical History:   Procedure Laterality Date    BREAST BIOPSY Left     COLPORRHAPHY, COMBINED ANTEROPOSTERIOR N/A 12/26/2023    Procedure: COLPORRHAPHY, COMBINED ANTEROPOSTERIOR;  Surgeon: Teresita Dugan MD;  Location: [facility] OR;  Service: OB/GYN;  Laterality: N/A;    DENTAL SURGERY      INJECTION OF ANESTHETIC AGENT AROUND GANGLION IMPAR N/A 2/23/2024    Procedure: Sacrococcygeal ligament and ganglion impar injection;  Surgeon: Elizabet Siu MD;  Location: [facility];  Service: Pain Management;  Laterality: N/A;    SACROSPINOUS LIGAMENT FIXATION N/A 12/26/2023    Procedure: FIXATION, LIGAMENT, SACROSPINOUS;  Surgeon: Teresita Dugan MD;  Location: [facility] OR;  Service: OB/GYN;  Laterality: N/A;    SINUS SURGERY      TOTAL VAGINAL HYSTERECTOMY N/A 12/26/2023    Procedure: HYSTERECTOMY, TOTAL, VAGINAL;  Surgeon: Teresita Dugan MD;  Location: [facility] OR;  Service: OB/GYN;  Laterality: N/A;  Request two assistants for this case     Review of patient's allergies indicates:  No Known Allergies    Current Outpatient Medications   Medication Sig    albuterol (PROVENTIL) 2.5 mg /3 mL (0.083 %) nebulizer solution Take 3 mLs (2.5 mg total) by nebulization every 4 to 6 hours as needed for Wheezing or Shortness of Breath.    albuterol (PROVENTIL/VENTOLIN HFA) 90 mcg/actuation inhaler Inhale 2 puffs into the lungs every 4 (four) hours as needed for Wheezing or Shortness of Breath. Rescue (Patient not taking: Reported on 2/9/2024)    ALPRAZolam (XANAX) 0.5 MG tablet Take 1 tablet (0.5 mg total) by mouth 2 (two) times daily as needed (SIGNIFICANT ANXIETY). New Prescriber    amoxicillin (AMOXIL) 500 MG capsule Take 1 capsule (500 mg total) by mouth once daily.    azelastine (ASTELIN) 137 mcg (0.1 %) nasal spray 1 spray (137 mcg total) by Nasal route 2 (two) times daily.    calcium carbonate (TUMS) 300 mg (750 mg) Chew Take 750 mg by  mouth.    cyanocobalamin (VITAMIN B-12) 1000 MCG tablet Take 100 mcg by mouth once daily.    cyanocobalamin (VITAMIN B-12) 1000 MCG tablet Take 5,000 mcg by mouth once daily.    diclofenac (VOLTAREN) 75 MG EC tablet Take 1 tablet (75 mg total) by mouth 2 (two) times daily.    estradioL (ESTRACE) 0.01 % (0.1 mg/gram) vaginal cream Place 1 g vaginally 3 (three) times a week.    EUTHYROX 25 mcg tablet Take 25 mcg by mouth.    ferrous sulfate (FEROSUL) 220 mg (44 mg iron)/5 mL Elix Take 5 mLs (220 mg total) by mouth once daily.    FLUoxetine 20 MG capsule Take 1 capsule (20 mg total) by mouth once daily. Take IN ADDITION to Prozac 40 mg supply    fluticasone propionate (FLONASE) 50 mcg/actuation nasal spray 1 spray (50 mcg total) by Each Nostril route 2 (two) times a day.    fluticasone-salmeterol diskus inhaler 250-50 mcg Inhale 1 puff into the lungs 2 (two) times daily. Wash out mouth after use.    hydroCHLOROthiazide (HYDRODIURIL) 12.5 MG Tab Take 1 tablet (12.5 mg total) by mouth daily as needed (edema, swelling). Do not take if BP is low and feeling dry/dehydrated/dizzy (Patient taking differently: Take 12.5 mg by mouth once daily. Do not take if BP is low and feeling dry/dehydrated/dizzy)    ibuprofen 20 mg/mL oral liquid Take 30 mLs (600 mg total) by mouth every 6 (six) hours. (Patient not taking: Reported on 2/6/2024)    indomethacin (INDOCIN) 25 MG capsule Take 1 capsule (25 mg total) by mouth 3 (three) times daily. (Patient not taking: Reported on 2/6/2024)    Lactobacillus rhamnosus GG (CULTURELLE) 10 billion cell capsule Take 1 capsule by mouth once daily.    levothyroxine (SYNTHROID) 50 MCG tablet Take 50 mcg by mouth before breakfast.    lovastatin (MEVACOR) 10 MG tablet Take 10 mg by mouth every evening.    methocarbamoL (ROBAXIN) 750 MG Tab Take 500 mg by mouth 4 (four) times daily.    montelukast (SINGULAIR) 10 mg tablet Take 10 mg by mouth every evening.    multivitamin (THERAGRAN) per tablet Take 1  tablet by mouth once daily.    omega-3 fatty acids/fish oil (FISH OIL-OMEGA-3 FATTY ACIDS) 300-1,000 mg capsule Take 1 capsule by mouth every evening.    omeprazole (PRILOSEC) 40 MG capsule Take 40 mg by mouth.    ondansetron (ZOFRAN-ODT) 8 MG TbDL Take 1 tablet (8 mg total) by mouth every 8 (eight) hours as needed (nausea).    oxybutynin (DITROPAN) 5 mg/5 mL syrup Take 5 mLs (5 mg total) by mouth 2 (two) times daily.    pregabalin (LYRICA) 75 MG capsule Take 1 capsule (75 mg total) by mouth every evening for 10 days, THEN 2 capsules (150 mg total) every evening for 10 days, THEN 3 capsules (225 mg total) every evening for 10 days.    zinc sulfate (ZINC-15 ORAL) Take by mouth Daily. Unknown dose     No current facility-administered medications for this visit.       Review of Systems     GENERAL:  No weight loss, malaise or fevers.  HEENT:   No recent changes in vision or hearing  NECK:  Negative for lumps, no difficulty with swallowing.  RESPIRATORY:  Negative for cough, wheezing or shortness of breath, patient denies any recent URI.  CARDIOVASCULAR:  Negative for chest pain or palpitations.  GI:  Negative for abdominal discomfort, blood in stools or black stools or change in bowel habits.  MUSCULOSKELETAL:  See HPI.  SKIN:  Negative for lesions, rash, and itching.  PSYCH:  No mood disorder or recent psychosocial stressors.   HEMATOLOGY/LYMPHOLOGY:  Negative for prolonged bleeding, bruising easily or swollen nodes.    NEURO:   No history of syncope, paralysis, seizures or tremors.  All other reviewed and negative other than HPI.    OBJECTIVE:    There were no vitals taken for this visit.      Physical Exam    GENERAL: Well appearing, in no acute distress, alert and oriented x3.  PSYCH:  Mood and affect appropriate.  SKIN: Skin color, texture, turgor normal, no rashes or lesions.  HEAD/FACE:  Normocephalic, atraumatic. Cranial nerves grossly intact.    CV: RRR with palpation of the radial artery.  PULM: No evidence  of respiratory difficulty, symmetric chest rise.  GI:  Soft and non-tender.    BACK: Straight leg raising in the sitting and supine positions is negative to radicular pain.  pain to palpation over the facet joints of the lumbar spine on R or spinous processes. Normal range of motion without pain reproduction.  EXTREMITIES: Peripheral joint ROM is full and pain free without obvious instability or laxity in all four extremities. No deformities, edema, or skin discoloration. Good capillary refill.  MUSCULOSKELETAL: Able to stand on heels & toes.   hip, and knee provocative maneuvers are negative.  pain with palpation over the sacroiliac joints on R.  Gaenslen's, Distraction/Compression and  FABERs test is negative.  Facet loading test is positive on R.   Bilateral upper and lower extremity strength is normal and symmetric.  No atrophy or tone abnormalities are noted.    RIGHT Lower extremity: Hip flexion 5/5, Hip Abduction 5/5, Hip Adduction 5/5, Knee extension 5/5, Knee flexion 5/5, Ankle dorsiflexion5/5, Extensor hallucis longus 5/5, Ankle plantarflexion 5/5  LEFT Lower extremity:  Hip flexion 5/5, Hip Abduction 5/5,Hip Adduction 5/5, Knee extension 5/5, Knee flexion 5/5, Ankle dorsiflexion 5/5, Extensor hallucis longus 5/5, Ankle plantarflexion 5/5  -Normal testing knee (patellar) jerk and ankle (achilles) jerk    NEURO: Bilateral upper and lower extremity coordination and muscle stretch reflexes are physiologic and symmetric. No loss of sensation is noted.  GAIT: normal.    Imagin23    X-Ray Lumbar Spine Ap And Lateral  FINDINGS:  Multiple radiographic views  were obtained.    No evidence of acute fracture or dislocation.  Bony mineralization is normal.  Soft tissues are unremarkable. Suggestion of degenerative joint disease    X-ray sacrum and coccyx 2024  FINDINGS:  No acute fractures or subluxations demonstrated.  SI joints appear grossly within normal limits.       ASSESSMENT: 63 y.o. year old  "female with     1. Coccydynia        2. DDD (degenerative disc disease), lumbar        3. Lumbar radiculopathy  MRI Lumbar Spine Without Contrast          PLAN:   - Interventions:  None at this time.  We will 1st review lumbar MRI for potential epidural steroid injection for lumbar radiculopathy.    - Anticoagulation use: No no anticoagulation     report:  Reviewed and consistent with medication use as prescribed.    - Medications:  -DC Lyrica 2/2 "too strong"    -Continue Voltaren 75mg BID PRN to see if this helps with his pain. We have discussed potential deleterious side effects of NSAIDs on the cardiovascular, gastrointestinal and renal systems. We have discussed judicious use of this medication. Pt expresses understanding.     - Therapy:   We discussed continuing at home physician directed physical therapy to help manage the patient/s painful condition. The patient was counseled that muscle strengthening will improve the long term prognosis in regards to pain and may also help increase range of motion and mobility.     - Imaging: Reviewed available imaging (X-ray sacrum and coccyx) with patient and answered any questions they had regarding study.  MRI lumbar spine to better evaluate pain and weakness    -HME:  Donut ordered to help offset pressure from sacral hiatus    - Follow up visit: return to clinic in 4-6 weeks to review MRI.      The above plan and management options were discussed at length with patient. Patient is in agreement with the above and verbalized understanding.    - I discussed the goals of interventional chronic pain management with the patient on today's visit. We discussed a multimodal and systematic approach to pain.  This includes diagnostic and therapeutic injections, adjuvant pharmacologic treatment, physical therapy, and at times psychiatry.  I emphasized the importance of regular exercise, core strengthening and stretching, diet and weight loss as a cornerstone of long-term pain " management.    - This condition does not require this patient to take time off of work, and the primary goal of our Pain Management services is to improve the patient's functional capacity.  - Patient Questions: Answered all of the patient's questions regarding diagnoses, therapy, treatment and next steps        Elizabet Siu MD  Interventional Pain Management  [facility]    Disclaimer:  This note was prepared using voice recognition system and is likely to have sound alike errors that may have been overlooked even after proof reading.  Please call me with any questions

## 2024-03-21 ENCOUNTER — OFFICE VISIT (OUTPATIENT)
Dept: PAIN MEDICINE | Facility: CLINIC | Age: 64
End: 2024-03-21
Payer: COMMERCIAL

## 2024-03-21 DIAGNOSIS — M51.36 DDD (DEGENERATIVE DISC DISEASE), LUMBAR: ICD-10-CM

## 2024-03-21 DIAGNOSIS — M53.3 COCCYDYNIA: Primary | ICD-10-CM

## 2024-03-21 DIAGNOSIS — M54.16 LUMBAR RADICULOPATHY: ICD-10-CM

## 2024-03-21 PROCEDURE — 99213 OFFICE O/P EST LOW 20 MIN: CPT | Mod: 95,,, | Performed by: ANESTHESIOLOGY

## 2024-03-21 PROCEDURE — 1160F RVW MEDS BY RX/DR IN RCRD: CPT | Mod: CPTII,95,, | Performed by: ANESTHESIOLOGY

## 2024-03-21 PROCEDURE — 1159F MED LIST DOCD IN RCRD: CPT | Mod: CPTII,95,, | Performed by: ANESTHESIOLOGY

## 2024-04-03 ENCOUNTER — PATIENT MESSAGE (OUTPATIENT)
Dept: PULMONOLOGY | Facility: CLINIC | Age: 64
End: 2024-04-03
Payer: COMMERCIAL

## 2024-05-17 DIAGNOSIS — R00.2 PALPITATIONS: ICD-10-CM

## 2024-05-17 DIAGNOSIS — I10 ESSENTIAL HYPERTENSION: Primary | ICD-10-CM

## 2024-05-23 ENCOUNTER — HOSPITAL ENCOUNTER (OUTPATIENT)
Dept: CARDIOLOGY | Facility: HOSPITAL | Age: 64
Discharge: HOME | End: 2024-05-23
Attending: INTERNAL MEDICINE
Payer: COMMERCIAL

## 2024-05-23 ENCOUNTER — OFFICE VISIT (OUTPATIENT)
Dept: CARDIOLOGY | Facility: CLINIC | Age: 64
End: 2024-05-23
Payer: COMMERCIAL

## 2024-05-23 VITALS
HEART RATE: 64 BPM | BODY MASS INDEX: 19.25 KG/M2 | DIASTOLIC BLOOD PRESSURE: 74 MMHG | HEIGHT: 62 IN | OXYGEN SATURATION: 97 % | SYSTOLIC BLOOD PRESSURE: 118 MMHG

## 2024-05-23 DIAGNOSIS — R00.2 PALPITATIONS: ICD-10-CM

## 2024-05-23 DIAGNOSIS — Z87.891 FORMER TOBACCO USE: ICD-10-CM

## 2024-05-23 DIAGNOSIS — K21.9 GASTROESOPHAGEAL REFLUX DISEASE, UNSPECIFIED WHETHER ESOPHAGITIS PRESENT: ICD-10-CM

## 2024-05-23 DIAGNOSIS — I10 ESSENTIAL HYPERTENSION: ICD-10-CM

## 2024-05-23 DIAGNOSIS — M19.90 INFLAMMATION OF JOINT: ICD-10-CM

## 2024-05-23 DIAGNOSIS — R00.1 BRADYCARDIA: ICD-10-CM

## 2024-05-23 DIAGNOSIS — R06.09 OTHER FORM OF DYSPNEA: ICD-10-CM

## 2024-05-23 DIAGNOSIS — R07.89 OTHER CHEST PAIN: Primary | ICD-10-CM

## 2024-05-23 DIAGNOSIS — R06.02 SHORTNESS OF BREATH: ICD-10-CM

## 2024-05-23 DIAGNOSIS — E78.49 OTHER HYPERLIPIDEMIA: ICD-10-CM

## 2024-05-23 PROCEDURE — 99214 OFFICE O/P EST MOD 30 MIN: CPT | Mod: S$GLB,,, | Performed by: INTERNAL MEDICINE

## 2024-05-23 PROCEDURE — 99999 PR PBB SHADOW E&M-EST. PATIENT-LVL III: CPT | Mod: PBBFAC,,, | Performed by: INTERNAL MEDICINE

## 2024-05-23 PROCEDURE — 1159F MED LIST DOCD IN RCRD: CPT | Mod: CPTII,S$GLB,, | Performed by: INTERNAL MEDICINE

## 2024-05-23 PROCEDURE — 1160F RVW MEDS BY RX/DR IN RCRD: CPT | Mod: CPTII,S$GLB,, | Performed by: INTERNAL MEDICINE

## 2024-05-23 PROCEDURE — 93005 ELECTROCARDIOGRAM TRACING: CPT

## 2024-05-23 PROCEDURE — G2211 COMPLEX E/M VISIT ADD ON: HCPCS | Mod: S$GLB,,, | Performed by: INTERNAL MEDICINE

## 2024-05-23 PROCEDURE — 3008F BODY MASS INDEX DOCD: CPT | Mod: CPTII,S$GLB,, | Performed by: INTERNAL MEDICINE

## 2024-05-23 PROCEDURE — 3078F DIAST BP <80 MM HG: CPT | Mod: CPTII,S$GLB,, | Performed by: INTERNAL MEDICINE

## 2024-05-23 PROCEDURE — 3074F SYST BP LT 130 MM HG: CPT | Mod: CPTII,S$GLB,, | Performed by: INTERNAL MEDICINE

## 2024-05-23 PROCEDURE — 93010 ELECTROCARDIOGRAM REPORT: CPT | Mod: ,,, | Performed by: INTERNAL MEDICINE

## 2024-05-23 RX ORDER — HYDROCHLOROTHIAZIDE 12.5 MG/1
12.5 TABLET ORAL DAILY PRN
Qty: 90 TABLET | Refills: 1 | Status: SHIPPED | OUTPATIENT
Start: 2024-05-23 | End: 2025-05-23

## 2024-05-23 NOTE — PROGRESS NOTES
Subjective:   Patient ID:  Yvonne Avila is a 63 y.o. female who presents for cardiac consult of No chief complaint on file.        The patient came in today for cardiac consult of No chief complaint on file.        Yvonne Avila is a 63 y.o. female pt with HTN, HLD, GERD, Anxiety, tobacco use presents for follow up CV eval.     9/11/19  Pt presented to the ER yesterday for CP. ECG with sinus julian V rate 49. Enzymes neg x 2, NTG caused worsening of CP.   She has been having intermittent chest pain for 2 weeks. Chest pain can lasts for a few sec, took BC which helped. She just started Nexium as well.   She does get lightheaded and feels like she may pass out.     2/28/23  She had adverse side effects of statin so DC'd and changed to Zetia. Had back pain and chest pain went to ER twice and followed up with Dr. Justice in Dec 2022 - ECHO and Nuclear stress ordered but was rescheduled and not completed still. Her CP seems more MSK/atypical.   She will have sinus surgery with Dr. Lundberg 3/10/23    11/16/23  ECHO 3/2023 with normal bi V function and valves, trivial peric effusion, PASP 12 mmHg.   She has upcoming hysterectomy for uterine prolapse in Dec '23 with Dr. Dugan.   She went to the ER today for back pain, received morphine, Toradol, and phenergan and DCd    BP and HR stable now. BMI 18 - 103 lbs. She had PNA in Sept, thought initially bronchitis, had CP.   She has then been to allergist, and had issues after sinus surgery.     5/23/24  BP and HR stable.   Has occ back pain - had injection with Dr. Siu.   OCc CP when it is hot outside.   Has been on iron levels.     ECG - NSR,  biatrial enlarge    FH - mother - MI s/p stents    Results for orders placed during the hospital encounter of 03/29/23    Echo    Interpretation Summary  · The left ventricle is normal in size with concentric remodeling and normal systolic function.  · The estimated ejection fraction is 60%.  · Normal left ventricular  diastolic function.  · Normal right ventricular size with normal right ventricular systolic function.  · Normal central venous pressure (3 mmHg).  · The estimated PA systolic pressure is 12 mmHg.  · Trivial pericardial effusion.      Cholesterol 100 - 199 mg/dL 258 High     Triglycerides 0 - 149 mg/dL 212 High     HDL >39 mg/dL 47    VLDL Cholesterol Bora 5 - 40 mg/dL 40    LDL Calculated 0 - 99 mg/dL 171 High         11/9/20 CT chest  Impression:    No evidence of pulmonary embolism.  Cardiomegaly.  Reflux into the IVC and hepatic veins suggest some component of right heart dysfunction.  Consider cardiac echo  Small left-sided pleural effusion with left lower lobe atelectasis.      TEST DESCRIPTION   PREDOMINANT RHYTHM  1. Sinus rhythm with heart rates varying between 47 and 103 bpm with an average of 61 bpm.   VENTRICULAR ARRHYTHMIAS  1. There were no PVCs. There was no ventricular ectopic activity.  SUPRA VENTRICULAR ARRHYTHMIAS  1. There were very rare PACs totalling 59 and averaging 1 per hour.  There were 4 monomorphic couplets.  2. There were no episodes of sustained supraventricular tachycardia.    EKG Conclusions:    1. The EKG portion of this study is negative for ischemia at a moderate workload, and peak heart rate of 89 bpm (57% of predicted).   2. Exercise capacity is average.   3. Blood pressure response to exercise was normal (Presenting BP: 150/86 Peak BP: 144/91).   4. No significant arrhythmias were present.   5. There were no symptoms of chest discomfort or significant dyspnea throughout the protocol.   6. The Duke treadmill score was 6 suggesting a low probability for future cardiovascular events.    This document has been electronically    SIGNED BY: Pablo Hua MD On: 10/10/2019 12:59    Past Medical History:   Diagnosis Date    Anxiety     Bradycardia 9/19/2019    GERD (gastroesophageal reflux disease)     Thyroid disease        Past Surgical History:   Procedure Laterality Date    BREAST  BIOPSY Left     COLPORRHAPHY, COMBINED ANTEROPOSTERIOR N/A 2023    Procedure: COLPORRHAPHY, COMBINED ANTEROPOSTERIOR;  Surgeon: Teresita Dugan MD;  Location: [facility] OR;  Service: OB/GYN;  Laterality: N/A;    DENTAL SURGERY      INJECTION OF ANESTHETIC AGENT AROUND GANGLION IMPAR N/A 2024    Procedure: Sacrococcygeal ligament and ganglion impar injection;  Surgeon: Elizabet Siu MD;  Location: [facility] PAIN MGT;  Service: Pain Management;  Laterality: N/A;    SACROSPINOUS LIGAMENT FIXATION N/A 2023    Procedure: FIXATION, LIGAMENT, SACROSPINOUS;  Surgeon: Teresita Dugan MD;  Location: [facility] OR;  Service: OB/GYN;  Laterality: N/A;    SINUS SURGERY      TOTAL VAGINAL HYSTERECTOMY N/A 2023    Procedure: HYSTERECTOMY, TOTAL, VAGINAL;  Surgeon: Teresita Dugan MD;  Location: [facility] OR;  Service: OB/GYN;  Laterality: N/A;  Request two assistants for this case       Social History     Tobacco Use    Smoking status: Former     Current packs/day: 0.00     Average packs/day: 0.8 packs/day for 37.0 years (27.8 ttl pk-yrs)     Types: Cigarettes     Start date:      Quit date: 2019     Years since quittin.3    Smokeless tobacco: Never    Tobacco comments:     did not smoke for 2 years since beginning smoking.    Substance Use Topics    Alcohol use: Never    Drug use: Never       Family History   Problem Relation Name Age of Onset    Heart attack Mother      Hypertension Mother      Heart disease Father      Heart attack Maternal Aunt      Heart attack Maternal Uncle         Patient's Medications   New Prescriptions    No medications on file   Previous Medications    ALBUTEROL (PROVENTIL) 2.5 MG /3 ML (0.083 %) NEBULIZER SOLUTION    Take 3 mLs (2.5 mg total) by nebulization every 4 to 6 hours as needed for Wheezing or Shortness of Breath.    ALBUTEROL (PROVENTIL/VENTOLIN HFA) 90 MCG/ACTUATION INHALER    Inhale 2 puffs into the lungs every 4 (four) hours as needed for Wheezing or Shortness of Breath.  Rescue    ALPRAZOLAM (XANAX) 0.5 MG TABLET    Take 1 tablet (0.5 mg total) by mouth 2 (two) times daily as needed (SIGNIFICANT ANXIETY). New Prescriber    AZELASTINE (ASTELIN) 137 MCG (0.1 %) NASAL SPRAY    1 spray (137 mcg total) by Nasal route 2 (two) times daily.    CALCIUM CARBONATE (TUMS) 300 MG (750 MG) CHEW    Take 750 mg by mouth.    CYANOCOBALAMIN (VITAMIN B-12) 1000 MCG TABLET    Take 100 mcg by mouth once daily.    CYANOCOBALAMIN (VITAMIN B-12) 1000 MCG TABLET    Take 5,000 mcg by mouth once daily.    DICLOFENAC (VOLTAREN) 75 MG EC TABLET    Take 1 tablet (75 mg total) by mouth 2 (two) times daily.    ESTRADIOL (ESTRACE) 0.01 % (0.1 MG/GRAM) VAGINAL CREAM    Place 1 g vaginally 3 (three) times a week.    EUTHYROX 25 MCG TABLET    Take 25 mcg by mouth.    FERROUS SULFATE (FEROSUL) 220 MG (44 MG IRON)/5 ML ELIX    Take 5 mLs (220 mg total) by mouth once daily.    FLUOXETINE 20 MG CAPSULE    Take 1 capsule (20 mg total) by mouth once daily. Take IN ADDITION to Prozac 40 mg supply    FLUTICASONE PROPIONATE (FLONASE) 50 MCG/ACTUATION NASAL SPRAY    1 spray (50 mcg total) by Each Nostril route 2 (two) times a day.    FLUTICASONE-SALMETEROL DISKUS INHALER 250-50 MCG    Inhale 1 puff into the lungs 2 (two) times daily. Wash out mouth after use.    HYDROCHLOROTHIAZIDE (HYDRODIURIL) 12.5 MG TAB    Take 1 tablet (12.5 mg total) by mouth daily as needed (edema, swelling). Do not take if BP is low and feeling dry/dehydrated/dizzy    IBUPROFEN 20 MG/ML ORAL LIQUID    Take 30 mLs (600 mg total) by mouth every 6 (six) hours.    INDOMETHACIN (INDOCIN) 25 MG CAPSULE    Take 1 capsule (25 mg total) by mouth 3 (three) times daily.    LACTOBACILLUS RHAMNOSUS GG (CULTURELLE) 10 BILLION CELL CAPSULE    Take 1 capsule by mouth once daily.    LEVOTHYROXINE (SYNTHROID) 50 MCG TABLET    Take 50 mcg by mouth before breakfast.    LOVASTATIN (MEVACOR) 10 MG TABLET    Take 10 mg by mouth every evening.    METHOCARBAMOL (ROBAXIN) 750 MG  TAB    Take 500 mg by mouth 4 (four) times daily.    MONTELUKAST (SINGULAIR) 10 MG TABLET    Take 10 mg by mouth every evening.    MULTIVITAMIN (THERAGRAN) PER TABLET    Take 1 tablet by mouth once daily.    OMEGA-3 FATTY ACIDS/FISH OIL (FISH OIL-OMEGA-3 FATTY ACIDS) 300-1,000 MG CAPSULE    Take 1 capsule by mouth every evening.    OMEPRAZOLE (PRILOSEC) 40 MG CAPSULE    Take 40 mg by mouth.    ONDANSETRON (ZOFRAN-ODT) 8 MG TBDL    Take 1 tablet (8 mg total) by mouth every 8 (eight) hours as needed (nausea).    OXYBUTYNIN (DITROPAN) 5 MG/5 ML SYRUP    Take 5 mLs (5 mg total) by mouth 2 (two) times daily.    PREGABALIN (LYRICA) 75 MG CAPSULE    Take 1 capsule (75 mg total) by mouth every evening for 10 days, THEN 2 capsules (150 mg total) every evening for 10 days, THEN 3 capsules (225 mg total) every evening for 10 days.    ZINC SULFATE (ZINC-15 ORAL)    Take by mouth Daily. Unknown dose   Modified Medications    No medications on file   Discontinued Medications    No medications on file       Review of Systems   Constitutional: Negative.    HENT: Negative.     Eyes: Negative.    Respiratory:  Positive for shortness of breath.    Cardiovascular:  Positive for chest pain.   Gastrointestinal: Negative.    Genitourinary: Negative.    Musculoskeletal: Negative.    Skin: Negative.    Neurological:  Positive for dizziness.   Endo/Heme/Allergies: Negative.    Psychiatric/Behavioral:  The patient is nervous/anxious.    All 12 systems otherwise negative.      Wt Readings from Last 3 Encounters:   02/23/24 47.7 kg (105 lb 4.3 oz)   02/09/24 47.5 kg (104 lb 11.5 oz)   02/08/24 47 kg (103 lb 9.9 oz)     Temp Readings from Last 3 Encounters:   02/23/24 97.4 °F (36.3 °C) (Temporal)   12/27/23 97.8 °F (36.6 °C) (Oral)   12/15/23 97.8 °F (36.6 °C)     BP Readings from Last 3 Encounters:   02/23/24 132/80   02/09/24 108/75   02/08/24 122/83     Pulse Readings from Last 3 Encounters:   02/23/24 74   02/09/24 63   02/08/24 62        There were no vitals taken for this visit.    Objective:   Physical Exam  Vitals and nursing note reviewed.   Constitutional:       General: She is not in acute distress.     Appearance: She is well-developed. She is not diaphoretic.   HENT:      Head: Normocephalic and atraumatic.      Nose: Nose normal.   Eyes:      General: No scleral icterus.     Conjunctiva/sclera: Conjunctivae normal.   Neck:      Thyroid: No thyromegaly.      Vascular: No JVD.   Cardiovascular:      Rate and Rhythm: Regular rhythm. Bradycardia present.      Heart sounds: S1 normal and S2 normal. No murmur heard.     No friction rub. No gallop. No S3 or S4 sounds.   Pulmonary:      Effort: Pulmonary effort is normal. No respiratory distress.      Breath sounds: Normal breath sounds. No stridor. No wheezing or rales.   Chest:      Chest wall: No tenderness.   Abdominal:      General: Bowel sounds are normal. There is no distension.      Palpations: Abdomen is soft. There is no mass.      Tenderness: There is no abdominal tenderness. There is no rebound.   Genitourinary:     Comments: Deferred  Musculoskeletal:         General: No tenderness or deformity. Normal range of motion.      Cervical back: Normal range of motion and neck supple.   Lymphadenopathy:      Cervical: No cervical adenopathy.   Skin:     General: Skin is warm and dry.      Coloration: Skin is not pale.      Findings: No erythema or rash.   Neurological:      Mental Status: She is alert and oriented to person, place, and time.      Motor: No abnormal muscle tone.      Coordination: Coordination normal.   Psychiatric:         Behavior: Behavior normal.         Thought Content: Thought content normal.         Judgment: Judgment normal.         Lab Results   Component Value Date     12/22/2023    K 3.6 12/22/2023     12/22/2023    CO2 29 12/22/2023    BUN 14 12/22/2023    CREATININE 1.0 12/22/2023    GLU 93 12/22/2023    MG 3.7 (H) 08/12/2021    AST 16 06/16/2023     ALT 8 (L) 06/16/2023    ALBUMIN 3.7 06/16/2023    PROT 7.1 06/16/2023    BILITOT 0.3 06/16/2023    WBC 6.27 12/22/2023    HGB 10.6 (L) 12/22/2023    HCT 31.7 (L) 12/22/2023    MCV 83 12/22/2023     12/22/2023    INR 1.1 09/10/2019    TSH 7.230 (H) 10/04/2022    TSH 9.597 (H) 11/19/2020    CHOL 258 (H) 10/04/2022    HDL 47 10/04/2022    LDLCALC 171 (H) 10/04/2022    TRIG 212 (H) 10/04/2022    BNP 45 06/16/2023     Assessment:      1. Other chest pain    2. Shortness of breath    3. Palpitations    4. Essential hypertension    5. Bradycardia    6. Former tobacco use    7. Other form of dyspnea    8. Gastroesophageal reflux disease, unspecified whether esophagitis present    9. Other hyperlipidemia    10. Inflammation of joint        Plan:   1. Chest pain, recurrent sec to inflammation   - f/u rheum eval   - cont motrin PRN or liquid advil   - ECHO 3/2023 with normal bi V function and valves, trivial peric effusion, PASP 12 mmHg.     2. GERD  - cont PPI  - f/u GI    3. Bradycardia with occ palpitations   - prior Holter - negative  -TSH, T4 - normal   - off BB    4.h/o Tobacco use   - has quit smoking     5. Anxiety  - referred to psych    6. Pleural effusion  - f/u pulm     7. HTN - well controlled  - cont meds and titrate    8. Hypothyroidism   - cont Synthroid    9. HLD, elevated TGs   - was on Lipitor 40mg - adverse side effects of statin so DC'd and changed to Zetia.   - takes fish oils - now on Lovastatin 20mg     Visit today included increased complexity associated with the care of the episodic problem chest pain addressed and managing the longitudinal care of the patient due to the serious and/or complex managed problem(s) .      Thank you for allowing me to participate in this patient's care. Please do not hesitate to contact me with any questions or concerns. Consult note has been forwarded to the referral physician.     Pablo Hua MD, [facility]  Cardiovascular Disease  [facility], [place]  [place]  [phone] (P)

## 2024-05-27 ENCOUNTER — HOSPITAL ENCOUNTER (EMERGENCY)
Facility: HOSPITAL | Age: 64
Discharge: HOME | End: 2024-05-27
Attending: EMERGENCY MEDICINE
Payer: COMMERCIAL

## 2024-05-27 VITALS
TEMPERATURE: 98 F | OXYGEN SATURATION: 100 % | SYSTOLIC BLOOD PRESSURE: 137 MMHG | BODY MASS INDEX: 18.73 KG/M2 | RESPIRATION RATE: 20 BRPM | DIASTOLIC BLOOD PRESSURE: 76 MMHG | WEIGHT: 102.38 LBS | HEART RATE: 57 BPM

## 2024-05-27 DIAGNOSIS — R10.9 ABDOMINAL PAIN, UNSPECIFIED ABDOMINAL LOCATION: ICD-10-CM

## 2024-05-27 DIAGNOSIS — K59.00 CONSTIPATION, UNSPECIFIED CONSTIPATION TYPE: Primary | ICD-10-CM

## 2024-05-27 LAB
ALBUMIN SERPL BCP-MCNC: 4 G/DL (ref 3.5–5.2)
ALP SERPL-CCNC: 79 U/L (ref 55–135)
ALT SERPL W/O P-5'-P-CCNC: 13 U/L (ref 10–44)
ANION GAP SERPL CALC-SCNC: 9 MMOL/L (ref 8–16)
AST SERPL-CCNC: 18 U/L (ref 10–40)
BASOPHILS # BLD AUTO: 0.02 K/UL (ref 0–0.2)
BASOPHILS NFR BLD: 0.3 % (ref 0–1.9)
BILIRUB SERPL-MCNC: 0.4 MG/DL (ref 0.1–1)
BILIRUB UR QL STRIP: NEGATIVE
BUN SERPL-MCNC: 12 MG/DL (ref 8–23)
CALCIUM SERPL-MCNC: 9.8 MG/DL (ref 8.7–10.5)
CHLORIDE SERPL-SCNC: 108 MMOL/L (ref 95–110)
CLARITY UR: CLEAR
CO2 SERPL-SCNC: 25 MMOL/L (ref 23–29)
COLOR UR: YELLOW
CREAT SERPL-MCNC: 1 MG/DL (ref 0.5–1.4)
DIFFERENTIAL METHOD BLD: ABNORMAL
EOSINOPHIL # BLD AUTO: 0 K/UL (ref 0–0.5)
EOSINOPHIL NFR BLD: 0.5 % (ref 0–8)
ERYTHROCYTE [DISTWIDTH] IN BLOOD BY AUTOMATED COUNT: 13.2 % (ref 11.5–14.5)
EST. GFR  (NO RACE VARIABLE): >60 ML/MIN/1.73 M^2
GLUCOSE SERPL-MCNC: 110 MG/DL (ref 70–110)
GLUCOSE UR QL STRIP: NEGATIVE
HCT VFR BLD AUTO: 39.8 % (ref 37–48.5)
HGB BLD-MCNC: 13 G/DL (ref 12–16)
HGB UR QL STRIP: NEGATIVE
IMM GRANULOCYTES # BLD AUTO: 0.02 K/UL (ref 0–0.04)
IMM GRANULOCYTES NFR BLD AUTO: 0.3 % (ref 0–0.5)
KETONES UR QL STRIP: NEGATIVE
LEUKOCYTE ESTERASE UR QL STRIP: NEGATIVE
LIPASE SERPL-CCNC: 36 U/L (ref 4–60)
LYMPHOCYTES # BLD AUTO: 1.1 K/UL (ref 1–4.8)
LYMPHOCYTES NFR BLD: 14.4 % (ref 18–48)
MCH RBC QN AUTO: 27.5 PG (ref 27–31)
MCHC RBC AUTO-ENTMCNC: 32.7 G/DL (ref 32–36)
MCV RBC AUTO: 84 FL (ref 82–98)
MONOCYTES # BLD AUTO: 0.5 K/UL (ref 0.3–1)
MONOCYTES NFR BLD: 6.4 % (ref 4–15)
NEUTROPHILS # BLD AUTO: 6 K/UL (ref 1.8–7.7)
NEUTROPHILS NFR BLD: 78.1 % (ref 38–73)
NITRITE UR QL STRIP: NEGATIVE
NRBC BLD-RTO: 0 /100 WBC
OHS QRS DURATION: 80 MS
OHS QTC CALCULATION: 452 MS
PH UR STRIP: 5 [PH] (ref 5–8)
PLATELET # BLD AUTO: 288 K/UL (ref 150–450)
PMV BLD AUTO: 9.6 FL (ref 9.2–12.9)
POTASSIUM SERPL-SCNC: 4.2 MMOL/L (ref 3.5–5.1)
PROT SERPL-MCNC: 7.7 G/DL (ref 6–8.4)
PROT UR QL STRIP: NEGATIVE
RBC # BLD AUTO: 4.73 M/UL (ref 4–5.4)
SODIUM SERPL-SCNC: 142 MMOL/L (ref 136–145)
SP GR UR STRIP: 1.02 (ref 1–1.03)
URN SPEC COLLECT METH UR: NORMAL
UROBILINOGEN UR STRIP-ACNC: NEGATIVE EU/DL
WBC # BLD AUTO: 7.65 K/UL (ref 3.9–12.7)

## 2024-05-27 PROCEDURE — 81003 URINALYSIS AUTO W/O SCOPE: CPT | Performed by: NURSE PRACTITIONER

## 2024-05-27 PROCEDURE — 96376 TX/PRO/DX INJ SAME DRUG ADON: CPT

## 2024-05-27 PROCEDURE — 99285 EMERGENCY DEPT VISIT HI MDM: CPT | Mod: 25

## 2024-05-27 PROCEDURE — 96374 THER/PROPH/DIAG INJ IV PUSH: CPT

## 2024-05-27 PROCEDURE — 63600175 PHARM REV CODE 636 W HCPCS: Performed by: NURSE PRACTITIONER

## 2024-05-27 PROCEDURE — 25000003 PHARM REV CODE 250: Performed by: NURSE PRACTITIONER

## 2024-05-27 PROCEDURE — 96375 TX/PRO/DX INJ NEW DRUG ADDON: CPT

## 2024-05-27 PROCEDURE — 83690 ASSAY OF LIPASE: CPT | Performed by: NURSE PRACTITIONER

## 2024-05-27 PROCEDURE — 96361 HYDRATE IV INFUSION ADD-ON: CPT

## 2024-05-27 PROCEDURE — 80053 COMPREHEN METABOLIC PANEL: CPT | Performed by: NURSE PRACTITIONER

## 2024-05-27 PROCEDURE — 85025 COMPLETE CBC W/AUTO DIFF WBC: CPT | Performed by: NURSE PRACTITIONER

## 2024-05-27 RX ORDER — ONDANSETRON HYDROCHLORIDE 2 MG/ML
8 INJECTION, SOLUTION INTRAVENOUS
Status: COMPLETED | OUTPATIENT
Start: 2024-05-27 | End: 2024-05-27

## 2024-05-27 RX ORDER — MORPHINE SULFATE 4 MG/ML
2 INJECTION, SOLUTION INTRAMUSCULAR; INTRAVENOUS
Status: COMPLETED | OUTPATIENT
Start: 2024-05-27 | End: 2024-05-27

## 2024-05-27 RX ORDER — ADHESIVE BANDAGE
2400 BANDAGE TOPICAL DAILY PRN
Qty: 354 ML | Refills: 0 | Status: SHIPPED | OUTPATIENT
Start: 2024-05-27

## 2024-05-27 RX ORDER — SODIUM CHLORIDE 9 MG/ML
1000 INJECTION, SOLUTION INTRAVENOUS
Status: COMPLETED | OUTPATIENT
Start: 2024-05-27 | End: 2024-05-27

## 2024-05-27 RX ORDER — ONDANSETRON HYDROCHLORIDE 2 MG/ML
4 INJECTION, SOLUTION INTRAVENOUS
Status: COMPLETED | OUTPATIENT
Start: 2024-05-27 | End: 2024-05-27

## 2024-05-27 RX ORDER — SODIUM CHLORIDE 9 MG/ML
1000 INJECTION, SOLUTION INTRAVENOUS
Status: DISCONTINUED | OUTPATIENT
Start: 2024-05-27 | End: 2024-05-27

## 2024-05-27 RX ORDER — MORPHINE SULFATE 4 MG/ML
4 INJECTION, SOLUTION INTRAMUSCULAR; INTRAVENOUS
Status: COMPLETED | OUTPATIENT
Start: 2024-05-27 | End: 2024-05-27

## 2024-05-27 RX ADMIN — MORPHINE SULFATE 4 MG: 4 INJECTION INTRAVENOUS at 12:05

## 2024-05-27 RX ADMIN — ONDANSETRON 8 MG: 2 INJECTION INTRAMUSCULAR; INTRAVENOUS at 08:05

## 2024-05-27 RX ADMIN — SODIUM CHLORIDE 1000 ML: 9 INJECTION, SOLUTION INTRAVENOUS at 08:05

## 2024-05-27 RX ADMIN — MORPHINE SULFATE 2 MG: 4 INJECTION INTRAVENOUS at 09:05

## 2024-05-27 RX ADMIN — Medication 1 ENEMA: at 10:05

## 2024-05-27 RX ADMIN — ONDANSETRON 4 MG: 2 INJECTION INTRAMUSCULAR; INTRAVENOUS at 12:05

## 2024-05-27 NOTE — Clinical Note
"Yvonne Watson" Pattie Avila was seen and treated in our emergency department on 5/27/2024.  She may return to work on 05/29/2024.       If you have any questions or concerns, please don't hesitate to call.      Monica PIERCE    "

## 2024-05-27 NOTE — ED PROVIDER NOTES
Encounter Date: 5/27/2024       History     Chief Complaint   Patient presents with    Abdominal Pain     Pt. Reports abd. Pain x 3 days with constipation. She has not had a BM in a week.        63-year-old female with complaint of abdominal pain and constipation for the past 7 days.  Patient also reports nausea.  No vomiting.  Patient reports no relief with laxatives.        Review of patient's allergies indicates:  No Known Allergies  Past Medical History:   Diagnosis Date    Anxiety     Bradycardia 9/19/2019    GERD (gastroesophageal reflux disease)     Thyroid disease      Past Surgical History:   Procedure Laterality Date    BREAST BIOPSY Left     COLPORRHAPHY, COMBINED ANTEROPOSTERIOR N/A 12/26/2023    Procedure: COLPORRHAPHY, COMBINED ANTEROPOSTERIOR;  Surgeon: Teresita Dugan MD;  Location: [facility];  Service: OB/GYN;  Laterality: N/A;    DENTAL SURGERY      INJECTION OF ANESTHETIC AGENT AROUND GANGLION IMPAR N/A 2/23/2024    Procedure: Sacrococcygeal ligament and ganglion impar injection;  Surgeon: Elizabet Siu MD;  Location: [facility];  Service: Pain Management;  Laterality: N/A;    SACROSPINOUS LIGAMENT FIXATION N/A 12/26/2023    Procedure: FIXATION, LIGAMENT, SACROSPINOUS;  Surgeon: Teresita Dugan MD;  Location: [facility] OR;  Service: OB/GYN;  Laterality: N/A;    SINUS SURGERY      TOTAL VAGINAL HYSTERECTOMY N/A 12/26/2023    Procedure: HYSTERECTOMY, TOTAL, VAGINAL;  Surgeon: Teresita Dugan MD;  Location: [facility] OR;  Service: OB/GYN;  Laterality: N/A;  Request two assistants for this case     Family History   Problem Relation Name Age of Onset    Heart attack Mother      Hypertension Mother      Heart disease Father      Heart attack Maternal Aunt      Heart attack Maternal Uncle       Social History     Tobacco Use    Smoking status: Former     Current packs/day: 0.00     Average packs/day: 0.8 packs/day for 37.0 years (27.8 ttl pk-yrs)     Types: Cigarettes     Start date: 1982     Quit date: 2019      Years since quittin.4    Smokeless tobacco: Never    Tobacco comments:     did not smoke for 2 years since beginning smoking.    Substance Use Topics    Alcohol use: Never    Drug use: Never     Review of Systems   Constitutional:  Negative for fever.   HENT:  Negative for sore throat.    Respiratory:  Negative for shortness of breath.    Cardiovascular:  Negative for chest pain.   Gastrointestinal:  Positive for abdominal pain, constipation and nausea.   Genitourinary:  Negative for dysuria.   Musculoskeletal:  Negative for back pain.   Skin:  Negative for rash.   Neurological:  Negative for weakness.   Hematological:  Does not bruise/bleed easily.       Physical Exam     Initial Vitals [24 0810]   BP Pulse Resp Temp SpO2   (!) 145/78 71 (!) 22 97.8 °F (36.6 °C) 100 %      MAP       --         Physical Exam    Nursing note and vitals reviewed.  Constitutional: She appears well-developed and well-nourished.   HENT:   Head: Normocephalic and atraumatic.   Eyes: Conjunctivae and EOM are normal. Pupils are equal, round, and reactive to light.   Neck: Neck supple.   Normal range of motion.  Cardiovascular:  Normal rate, regular rhythm, normal heart sounds and intact distal pulses.           Pulmonary/Chest: Breath sounds normal.   Abdominal: Abdomen is soft. There is no abdominal tenderness. There is no rebound and no guarding.   Genitourinary:    Genitourinary Comments: No fecal impaction      Musculoskeletal:         General: Normal range of motion.      Cervical back: Normal range of motion and neck supple.     Neurological: She is alert and oriented to person, place, and time. She has normal strength and normal reflexes.   Skin: Skin is warm and dry.   Psychiatric: She has a normal mood and affect. Her behavior is normal. Thought content normal.         ED Course   Procedures  Labs Reviewed   CBC W/ AUTO DIFFERENTIAL - Abnormal; Notable for the following components:       Result Value    Gran % 78.1  (*)     Lymph % 14.4 (*)     All other components within normal limits   COMPREHENSIVE METABOLIC PANEL   LIPASE   URINALYSIS, REFLEX TO URINE CULTURE    Narrative:     Specimen Source->Urine          Imaging Results              CT Abdomen Pelvis  Without Contrast (Final result)  Result time 05/27/24 09:55:40      Final result by Aime Ervin MD (05/27/24 09:55:40)                   Impression:      Moderate constipation.  No definite evidence for acute diverticulitis.    All CT scans at this facility use dose modulation, iterative reconstruction, and/or weight based dosing when appropriate to reduce radiation dose to as low as reasonable achievable.      Electronically signed by: Aime Ervin MD  Date:    05/27/2024  Time:    09:55               Narrative:    EXAMINATION:  CT ABDOMEN PELVIS WITHOUT CONTRAST    CLINICAL HISTORY:  Abdominal pain, acute, nonlocalized;    TECHNIQUE:  Low dose axial images, sagittal and coronal reformations were obtained from the lung bases to the pubic symphysis.  Oral contrast was not administered.    COMPARISON:  08/11/2021    FINDINGS:  Heart: Normal size. No effusion.    Lung Bases: Clear.    Liver: Normal size and attenuation. No focal lesions.    Gallbladder: No calcified gallstones.    Bile Ducts: No dilatation.    Pancreas: No obvious mass. No peripancreatic fat stranding.    Spleen: Normal.    Adrenals: Normal.    Kidneys/Ureters: No mass, hydroureteronephrosis, or nephroureterolithiasis.    Bladder: No wall thickening.    Reproductive organs: Post hysterectomy    GI Tract/Mesentery: No evidence of bowel obstruction or inflammation.  Moderate constipation.  Sigmoid diverticulosis without evidence for acute diverticulitis.  Muscular hypertrophy within the sigmoid colon likely reflects chronic diverticular disease.  No evidence of appendicitis.    Peritoneal Space: No ascites or free air.    Retroperitoneum: Shotty adenopathy.    Abdominal wall: Normal.    Vasculature: No  aneurysm. Aorta demonstrates atherosclerotic disease.    Bones: No acute fracture. No suspicious lytic or sclerotic lesions.                                    Imaging Results              CT Abdomen Pelvis  Without Contrast (Final result)  Result time 05/27/24 09:55:40      Final result by Aime Ervin MD (05/27/24 09:55:40)                   Impression:      Moderate constipation.  No definite evidence for acute diverticulitis.    All CT scans at this facility use dose modulation, iterative reconstruction, and/or weight based dosing when appropriate to reduce radiation dose to as low as reasonable achievable.      Electronically signed by: Aime Ervin MD  Date:    05/27/2024  Time:    09:55               Narrative:    EXAMINATION:  CT ABDOMEN PELVIS WITHOUT CONTRAST    CLINICAL HISTORY:  Abdominal pain, acute, nonlocalized;    TECHNIQUE:  Low dose axial images, sagittal and coronal reformations were obtained from the lung bases to the pubic symphysis.  Oral contrast was not administered.    COMPARISON:  08/11/2021    FINDINGS:  Heart: Normal size. No effusion.    Lung Bases: Clear.    Liver: Normal size and attenuation. No focal lesions.    Gallbladder: No calcified gallstones.    Bile Ducts: No dilatation.    Pancreas: No obvious mass. No peripancreatic fat stranding.    Spleen: Normal.    Adrenals: Normal.    Kidneys/Ureters: No mass, hydroureteronephrosis, or nephroureterolithiasis.    Bladder: No wall thickening.    Reproductive organs: Post hysterectomy    GI Tract/Mesentery: No evidence of bowel obstruction or inflammation.  Moderate constipation.  Sigmoid diverticulosis without evidence for acute diverticulitis.  Muscular hypertrophy within the sigmoid colon likely reflects chronic diverticular disease.  No evidence of appendicitis.    Peritoneal Space: No ascites or free air.    Retroperitoneum: Shotty adenopathy.    Abdominal wall: Normal.    Vasculature: No aneurysm. Aorta demonstrates  atherosclerotic disease.    Bones: No acute fracture. No suspicious lytic or sclerotic lesions.                                    Labs Reviewed   CBC W/ AUTO DIFFERENTIAL - Abnormal; Notable for the following components:       Result Value    Gran % 78.1 (*)     Lymph % 14.4 (*)     All other components within normal limits   COMPREHENSIVE METABOLIC PANEL   LIPASE   URINALYSIS, REFLEX TO URINE CULTURE    Narrative:     Specimen Source->Urine          Medications   sodium phosphates 19-7 gram/118 mL enema 1 enema (has no administration in time range)   0.9%  NaCl infusion (1,000 mLs Intravenous New Bag 5/27/24 0840)   ondansetron injection 8 mg (8 mg Intravenous Given 5/27/24 0838)   morphine injection 2 mg (2 mg Intravenous Given 5/27/24 0915)     Medical Decision Making  11:41 AM   Blood work and CT does not show anything remarkable or acute.  Patient was still complaining of abdominal pain and cramping at time of discharge.  Patient requesting 1 dose of pain medication and discharge.  Patient instructed to take Metamucil and milk of magnesium as needed for constipation.  Patient told to return for any vomiting or worsening pain.  Patient verbalized understanding.    Amount and/or Complexity of Data Reviewed  Labs: ordered.  Radiology: ordered.    Risk  OTC drugs.  Prescription drug management.                                      Clinical Impression:  Final diagnoses:  [K59.00] Constipation, unspecified constipation type (Primary)  [R10.9] Abdominal pain, unspecified abdominal location          ED Disposition Condition    Discharge Stable          ED Prescriptions       Medication Sig Dispense Start Date End Date Auth. Provider    psyllium (HYDROCIL) packet Take 1 packet by mouth once daily. 30 packet 5/27/2024 -- Melo Pak NP    magnesium hydroxide 400 mg/5 ml (MILK OF MAGNESIA) 400 mg/5 mL Susp Take 30 mLs (2,400 mg total) by mouth daily as needed (constipation). 354 mL 5/27/2024 -- Melo Pak NP           Follow-up Information       Follow up With Specialties Details Why Contact Info    Nafisa Irving, APRN Family Medicine Schedule an appointment as soon as possible for a visit in 2 days  [address] [facility] [address]  [phone]               Melo Pak, PHUONG  05/27/24 1037       Melo Pak NP  05/27/24 1142

## 2024-05-28 ENCOUNTER — HOSPITAL ENCOUNTER (EMERGENCY)
Facility: HOSPITAL | Age: 64
Discharge: HOME | End: 2024-05-28
Attending: FAMILY MEDICINE
Payer: COMMERCIAL

## 2024-05-28 VITALS
SYSTOLIC BLOOD PRESSURE: 131 MMHG | HEART RATE: 63 BPM | OXYGEN SATURATION: 100 % | RESPIRATION RATE: 19 BRPM | TEMPERATURE: 98 F | DIASTOLIC BLOOD PRESSURE: 74 MMHG

## 2024-05-28 DIAGNOSIS — R10.9 ABDOMINAL CRAMPING: Primary | ICD-10-CM

## 2024-05-28 DIAGNOSIS — K59.00 CONSTIPATION, UNSPECIFIED CONSTIPATION TYPE: ICD-10-CM

## 2024-05-28 LAB
ALBUMIN SERPL BCP-MCNC: 4.4 G/DL (ref 3.5–5.2)
ALP SERPL-CCNC: 85 U/L (ref 55–135)
ALT SERPL W/O P-5'-P-CCNC: 12 U/L (ref 10–44)
ANION GAP SERPL CALC-SCNC: 12 MMOL/L (ref 8–16)
AST SERPL-CCNC: 17 U/L (ref 10–40)
BACTERIA #/AREA URNS HPF: NORMAL /HPF
BASOPHILS # BLD AUTO: 0.01 K/UL (ref 0–0.2)
BASOPHILS NFR BLD: 0.1 % (ref 0–1.9)
BILIRUB SERPL-MCNC: 0.7 MG/DL (ref 0.1–1)
BILIRUB UR QL STRIP: NEGATIVE
BUN SERPL-MCNC: 19 MG/DL (ref 8–23)
CALCIUM SERPL-MCNC: 10.2 MG/DL (ref 8.7–10.5)
CHLORIDE SERPL-SCNC: 105 MMOL/L (ref 95–110)
CLARITY UR: CLEAR
CO2 SERPL-SCNC: 25 MMOL/L (ref 23–29)
COLOR UR: YELLOW
CREAT SERPL-MCNC: 1 MG/DL (ref 0.5–1.4)
DIFFERENTIAL METHOD BLD: ABNORMAL
EOSINOPHIL # BLD AUTO: 0 K/UL (ref 0–0.5)
EOSINOPHIL NFR BLD: 0 % (ref 0–8)
ERYTHROCYTE [DISTWIDTH] IN BLOOD BY AUTOMATED COUNT: 13.3 % (ref 11.5–14.5)
EST. GFR  (NO RACE VARIABLE): >60 ML/MIN/1.73 M^2
GLUCOSE SERPL-MCNC: 106 MG/DL (ref 70–110)
GLUCOSE UR QL STRIP: NEGATIVE
HCT VFR BLD AUTO: 42.7 % (ref 37–48.5)
HGB BLD-MCNC: 14 G/DL (ref 12–16)
HGB UR QL STRIP: NEGATIVE
HYALINE CASTS #/AREA URNS LPF: 0 /LPF
IMM GRANULOCYTES # BLD AUTO: 0.02 K/UL (ref 0–0.04)
IMM GRANULOCYTES NFR BLD AUTO: 0.3 % (ref 0–0.5)
KETONES UR QL STRIP: ABNORMAL
LEUKOCYTE ESTERASE UR QL STRIP: NEGATIVE
LIPASE SERPL-CCNC: 13 U/L (ref 4–60)
LYMPHOCYTES # BLD AUTO: 0.9 K/UL (ref 1–4.8)
LYMPHOCYTES NFR BLD: 11.8 % (ref 18–48)
MCH RBC QN AUTO: 27.3 PG (ref 27–31)
MCHC RBC AUTO-ENTMCNC: 32.8 G/DL (ref 32–36)
MCV RBC AUTO: 83 FL (ref 82–98)
MICROSCOPIC COMMENT: NORMAL
MONOCYTES # BLD AUTO: 0.3 K/UL (ref 0.3–1)
MONOCYTES NFR BLD: 4.4 % (ref 4–15)
NEUTROPHILS # BLD AUTO: 6.4 K/UL (ref 1.8–7.7)
NEUTROPHILS NFR BLD: 83.4 % (ref 38–73)
NITRITE UR QL STRIP: NEGATIVE
NRBC BLD-RTO: 0 /100 WBC
PH UR STRIP: 6 [PH] (ref 5–8)
PLATELET # BLD AUTO: 318 K/UL (ref 150–450)
PMV BLD AUTO: 9.2 FL (ref 9.2–12.9)
POTASSIUM SERPL-SCNC: 4.4 MMOL/L (ref 3.5–5.1)
PROT SERPL-MCNC: 8.6 G/DL (ref 6–8.4)
PROT UR QL STRIP: ABNORMAL
RBC # BLD AUTO: 5.12 M/UL (ref 4–5.4)
RBC #/AREA URNS HPF: 0 /HPF (ref 0–4)
SODIUM SERPL-SCNC: 142 MMOL/L (ref 136–145)
SP GR UR STRIP: >1.03 (ref 1–1.03)
SQUAMOUS #/AREA URNS HPF: 5 /HPF
URN SPEC COLLECT METH UR: ABNORMAL
UROBILINOGEN UR STRIP-ACNC: NEGATIVE EU/DL
WBC # BLD AUTO: 7.69 K/UL (ref 3.9–12.7)
WBC #/AREA URNS HPF: 2 /HPF (ref 0–5)

## 2024-05-28 PROCEDURE — 96374 THER/PROPH/DIAG INJ IV PUSH: CPT

## 2024-05-28 PROCEDURE — 63600175 PHARM REV CODE 636 W HCPCS: Performed by: FAMILY MEDICINE

## 2024-05-28 PROCEDURE — 63600175 PHARM REV CODE 636 W HCPCS: Performed by: NURSE PRACTITIONER

## 2024-05-28 PROCEDURE — 85025 COMPLETE CBC W/AUTO DIFF WBC: CPT | Performed by: NURSE PRACTITIONER

## 2024-05-28 PROCEDURE — 25000003 PHARM REV CODE 250: Performed by: NURSE PRACTITIONER

## 2024-05-28 PROCEDURE — 99284 EMERGENCY DEPT VISIT MOD MDM: CPT | Mod: 25

## 2024-05-28 PROCEDURE — 81000 URINALYSIS NONAUTO W/SCOPE: CPT | Performed by: NURSE PRACTITIONER

## 2024-05-28 PROCEDURE — 96375 TX/PRO/DX INJ NEW DRUG ADDON: CPT

## 2024-05-28 PROCEDURE — 83690 ASSAY OF LIPASE: CPT | Performed by: NURSE PRACTITIONER

## 2024-05-28 PROCEDURE — 96361 HYDRATE IV INFUSION ADD-ON: CPT

## 2024-05-28 PROCEDURE — 25000003 PHARM REV CODE 250: Performed by: FAMILY MEDICINE

## 2024-05-28 PROCEDURE — 80053 COMPREHEN METABOLIC PANEL: CPT | Performed by: NURSE PRACTITIONER

## 2024-05-28 RX ORDER — BISACODYL 5 MG
10 TABLET, DELAYED RELEASE (ENTERIC COATED) ORAL 2 TIMES DAILY
Qty: 14 TABLET | Refills: 0 | Status: SHIPPED | OUTPATIENT
Start: 2024-05-28

## 2024-05-28 RX ORDER — ONDANSETRON HYDROCHLORIDE 2 MG/ML
4 INJECTION, SOLUTION INTRAVENOUS
Status: COMPLETED | OUTPATIENT
Start: 2024-05-28 | End: 2024-05-28

## 2024-05-28 RX ORDER — KETOROLAC TROMETHAMINE 30 MG/ML
15 INJECTION, SOLUTION INTRAMUSCULAR; INTRAVENOUS
Status: COMPLETED | OUTPATIENT
Start: 2024-05-28 | End: 2024-05-28

## 2024-05-28 RX ORDER — SYRING-NEEDL,DISP,INSUL,0.3 ML 29 G X1/2"
296 SYRINGE, EMPTY DISPOSABLE MISCELLANEOUS
Status: COMPLETED | OUTPATIENT
Start: 2024-05-28 | End: 2024-05-28

## 2024-05-28 RX ORDER — SENNOSIDES 8.6 MG/1
8.6 TABLET ORAL DAILY
Status: COMPLETED | OUTPATIENT
Start: 2024-05-28 | End: 2024-05-28

## 2024-05-28 RX ORDER — ONDANSETRON 4 MG/1
4 TABLET, FILM COATED ORAL EVERY 6 HOURS
Qty: 12 TABLET | Refills: 0 | Status: SHIPPED | OUTPATIENT
Start: 2024-05-28 | End: 2024-06-11

## 2024-05-28 RX ADMIN — ONDANSETRON 4 MG: 2 INJECTION INTRAMUSCULAR; INTRAVENOUS at 05:05

## 2024-05-28 RX ADMIN — SODIUM CHLORIDE 1000 ML: 9 INJECTION, SOLUTION INTRAVENOUS at 05:05

## 2024-05-28 RX ADMIN — MAGNESIUM CITRATE 296 ML: 1.75 LIQUID ORAL at 06:05

## 2024-05-28 RX ADMIN — KETOROLAC TROMETHAMINE 15 MG: 30 INJECTION, SOLUTION INTRAMUSCULAR at 07:05

## 2024-05-28 RX ADMIN — SENNOSIDES 8.6 MG: 8.6 TABLET, FILM COATED ORAL at 08:05

## 2024-05-28 NOTE — Clinical Note
"Yvonne Watson" Pattie Avila was seen and treated in our emergency department on 5/28/2024.  She may return to work on 06/03/2024.       If you have any questions or concerns, please don't hesitate to call.       RN    "

## 2024-05-28 NOTE — FIRST PROVIDER EVALUATION
Medical screening examination initiated.  I have conducted a focused provider triage encounter, findings are as follows:    Brief history of present illness:   patient was seen here yesterday for the same complaint.  Patient reports she followed up with PCP today and sent her to the ER for dehydrated    Vitals:    05/28/24 1542   BP: (!) 150/70   Pulse: (!) 56   Resp: 19   SpO2: 100%       Pertinent physical exam:   no acute distress    Brief workup plan:   labs, meds, further eval    Preliminary workup initiated; this workup will be continued and followed by the physician or advanced practice provider that is assigned to the patient when roomed.

## 2024-05-29 NOTE — ED PROVIDER NOTES
SCRIBE #1 NOTE: I, Christopher Bergman, am scribing for, and in the presence of, Annette Gudino MD. I have scribed the entire note.       History     Chief Complaint   Patient presents with    Abdominal Cramping     Seen Saturday for +abd cramping. +NV +constipation. Wad given an enema with no relief. Pt went to follow up appointment and was sent back for same symptoms      Review of patient's allergies indicates:  No Known Allergies      History of Present Illness     HPI    5/28/2024, 7:19 PM  History obtained from the patient      History of Present Illness: Yvonne Avila is a 63 y.o. female patient who presents to the Emergency Department for evaluation of abdominal cramping which onset gradually 8 days ago. Patient states she was seen here yesterday for same symptoms and was advised to take Milk of Magnesium which did not improve her constipation prompting her to come back to the ED.  Symptoms are constant and moderate in severity. No mitigating or exacerbating factors reported. Associated sxs include N/V. Patient denies any CP, SOB, fever, weakness, urinary symptoms, and all other sxs at this time. Prior Tx includes enema, Miramax which provided no relief. No further complaints or concerns at this time.       Arrival mode: Personal vehicle    PCP: Nafisa Irving APRN        Past Medical History:  Past Medical History:   Diagnosis Date    Anxiety     Bradycardia 9/19/2019    GERD (gastroesophageal reflux disease)     Thyroid disease        Past Surgical History:  Past Surgical History:   Procedure Laterality Date    BREAST BIOPSY Left     COLPORRHAPHY, COMBINED ANTEROPOSTERIOR N/A 12/26/2023    Procedure: COLPORRHAPHY, COMBINED ANTEROPOSTERIOR;  Surgeon: Teresita Dugan MD;  Location: [facility];  Service: OB/GYN;  Laterality: N/A;    DENTAL SURGERY      INJECTION OF ANESTHETIC AGENT AROUND GANGLION IMPAR N/A 2/23/2024    Procedure: Sacrococcygeal ligament and ganglion impar injection;  Surgeon: Elizabet Siu  MD MELLISA;  Location: [facility];  Service: Pain Management;  Laterality: N/A;    SACROSPINOUS LIGAMENT FIXATION N/A 2023    Procedure: FIXATION, LIGAMENT, SACROSPINOUS;  Surgeon: Teresita Dugan MD;  Location: [facility] OR;  Service: OB/GYN;  Laterality: N/A;    SINUS SURGERY      TOTAL VAGINAL HYSTERECTOMY N/A 2023    Procedure: HYSTERECTOMY, TOTAL, VAGINAL;  Surgeon: Teresita Dugan MD;  Location: [facility] OR;  Service: OB/GYN;  Laterality: N/A;  Request two assistants for this case         Family History:  Family History   Problem Relation Name Age of Onset    Heart attack Mother      Hypertension Mother      Heart disease Father      Heart attack Maternal Aunt      Heart attack Maternal Uncle         Social History:  Social History     Tobacco Use    Smoking status: Former     Current packs/day: 0.00     Average packs/day: 0.8 packs/day for 37.0 years (27.8 ttl pk-yrs)     Types: Cigarettes     Start date:      Quit date:      Years since quittin.4    Smokeless tobacco: Never    Tobacco comments:     did not smoke for 2 years since beginning smoking.    Substance and Sexual Activity    Alcohol use: Never    Drug use: Never    Sexual activity: Yes     Partners: Male     Birth control/protection: None        Review of Systems     Review of Systems   Constitutional:  Negative for fever.   HENT:  Negative for sore throat.    Respiratory:  Negative for shortness of breath.    Cardiovascular:  Negative for chest pain.   Gastrointestinal:  Positive for abdominal pain (RLQ and LLQ cramping), constipation, nausea and vomiting.   Genitourinary:  Negative for dysuria.   Musculoskeletal:  Negative for back pain.   Skin:  Negative for rash.   Neurological:  Negative for weakness.   Hematological:  Does not bruise/bleed easily.   All other systems reviewed and are negative.       Physical Exam     Initial Vitals   BP Pulse Resp Temp SpO2   24 1542 24 1542 24 1542 24 1809 24 1542    (!) 150/70 (!) 56 19 98.4 °F (36.9 °C) 100 %      MAP       --                 Physical Exam   Nursing Notes and Vital Signs Reviewed.  Constitutional: Patient is in no acute distress. Well-developed and well-nourished.  Head: Atraumatic. Normocephalic.  Eyes: PERRL. EOM intact. Conjunctivae are not pale. No scleral icterus.  ENT: Mucous membranes are moist. Oropharynx is clear and symmetric.    Neck: Supple. Full ROM. No lymphadenopathy.  Cardiovascular: Regular rate. Regular rhythm. No murmurs, rubs, or gallops. Distal pulses are 2+ and symmetric.  Pulmonary/Chest: No respiratory distress. Clear to auscultation bilaterally. No wheezing or rales.  Abdominal: Soft and non-distended.  There is bilateral lower quadrant tenderness.  No rebound, guarding, or rigidity. Good bowel sounds.  Genitourinary: No CVA tenderness  Musculoskeletal: Moves all extremities. No obvious deformities. No edema. No calf tenderness.  Skin: Warm and dry.  Neurological:  Alert, awake, and appropriate.  Normal speech.  No acute focal neurological deficits are appreciated.  Psychiatric: Normal affect. Good eye contact. Appropriate in content.     ED Course   Procedures  ED Vital Signs:  Vitals:    05/28/24 1542 05/28/24 1809 05/28/24 1811 05/28/24 1915   BP: (!) 150/70  (!) 140/72 (!) 140/75   Pulse: (!) 56  61 62   Resp: 19      Temp:  98.4 °F (36.9 °C)     TempSrc:  Oral     SpO2: 100%  100% 100%    05/28/24 2002   BP: 131/74   Pulse: 63   Resp:    Temp:    TempSrc:    SpO2: 100%       Abnormal Lab Results:  Labs Reviewed   CBC W/ AUTO DIFFERENTIAL - Abnormal; Notable for the following components:       Result Value    Lymph # 0.9 (*)     Gran % 83.4 (*)     Lymph % 11.8 (*)     All other components within normal limits   COMPREHENSIVE METABOLIC PANEL - Abnormal; Notable for the following components:    Total Protein 8.6 (*)     All other components within normal limits   URINALYSIS, REFLEX TO URINE CULTURE - Abnormal; Notable for the  following components:    Specific Gravity, UA >1.030 (*)     Protein, UA 1+ (*)     Ketones, UA 2+ (*)     All other components within normal limits    Narrative:     Specimen Source->Urine   LIPASE   URINALYSIS MICROSCOPIC    Narrative:     Specimen Source->Urine        All Lab Results:  Results for orders placed or performed during the hospital encounter of 05/28/24   CBC auto differential   Result Value Ref Range    WBC 7.69 3.90 - 12.70 K/uL    RBC 5.12 4.00 - 5.40 M/uL    Hemoglobin 14.0 12.0 - 16.0 g/dL    Hematocrit 42.7 37.0 - 48.5 %    MCV 83 82 - 98 fL    MCH 27.3 27.0 - 31.0 pg    MCHC 32.8 32.0 - 36.0 g/dL    RDW 13.3 11.5 - 14.5 %    Platelets 318 150 - 450 K/uL    MPV 9.2 9.2 - 12.9 fL    Immature Granulocytes 0.3 0.0 - 0.5 %    Gran # (ANC) 6.4 1.8 - 7.7 K/uL    Immature Grans (Abs) 0.02 0.00 - 0.04 K/uL    Lymph # 0.9 (L) 1.0 - 4.8 K/uL    Mono # 0.3 0.3 - 1.0 K/uL    Eos # 0.0 0.0 - 0.5 K/uL    Baso # 0.01 0.00 - 0.20 K/uL    nRBC 0 0 /100 WBC    Gran % 83.4 (H) 38.0 - 73.0 %    Lymph % 11.8 (L) 18.0 - 48.0 %    Mono % 4.4 4.0 - 15.0 %    Eosinophil % 0.0 0.0 - 8.0 %    Basophil % 0.1 0.0 - 1.9 %    Differential Method Automated    Comprehensive metabolic panel   Result Value Ref Range    Sodium 142 136 - 145 mmol/L    Potassium 4.4 3.5 - 5.1 mmol/L    Chloride 105 95 - 110 mmol/L    CO2 25 23 - 29 mmol/L    Glucose 106 70 - 110 mg/dL    BUN 19 8 - 23 mg/dL    Creatinine 1.0 0.5 - 1.4 mg/dL    Calcium 10.2 8.7 - 10.5 mg/dL    Total Protein 8.6 (H) 6.0 - 8.4 g/dL    Albumin 4.4 3.5 - 5.2 g/dL    Total Bilirubin 0.7 0.1 - 1.0 mg/dL    Alkaline Phosphatase 85 55 - 135 U/L    AST 17 10 - 40 U/L    ALT 12 10 - 44 U/L    eGFR >60 >60 mL/min/1.73 m^2    Anion Gap 12 8 - 16 mmol/L   Lipase   Result Value Ref Range    Lipase 13 4 - 60 U/L   Urinalysis, Reflex to Urine Culture Urine, Clean Catch    Specimen: Urine   Result Value Ref Range    Specimen UA Urine, Clean Catch     Color, UA Yellow Yellow, Straw,  Kendy    Appearance, UA Clear Clear    pH, UA 6.0 5.0 - 8.0    Specific Gravity, UA >1.030 (A) 1.005 - 1.030    Protein, UA 1+ (A) Negative    Glucose, UA Negative Negative    Ketones, UA 2+ (A) Negative    Bilirubin (UA) Negative Negative    Occult Blood UA Negative Negative    Nitrite, UA Negative Negative    Urobilinogen, UA Negative <2.0 EU/dL    Leukocytes, UA Negative Negative   Urinalysis Microscopic   Result Value Ref Range    RBC, UA 0 0 - 4 /hpf    WBC, UA 2 0 - 5 /hpf    Bacteria Rare None-Occ /hpf    Squam Epithel, UA 5 /hpf    Hyaline Casts, UA 0 0-1/lpf /lpf    Microscopic Comment SEE COMMENT        Imaging Results:  Imaging Results    None          The Emergency Provider reviewed the vital signs and test results, which are outlined above.     ED Discussion       7:21 PM: Reassessed pt at this time. Discussed with pt all pertinent ED information and results. Discussed pt dx and plan of tx. Gave pt all f/u and return to the ED instructions. All questions and concerns were addressed at this time. Pt expresses understanding of information and instructions, and is comfortable with plan to discharge. Pt is stable for discharge.    I discussed with patient and/or family/caretaker that evaluation in the ED does not suggest any emergent or life threatening medical conditions requiring immediate intervention beyond what was provided in the ED, and I believe patient is safe for discharge.  Regardless, an unremarkable evaluation in the ED does not preclude the development or presence of a serious of life threatening condition. As such, patient was instructed to return immediately for any worsening or change in current symptoms.         Medical Decision Making  Amount and/or Complexity of Data Reviewed  Labs: ordered. Decision-making details documented in ED Course.    Risk  OTC drugs.  Prescription drug management.                 ED Medication(s):  Medications   sodium chloride 0.9% bolus 1,000 mL 1,000 mL (0  mLs Intravenous Stopped 5/28/24 1814)   ondansetron injection 4 mg (4 mg Intravenous Given 5/28/24 1714)   magnesium citrate solution 296 mL (296 mLs Oral Given 5/28/24 1834)   ketorolac injection 15 mg (15 mg Intravenous Given 5/28/24 1912)   senna tablet 8.6 mg (8.6 mg Oral Given 5/28/24 2001)       Discharge Medication List as of 5/28/2024  7:21 PM        START taking these medications    Details   bisacodyL (DULCOLAX) 5 mg EC tablet Take 2 tablets (10 mg total) by mouth 2 (two) times daily., Starting Tue 5/28/2024, Print              Follow-up Information       Schedule an appointment as soon as possible for a visit  with Nafisa Irving APRN.    Specialty: Family Medicine  Why: As needed  Contact information:  [address] [facility] [address]  [phone]                                 Scribe Attestation:   Scribe #1: I performed the above scribed service and the documentation accurately describes the services I performed. I attest to the accuracy of the note.     Attending:   Physician Attestation Statement for Scribe #1: I, Annette Gudino MD, personally performed the services described in this documentation, as scribed by Christopher Bergman, in my presence, and it is both accurate and complete.           Clinical Impression       ICD-10-CM ICD-9-CM   1. Abdominal cramping  R10.9 789.00   2. Constipation, unspecified constipation type  K59.00 564.00       Disposition:   Disposition: Discharged  Condition: Stable         Annette Gudino MD  05/30/24 7899

## 2024-06-11 ENCOUNTER — OFFICE VISIT (OUTPATIENT)
Dept: ALLERGY | Facility: CLINIC | Age: 64
End: 2024-06-11
Payer: COMMERCIAL

## 2024-06-11 VITALS
SYSTOLIC BLOOD PRESSURE: 117 MMHG | TEMPERATURE: 98 F | BODY MASS INDEX: 18.59 KG/M2 | DIASTOLIC BLOOD PRESSURE: 71 MMHG | WEIGHT: 101.63 LBS | HEART RATE: 64 BPM

## 2024-06-11 DIAGNOSIS — R06.02 SOB (SHORTNESS OF BREATH) ON EXERTION: ICD-10-CM

## 2024-06-11 DIAGNOSIS — D80.6 SPECIFIC ANTIBODY DEFICIENCY WITH NORMAL IG CONCENTRATION AND NORMAL NUMBER OF B CELLS: Primary | ICD-10-CM

## 2024-06-11 DIAGNOSIS — J31.0 CHRONIC NONALLERGIC RHINITIS: ICD-10-CM

## 2024-06-11 PROBLEM — J32.9 SINUSITIS: Status: RESOLVED | Noted: 2022-04-13 | Resolved: 2024-06-11

## 2024-06-11 PROCEDURE — 3074F SYST BP LT 130 MM HG: CPT | Mod: CPTII,S$GLB,, | Performed by: STUDENT IN AN ORGANIZED HEALTH CARE EDUCATION/TRAINING PROGRAM

## 2024-06-11 PROCEDURE — 3008F BODY MASS INDEX DOCD: CPT | Mod: CPTII,S$GLB,, | Performed by: STUDENT IN AN ORGANIZED HEALTH CARE EDUCATION/TRAINING PROGRAM

## 2024-06-11 PROCEDURE — 1160F RVW MEDS BY RX/DR IN RCRD: CPT | Mod: CPTII,S$GLB,, | Performed by: STUDENT IN AN ORGANIZED HEALTH CARE EDUCATION/TRAINING PROGRAM

## 2024-06-11 PROCEDURE — G2211 COMPLEX E/M VISIT ADD ON: HCPCS | Mod: S$GLB,,, | Performed by: STUDENT IN AN ORGANIZED HEALTH CARE EDUCATION/TRAINING PROGRAM

## 2024-06-11 PROCEDURE — 99214 OFFICE O/P EST MOD 30 MIN: CPT | Mod: S$GLB,,, | Performed by: STUDENT IN AN ORGANIZED HEALTH CARE EDUCATION/TRAINING PROGRAM

## 2024-06-11 PROCEDURE — 3078F DIAST BP <80 MM HG: CPT | Mod: CPTII,S$GLB,, | Performed by: STUDENT IN AN ORGANIZED HEALTH CARE EDUCATION/TRAINING PROGRAM

## 2024-06-11 PROCEDURE — 99999 PR PBB SHADOW E&M-EST. PATIENT-LVL V: CPT | Mod: PBBFAC,,, | Performed by: STUDENT IN AN ORGANIZED HEALTH CARE EDUCATION/TRAINING PROGRAM

## 2024-06-11 PROCEDURE — 1159F MED LIST DOCD IN RCRD: CPT | Mod: CPTII,S$GLB,, | Performed by: STUDENT IN AN ORGANIZED HEALTH CARE EDUCATION/TRAINING PROGRAM

## 2024-06-11 RX ORDER — AMOXICILLIN 500 MG/1
500 CAPSULE ORAL DAILY
Qty: 90 CAPSULE | Refills: 3 | Status: SHIPPED | OUTPATIENT
Start: 2024-06-11 | End: 2025-06-11

## 2024-06-11 RX ORDER — MONTELUKAST SODIUM 10 MG/1
10 TABLET ORAL NIGHTLY
Qty: 90 TABLET | Refills: 3 | Status: SHIPPED | OUTPATIENT
Start: 2024-06-11 | End: 2025-06-11

## 2024-06-11 NOTE — ASSESSMENT & PLAN NOTE
- Improvement on ppx Amoxicillin   - Continue current medications  - Will continue to monitor and reassess

## 2024-06-11 NOTE — ASSESSMENT & PLAN NOTE
- Not controlled at this time   - Continue current medications  - Will continue to monitor and reassess

## 2024-06-11 NOTE — PROGRESS NOTES
Allergy and Immunology  Established Patient Clinic Note    Date: 6/11/2024  Chief Complaint   Patient presents with    Follow-up     History  Yvonne Avila is a 63 y.o. female being seen for follow-up today.    Specific Antibody Deficiency 3  - No infection reported since prior appointment   - Taking Amoxicillin 500 mg daily with improvement  - Ppx Abx with decrease in infections  - Not wanting IVIG at this time though great candidate      Chronic Nonallergic Rhinitis   - Well controlled, no acute complaints   - Continue current medications    Allergies, PMH, PSH, Social, and Family History were reviewed.    Current Outpatient Medications on File Prior to Visit   Medication Sig Dispense Refill    albuterol (PROVENTIL) 2.5 mg /3 mL (0.083 %) nebulizer solution Take 3 mLs (2.5 mg total) by nebulization every 4 to 6 hours as needed for Wheezing or Shortness of Breath. 360 mL 11    albuterol (PROVENTIL/VENTOLIN HFA) 90 mcg/actuation inhaler Inhale 2 puffs into the lungs every 4 (four) hours as needed for Wheezing or Shortness of Breath. Rescue 18 g 11    azelastine (ASTELIN) 137 mcg (0.1 %) nasal spray 1 spray (137 mcg total) by Nasal route 2 (two) times daily. 30 mL 11    bisacodyL (DULCOLAX) 5 mg EC tablet Take 2 tablets (10 mg total) by mouth 2 (two) times daily. 14 tablet 0    calcium carbonate (TUMS) 300 mg (750 mg) Chew Take 750 mg by mouth.      cyanocobalamin (VITAMIN B-12) 1000 MCG tablet Take 5,000 mcg by mouth once daily.      estradioL (ESTRACE) 0.01 % (0.1 mg/gram) vaginal cream Place 1 g vaginally 3 (three) times a week. 36 g 3    EUTHYROX 25 mcg tablet Take 25 mcg by mouth.      ferrous sulfate (FEROSUL) 220 mg (44 mg iron)/5 mL Elix Take 5 mLs (220 mg total) by mouth once daily. 120 mL 2    fluticasone-salmeterol diskus inhaler 250-50 mcg Inhale 1 puff into the lungs 2 (two) times daily. Wash out mouth after use. 60 each 11    hydroCHLOROthiazide (HYDRODIURIL) 12.5 MG Tab Take 1 tablet (12.5 mg  total) by mouth daily as needed (edema, swelling). Do not take if BP is low (under 110/70)  and feeling dry/dehydrated/dizzy - 90 tablet 1    ibuprofen 20 mg/mL oral liquid Take 30 mLs (600 mg total) by mouth every 6 (six) hours. 354 mL 1    levothyroxine (SYNTHROID) 50 MCG tablet Take 50 mcg by mouth before breakfast.      lovastatin (MEVACOR) 10 MG tablet Take 10 mg by mouth every evening.      magnesium hydroxide 400 mg/5 ml (MILK OF MAGNESIA) 400 mg/5 mL Susp Take 30 mLs (2,400 mg total) by mouth daily as needed (constipation). 354 mL 0    ondansetron (ZOFRAN-ODT) 8 MG TbDL Take 1 tablet (8 mg total) by mouth every 8 (eight) hours as needed (nausea). 20 tablet 1    psyllium (HYDROCIL) packet Take 1 packet by mouth once daily. 30 packet 0    [DISCONTINUED] ondansetron (ZOFRAN) 4 MG tablet Take 1 tablet (4 mg total) by mouth every 6 (six) hours. 12 tablet 0    ALPRAZolam (XANAX) 0.5 MG tablet Take 1 tablet (0.5 mg total) by mouth 2 (two) times daily as needed (SIGNIFICANT ANXIETY). New Prescriber 60 tablet 2    cyanocobalamin (VITAMIN B-12) 1000 MCG tablet Take 100 mcg by mouth once daily. (Patient not taking: Reported on 5/23/2024)      fluticasone propionate (FLONASE) 50 mcg/actuation nasal spray 1 spray (50 mcg total) by Each Nostril route 2 (two) times a day. (Patient not taking: Reported on 5/23/2024) 16 g 11    Lactobacillus rhamnosus GG (CULTURELLE) 10 billion cell capsule Take 1 capsule by mouth once daily. (Patient not taking: Reported on 5/23/2024)      multivitamin (THERAGRAN) per tablet Take 1 tablet by mouth once daily. (Patient not taking: Reported on 5/23/2024)      omega-3 fatty acids/fish oil (FISH OIL-OMEGA-3 FATTY ACIDS) 300-1,000 mg capsule Take 1 capsule by mouth every evening. (Patient not taking: Reported on 5/23/2024)      pregabalin (LYRICA) 75 MG capsule Take 1 capsule (75 mg total) by mouth every evening for 10 days, THEN 2 capsules (150 mg total) every evening for 10 days, THEN 3  capsules (225 mg total) every evening for 10 days. (Patient not taking: Reported on 5/23/2024) 60 capsule 0    zinc sulfate (ZINC-15 ORAL) Take by mouth Daily. Unknown dose (Patient not taking: Reported on 5/23/2024)      [DISCONTINUED] diclofenac (VOLTAREN) 75 MG EC tablet Take 1 tablet (75 mg total) by mouth 2 (two) times daily. (Patient not taking: Reported on 5/23/2024) 30 tablet 0    [DISCONTINUED] FLUoxetine 20 MG capsule Take 1 capsule (20 mg total) by mouth once daily. Take IN ADDITION to Prozac 40 mg supply (Patient not taking: Reported on 5/23/2024) 30 capsule 2    [DISCONTINUED] indomethacin (INDOCIN) 25 MG capsule Take 1 capsule (25 mg total) by mouth 3 (three) times daily. (Patient not taking: Reported on 2/6/2024) 90 capsule 3    [DISCONTINUED] methocarbamoL (ROBAXIN) 750 MG Tab Take 500 mg by mouth 4 (four) times daily. (Patient not taking: Reported on 5/23/2024)      [DISCONTINUED] montelukast (SINGULAIR) 10 mg tablet Take 10 mg by mouth every evening. (Patient not taking: Reported on 5/23/2024)      [DISCONTINUED] omeprazole (PRILOSEC) 40 MG capsule Take 40 mg by mouth. (Patient not taking: Reported on 5/23/2024)      [DISCONTINUED] oxybutynin (DITROPAN) 5 mg/5 mL syrup Take 5 mLs (5 mg total) by mouth 2 (two) times daily. (Patient not taking: Reported on 5/23/2024) 473 mL 1     No current facility-administered medications on file prior to visit.     Physical Examination  Vitals:    06/11/24 1606   BP: 117/71   Pulse: 64   Temp: 98.2 °F (36.8 °C)     GENERAL:  female in no apparent distress and well developed and well nourished  HEAD:  Normocephalic, without obvious abnormality, atraumatic  EYES: sclera anicteric, conjunctiva normochromic  EARS: normal TM's and external ear canals both ears  NOSE: without erythema or discharge, clear discharge, turbinates normal    OROPHARYNX: moist mucous membranes without erythema, exudates or petechiae  LYMPH NODES: normal, supple, no lymphadenopathy  LUNGS:  clear to auscultation, no wheezes, rales or rhonchi, symmetric air entry.  HEART: normal rate, regular rhythm, normal S1, S2, no murmurs, rubs, clicks or gallops.  ABDOMEN: soft, nontender, nondistended, no masses or organomegaly.  MUSCULOSKELETAL: no gross joint deformity or swelling.  NEURO: alert, oriented, normal speech, no focal findings or movement disorder noted.  SKIN: normal coloration and turgor, no rashes, no suspicious skin lesions noted.     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 - Primary    Overview     - 02/2024: Starting ppx Amoxicillin 500 mg daily instead of IVIG per shared decision making with patient   - 12/2023: Sinus infection tx with Levofloxacin   - 12/08/2023: PCV20 vaccination   - 11/29/2023: 6/23 pneumococcal titers protective  - 11/08/2023: 8/23 pneumococcal titers protective  - 10/25/2023: PPSV23 vaccination            Current Assessment & Plan     - Improvement on ppx Amoxicillin   - Continue current medications  - Will continue to monitor and reassess         Relevant Medications    amoxicillin (AMOXIL) 500 MG capsule     Follow up:  Follow up in about 4 months (around 10/11/2024).    Visit today included increased complexity associated with the care of the episodic problem rhinitis and infections addressed and managing the longitudinal care of the patient due to the serious and/or complex managed problem(s) specific antibody deficiency and chronic nonallergic  rhinitis.    Grayson Guaman MD   [facility]  Allergy and Immunology

## 2024-06-11 NOTE — LETTER
June 11, 2024      [facility]  [id number] [place] [address]  Phone: [phone]  Fax: [phone]       Patient: Yvonne Avila   YOB: 1960  Date of Visit: 06/11/2024    To Whom It May Concern:    Alia Avila  was at [facility] on 06/11/2024. The patient may return to work/school on 06/12/2024 with no restrictions. If you have any questions or concerns, or if I can be of further assistance, please do not hesitate to contact me.    Sincerely,          Grayson Guaman MD

## 2024-08-05 NOTE — ASSESSMENT & PLAN NOTE
Continue current medical therapy for now  Recommend ambulating in [place] to assess HR response   If HR < 60 bpm will continue to monitor for now  Could be having be vagal response from her N/V     9/22/19  HR up to 60's with ambulation around nurses station   Dizziness improved.   Patient has been examined and is clear for DC from Cardiology standpoint  Will need to follow up in clinic in 1-2 weeks.   Clinic will call to arrange appt.    1

## 2024-10-11 ENCOUNTER — OFFICE VISIT (OUTPATIENT)
Dept: ALLERGY | Facility: CLINIC | Age: 64
End: 2024-10-11
Payer: COMMERCIAL

## 2024-10-11 ENCOUNTER — NURSE TRIAGE (OUTPATIENT)
Dept: ADMINISTRATIVE | Facility: CLINIC | Age: 64
End: 2024-10-11
Payer: COMMERCIAL

## 2024-10-11 VITALS
SYSTOLIC BLOOD PRESSURE: 134 MMHG | BODY MASS INDEX: 18.99 KG/M2 | DIASTOLIC BLOOD PRESSURE: 85 MMHG | HEART RATE: 63 BPM | HEIGHT: 62 IN | WEIGHT: 103.19 LBS | TEMPERATURE: 98 F | OXYGEN SATURATION: 98 %

## 2024-10-11 DIAGNOSIS — Z00.00 PREVENTATIVE HEALTH CARE: ICD-10-CM

## 2024-10-11 DIAGNOSIS — D80.6 SPECIFIC ANTIBODY DEFICIENCY WITH NORMAL IG CONCENTRATION AND NORMAL NUMBER OF B CELLS: Primary | ICD-10-CM

## 2024-10-11 PROCEDURE — 99999 PR PBB SHADOW E&M-EST. PATIENT-LVL III: CPT | Mod: PBBFAC,,, | Performed by: STUDENT IN AN ORGANIZED HEALTH CARE EDUCATION/TRAINING PROGRAM

## 2024-10-11 RX ORDER — DOXYCYCLINE 100 MG/1
100 CAPSULE ORAL 2 TIMES DAILY
Qty: 20 CAPSULE | Refills: 0 | Status: SHIPPED | OUTPATIENT
Start: 2024-10-11 | End: 2024-10-21

## 2024-10-11 NOTE — TELEPHONE ENCOUNTER
Patient c/o severe arm pain and weakness. States that she received a pneumonia vaccine today and now she can barely lift her arm. Patient has difficulty distinguishing if her symptoms are due to pain or weakness. Also c/o severe pain. Per protocol patient was advised to see PCP today or go to the nearest UC/ED now. Will refer patient Muriel. Per Dr. Arshad, patient can monitor symptoms and f/u with her PCP on Monday. Care measures were discussed. Advised the patient to call back with any further questions or if symptoms worsen.          Reason for Disposition   Sounds like a severe, unusual reaction to the triager    Additional Information   Negative: Difficulty breathing or swallowing starts within 2 hours after injection   Negative: Difficult to awaken or acting confused (e.g., disoriented, slurred speech)   Negative: Unresponsive, passed out, or very weak   Negative: Sounds like a life-threatening emergency to the triager   Negative: Fever > 104 F (40 C)   Negative: Measles vaccine rash (onset day 6-12) and purple or blood-colored    Protocols used: Immunization [id number]-DA

## 2024-10-11 NOTE — TELEPHONE ENCOUNTER
[facility] Plan of Care Note    Referral source: Nurse On-Call      Discussed patient with: Nurse On Call      Reason for consult:  Arm pain after pneumonia immunization      Medical Record Review:  The patient received her pneumonia immunization earlier today.  She was now having severe arm pain.  She reports limited use of the arm due to pain.  She denies true weakness or any numbness.  She reports swelling around the injection site but no circumferential swelling.  No arm discoloration.  No fevers.  She reports ongoing chest pain secondary to a cough which has been treated.      Disposition recommended:  PCP follow up      Additional Recommendations:  Based on current symptoms, low suspicion for stroke, vascular occlusion either DVT or arterial, infection, nerve damage.  Appropriate to continue to monitor and follow up with the PCP as needed.      Rachelle Arshad MD

## 2024-10-11 NOTE — LETTER
October 11, 2024      [facility]  [id number] [place] [address] [address] [address]  Phone: [phone]  Fax: [phone]       Patient: Yvonne Avila   YOB: 1960  Date of Visit: 10/11/2024    To Whom It May Concern:    Alia Avila  was at [facility] on 10/11/2024. The patient may return to work/school on 10/11/2024 with no restrictions. If you have any questions or concerns, or if I can be of further assistance, please do not hesitate to contact me.      Additionally, please note patient appointment was scheduled for 10/11/2024 at 4:30 PM. Patient was contact on the evening of 10/10/2024 to come in early AM due to an unforseen last minute change to my schedule. I was no longer available in afternoon on 10/11/2024.    Sincerely,          Grayson Guaman MD

## 2024-10-11 NOTE — PROGRESS NOTES
Allergy and Immunology  Established Patient Clinic Note    Date: 10/11/2024  Chief Complaint   Patient presents with    Follow-up     Pt states that condition was improving up until last week she begin to cough up green mucus. Pt is currently ill.      History  Yvonne Avila is a 64 y.o. female being seen for follow-up today.    Acute Sinusitis   Specific Antibody Deficiency   Preventative Health Care  - 2-3 days of increasing sinus pressure and mucus production   - Concern for acute sinusitis   - Pause ppx Abx at this time and treatment with Doxycycline   - Preventative vaccinations at this time     Allergies, PMH, PSH, Social, and Family History were reviewed.    Current Outpatient Medications on File Prior to Visit   Medication Sig Dispense Refill    albuterol (PROVENTIL/VENTOLIN HFA) 90 mcg/actuation inhaler Inhale 2 puffs into the lungs every 4 (four) hours as needed for Wheezing or Shortness of Breath. Rescue 18 g 11    amoxicillin (AMOXIL) 500 MG capsule Take 1 capsule (500 mg total) by mouth once daily. Prophylactic daily antibiotic 90 capsule 3    azelastine (ASTELIN) 137 mcg (0.1 %) nasal spray 1 spray (137 mcg total) by Nasal route 2 (two) times daily. 30 mL 11    bisacodyL (DULCOLAX) 5 mg EC tablet Take 2 tablets (10 mg total) by mouth 2 (two) times daily. 14 tablet 0    calcium carbonate (TUMS) 300 mg (750 mg) Chew Take 750 mg by mouth.      cyanocobalamin (VITAMIN B-12) 1000 MCG tablet Take 5,000 mcg by mouth once daily.      estradioL (ESTRACE) 0.01 % (0.1 mg/gram) vaginal cream Place 1 g vaginally 3 (three) times a week. 36 g 3    EUTHYROX 25 mcg tablet Take 25 mcg by mouth.      ferrous sulfate (FEROSUL) 220 mg (44 mg iron)/5 mL Elix Take 5 mLs (220 mg total) by mouth once daily. 120 mL 2    fluticasone-salmeterol diskus inhaler 250-50 mcg Inhale 1 puff into the lungs 2 (two) times daily. Wash out mouth after use. 60 each 11    hydroCHLOROthiazide (HYDRODIURIL) 12.5 MG Tab Take 1 tablet (12.5  mg total) by mouth daily as needed (edema, swelling). Do not take if BP is low (under 110/70)  and feeling dry/dehydrated/dizzy - 90 tablet 1    ibuprofen 20 mg/mL oral liquid Take 30 mLs (600 mg total) by mouth every 6 (six) hours. 354 mL 1    levothyroxine (SYNTHROID) 50 MCG tablet Take 50 mcg by mouth before breakfast.      lovastatin (MEVACOR) 10 MG tablet Take 10 mg by mouth every evening.      magnesium hydroxide 400 mg/5 ml (MILK OF MAGNESIA) 400 mg/5 mL Susp Take 30 mLs (2,400 mg total) by mouth daily as needed (constipation). 354 mL 0    montelukast (SINGULAIR) 10 mg tablet Take 1 tablet (10 mg total) by mouth every evening. 90 tablet 3    ondansetron (ZOFRAN-ODT) 8 MG TbDL Take 1 tablet (8 mg total) by mouth every 8 (eight) hours as needed (nausea). 20 tablet 1    psyllium (HYDROCIL) packet Take 1 packet by mouth once daily. 30 packet 0    ALPRAZolam (XANAX) 0.5 MG tablet Take 1 tablet (0.5 mg total) by mouth 2 (two) times daily as needed (SIGNIFICANT ANXIETY). New Prescriber 60 tablet 2    cyanocobalamin (VITAMIN B-12) 1000 MCG tablet Take 100 mcg by mouth once daily. (Patient not taking: Reported on 10/11/2024)      fluticasone propionate (FLONASE) 50 mcg/actuation nasal spray 1 spray (50 mcg total) by Each Nostril route 2 (two) times a day. (Patient not taking: Reported on 10/11/2024) 16 g 11    Lactobacillus rhamnosus GG (CULTURELLE) 10 billion cell capsule Take 1 capsule by mouth once daily. (Patient not taking: Reported on 10/11/2024)      multivitamin (THERAGRAN) per tablet Take 1 tablet by mouth once daily. (Patient not taking: Reported on 10/11/2024)      omega-3 fatty acids/fish oil (FISH OIL-OMEGA-3 FATTY ACIDS) 300-1,000 mg capsule Take 1 capsule by mouth every evening. (Patient not taking: Reported on 10/11/2024)      pregabalin (LYRICA) 75 MG capsule Take 1 capsule (75 mg total) by mouth every evening for 10 days, THEN 2 capsules (150 mg total) every evening for 10 days, THEN 3 capsules (225  mg total) every evening for 10 days. (Patient not taking: Reported on 5/23/2024) 60 capsule 0    zinc sulfate (ZINC-15 ORAL) Take by mouth Daily. Unknown dose (Patient not taking: Reported on 10/11/2024)       No current facility-administered medications on file prior to visit.     Physical Examination  Vitals:    10/11/24 0722   BP: 134/85   Pulse: 63   Temp: 98.1 °F (36.7 °C)     GENERAL:  female in no apparent distress and well developed and well nourished  HEAD:  Normocephalic, without obvious abnormality, atraumatic  EYES: sclera anicteric, conjunctiva normochromic  EARS: normal TM's and external ear canals both ears  NOSE: without erythema or discharge, clear discharge, turbinates normal    OROPHARYNX: moist mucous membranes without erythema, exudates or petechiae  LYMPH NODES: normal, supple, no lymphadenopathy  LUNGS: clear to auscultation, no wheezes, rales or rhonchi, symmetric air entry.  HEART: normal rate, regular rhythm, normal S1, S2, no murmurs, rubs, clicks or gallops.  ABDOMEN: soft, nontender, nondistended, no masses or organomegaly.  MUSCULOSKELETAL: no gross joint deformity or swelling.  NEURO: alert, oriented, normal speech, no focal findings or movement disorder noted.  SKIN: normal coloration and turgor, no rashes, no suspicious skin lesions noted.     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    Overview     - 02/2024: Starting ppx Amoxicillin 500 mg daily instead of IVIG per shared decision making with patient   - 12/2023: Sinus infection tx with Levofloxacin   - 12/08/2023: PCV20 vaccination   - 11/29/2023: 6/23 pneumococcal titers protective  - 11/08/2023: 8/23 pneumococcal titers protective  - 10/25/2023: PPSV23 vaccination            Current Assessment & Plan     - 2-3 days of increasing sinus pressure and mucus production   - Concern for  acute sinusitis   - Pause ppx Abx at this time and treatment with Doxycycline   - Preventative vaccinations at this time  - Will continue to monitor and reassess             Other    Preventative health care    Relevant Medications    (VFC) influenza (Flulaval, Fluzone, Fluarix) 45 mcg/0.5 mL IM vaccine (> or = 6 mo) 0.5 mL (Completed)    pneumococcal vaccine (PNEUMOVAX-23) injection 0.5 mL (Completed)     Follow up:  Follow up in about 3 months (around 1/11/2025).    Grayson Guaman MD   [facility]  [facility]

## 2024-10-11 NOTE — LETTER
October 11, 2024      [facility]  [address] [place] [address] [address] [address]  Phone: [phone]  Fax: [phone]       Patient: Yvonne Avila   YOB: 1960  Date of Visit: 10/11/2024    To Whom It May Concern:    Alia Avila  was at [facility] on 10/11/2024. The patient may return to work/school on 10/11/2024 with no restrictions. If you have any questions or concerns, or if I can be of further assistance, please do not hesitate to contact me.    Sincerely,          Grayson Guaman MD

## 2024-10-11 NOTE — ASSESSMENT & PLAN NOTE
- 2-3 days of increasing sinus pressure and mucus production   - Concern for acute sinusitis   - Pause ppx Abx at this time and treatment with Doxycycline   - Preventative vaccinations at this time  - Will continue to monitor and reassess

## 2024-11-25 DIAGNOSIS — N95.2 VAGINAL ATROPHY: ICD-10-CM

## 2024-11-25 RX ORDER — ESTRADIOL 0.1 MG/G
CREAM VAGINAL
Qty: 42.5 G | Refills: 0 | Status: SHIPPED | OUTPATIENT
Start: 2024-11-25

## 2024-11-25 NOTE — TELEPHONE ENCOUNTER
Refill Routing Note   Medication(s) are not appropriate for processing by [facility] for the following reason(s):        Required labs outdated-mammogram    [facility] action(s):  Defer               Appointments  past 12m or future 3m with PCP    Date Provider   Last Visit   1/2/2024 Teresita Dugan MD   Next Visit   Visit date not found Teresita Dugan MD   ED visits in past 90 days: 0        Note composed:5:44 AM 11/25/2024

## 2024-12-06 ENCOUNTER — OFFICE VISIT (OUTPATIENT)
Dept: ALLERGY | Facility: CLINIC | Age: 64
End: 2024-12-06
Payer: COMMERCIAL

## 2024-12-06 DIAGNOSIS — D80.6 SPECIFIC ANTIBODY DEFICIENCY WITH NORMAL IG CONCENTRATION AND NORMAL NUMBER OF B CELLS: ICD-10-CM

## 2024-12-06 DIAGNOSIS — J31.0 CHRONIC NONALLERGIC RHINITIS: Primary | ICD-10-CM

## 2024-12-06 DIAGNOSIS — B99.9 RECURRENT INFECTIONS: ICD-10-CM

## 2024-12-06 PROCEDURE — 3079F DIAST BP 80-89 MM HG: CPT | Mod: CPTII,S$GLB,, | Performed by: STUDENT IN AN ORGANIZED HEALTH CARE EDUCATION/TRAINING PROGRAM

## 2024-12-06 PROCEDURE — 3074F SYST BP LT 130 MM HG: CPT | Mod: CPTII,S$GLB,, | Performed by: STUDENT IN AN ORGANIZED HEALTH CARE EDUCATION/TRAINING PROGRAM

## 2024-12-06 PROCEDURE — 3008F BODY MASS INDEX DOCD: CPT | Mod: CPTII,S$GLB,, | Performed by: STUDENT IN AN ORGANIZED HEALTH CARE EDUCATION/TRAINING PROGRAM

## 2024-12-06 PROCEDURE — 99999 PR PBB SHADOW E&M-EST. PATIENT-LVL IV: CPT | Mod: PBBFAC,,, | Performed by: STUDENT IN AN ORGANIZED HEALTH CARE EDUCATION/TRAINING PROGRAM

## 2024-12-06 PROCEDURE — 1160F RVW MEDS BY RX/DR IN RCRD: CPT | Mod: CPTII,S$GLB,, | Performed by: STUDENT IN AN ORGANIZED HEALTH CARE EDUCATION/TRAINING PROGRAM

## 2024-12-06 PROCEDURE — 1159F MED LIST DOCD IN RCRD: CPT | Mod: CPTII,S$GLB,, | Performed by: STUDENT IN AN ORGANIZED HEALTH CARE EDUCATION/TRAINING PROGRAM

## 2024-12-06 PROCEDURE — 99214 OFFICE O/P EST MOD 30 MIN: CPT | Mod: S$GLB,,, | Performed by: STUDENT IN AN ORGANIZED HEALTH CARE EDUCATION/TRAINING PROGRAM

## 2024-12-06 RX ORDER — IPRATROPIUM BROMIDE 42 UG/1
2 SPRAY, METERED NASAL 4 TIMES DAILY PRN
Qty: 15 ML | Refills: 11 | Status: SHIPPED | OUTPATIENT
Start: 2024-12-06 | End: 2025-12-06

## 2024-12-06 RX ORDER — DESLORATADINE 5 MG/1
5 TABLET ORAL DAILY
Qty: 30 TABLET | Refills: 11 | Status: SHIPPED | OUTPATIENT
Start: 2024-12-06 | End: 2025-12-06

## 2024-12-06 RX ORDER — AMOXICILLIN 500 MG/1
500 CAPSULE ORAL DAILY
Qty: 90 CAPSULE | Refills: 3 | Status: SHIPPED | OUTPATIENT
Start: 2024-12-06 | End: 2025-12-06

## 2024-12-06 RX ORDER — CARBINOXAMINE MALEATE 4 MG/1
4 TABLET ORAL NIGHTLY
Qty: 60 EACH | Refills: 3 | Status: SHIPPED | OUTPATIENT
Start: 2024-12-06 | End: 2025-12-06

## 2024-12-08 VITALS
SYSTOLIC BLOOD PRESSURE: 129 MMHG | HEIGHT: 62 IN | TEMPERATURE: 99 F | WEIGHT: 102.94 LBS | BODY MASS INDEX: 18.94 KG/M2 | DIASTOLIC BLOOD PRESSURE: 83 MMHG | HEART RATE: 64 BPM

## 2024-12-08 NOTE — ASSESSMENT & PLAN NOTE
- Trial of new medications at this time   - Ordered Ipratropium QID PRN   - Ordered Clarinex in AM and Carbinoxamine in PM  - Educated on proper use of medication   - Will continue to monitor and reassess

## 2024-12-08 NOTE — ASSESSMENT & PLAN NOTE
- Continue Amoxicillin 500 mg daily   - Repeat PPSV23 vaccination in the future   - Will continue to monitor and reassess

## 2024-12-08 NOTE — PROGRESS NOTES
Allergy and Immunology  Established Patient Clinic Note    Date: 12/8/2024  Chief Complaint   Patient presents with    Follow-up     History  Yvonne Avila is a 64 y.o. female being seen for follow-up today.    Chronic Nonallergic Rhinitis  Post Nasal Drip   - Switching to ipratropium, Clarinex, and Carbinoxamine   - Trial to see if symptoms controlled     Recurrent Sinus Infections  Specific Antibody Deficiency   - Declined IVIG and instead on Amoxicillin 500 mg daily ppx   - Repeat PPSV23 at next appointment     Allergies, PMH, PSH, Social, and Family History were reviewed.    Current Outpatient Medications on File Prior to Visit   Medication Sig Dispense Refill    albuterol (PROVENTIL/VENTOLIN HFA) 90 mcg/actuation inhaler Inhale 2 puffs into the lungs every 4 (four) hours as needed for Wheezing or Shortness of Breath. Rescue 18 g 11    bisacodyL (DULCOLAX) 5 mg EC tablet Take 2 tablets (10 mg total) by mouth 2 (two) times daily. 14 tablet 0    calcium carbonate (TUMS) 300 mg (750 mg) Chew Take 750 mg by mouth.      cyanocobalamin (VITAMIN B-12) 1000 MCG tablet Take 5,000 mcg by mouth once daily.      estradioL (ESTRACE) 0.01 % (0.1 mg/gram) vaginal cream USE 1 GRAM VAGINALLY THREE TIMES A WEEK 42.5 g 0    EUTHYROX 25 mcg tablet Take 25 mcg by mouth.      ferrous sulfate (FEROSUL) 220 mg (44 mg iron)/5 mL Elix Take 5 mLs (220 mg total) by mouth once daily. 120 mL 2    hydroCHLOROthiazide (HYDRODIURIL) 12.5 MG Tab Take 1 tablet (12.5 mg total) by mouth daily as needed (edema, swelling). Do not take if BP is low (under 110/70)  and feeling dry/dehydrated/dizzy - 90 tablet 1    ibuprofen 20 mg/mL oral liquid Take 30 mLs (600 mg total) by mouth every 6 (six) hours. 354 mL 1    levothyroxine (SYNTHROID) 50 MCG tablet Take 50 mcg by mouth before breakfast.      lovastatin (MEVACOR) 10 MG tablet Take 10 mg by mouth every evening.      magnesium hydroxide 400 mg/5 ml (MILK OF MAGNESIA) 400 mg/5 mL Susp Take 30 mLs  (2,400 mg total) by mouth daily as needed (constipation). 354 mL 0    ondansetron (ZOFRAN-ODT) 8 MG TbDL Take 1 tablet (8 mg total) by mouth every 8 (eight) hours as needed (nausea). 20 tablet 1    ALPRAZolam (XANAX) 0.5 MG tablet Take 1 tablet (0.5 mg total) by mouth 2 (two) times daily as needed (SIGNIFICANT ANXIETY). New Prescriber 60 tablet 2    fluticasone-salmeterol diskus inhaler 250-50 mcg Inhale 1 puff into the lungs 2 (two) times daily. Wash out mouth after use. 60 each 11    pregabalin (LYRICA) 75 MG capsule Take 1 capsule (75 mg total) by mouth every evening for 10 days, THEN 2 capsules (150 mg total) every evening for 10 days, THEN 3 capsules (225 mg total) every evening for 10 days. (Patient not taking: Reported on 5/23/2024) 60 capsule 0    psyllium (HYDROCIL) packet Take 1 packet by mouth once daily. 30 packet 0     No current facility-administered medications on file prior to visit.     Physical Examination  Vitals:    12/08/24 1522   BP: 129/83   Pulse: 64   Temp: 98.6 °F (37 °C)     GENERAL:  female in no apparent distress and well developed and well nourished  HEAD:  Normocephalic, without obvious abnormality, atraumatic  EYES: sclera anicteric, conjunctiva normochromic  EARS: normal TM's and external ear canals both ears  NOSE: without erythema or discharge, clear discharge, turbinates normal    OROPHARYNX: moist mucous membranes without erythema, exudates or petechiae, clear post-nasal drainage present  LYMPH NODES: normal, supple, no lymphadenopathy  LUNGS: clear to auscultation, no wheezes, rales or rhonchi, symmetric air entry.  HEART: normal rate, regular rhythm, normal S1, S2, no murmurs, rubs, clicks or gallops.  ABDOMEN: soft, nontender, nondistended, no masses or organomegaly.  MUSCULOSKELETAL: no gross joint deformity or swelling.  NEURO: alert, oriented, normal speech, no focal findings or movement disorder noted.  SKIN: normal coloration and turgor, no rashes, no suspicious skin  lesions noted.     Assessment/Plan:   Problem List Items Addressed This Visit       Recurrent infections    Overview     - 10/25/2023: PPSV23 vaccination   - 11/08/2023: 8/23 pneumococcal titers protective  - Recurrent sinus infections - multiple in the past   - Recent PNA - completed treatment with pulm   - Concern for otitis media - more than 1 in the past   - No issues with infection until 50-59 yo   - No hx of bacteremia, fungal infections, or viral infections          Current Assessment & Plan     - Continue Amoxicillin 500 mg daily   - Repeat PPSV23 vaccination in the future   - Will continue to monitor and reassess          Chronic nonallergic rhinitis - Primary    Overview     - 11/08/2023: Serum IgE to Aeroallergens negative          Current Assessment & Plan     - Trial of new medications at this time   - Ordered Ipratropium QID PRN   - Ordered Clarinex in AM and Carbinoxamine in PM  - Educated on proper use of medication   - Will continue to monitor and reassess          Relevant Medications    ipratropium (ATROVENT) 42 mcg (0.06 %) nasal spray    desloratadine (CLARINEX) 5 mg tablet    carbinoxamine maleate 4 mg Tab    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    Grayson Guaman MD   [facility]  Allergy and Immunology

## 2025-01-03 ENCOUNTER — TELEPHONE (OUTPATIENT)
Dept: ALLERGY | Facility: CLINIC | Age: 65
End: 2025-01-03
Payer: COMMERCIAL

## 2025-01-03 NOTE — TELEPHONE ENCOUNTER
Patient will take wilbert seltzer because she is stopped up in the middle of the day.She has appointment 1/13/25.  ----- Message from Gutierrez sent at 1/3/2025  3:38 PM CST -----  Contact: Yvonne  .Type:  Needs Medical Advice    Who Called:  Yvonne     Would the patient rather a call back or a response via [facility]? Call back   Best Call Back Number:  .. [phone]  Additional Information:  Pt would like to get a call back to discuss questions pertaining to medication due to still experiencing nasal congestion after taking medication that was prescribed     Thanks

## 2025-01-06 DIAGNOSIS — Z12.31 BREAST CANCER SCREENING BY MAMMOGRAM: Primary | ICD-10-CM

## 2025-01-13 ENCOUNTER — LAB VISIT (OUTPATIENT)
Dept: LAB | Facility: HOSPITAL | Age: 65
End: 2025-01-13
Attending: STUDENT IN AN ORGANIZED HEALTH CARE EDUCATION/TRAINING PROGRAM
Payer: COMMERCIAL

## 2025-01-13 ENCOUNTER — OFFICE VISIT (OUTPATIENT)
Dept: ALLERGY | Facility: CLINIC | Age: 65
End: 2025-01-13
Payer: COMMERCIAL

## 2025-01-13 VITALS
HEIGHT: 63 IN | DIASTOLIC BLOOD PRESSURE: 76 MMHG | OXYGEN SATURATION: 99 % | SYSTOLIC BLOOD PRESSURE: 114 MMHG | BODY MASS INDEX: 18.21 KG/M2 | HEART RATE: 55 BPM | WEIGHT: 102.75 LBS

## 2025-01-13 DIAGNOSIS — D80.6 SPECIFIC ANTIBODY DEFICIENCY WITH NORMAL IG CONCENTRATION AND NORMAL NUMBER OF B CELLS: Primary | ICD-10-CM

## 2025-01-13 DIAGNOSIS — D80.6 SPECIFIC ANTIBODY DEFICIENCY WITH NORMAL IG CONCENTRATION AND NORMAL NUMBER OF B CELLS: ICD-10-CM

## 2025-01-13 PROCEDURE — 3008F BODY MASS INDEX DOCD: CPT | Mod: CPTII,S$GLB,, | Performed by: STUDENT IN AN ORGANIZED HEALTH CARE EDUCATION/TRAINING PROGRAM

## 2025-01-13 PROCEDURE — 3074F SYST BP LT 130 MM HG: CPT | Mod: CPTII,S$GLB,, | Performed by: STUDENT IN AN ORGANIZED HEALTH CARE EDUCATION/TRAINING PROGRAM

## 2025-01-13 PROCEDURE — 1160F RVW MEDS BY RX/DR IN RCRD: CPT | Mod: CPTII,S$GLB,, | Performed by: STUDENT IN AN ORGANIZED HEALTH CARE EDUCATION/TRAINING PROGRAM

## 2025-01-13 PROCEDURE — 82787 IGG 1 2 3 OR 4 EACH: CPT | Mod: 59 | Performed by: STUDENT IN AN ORGANIZED HEALTH CARE EDUCATION/TRAINING PROGRAM

## 2025-01-13 PROCEDURE — 3078F DIAST BP <80 MM HG: CPT | Mod: CPTII,S$GLB,, | Performed by: STUDENT IN AN ORGANIZED HEALTH CARE EDUCATION/TRAINING PROGRAM

## 2025-01-13 PROCEDURE — 99214 OFFICE O/P EST MOD 30 MIN: CPT | Mod: S$GLB,,, | Performed by: STUDENT IN AN ORGANIZED HEALTH CARE EDUCATION/TRAINING PROGRAM

## 2025-01-13 PROCEDURE — 1159F MED LIST DOCD IN RCRD: CPT | Mod: CPTII,S$GLB,, | Performed by: STUDENT IN AN ORGANIZED HEALTH CARE EDUCATION/TRAINING PROGRAM

## 2025-01-13 PROCEDURE — 99999 PR PBB SHADOW E&M-EST. PATIENT-LVL V: CPT | Mod: PBBFAC,,, | Performed by: STUDENT IN AN ORGANIZED HEALTH CARE EDUCATION/TRAINING PROGRAM

## 2025-01-13 PROCEDURE — 86581 STRPTCS PNEUM ANTB SEROT IA: CPT | Performed by: STUDENT IN AN ORGANIZED HEALTH CARE EDUCATION/TRAINING PROGRAM

## 2025-01-13 RX ORDER — DOXYCYCLINE 100 MG/1
100 CAPSULE ORAL 2 TIMES DAILY
Qty: 14 CAPSULE | Refills: 0 | Status: SHIPPED | OUTPATIENT
Start: 2025-01-13 | End: 2025-01-20

## 2025-01-13 RX ORDER — PREDNISONE 20 MG/1
TABLET ORAL
Qty: 13 TABLET | Refills: 0 | Status: SHIPPED | OUTPATIENT
Start: 2025-01-13 | End: 2025-01-21

## 2025-01-15 NOTE — PROGRESS NOTES
Allergy and Immunology  Established Patient Clinic Note    Date: 1/15/2025  Chief Complaint   Patient presents with    Follow-up     History  Yvonne Avila is a 64 y.o. female being seen for follow-up today.    Chronic Nonallergic Rhinitis  Post Nasal Drip   - Not controlled at this time   - Likely infectious etiology contributing to lack of control      Recurrent Sinus Infections  Specific Antibody Deficiency   - Failed PPSV23 and oral daily ppx antibiotics   - Initial not wanting IVIG due to work schedule   - Acute infection at this time   - Typically 6-8 sinus infections per year   - Pt wanting IVIG at this time for SAD and recurrent infections     Allergies, PMH, PSH, Social, and Family History were reviewed.    Current Outpatient Medications on File Prior to Visit   Medication Sig Dispense Refill    albuterol (PROVENTIL/VENTOLIN HFA) 90 mcg/actuation inhaler Inhale 2 puffs into the lungs every 4 (four) hours as needed for Wheezing or Shortness of Breath. Rescue 18 g 11    amoxicillin (AMOXIL) 500 MG capsule Take 1 capsule (500 mg total) by mouth once daily. Prophylactic daily antibiotic 90 capsule 3    bisacodyL (DULCOLAX) 5 mg EC tablet Take 2 tablets (10 mg total) by mouth 2 (two) times daily. 14 tablet 0    calcium carbonate (TUMS) 300 mg (750 mg) Chew Take 750 mg by mouth.      carbinoxamine maleate 4 mg Tab Take 4 mg by mouth every evening. 60 each 3    cyanocobalamin (VITAMIN B-12) 1000 MCG tablet Take 5,000 mcg by mouth once daily.      desloratadine (CLARINEX) 5 mg tablet Take 1 tablet (5 mg total) by mouth once daily. 30 tablet 11    estradioL (ESTRACE) 0.01 % (0.1 mg/gram) vaginal cream USE 1 GRAM VAGINALLY THREE TIMES A WEEK 42.5 g 0    EUTHYROX 25 mcg tablet Take 25 mcg by mouth.      ferrous sulfate (FEROSUL) 220 mg (44 mg iron)/5 mL Elix Take 5 mLs (220 mg total) by mouth once daily. 120 mL 2    hydroCHLOROthiazide (HYDRODIURIL) 12.5 MG Tab Take 1 tablet (12.5 mg total) by mouth daily as  needed (edema, swelling). Do not take if BP is low (under 110/70)  and feeling dry/dehydrated/dizzy - 90 tablet 1    ibuprofen 20 mg/mL oral liquid Take 30 mLs (600 mg total) by mouth every 6 (six) hours. 354 mL 1    ipratropium (ATROVENT) 42 mcg (0.06 %) nasal spray 2 sprays by Each Nostril route 4 (four) times daily as needed for Rhinitis. 15 mL 11    levothyroxine (SYNTHROID) 50 MCG tablet Take 50 mcg by mouth before breakfast.      lovastatin (MEVACOR) 10 MG tablet Take 10 mg by mouth every evening.      magnesium hydroxide 400 mg/5 ml (MILK OF MAGNESIA) 400 mg/5 mL Susp Take 30 mLs (2,400 mg total) by mouth daily as needed (constipation). 354 mL 0    ondansetron (ZOFRAN-ODT) 8 MG TbDL Take 1 tablet (8 mg total) by mouth every 8 (eight) hours as needed (nausea). 20 tablet 1    psyllium (HYDROCIL) packet Take 1 packet by mouth once daily. 30 packet 0    ALPRAZolam (XANAX) 0.5 MG tablet Take 1 tablet (0.5 mg total) by mouth 2 (two) times daily as needed (SIGNIFICANT ANXIETY). New Prescriber 60 tablet 2    fluticasone-salmeterol diskus inhaler 250-50 mcg Inhale 1 puff into the lungs 2 (two) times daily. Wash out mouth after use. 60 each 11    pregabalin (LYRICA) 75 MG capsule Take 1 capsule (75 mg total) by mouth every evening for 10 days, THEN 2 capsules (150 mg total) every evening for 10 days, THEN 3 capsules (225 mg total) every evening for 10 days. (Patient not taking: Reported on 5/23/2024) 60 capsule 0     No current facility-administered medications on file prior to visit.     Physical Examination  Vitals:    01/13/25 1432   BP: 114/76   Pulse: (!) 55     GENERAL:  female in no apparent distress and well developed and well nourished  HEAD:  Normocephalic, without obvious abnormality, atraumatic  EYES: sclera anicteric, conjunctiva normochromic  EARS: normal TM's and external ear canals both ears  NOSE: pale and boggy, swollen, and discharge present, mucoid discharge, turbinates pink, pale,  swollen    OROPHARYNX: moist mucous membranes without erythema, exudates or petechiae, clear post-nasal drainage present  LYMPH NODES: normal, supple, no lymphadenopathy  LUNGS: clear to auscultation, no wheezes, rales or rhonchi, symmetric air entry.  HEART: normal rate, regular rhythm, normal S1, S2, no murmurs, rubs, clicks or gallops.  ABDOMEN: soft, nontender, nondistended, no masses or organomegaly.  MUSCULOSKELETAL: no gross joint deformity or swelling.  NEURO: alert, oriented, normal speech, no focal findings or movement disorder noted.  SKIN: normal coloration and turgor, no rashes, no suspicious skin lesions noted.     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    Overview     - 02/2024: Starting ppx Amoxicillin 500 mg daily instead of IVIG per shared decision making with patient   - 12/2023: Sinus infection tx with Levofloxacin   - 12/08/2023: PCV20 vaccination   - 11/29/2023: 6/23 pneumococcal titers protective  - 11/08/2023: 8/23 pneumococcal titers protective  - 10/25/2023: PPSV23 vaccination            Relevant Orders    Streptococcus Pneumoniae IgG Antibody (23 Serotypes), MAID    IgG 1, 2, 3, and 4     - Acute sinus infection at this time   - Average 6-8 sinus infection per year   - Failed adequate response to PPSV23   - Failed ppx antibiotics   - Escalation to IVIG at this time merited     Follow up:  Follow up in about 3 months (around 4/13/2025).    Grayson Guaman MD   [facility]  Allergy and Immunology

## 2025-01-16 LAB
IGG1 SER-MCNC: 450 MG/DL (ref 382–929)
IGG2 SER-MCNC: 293 MG/DL (ref 242–700)
IGG3 SER-MCNC: 54 MG/DL (ref 22–176)
IGG4 SER-MCNC: 68 MG/DL (ref 4–86)
IMMUNOLOGIST REVIEW: NORMAL
S PN DA SERO 19F IGG SER-MCNC: 3.7 MCG/ML
S PNEUM DA 1 IGG SER-MCNC: 0.6 MCG/ML
S PNEUM DA 10A IGG SER-MCNC: 1.3 MCG/ML
S PNEUM DA 11A IGG SER-MCNC: 2.3 MCG/ML
S PNEUM DA 12F IGG SER-MCNC: 1.1 MCG/ML
S PNEUM DA 14 IGG SER-MCNC: 2.7 MCG/ML
S PNEUM DA 15B IGG SER-MCNC: 17.1 MCG/ML
S PNEUM DA 17F IGG SER-MCNC: 1.2 MCG/ML
S PNEUM DA 18C IGG SER-MCNC: 0.4 MCG/ML
S PNEUM DA 19A IGG SER-MCNC: 4.4 MCG/ML
S PNEUM DA 2 IGG SER-MCNC: 21.5 MCG/ML
S PNEUM DA 20A IGG SER-MCNC: 2.9 MCG/ML
S PNEUM DA 22F IGG SER-MCNC: 5.5 MCG/ML
S PNEUM DA 23F IGG SER-MCNC: 2.4 MCG/ML
S PNEUM DA 3 IGG SER-MCNC: 0.3 MCG/ML
S PNEUM DA 33F IGG SER-MCNC: 4.5 MCG/ML
S PNEUM DA 4 IGG SER-MCNC: 0.9 MCG/ML
S PNEUM DA 5 IGG SER-MCNC: 18.3 MCG/ML
S PNEUM DA 6B IGG SER-MCNC: 1.1 MCG/ML
S PNEUM DA 7F IGG SER-MCNC: 2.6 MCG/ML
S PNEUM DA 8 IGG SER-MCNC: 1.8 MCG/ML
S PNEUM DA 9N IGG SER-MCNC: 0.4 MCG/ML
S PNEUM DA 9V IGG SER-MCNC: 0.4 MCG/ML

## 2025-01-24 ENCOUNTER — HOSPITAL ENCOUNTER (OUTPATIENT)
Dept: RADIOLOGY | Facility: HOSPITAL | Age: 65
Discharge: HOME | End: 2025-01-24
Attending: OBSTETRICS & GYNECOLOGY
Payer: COMMERCIAL

## 2025-01-24 ENCOUNTER — OFFICE VISIT (OUTPATIENT)
Dept: ALLERGY | Facility: CLINIC | Age: 65
End: 2025-01-24
Payer: COMMERCIAL

## 2025-01-24 VITALS — WEIGHT: 102.75 LBS | BODY MASS INDEX: 18.21 KG/M2 | HEIGHT: 63 IN

## 2025-01-24 DIAGNOSIS — Z12.31 BREAST CANCER SCREENING BY MAMMOGRAM: ICD-10-CM

## 2025-01-24 DIAGNOSIS — B99.9 RECURRENT INFECTIONS: ICD-10-CM

## 2025-01-24 DIAGNOSIS — D80.6 SPECIFIC ANTIBODY DEFICIENCY WITH NORMAL IG CONCENTRATION AND NORMAL NUMBER OF B CELLS: Primary | ICD-10-CM

## 2025-01-24 DIAGNOSIS — J31.0 CHRONIC NONALLERGIC RHINITIS: ICD-10-CM

## 2025-01-24 PROCEDURE — 77063 BREAST TOMOSYNTHESIS BI: CPT | Mod: 26,,, | Performed by: RADIOLOGY

## 2025-01-24 PROCEDURE — 99999 PR PBB SHADOW E&M-EST. PATIENT-LVL III: CPT | Mod: PBBFAC,,, | Performed by: STUDENT IN AN ORGANIZED HEALTH CARE EDUCATION/TRAINING PROGRAM

## 2025-01-24 PROCEDURE — 99214 OFFICE O/P EST MOD 30 MIN: CPT | Mod: S$GLB,,, | Performed by: STUDENT IN AN ORGANIZED HEALTH CARE EDUCATION/TRAINING PROGRAM

## 2025-01-24 PROCEDURE — 3074F SYST BP LT 130 MM HG: CPT | Mod: CPTII,S$GLB,, | Performed by: STUDENT IN AN ORGANIZED HEALTH CARE EDUCATION/TRAINING PROGRAM

## 2025-01-24 PROCEDURE — 3008F BODY MASS INDEX DOCD: CPT | Mod: CPTII,S$GLB,, | Performed by: STUDENT IN AN ORGANIZED HEALTH CARE EDUCATION/TRAINING PROGRAM

## 2025-01-24 PROCEDURE — 77067 SCR MAMMO BI INCL CAD: CPT | Mod: 26,,, | Performed by: RADIOLOGY

## 2025-01-24 PROCEDURE — 3079F DIAST BP 80-89 MM HG: CPT | Mod: CPTII,S$GLB,, | Performed by: STUDENT IN AN ORGANIZED HEALTH CARE EDUCATION/TRAINING PROGRAM

## 2025-01-24 PROCEDURE — 77063 BREAST TOMOSYNTHESIS BI: CPT | Mod: TC

## 2025-01-24 PROCEDURE — 1160F RVW MEDS BY RX/DR IN RCRD: CPT | Mod: CPTII,S$GLB,, | Performed by: STUDENT IN AN ORGANIZED HEALTH CARE EDUCATION/TRAINING PROGRAM

## 2025-01-24 PROCEDURE — 1159F MED LIST DOCD IN RCRD: CPT | Mod: CPTII,S$GLB,, | Performed by: STUDENT IN AN ORGANIZED HEALTH CARE EDUCATION/TRAINING PROGRAM

## 2025-01-24 RX ORDER — PREDNISONE 5 MG/1
TABLET ORAL
Qty: 35 TABLET | Refills: 0 | Status: SHIPPED | OUTPATIENT
Start: 2025-01-24 | End: 2025-02-08

## 2025-01-26 VITALS
BODY MASS INDEX: 18.21 KG/M2 | SYSTOLIC BLOOD PRESSURE: 121 MMHG | TEMPERATURE: 98 F | HEART RATE: 59 BPM | WEIGHT: 102.75 LBS | DIASTOLIC BLOOD PRESSURE: 84 MMHG | HEIGHT: 63 IN

## 2025-01-26 NOTE — PROGRESS NOTES
Allergy and Immunology  Established Patient Clinic Note    Date: 1/26/2025  Chief Complaint   Patient presents with    Follow-up     History  Yvonne Avila is a 64 y.o. female being seen for follow-up today.    Chronic Nonallergic Rhinitis  Post Nasal Drip   Recurrent Sinus Infections  Specific Antibody Deficiency   - Acute mucus increase with oral prednisone taper at this time   - Continue ppx Abx at this time   - IVIG to be ordered  - ED precautions discussed     Allergies, PMH, PSH, Social, and Family History were reviewed.    Current Outpatient Medications on File Prior to Visit   Medication Sig Dispense Refill    albuterol (PROVENTIL/VENTOLIN HFA) 90 mcg/actuation inhaler Inhale 2 puffs into the lungs every 4 (four) hours as needed for Wheezing or Shortness of Breath. Rescue 18 g 11    ALPRAZolam (XANAX) 0.5 MG tablet Take 1 tablet (0.5 mg total) by mouth 2 (two) times daily as needed (SIGNIFICANT ANXIETY). New Prescriber 60 tablet 2    amoxicillin (AMOXIL) 500 MG capsule Take 1 capsule (500 mg total) by mouth once daily. Prophylactic daily antibiotic 90 capsule 3    bisacodyL (DULCOLAX) 5 mg EC tablet Take 2 tablets (10 mg total) by mouth 2 (two) times daily. 14 tablet 0    calcium carbonate (TUMS) 300 mg (750 mg) Chew Take 750 mg by mouth.      carbinoxamine maleate 4 mg Tab Take 4 mg by mouth every evening. 60 each 3    cyanocobalamin (VITAMIN B-12) 1000 MCG tablet Take 5,000 mcg by mouth once daily.      desloratadine (CLARINEX) 5 mg tablet Take 1 tablet (5 mg total) by mouth once daily. 30 tablet 11    estradioL (ESTRACE) 0.01 % (0.1 mg/gram) vaginal cream USE 1 GRAM VAGINALLY THREE TIMES A WEEK 42.5 g 0    EUTHYROX 25 mcg tablet Take 25 mcg by mouth.      ferrous sulfate (FEROSUL) 220 mg (44 mg iron)/5 mL Elix Take 5 mLs (220 mg total) by mouth once daily. 120 mL 2    fluticasone-salmeterol diskus inhaler 250-50 mcg Inhale 1 puff into the lungs 2 (two) times daily. Wash out mouth after use. 60 each  11    hydroCHLOROthiazide (HYDRODIURIL) 12.5 MG Tab Take 1 tablet (12.5 mg total) by mouth daily as needed (edema, swelling). Do not take if BP is low (under 110/70)  and feeling dry/dehydrated/dizzy - 90 tablet 1    ibuprofen 20 mg/mL oral liquid Take 30 mLs (600 mg total) by mouth every 6 (six) hours. 354 mL 1    ipratropium (ATROVENT) 42 mcg (0.06 %) nasal spray 2 sprays by Each Nostril route 4 (four) times daily as needed for Rhinitis. 15 mL 11    levothyroxine (SYNTHROID) 50 MCG tablet Take 50 mcg by mouth before breakfast.      lovastatin (MEVACOR) 10 MG tablet Take 10 mg by mouth every evening.      magnesium hydroxide 400 mg/5 ml (MILK OF MAGNESIA) 400 mg/5 mL Susp Take 30 mLs (2,400 mg total) by mouth daily as needed (constipation). 354 mL 0    ondansetron (ZOFRAN-ODT) 8 MG TbDL Take 1 tablet (8 mg total) by mouth every 8 (eight) hours as needed (nausea). 20 tablet 1    pregabalin (LYRICA) 75 MG capsule Take 1 capsule (75 mg total) by mouth every evening for 10 days, THEN 2 capsules (150 mg total) every evening for 10 days, THEN 3 capsules (225 mg total) every evening for 10 days. (Patient not taking: Reported on 5/23/2024) 60 capsule 0    psyllium (HYDROCIL) packet Take 1 packet by mouth once daily. 30 packet 0     No current facility-administered medications on file prior to visit.     Physical Examination  Vitals:    01/26/25 1604   BP: 121/84   Pulse: (!) 59   Temp: 98.2 °F (36.8 °C)     GENERAL:  female in no apparent distress and well developed and well nourished  HEAD:  Normocephalic, without obvious abnormality, atraumatic  EYES: sclera anicteric, conjunctiva normochromic  EARS: normal TM's and external ear canals both ears  NOSE: swollen and discharge present, clear, copious, and mucoid discharge, turbinates pink, swollen   OROPHARYNX: moist mucous membranes without erythema, exudates or petechiae, clear post-nasal drainage present  LYMPH NODES: normal, supple, no lymphadenopathy  LUNGS: clear to  auscultation, no wheezes, rales or rhonchi, symmetric air entry.  HEART: normal rate, regular rhythm, normal S1, S2, no murmurs, rubs, clicks or gallops.  ABDOMEN: soft, nontender, nondistended, no masses or organomegaly.  MUSCULOSKELETAL: no gross joint deformity or swelling.  NEURO: alert, oriented, normal speech, no focal findings or movement disorder noted.  SKIN: normal coloration and turgor, no rashes, no suspicious skin lesions noted.     Assessment/Plan:   Problem List Items Addressed This Visit       Recurrent infections    Overview     - 10/25/2023: PPSV23 vaccination   - 11/08/2023: 8/23 pneumococcal titers protective  - Recurrent sinus infections - multiple in the past   - Recent PNA - completed treatment with pulm   - Concern for otitis media - more than 1 in the past   - No issues with infection until 50-59 yo   - No hx of bacteremia, fungal infections, or viral infections          Chronic nonallergic rhinitis    Overview     - 11/08/2023: Serum IgE to Aeroallergens negative          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 - Primary    Overview     - 02/2024: Starting ppx Amoxicillin 500 mg daily instead of IVIG per shared decision making with patient   - 12/2023: Sinus infection tx with Levofloxacin   - 12/08/2023: PCV20 vaccination   - 11/29/2023: 6/23 pneumococcal titers protective  - 11/08/2023: 8/23 pneumococcal titers protective  - 10/25/2023: PPSV23 vaccination             - Acute mucus increase with oral prednisone taper at this time   - Continue ppx Abx at this time   - IVIG to be ordered  - ED precautions discussed     Follow up:  Per prior appointment     Grayson Gumaan MD   [facility]  Allergy and Immunology

## 2025-02-07 ENCOUNTER — TELEPHONE (OUTPATIENT)
Dept: ALLERGY | Facility: CLINIC | Age: 65
End: 2025-02-07
Payer: COMMERCIAL

## 2025-02-07 NOTE — TELEPHONE ENCOUNTER
----- Message from Sanaz sent at 2/7/2025  3:45 PM CST -----  Contact: Mobile  [phone]  Patient is returning a phone call.    Who left a message for the patient:     Does patient know what this is regarding:      Would you like a call back, or a response through your [facility] portal?:   Call    Comments: Patient would like to know if you heard anything so that she can start her infusions?

## 2025-02-13 ENCOUNTER — TELEPHONE (OUTPATIENT)
Dept: ALLERGY | Facility: CLINIC | Age: 65
End: 2025-02-13
Payer: COMMERCIAL

## 2025-02-13 NOTE — TELEPHONE ENCOUNTER
Patient asking about IVIG infusions.    ----- Message from Elsy sent at 2/13/2025  2:16 PM CST -----  Contact: EMILY STORM [[id number]]  ..Type:  Patient Requesting Call    Who Called:EMILY STORM [[id number]]  Does the patient know what this is regarding?:pt wants to discuss infusions with provider  Would the patient rather a call back or a response via [facility]? call  Best Call Back Number:.[phone] (home)     Additional Information:

## 2025-02-14 RX ORDER — DIPHENHYDRAMINE HYDROCHLORIDE 50 MG/ML
25 INJECTION INTRAMUSCULAR; INTRAVENOUS
OUTPATIENT
Start: 2025-02-18

## 2025-02-14 RX ORDER — SODIUM CHLORIDE 0.9 % (FLUSH) 0.9 %
10 SYRINGE (ML) INJECTION
OUTPATIENT
Start: 2025-02-18

## 2025-02-14 RX ORDER — FAMOTIDINE 10 MG/ML
20 INJECTION INTRAVENOUS
OUTPATIENT
Start: 2025-02-18

## 2025-02-14 RX ORDER — ACETAMINOPHEN 325 MG/1
650 TABLET ORAL
OUTPATIENT
Start: 2025-02-18

## 2025-02-14 RX ORDER — HEPARIN 100 UNIT/ML
500 SYRINGE INTRAVENOUS
OUTPATIENT
Start: 2025-02-18

## 2025-02-14 NOTE — TELEPHONE ENCOUNTER
[facility]    Referral has been received. We will begin the insurance verification/authorization submission process. Patient has been sent a [employer] message and provided our contact information.     Thanks,  Flaquita

## 2025-02-18 ENCOUNTER — TELEPHONE (OUTPATIENT)
Dept: ALLERGY | Facility: CLINIC | Age: 65
End: 2025-02-18
Payer: COMMERCIAL

## 2025-02-18 NOTE — TELEPHONE ENCOUNTER
Patient unable to use her [facility].I gave her the phone number to [facility].        ----- Message from Nurse Valera sent at 2/17/2025  3:14 PM CST -----  Contact: [phone]    ----- Message -----  From: Dinorah Mata  Sent: 2/17/2025   3:12 PM CST  To: Rowena Galicia Staff    Type:  Patient Returning CallWho Called:EMILY STORM [[id number]]Who Left Message for Patitent .Does the patient know what this is regarding?:missed a call from your officeWould the patient rather a call back or a response via [facility]? Call back or leave a message Best Call Back Number: [phone] Information: [id number]

## 2025-03-02 DIAGNOSIS — N95.2 VAGINAL ATROPHY: ICD-10-CM

## 2025-03-03 RX ORDER — ESTRADIOL 0.1 MG/G
CREAM VAGINAL
Qty: 42.5 G | Refills: 0 | Status: SHIPPED | OUTPATIENT
Start: 2025-03-03

## 2025-03-03 NOTE — TELEPHONE ENCOUNTER
Refill Decision Note   Yvonne Avila  is requesting a refill authorization.  Brief Assessment and Rationale for Refill:  Approve     Medication Therapy Plan:         Comments:     Note composed:12:55 PM 03/03/2025

## 2025-03-06 ENCOUNTER — TELEPHONE (OUTPATIENT)
Dept: ALLERGY | Facility: CLINIC | Age: 65
End: 2025-03-06
Payer: COMMERCIAL

## 2025-03-06 NOTE — TELEPHONE ENCOUNTER
I spoke to Will in [facility] . He has the Rx for Prvigen And will prepare for Home infusion.

## 2025-03-06 NOTE — TELEPHONE ENCOUNTER
----- Message from Med Assistant Miller sent at 3/6/2025  9:20 AM CST -----  Contact: Melanie/ Zurdo  Type:  Pharmacy Calling to Clarify an RXName of Caller:DanielleaPharmacy Name: [facility]Prescription Name: Immunoglobulin TherapyWhat do they need to clarify?: Needing information on where the infusions will be done including the phone number and the AuthorizationBest Call Back Number: [phone] Information: CoverMyMeds Key: [id number]

## 2025-03-24 DIAGNOSIS — I10 ESSENTIAL HYPERTENSION: Primary | ICD-10-CM

## 2025-04-11 ENCOUNTER — TELEPHONE (OUTPATIENT)
Dept: ALLERGY | Facility: CLINIC | Age: 65
End: 2025-04-11
Payer: COMMERCIAL

## 2025-04-11 NOTE — TELEPHONE ENCOUNTER
Patient stated she has never started infusions.Patient stated she thought she was going to the cancer center but someone called her and said she would be taking at home. Patient stated she never heard back from anyone.She has an appointment with you Monday. Please advise.

## 2025-04-14 ENCOUNTER — OFFICE VISIT (OUTPATIENT)
Dept: ALLERGY | Facility: CLINIC | Age: 65
End: 2025-04-14
Payer: COMMERCIAL

## 2025-04-14 VITALS
DIASTOLIC BLOOD PRESSURE: 74 MMHG | TEMPERATURE: 98 F | SYSTOLIC BLOOD PRESSURE: 111 MMHG | HEIGHT: 63 IN | BODY MASS INDEX: 18.21 KG/M2 | OXYGEN SATURATION: 97 % | WEIGHT: 102.75 LBS | HEART RATE: 62 BPM

## 2025-04-14 DIAGNOSIS — D80.6 SPECIFIC ANTIBODY DEFICIENCY WITH NORMAL IG CONCENTRATION AND NORMAL NUMBER OF B CELLS: Primary | ICD-10-CM

## 2025-04-14 DIAGNOSIS — B99.9 RECURRENT INFECTIONS: ICD-10-CM

## 2025-04-14 PROCEDURE — 99214 OFFICE O/P EST MOD 30 MIN: CPT | Mod: S$GLB,,, | Performed by: STUDENT IN AN ORGANIZED HEALTH CARE EDUCATION/TRAINING PROGRAM

## 2025-04-14 PROCEDURE — 1159F MED LIST DOCD IN RCRD: CPT | Mod: CPTII,S$GLB,, | Performed by: STUDENT IN AN ORGANIZED HEALTH CARE EDUCATION/TRAINING PROGRAM

## 2025-04-14 PROCEDURE — 1160F RVW MEDS BY RX/DR IN RCRD: CPT | Mod: CPTII,S$GLB,, | Performed by: STUDENT IN AN ORGANIZED HEALTH CARE EDUCATION/TRAINING PROGRAM

## 2025-04-14 PROCEDURE — 3074F SYST BP LT 130 MM HG: CPT | Mod: CPTII,S$GLB,, | Performed by: STUDENT IN AN ORGANIZED HEALTH CARE EDUCATION/TRAINING PROGRAM

## 2025-04-14 PROCEDURE — 3008F BODY MASS INDEX DOCD: CPT | Mod: CPTII,S$GLB,, | Performed by: STUDENT IN AN ORGANIZED HEALTH CARE EDUCATION/TRAINING PROGRAM

## 2025-04-14 PROCEDURE — 99999 PR PBB SHADOW E&M-EST. PATIENT-LVL V: CPT | Mod: PBBFAC,,, | Performed by: STUDENT IN AN ORGANIZED HEALTH CARE EDUCATION/TRAINING PROGRAM

## 2025-04-14 PROCEDURE — 3078F DIAST BP <80 MM HG: CPT | Mod: CPTII,S$GLB,, | Performed by: STUDENT IN AN ORGANIZED HEALTH CARE EDUCATION/TRAINING PROGRAM

## 2025-04-14 RX ORDER — LOVASTATIN 20 MG/1
20 TABLET ORAL
COMMUNITY
Start: 2025-02-07

## 2025-04-14 NOTE — PROGRESS NOTES
Allergy and Immunology  Established Patient Clinic Note    Date: 4/14/2025  Chief Complaint   Patient presents with    Allergies    Immunotherapy     History  Yvonne Avila is a 64 y.o. female being seen for follow-up today.    Chronic Nonallergic Rhinitis  Post Nasal Drip   Recurrent Sinus Infections  Specific Antibody Deficiency   - Acute mucus increase with oral prednisone taper at this time   - Continue ppx Abx at this time   - IVIG to be ordered at this time   - Patient wanting to [facility]   - ED precautions discussed     Allergies, PMH, PSH, Social, and Family History were reviewed.    Medications Ordered Prior to Encounter[1]    Physical Examination  Vitals:    04/14/25 1318   BP: 111/74   Pulse: 62   Temp: 97.7 °F (36.5 °C)     GENERAL:  female in no apparent distress and well developed and well nourished  HEAD:  Normocephalic, without obvious abnormality, atraumatic  EYES: sclera anicteric, conjunctiva normochromic  EARS: normal TM's and external ear canals both ears  NOSE: without erythema or discharge, clear discharge, turbinates normal    OROPHARYNX: moist mucous membranes without erythema, exudates or petechiae  LYMPH NODES: normal, supple, no lymphadenopathy  LUNGS: clear to auscultation, no wheezes, rales or rhonchi, symmetric air entry.  HEART: normal rate, regular rhythm, normal S1, S2, no murmurs, rubs, clicks or gallops.  ABDOMEN: soft, nontender, nondistended, no masses or organomegaly.  MUSCULOSKELETAL: no gross joint deformity or swelling.  NEURO: alert, oriented, normal speech, no focal findings or movement disorder noted.  SKIN: normal coloration and turgor, no rashes, no suspicious skin lesions noted.     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 - Primary    Overview   - 02/2024: Starting ppx Amoxicillin 500 mg daily instead of IVIG per shared decision making with patient   - 12/2023: Sinus infection tx with Levofloxacin   - 12/08/2023: PCV20 vaccination   - 11/29/2023: 6/23 pneumococcal titers protective  - 11/08/2023: 8/23 pneumococcal titers protective  - 10/25/2023: PPSV23 vaccination             - Failed ppx Abx and multiple vaccination  - Escalation to IVIG merited due to risk of life threatening infections   - IVIG with standard premedications every 4 weeks   - IVIG dosages of 20 g     - 01/13/2025:14/23 pneumococcal antibody titers protective  - 10/11/2024: Administered PPSV23   - 02/09/2024: 14/23 pneumococcal antibody titers protective  - 11/29/2023: 6/23 pneumococcal titers protective  - 11/08/2023: Serum IgE to Aeroallergens negative   - 11/08/2023: 8/23 pneumococcal titers protective  - 10/25/2023: PPSV23 vaccination     Follow up:  Follow up in about 3 months (around 7/14/2025).    Grayson Guaman MD   [facility]  Allergy and Immunology        [1]   Current Outpatient Medications on File Prior to Visit   Medication Sig Dispense Refill    lovastatin (MEVACOR) 20 MG tablet Take 20 mg by mouth.      albuterol (PROVENTIL/VENTOLIN HFA) 90 mcg/actuation inhaler Inhale 2 puffs into the lungs every 4 (four) hours as needed for Wheezing or Shortness of Breath. Rescue 18 g 11    ALPRAZolam (XANAX) 0.5 MG tablet Take 1 tablet (0.5 mg total) by mouth 2 (two) times daily as needed (SIGNIFICANT ANXIETY). New Prescriber 60 tablet 2    amoxicillin (AMOXIL) 500 MG capsule Take 1 capsule (500 mg total) by mouth once daily. Prophylactic daily antibiotic 90 capsule 3     bisacodyL (DULCOLAX) 5 mg EC tablet Take 2 tablets (10 mg total) by mouth 2 (two) times daily. 14 tablet 0    calcium carbonate (TUMS) 300 mg (750 mg) Chew Take 750 mg by mouth.      carbinoxamine maleate 4 mg Tab Take 4 mg by mouth every evening. 60 each 3    cyanocobalamin (VITAMIN B-12) 1000 MCG tablet Take 5,000 mcg by mouth once daily.      desloratadine (CLARINEX) 5 mg tablet Take 1 tablet (5 mg total) by mouth once daily. 30 tablet 11    estradioL (ESTRACE) 0.01 % (0.1 mg/gram) vaginal cream USE 1 GRAM VAGINALLY THREE TIMES A WEEK 42.5 g 0    EUTHYROX 25 mcg tablet Take 25 mcg by mouth.      ferrous sulfate (FEROSUL) 220 mg (44 mg iron)/5 mL Elix Take 5 mLs (220 mg total) by mouth once daily. 120 mL 2    fluticasone-salmeterol diskus inhaler 250-50 mcg Inhale 1 puff into the lungs 2 (two) times daily. Wash out mouth after use. 60 each 11    hydroCHLOROthiazide (HYDRODIURIL) 12.5 MG Tab Take 1 tablet (12.5 mg total) by mouth daily as needed (edema, swelling). Do not take if BP is low (under 110/70)  and feeling dry/dehydrated/dizzy - 90 tablet 1    ibuprofen 20 mg/mL oral liquid Take 30 mLs (600 mg total) by mouth every 6 (six) hours. 354 mL 1    ipratropium (ATROVENT) 42 mcg (0.06 %) nasal spray 2 sprays by Each Nostril route 4 (four) times daily as needed for Rhinitis. 15 mL 11    levothyroxine (SYNTHROID) 50 MCG tablet Take 50 mcg by mouth before breakfast.      lovastatin (MEVACOR) 10 MG tablet Take 10 mg by mouth every evening. (Patient not taking: Reported on 4/14/2025)      magnesium hydroxide 400 mg/5 ml (MILK OF MAGNESIA) 400 mg/5 mL Susp Take 30 mLs (2,400 mg total) by mouth daily as needed (constipation). 354 mL 0    ondansetron (ZOFRAN-ODT) 8 MG TbDL Take 1 tablet (8 mg total) by mouth every 8 (eight) hours as needed (nausea). 20 tablet 1    pregabalin (LYRICA) 75 MG capsule Take 1 capsule (75 mg total) by mouth every evening for 10 days, THEN 2 capsules (150 mg total) every evening for 10 days,  THEN 3 capsules (225 mg total) every evening for 10 days. (Patient not taking: Reported on 5/23/2024) 60 capsule 0    psyllium (HYDROCIL) packet Take 1 packet by mouth once daily. 30 packet 0     No current facility-administered medications on file prior to visit.

## 2025-04-21 ENCOUNTER — TELEPHONE (OUTPATIENT)
Dept: ALLERGY | Facility: CLINIC | Age: 65
End: 2025-04-21
Payer: COMMERCIAL

## 2025-04-21 NOTE — TELEPHONE ENCOUNTER
4/21/25 - Spoke with patient regarding approval for infusions. Informed patient she should be receiving a call some time this week to set up infusion date. - IB    ----- Message from Melanie sent at 4/21/2025  7:53 AM CDT -----  Contact: [phone]  .1MEDICALADVICE Patient is calling for Medical Advice regarding: infusion dept never contacted her How long has patient had these symptoms:Pharmacy name and phone#:Patient wants a call back or thru [facility]:call back Comments:She states the dr told her to call if she did not hear from anyone please advise She states also to let you know she woke up this morning and her nose bleed for 10 minutes Please advise patient replies from provider may take up to 48 hours.  ----- Message -----  From: Melanie Norris  Sent: 4/21/2025   7:54 AM CDT  To: Rowena Galicia Staff    .1MEDICALADVICE Patient is calling for Medical Advice regarding: infusion dept never contacted her How long has patient had these symptoms:Pharmacy name and phone#:Patient wants a call back or [facility]:call back Comments:She states the dr told her to call if she did not hear from anyone please advise Please advise patient replies from provider may take up to 48 hours.

## 2025-05-13 ENCOUNTER — TELEPHONE (OUTPATIENT)
Dept: ALLERGY | Facility: CLINIC | Age: 65
End: 2025-05-13
Payer: COMMERCIAL

## 2025-05-13 NOTE — TELEPHONE ENCOUNTER
5/13/25 - Spoke with Halina from [facility] regarding IG therapy. Form signed and faxed over regarding [facility].     ----- Message from Nurse Hdz sent at 5/9/2025 11:55 AM CDT -----  Contact: Melanie    ----- Message -----  From: Karishma Fermin  Sent: 5/9/2025   9:31 AM CDT  To: Rowena Galicia Staff    .TYPE: Patient Call BackWho called:Melanie With [facility]  What is the request in detail:  regarding IG therapy Can the clinic reply by [facility]?   Would the patient rather a call back or a response via [facility] call back number:[phone]

## 2025-06-09 ENCOUNTER — TELEPHONE (OUTPATIENT)
Dept: ALLERGY | Facility: CLINIC | Age: 65
End: 2025-06-09
Payer: COMMERCIAL

## 2025-06-09 DIAGNOSIS — N95.2 VAGINAL ATROPHY: ICD-10-CM

## 2025-06-09 NOTE — TELEPHONE ENCOUNTER
6/9/25 - [facility] has attempted to contact patient multiple times to have medication setup for delivery. Unable to reach patient. - IB

## 2025-06-10 RX ORDER — ESTRADIOL 0.1 MG/G
CREAM VAGINAL
Qty: 42.5 G | Refills: 0 | Status: SHIPPED | OUTPATIENT
Start: 2025-06-10

## 2025-06-10 NOTE — TELEPHONE ENCOUNTER
Refill Routing Note   Medication(s) are not appropriate for processing by [facility] for the following reason(s):        Patient not seen by provider within 15 months    [facility] action(s):  Defer             Appointments  past 12m or future 3m with PCP    Date Provider   Last Visit   1/2/2024 Teresita Dugan MD   Next Visit   Visit date not found Teresita Dugan MD   ED visits in past 90 days: 0        Note composed:9:40 AM 06/10/2025

## 2025-06-12 ENCOUNTER — TELEPHONE (OUTPATIENT)
Dept: ALLERGY | Facility: CLINIC | Age: 65
End: 2025-06-12
Payer: COMMERCIAL

## 2025-06-12 NOTE — TELEPHONE ENCOUNTER
6/12/25 - Spoke with Ileana from [facility] regarding patient's infusion. Stated they have ha unsuccessful attempts to contact patient. MsNataly Ileana informed me that someone will reach out to her again. Called Ms. Yvonne and informed her that someone from [facility] will be giving her a call. She verbalized understanding.

## 2025-06-18 ENCOUNTER — TELEPHONE (OUTPATIENT)
Dept: ALLERGY | Facility: CLINIC | Age: 65
End: 2025-06-18
Payer: COMMERCIAL

## 2025-06-18 NOTE — TELEPHONE ENCOUNTER
6/18/25 - Spoke with patient regarding medication name for infusion. Medication provided to give to [facility] for Home infusion. - IB

## 2025-07-07 ENCOUNTER — TELEPHONE (OUTPATIENT)
Dept: CARDIOLOGY | Facility: CLINIC | Age: 65
End: 2025-07-07
Payer: COMMERCIAL

## 2025-07-07 NOTE — TELEPHONE ENCOUNTER
Copied from CRM #[id number]. Topic: General Inquiry - Patient Advice  >> Jul 7, 2025  8:49 AM Colleen wrote:  Type:  Needs Medical Advice    Who Called: MAJOR  Symptoms (please be specific): CHEST PAINS X 3DA  Pharmacy name and phone #:  [id number] - [facility] [address]  [address] [facility] [address]  Phone: [phone] Fax: [phone]  Would the patient rather a call back or a response via [facility]? PLEASE CALL BACK  Best Call Back Number: [phone]  Additional Information:      I returned pts call. No answer. m for call back

## 2025-07-08 DIAGNOSIS — R07.9 CHEST PAIN, UNSPECIFIED TYPE: Primary | ICD-10-CM

## 2025-07-15 ENCOUNTER — OFFICE VISIT (OUTPATIENT)
Dept: ALLERGY | Facility: CLINIC | Age: 65
End: 2025-07-15
Payer: COMMERCIAL

## 2025-07-15 VITALS
WEIGHT: 102.31 LBS | SYSTOLIC BLOOD PRESSURE: 141 MMHG | OXYGEN SATURATION: 100 % | HEART RATE: 58 BPM | DIASTOLIC BLOOD PRESSURE: 81 MMHG | HEIGHT: 63 IN | BODY MASS INDEX: 18.13 KG/M2

## 2025-07-15 DIAGNOSIS — B99.9 RECURRENT INFECTIONS: ICD-10-CM

## 2025-07-15 DIAGNOSIS — D80.6 SPECIFIC ANTIBODY DEFICIENCY WITH NORMAL IG CONCENTRATION AND NORMAL NUMBER OF B CELLS: Primary | ICD-10-CM

## 2025-07-15 DIAGNOSIS — J31.0 CHRONIC NONALLERGIC RHINITIS: ICD-10-CM

## 2025-07-15 PROCEDURE — 1160F RVW MEDS BY RX/DR IN RCRD: CPT | Mod: CPTII,S$GLB,, | Performed by: STUDENT IN AN ORGANIZED HEALTH CARE EDUCATION/TRAINING PROGRAM

## 2025-07-15 PROCEDURE — 3008F BODY MASS INDEX DOCD: CPT | Mod: CPTII,S$GLB,, | Performed by: STUDENT IN AN ORGANIZED HEALTH CARE EDUCATION/TRAINING PROGRAM

## 2025-07-15 PROCEDURE — 1159F MED LIST DOCD IN RCRD: CPT | Mod: CPTII,S$GLB,, | Performed by: STUDENT IN AN ORGANIZED HEALTH CARE EDUCATION/TRAINING PROGRAM

## 2025-07-15 PROCEDURE — 99215 OFFICE O/P EST HI 40 MIN: CPT | Mod: S$GLB,,, | Performed by: STUDENT IN AN ORGANIZED HEALTH CARE EDUCATION/TRAINING PROGRAM

## 2025-07-15 PROCEDURE — 3079F DIAST BP 80-89 MM HG: CPT | Mod: CPTII,S$GLB,, | Performed by: STUDENT IN AN ORGANIZED HEALTH CARE EDUCATION/TRAINING PROGRAM

## 2025-07-15 PROCEDURE — 99999 PR PBB SHADOW E&M-EST. PATIENT-LVL V: CPT | Mod: PBBFAC,,, | Performed by: STUDENT IN AN ORGANIZED HEALTH CARE EDUCATION/TRAINING PROGRAM

## 2025-07-15 PROCEDURE — 3077F SYST BP >= 140 MM HG: CPT | Mod: CPTII,S$GLB,, | Performed by: STUDENT IN AN ORGANIZED HEALTH CARE EDUCATION/TRAINING PROGRAM

## 2025-07-16 NOTE — PROGRESS NOTES
Allergy and Immunology  Established Patient Clinic Note    Date: 7/16/2025  Chief Complaint   Patient presents with    Follow-up     History  Yvonne Avila is a 64 y.o. female being seen for follow-up today.    Chronic Nonallergic Rhinitis  Post Nasal Drip   Recurrent Sinus Infections  Specific Antibody Deficiency   No infections since prior appointment  Patient received 1st IVIG infusion on 07/14/2025  Patient reported headache at this appointment consistent with infusions  No overt fatigue at this time  Tolerated infusion without major concerns  ED precautions and infection precautions discussed  Plan to discontinue the prophylactic antibiotics in the future if no infection between now and next appointment      Allergies, PMH, PSH, Social, and Family History were reviewed.    Medications Ordered Prior to Encounter[1]    Physical Examination  Vitals:    07/15/25 1303   BP: (!) 141/81   Pulse: (!) 58     GENERAL:  female in no apparent distress and well developed and well nourished  HEAD:  Normocephalic, without obvious abnormality, atraumatic  EYES: sclera anicteric, conjunctiva normochromic  EARS: normal TM's and external ear canals both ears  NOSE: without erythema or discharge, clear discharge, turbinates normal    OROPHARYNX: moist mucous membranes without erythema, exudates or petechiae  LYMPH NODES: normal, supple, no lymphadenopathy  LUNGS: clear to auscultation, no wheezes, rales or rhonchi, symmetric air entry.  HEART: normal rate, regular rhythm, normal S1, S2, no murmurs, rubs, clicks or gallops.  ABDOMEN: soft, nontender, nondistended, no masses or organomegaly.  MUSCULOSKELETAL: no gross joint deformity or swelling.  NEURO: alert, oriented, normal speech, no focal findings or movement disorder noted.  SKIN: normal coloration and turgor, no rashes, no suspicious skin lesions noted.     Assessment/Plan:   Problem List Items Addressed This Visit       Recurrent infections    Overview   -  10/25/2023: PPSV23 vaccination   - 11/08/2023: 8/23 pneumococcal titers protective  - Recurrent sinus infections - multiple in the past   - Recent PNA - completed treatment with pulm   - Concern for otitis media - more than 1 in the past   - No issues with infection until 50-59 yo   - No hx of bacteremia, fungal infections, or viral infections          Chronic nonallergic rhinitis    Overview   - 11/08/2023: Serum IgE to Aeroallergens negative          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 - Primary    Overview   - 02/2024: Starting ppx Amoxicillin 500 mg daily instead of IVIG per shared decision making with patient   - 12/2023: Sinus infection tx with Levofloxacin   - 12/08/2023: PCV20 vaccination   - 11/29/2023: 6/23 pneumococcal titers protective  - 11/08/2023: 8/23 pneumococcal titers protective  - 10/25/2023: PPSV23 vaccination             Chronic Nonallergic Rhinitis  Post Nasal Drip   Recurrent Sinus Infections  Specific Antibody Deficiency   Continue current medications at this time  No infections since prior appointment  Patient received 1st IVIG infusion on 07/14/2025  Patient reported headache at this appointment consistent with infusions  No overt fatigue at this time  Tolerated infusion without major concerns  ED precautions and infection precautions discussed  Plan to discontinue the prophylactic antibiotics in the future if no infection between now and next appointment    Follow up:  Follow up in about 3 months (around 10/15/2025).    40+ minutes was spent on direct and indirect health care of this patient.  Additional timing was spent to discuss at length IVIG infusions as well as discuss prophylactic antibiotics.  We spent additional time discussing the risks versus benefits as well as side effects.  Patient appears to be tolerating without issues.  ED precautions discussed at length.  Extensive conversation and questions answered at this  time.    DISCLAIMER: This note was prepared with [employer] voice recognition transcription software. Garbled syntax, mangled pronouns, and other bizarre constructions may be attributed to that software system. While efforts were made to correct any mistakes made by this voice recognition program, some errors and/or omissions may remain in the note that were missed when the note was originally created.     Grayson Guaman MD   [facility]  Allergy and Immunology       [1]   Current Outpatient Medications on File Prior to Visit   Medication Sig Dispense Refill    albuterol (PROVENTIL/VENTOLIN HFA) 90 mcg/actuation inhaler Inhale 2 puffs into the lungs every 4 (four) hours as needed for Wheezing or Shortness of Breath. Rescue 18 g 11    amoxicillin (AMOXIL) 500 MG capsule Take 1 capsule (500 mg total) by mouth once daily. Prophylactic daily antibiotic 90 capsule 3    bisacodyL (DULCOLAX) 5 mg EC tablet Take 2 tablets (10 mg total) by mouth 2 (two) times daily. 14 tablet 0    calcium carbonate (TUMS) 300 mg (750 mg) Chew Take 750 mg by mouth.      carbinoxamine maleate 4 mg Tab Take 4 mg by mouth every evening. 60 each 3    cyanocobalamin (VITAMIN B-12) 1000 MCG tablet Take 5,000 mcg by mouth once daily.      desloratadine (CLARINEX) 5 mg tablet Take 1 tablet (5 mg total) by mouth once daily. 30 tablet 11    estradioL (ESTRACE) 0.01 % (0.1 mg/gram) vaginal cream USE 1 GRAM VAGINALLY THREE TIMES A WEEK 42.5 g 0    EUTHYROX 25 mcg tablet Take 25 mcg by mouth.      ferrous sulfate (FEROSUL) 220 mg (44 mg iron)/5 mL Elix Take 5 mLs (220 mg total) by mouth once daily. 120 mL 2    ibuprofen 20 mg/mL oral liquid Take 30 mLs (600 mg total) by mouth every 6 (six) hours. 354 mL 1    ipratropium (ATROVENT) 42 mcg (0.06 %) nasal spray 2 sprays by Each Nostril route 4 (four) times daily as needed for Rhinitis. 15 mL 11    levothyroxine (SYNTHROID) 50 MCG tablet Take 50 mcg by mouth before breakfast.      lovastatin (MEVACOR)  10 MG tablet Take 10 mg by mouth every evening.      lovastatin (MEVACOR) 20 MG tablet Take 20 mg by mouth.      magnesium hydroxide 400 mg/5 ml (MILK OF MAGNESIA) 400 mg/5 mL Susp Take 30 mLs (2,400 mg total) by mouth daily as needed (constipation). 354 mL 0    ondansetron (ZOFRAN-ODT) 8 MG TbDL Take 1 tablet (8 mg total) by mouth every 8 (eight) hours as needed (nausea). 20 tablet 1    psyllium (HYDROCIL) packet Take 1 packet by mouth once daily. 30 packet 0    ALPRAZolam (XANAX) 0.5 MG tablet Take 1 tablet (0.5 mg total) by mouth 2 (two) times daily as needed (SIGNIFICANT ANXIETY). New Prescriber 60 tablet 2    fluticasone-salmeterol diskus inhaler 250-50 mcg Inhale 1 puff into the lungs 2 (two) times daily. Wash out mouth after use. 60 each 11    hydroCHLOROthiazide (HYDRODIURIL) 12.5 MG Tab Take 1 tablet (12.5 mg total) by mouth daily as needed (edema, swelling). Do not take if BP is low (under 110/70)  and feeling dry/dehydrated/dizzy - 90 tablet 1    pregabalin (LYRICA) 75 MG capsule Take 1 capsule (75 mg total) by mouth every evening for 10 days, THEN 2 capsules (150 mg total) every evening for 10 days, THEN 3 capsules (225 mg total) every evening for 10 days. (Patient not taking: Reported on 5/23/2024) 60 capsule 0     No current facility-administered medications on file prior to visit.

## 2025-07-28 ENCOUNTER — HOSPITAL ENCOUNTER (EMERGENCY)
Facility: HOSPITAL | Age: 65
Discharge: HOME | End: 2025-07-28
Attending: EMERGENCY MEDICINE
Payer: COMMERCIAL

## 2025-07-28 VITALS
HEIGHT: 62 IN | HEART RATE: 73 BPM | WEIGHT: 105 LBS | BODY MASS INDEX: 19.32 KG/M2 | DIASTOLIC BLOOD PRESSURE: 76 MMHG | TEMPERATURE: 99 F | RESPIRATION RATE: 20 BRPM | SYSTOLIC BLOOD PRESSURE: 127 MMHG | OXYGEN SATURATION: 96 %

## 2025-07-28 DIAGNOSIS — K52.9 SIGMOIDITIS: Primary | ICD-10-CM

## 2025-07-28 LAB
ABSOLUTE EOSINOPHIL (OHS): 0.01 K/UL
ABSOLUTE MONOCYTE (OHS): 0.51 K/UL (ref 0.3–1)
ABSOLUTE NEUTROPHIL COUNT (OHS): 12.1 K/UL (ref 1.8–7.7)
ALBUMIN SERPL BCP-MCNC: 4.4 G/DL (ref 3.5–5.2)
ALP SERPL-CCNC: 70 UNIT/L (ref 40–150)
ALT SERPL W/O P-5'-P-CCNC: 9 UNIT/L (ref 10–44)
ANION GAP (OHS): 12 MMOL/L (ref 8–16)
AST SERPL-CCNC: 28 UNIT/L (ref 11–45)
BASOPHILS # BLD AUTO: 0.02 K/UL
BASOPHILS NFR BLD AUTO: 0.2 %
BILIRUB SERPL-MCNC: 0.5 MG/DL (ref 0.1–1)
BUN SERPL-MCNC: 15 MG/DL (ref 8–23)
CALCIUM SERPL-MCNC: 9.8 MG/DL (ref 8.7–10.5)
CHLORIDE SERPL-SCNC: 104 MMOL/L (ref 95–110)
CO2 SERPL-SCNC: 25 MMOL/L (ref 23–29)
CREAT SERPL-MCNC: 1 MG/DL (ref 0.5–1.4)
ERYTHROCYTE [DISTWIDTH] IN BLOOD BY AUTOMATED COUNT: 12.8 % (ref 11.5–14.5)
GFR SERPLBLD CREATININE-BSD FMLA CKD-EPI: >60 ML/MIN/1.73/M2
GLUCOSE SERPL-MCNC: 117 MG/DL (ref 70–110)
HCT VFR BLD AUTO: 36.4 % (ref 37–48.5)
HCV AB SERPL QL IA: NORMAL
HGB BLD-MCNC: 12.3 GM/DL (ref 12–16)
HIV 1+2 AB+HIV1 P24 AG SERPL QL IA: NORMAL
IMM GRANULOCYTES # BLD AUTO: 0.05 K/UL (ref 0–0.04)
IMM GRANULOCYTES NFR BLD AUTO: 0.4 % (ref 0–0.5)
LACTATE SERPL-SCNC: 1.9 MMOL/L (ref 0.5–2.2)
LIPASE SERPL-CCNC: 25 U/L (ref 4–60)
LYMPHOCYTES # BLD AUTO: 0.6 K/UL (ref 1–4.8)
MCH RBC QN AUTO: 27.9 PG (ref 27–31)
MCHC RBC AUTO-ENTMCNC: 33.8 G/DL (ref 32–36)
MCV RBC AUTO: 83 FL (ref 82–98)
NUCLEATED RBC (/100WBC) (OHS): 0 /100 WBC
PLATELET # BLD AUTO: 224 K/UL (ref 150–450)
PMV BLD AUTO: 9.7 FL (ref 9.2–12.9)
POTASSIUM SERPL-SCNC: 3.9 MMOL/L (ref 3.5–5.1)
PROT SERPL-MCNC: 8.1 GM/DL (ref 6–8.4)
RBC # BLD AUTO: 4.41 M/UL (ref 4–5.4)
RELATIVE EOSINOPHIL (OHS): 0.1 %
RELATIVE LYMPHOCYTE (OHS): 4.5 % (ref 18–48)
RELATIVE MONOCYTE (OHS): 3.8 % (ref 4–15)
RELATIVE NEUTROPHIL (OHS): 91 % (ref 38–73)
SODIUM SERPL-SCNC: 141 MMOL/L (ref 136–145)
WBC # BLD AUTO: 13.29 K/UL (ref 3.9–12.7)

## 2025-07-28 PROCEDURE — 25000003 PHARM REV CODE 250: Performed by: EMERGENCY MEDICINE

## 2025-07-28 PROCEDURE — 63600175 PHARM REV CODE 636 W HCPCS: Performed by: EMERGENCY MEDICINE

## 2025-07-28 PROCEDURE — 82040 ASSAY OF SERUM ALBUMIN: CPT

## 2025-07-28 PROCEDURE — 85025 COMPLETE CBC W/AUTO DIFF WBC: CPT

## 2025-07-28 PROCEDURE — 83605 ASSAY OF LACTIC ACID: CPT

## 2025-07-28 PROCEDURE — 86803 HEPATITIS C AB TEST: CPT | Performed by: EMERGENCY MEDICINE

## 2025-07-28 PROCEDURE — 99285 EMERGENCY DEPT VISIT HI MDM: CPT | Mod: 25

## 2025-07-28 PROCEDURE — 83690 ASSAY OF LIPASE: CPT

## 2025-07-28 PROCEDURE — 96376 TX/PRO/DX INJ SAME DRUG ADON: CPT

## 2025-07-28 PROCEDURE — 96374 THER/PROPH/DIAG INJ IV PUSH: CPT

## 2025-07-28 PROCEDURE — 96375 TX/PRO/DX INJ NEW DRUG ADDON: CPT

## 2025-07-28 PROCEDURE — 87389 HIV-1 AG W/HIV-1&-2 AB AG IA: CPT | Performed by: EMERGENCY MEDICINE

## 2025-07-28 RX ORDER — HYDROCODONE BITARTRATE AND ACETAMINOPHEN 5; 325 MG/1; MG/1
1 TABLET ORAL EVERY 6 HOURS PRN
Qty: 30 TABLET | Refills: 0 | Status: SHIPPED | OUTPATIENT
Start: 2025-07-28

## 2025-07-28 RX ORDER — METRONIDAZOLE 500 MG/1
500 TABLET ORAL 3 TIMES DAILY
Qty: 21 TABLET | Refills: 0 | Status: SHIPPED | OUTPATIENT
Start: 2025-07-28 | End: 2025-08-04

## 2025-07-28 RX ORDER — CIPROFLOXACIN 500 MG/1
500 TABLET, FILM COATED ORAL
Status: COMPLETED | OUTPATIENT
Start: 2025-07-28 | End: 2025-07-28

## 2025-07-28 RX ORDER — ONDANSETRON HYDROCHLORIDE 2 MG/ML
4 INJECTION, SOLUTION INTRAVENOUS
Status: COMPLETED | OUTPATIENT
Start: 2025-07-28 | End: 2025-07-28

## 2025-07-28 RX ORDER — CIPROFLOXACIN 500 MG/1
500 TABLET, FILM COATED ORAL 2 TIMES DAILY
Qty: 20 TABLET | Refills: 0 | Status: SHIPPED | OUTPATIENT
Start: 2025-07-28 | End: 2025-08-07

## 2025-07-28 RX ORDER — METRONIDAZOLE 500 MG/1
500 TABLET ORAL
Status: COMPLETED | OUTPATIENT
Start: 2025-07-28 | End: 2025-07-28

## 2025-07-28 RX ORDER — ONDANSETRON 4 MG/1
4 TABLET, FILM COATED ORAL EVERY 6 HOURS
Qty: 9 TABLET | Refills: 2 | Status: SHIPPED | OUTPATIENT
Start: 2025-07-28

## 2025-07-28 RX ORDER — MORPHINE SULFATE 4 MG/ML
4 INJECTION, SOLUTION INTRAMUSCULAR; INTRAVENOUS
Refills: 0 | Status: COMPLETED | OUTPATIENT
Start: 2025-07-28 | End: 2025-07-28

## 2025-07-28 RX ADMIN — ONDANSETRON 4 MG: 2 INJECTION INTRAMUSCULAR; INTRAVENOUS at 09:07

## 2025-07-28 RX ADMIN — ONDANSETRON 4 MG: 2 INJECTION INTRAMUSCULAR; INTRAVENOUS at 08:07

## 2025-07-28 RX ADMIN — METRONIDAZOLE 500 MG: 500 TABLET ORAL at 10:07

## 2025-07-28 RX ADMIN — MORPHINE SULFATE 4 MG: 4 INJECTION INTRAVENOUS at 08:07

## 2025-07-28 RX ADMIN — CIPROFLOXACIN 500 MG: 500 TABLET ORAL at 10:07

## 2025-07-29 NOTE — FIRST PROVIDER EVALUATION
"Medical screening examination initiated.  I have conducted a focused provider triage encounter, findings are as follows:    Brief history of present illness:  64 year old female presenting to the ED with c/o periumbilical abdominal pain since approximately 10:30 this morning. Pain has been progressively worsening throughabout the day. Associated sx include n/v and constipation. Reports last LBM was 4-5 days ago. No history of abdominal surgeries. Denies fever and chills.     Vitals:    07/28/25 1928   BP: (!) 132/95   BP Location: Right arm   Pulse: 76   Resp: 18   Temp: 98.5 °F (36.9 °C)   TempSrc: Oral   SpO2: 99%   Weight: 47.6 kg (105 lb)   Height: 5' 2" (1.575 m)       Pertinent physical exam:  VSS. NAD; patient does appear uncomfortable. Respirations even and unlabored. Generalized abdominal tenderness; worse in the periumbilical region. Guarding present.     Brief workup plan:  labs and imaging    Preliminary workup initiated; this workup will be continued and followed by the physician or advanced practice provider that is assigned to the patient when roomed.  "

## 2025-07-29 NOTE — DISCHARGE INSTRUCTIONS
A referral to Gastroenterology has been done for you.  Ciprofloxacin and metronidazole antibiotics has been prescribed to you.  Hydrocodone has been prescribed for pain  You can also take ibuprofen 600 mg every 6-8 hours as needed for added pain control    Ondansetron has been prescribed for nausea and vomiting.    Return to emergency department if you develop:  - Sustained Fever >100.4 or low temperature <96.8   - Tachycardia (very high heart rate) or Pulse >90  - Hypotension (low blood pressure) a top number (systolic pressure) of <90 or a bottom number (diastolic pressure) of <40 or lower  - Hypertension (high blood pressure) a top number (systolic pressure) of >180 or a bottom number (diastolic pressure) of >120 or higher  - Severe pain not relieved with recommended pain control regimen  - Severe shortness of breath above baseline  - Severe nausea, vomiting, inability to tolerate oral feeding/hydration  - Altered mental status, abnormal interaction or behaviors   - If symptoms acutely worsen or do not improve  - Development of other worrisome symptom

## 2025-07-29 NOTE — ED PROVIDER NOTES
SCRIBE #1 NOTE: IYancy, am scribing for, and in the presence of, Mahesh Reza MD. I have scribed the entire note.       History     Chief Complaint   Patient presents with    Abdominal Pain     Pt complaining opf abdominal pain, constipation, back pain, and nausea. Pt reports last bowel movement a week ago      Review of patient's allergies indicates:  No Known Allergies      History of Present Illness     HPI    7/28/2025, 8:38 PM  History obtained from the patient and medical records      History of Present Illness: Yvonne Avila is a 64 y.o. female patient with a PMHx of GERD and thyroid disease and no hx of kidney disease who presents to the Emergency Department for evaluation of lower abdominal pain and constipation which began 3 days ago. Pt states her abdominal pain radiates to her back.  Pain is a throbbing and stabbing pain with no aggravating or relieving factors.  She reports that she has not been able to eat due to nausea and the pain.  She also reports this happens around once a year. Pt does not see a GI specialist. Her last colonoscopy was last year. Symptoms are constant and moderate in severity. No mitigating or exacerbating factors reported. Associated sxs include decreased appetite, N/V, and abdominal cramping. Patient denies any fever. Prior Tx includes 2 enemas, gas x, and a laxative at home with no relief..  No further complaints or concerns at this time.       Arrival mode: Personal Transportation    PCP: Nafisa Irving APRN        Past Medical History:  Past Medical History:   Diagnosis Date    Anxiety     Bradycardia 9/19/2019    GERD (gastroesophageal reflux disease)     Thyroid disease        Past Surgical History:  Past Surgical History:   Procedure Laterality Date    BREAST BIOPSY Left     COLPORRHAPHY, COMBINED ANTEROPOSTERIOR N/A 12/26/2023    Procedure: COLPORRHAPHY, COMBINED ANTEROPOSTERIOR;  Surgeon: Teresita Dugan MD;  Location: [facility] OR;  Service:  OB/GYN;  Laterality: N/A;    DENTAL SURGERY      INJECTION OF ANESTHETIC AGENT AROUND GANGLION IMPAR N/A 2024    Procedure: Sacrococcygeal ligament and ganglion impar injection;  Surgeon: Elizabet Siu MD;  Location: [facility] PAIN MGT;  Service: Pain Management;  Laterality: N/A;    SACROSPINOUS LIGAMENT FIXATION N/A 2023    Procedure: FIXATION, LIGAMENT, SACROSPINOUS;  Surgeon: Teresita Dugan MD;  Location: [facility] OR;  Service: OB/GYN;  Laterality: N/A;    SINUS SURGERY      TOTAL VAGINAL HYSTERECTOMY N/A 2023    Procedure: HYSTERECTOMY, TOTAL, VAGINAL;  Surgeon: Teresita Dugan MD;  Location: [facility] OR;  Service: OB/GYN;  Laterality: N/A;  Request two assistants for this case         Family History:  Family History   Problem Relation Name Age of Onset    Heart attack Mother      Hypertension Mother      Heart disease Father      Heart attack Maternal Aunt      Heart attack Maternal Uncle         Social History:  Social History     Tobacco Use    Smoking status: Former     Current packs/day: 0.00     Average packs/day: 0.8 packs/day for 37.0 years (27.8 ttl pk-yrs)     Types: Cigarettes     Start date:      Quit date: 2019     Years since quittin.5    Smokeless tobacco: Never    Tobacco comments:     did not smoke for 2 years since beginning smoking.    Substance and Sexual Activity    Alcohol use: Never    Drug use: Never    Sexual activity: Yes     Partners: Male     Birth control/protection: None        Review of Systems     Review of Systems   Constitutional:  Negative for fever.   HENT:  Negative for sore throat.    Respiratory:  Negative for shortness of breath.    Cardiovascular:  Negative for chest pain.   Gastrointestinal:  Positive for abdominal pain (lower), constipation, nausea and vomiting.        (+) abdominal cramping   Genitourinary:  Negative for dysuria.   Musculoskeletal:  Positive for back pain.   Skin:  Negative for rash.   Neurological:  Negative for weakness.  "  Hematological:  Does not bruise/bleed easily.   All other systems reviewed and are negative.       Physical Exam     Initial Vitals [07/28/25 1928]   BP Pulse Resp Temp SpO2   (!) 132/95 76 18 98.5 °F (36.9 °C) 99 %      MAP       --          Physical Exam  Nursing Notes and Vital Signs Reviewed.  Constitutional: Patient is in no apparent distress. Well-developed and well-nourished.  Head: Atraumatic. Normocephalic.  ENT: Mucous membranes are moist. Oropharynx is clear and symmetric.    Neck: Supple. Full ROM. No lymphadenopathy.  Cardiovascular: Regular rate. Regular rhythm. No murmurs, rubs, or gallops.  Pulmonary/Chest: No respiratory distress. Clear to auscultation bilaterally. No wheezing or rales.  Abdominal:  Patient has tenderness to the left lower quadrant.   No rebound, guarding, or rigidity.  Genitourinary: No CVA tenderness.  Rectal: No impaction.  Musculoskeletal: Moves all extremities. No obvious deformities. No edema. No calf tenderness.  Skin: Warm and dry.  Neurological:  Alert, awake, and appropriate.  Normal speech.  No acute focal neurological deficits are appreciated.  Psychiatric: Normal affect. Good eye contact. Appropriate in content.     ED Course   Procedures  ED Vital Signs:  Vitals:    07/28/25 1928 07/28/25 2027 07/28/25 2100 07/28/25 2130   BP: (!) 132/95  129/77    Pulse: 76  69 69   Resp: 18 18 16 20   Temp: 98.5 °F (36.9 °C)      TempSrc: Oral      SpO2: 99%   98%   Weight: 47.6 kg (105 lb)      Height: 5' 2" (1.575 m)       07/28/25 2200   BP: 130/79   Pulse: 70   Resp: 20   Temp:    TempSrc:    SpO2: 100%   Weight:    Height:        Abnormal Lab Results:  Labs Reviewed   COMPREHENSIVE METABOLIC PANEL - Abnormal       Result Value    Sodium 141      Potassium 3.9      Chloride 104      CO2 25      Glucose 117 (*)     BUN 15      Creatinine 1.0      Calcium 9.8      Protein Total 8.1      Albumin 4.4      Bilirubin Total 0.5      ALP 70      AST 28      ALT 9 (*)     Anion Gap 12   "    eGFR >60     CBC WITH DIFFERENTIAL - Abnormal    WBC 13.29 (*)     RBC 4.41      HGB 12.3      HCT 36.4 (*)     MCV 83      MCH 27.9      MCHC 33.8      RDW 12.8      Platelet Count 224      MPV 9.7      Nucleated RBC 0      Neut % 91.0 (*)     Lymph % 4.5 (*)     Mono % 3.8 (*)     Eos % 0.1      Basophil % 0.2      Imm Grans % 0.4      Neut # 12.10 (*)     Lymph # 0.60 (*)     Mono # 0.51      Eos # 0.01      Baso # 0.02      Imm Grans # 0.05 (*)    LIPASE - Normal    Lipase Level 25     LACTIC ACID, PLASMA - Normal    Lactic Acid Level 1.9      Narrative:     Falsely low lactic acid results can be found in samples containing >=13.0 mg/dL total bilirubin and/or >=3.5 mg/dL direct bilirubin.    HEPATITIS C ANTIBODY - Normal    Hep C Ab Interp Non-Reactive     HIV 1 / 2 ANTIBODY - Normal    HIV 1/2 Ag/Ab Non-Reactive     CBC W/ AUTO DIFFERENTIAL    Narrative:     The following orders were created for panel order CBC W/ AUTO DIFFERENTIAL.  Procedure                               Abnormality         Status                     ---------                               -----------         ------                     CBC with Differential[[id number]]       Abnormal            Final result                 Please view results for these tests on the individual orders.   URINALYSIS, REFLEX TO URINE CULTURE   HEP C VIRUS HOLD SPECIMEN        All Lab Results:  Results for orders placed or performed during the hospital encounter of 07/28/25   Comp. Metabolic Panel    Collection Time: 07/28/25  7:59 PM   Result Value Ref Range    Sodium 141 136 - 145 mmol/L    Potassium 3.9 3.5 - 5.1 mmol/L    Chloride 104 95 - 110 mmol/L    CO2 25 23 - 29 mmol/L    Glucose 117 (H) 70 - 110 mg/dL    BUN 15 8 - 23 mg/dL    Creatinine 1.0 0.5 - 1.4 mg/dL    Calcium 9.8 8.7 - 10.5 mg/dL    Protein Total 8.1 6.0 - 8.4 gm/dL    Albumin 4.4 3.5 - 5.2 g/dL    Bilirubin Total 0.5 0.1 - 1.0 mg/dL    ALP 70 40 - 150 unit/L    AST 28 11 - 45 unit/L    ALT  9 (L) 10 - 44 unit/L    Anion Gap 12 8 - 16 mmol/L    eGFR >60 >60 mL/min/1.73/m2   Lipase    Collection Time: 07/28/25  7:59 PM   Result Value Ref Range    Lipase Level 25 4 - 60 U/L   Lactic acid, plasma    Collection Time: 07/28/25  7:59 PM   Result Value Ref Range    Lactic Acid Level 1.9 0.5 - 2.2 mmol/L   CBC with Differential    Collection Time: 07/28/25  7:59 PM   Result Value Ref Range    WBC 13.29 (H) 3.90 - 12.70 K/uL    RBC 4.41 4.00 - 5.40 M/uL    HGB 12.3 12.0 - 16.0 gm/dL    HCT 36.4 (L) 37.0 - 48.5 %    MCV 83 82 - 98 fL    MCH 27.9 27.0 - 31.0 pg    MCHC 33.8 32.0 - 36.0 g/dL    RDW 12.8 11.5 - 14.5 %    Platelet Count 224 150 - 450 K/uL    MPV 9.7 9.2 - 12.9 fL    Nucleated RBC 0 <=0 /100 WBC    Neut % 91.0 (H) 38 - 73 %    Lymph % 4.5 (L) 18 - 48 %    Mono % 3.8 (L) 4 - 15 %    Eos % 0.1 <=8 %    Basophil % 0.2 <=1.9 %    Imm Grans % 0.4 0.0 - 0.5 %    Neut # 12.10 (H) 1.8 - 7.7 K/uL    Lymph # 0.60 (L) 1 - 4.8 K/uL    Mono # 0.51 0.3 - 1 K/uL    Eos # 0.01 <=0.5 K/uL    Baso # 0.02 <=0.2 K/uL    Imm Grans # 0.05 (H) 0.00 - 0.04 K/uL   Hepatitis C Antibody    Collection Time: 07/28/25  7:59 PM   Result Value Ref Range    Hep C Ab Interp Non-Reactive Non-Reactive   HIV 1/2 Ag/Ab (4th Gen)    Collection Time: 07/28/25  7:59 PM   Result Value Ref Range    HIV 1/2 Ag/Ab Non-Reactive Non-Reactive       Imaging Results:  Imaging Results              CT Abdomen Pelvis  Without Contrast (Final result)  Result time 07/28/25 20:34:09      Final result by Aime Ervin MD (07/28/25 20:34:09)                   Impression:     Sigmoid diverticulosis with mild thickening and inflammatory changes within the mid to distal sigmoid colon consistent with acute diverticulitis.  No perforation or abscess identified.      Evaluation of solid organ and vascular pathology is limited due to lack of oral and IV contrast.      All CT scans at [this location] are performed using dose modulation techniques as appropriate  to a performed exam including the following: automated exposure control; adjustment of the mA and/or kV according to patient size (this includes techniques or standardized protocols for targeted exams where dose is matched to indication / reason for exam; i.e. extremities or head); use of iterative reconstruction technique.    Finalized on: 7/28/2025 8:34 PM By:  Aime Ervin MD  [facility]# [id number]      2025-07-28 20:36:12.368     [facility]               Narrative:    EXAM:  CT ABDOMEN PELVIS WITHOUT CONTRAST    CLINICAL HISTORY: Abdominal pain.    Technique: Routine noncontrast CT of the abdomen and pelvis was performed.    Comparison: 05/27/2024    FINDINGS:    No concerning abnormality involves the lung bases.    The kidneys, ureters, and bladder are unremarkable for noncontrast technique.  No evidence of urolithiasis or obstructive uropathy.    The gallbladder and bile ducts are within normal limits.  The liver, spleen, pancreas, and adrenal glands are within normal limits for noncontrast technique.    The stomach, small bowel, and colon are normal within limits of the protocol of the study.  Moderate constipation.  No evidence of bowel obstruction.  Sigmoid diverticulosis with mild thickening and inflammatory changes within the mid to distal sigmoid colon consistent with acute diverticulitis.  No definite perforation or abscess is identified.  Trace fluid within the pelvis likely reactive.  No free air, or abscess.  There is no evidence of acute appendicitis.    No aortic aneurysm identified.  No pathologically enlarged lymph nodes.    The reproductive organs are not unusual in CT appearance.    No acute or suspicious osseous lesion is evident.                                                    The Emergency Provider reviewed the vital signs and test results, which are outlined above.     ED Discussion     10:59 PM: Re-evaluated pt.  at bedside. Patient is resting comfortably and is in no acute distress.  Pt  is able to tolerate PO.  Patient was able to take the Cipro and Flagyl orally and did not vomit.    Discussed with pt and/or family/caretaker all pertinent results. Discussed with pt and/or family/caretaker any concerns expressed at this time. Answered all questions. Pt and/or family/caretaker express understanding at this time.      11:00 PM: Reassessed pt at this time. Discussed with patient and/or family/caretaker all pertinent ED information and results. Discussed pt dx and plan of tx. Gave the patient all f/u and return to the ED instructions. All questions and concerns were addressed at this time. Patient and/or family/caretaker expresses understanding of information and instructions, and is comfortable with plan to discharge. Pt is stable for discharge.     I discussed with patient and/or family/caretaker that evaluation in the ED does not suggest any emergent or life threatening medical conditions requiring immediate intervention beyond what was provided in the ED, and I believe patient is safe for discharge. Regardless, an unremarkable evaluation in the ED does not preclude the development or presence of a serious or life threatening condition. As such, I instructed that the patient is to return immediately for any worsening or change in current symptoms.         Medical Decision Making  Amount and/or Complexity of Data Reviewed  Labs: ordered. Decision-making details documented in ED Course.  Radiology: ordered. Decision-making details documented in ED Course.    Risk  Prescription drug management.  Risk Details: Differential diagnosis;  Gastroenteritis, Bowel obstruction, Colitis, Diverticulitis, Cholecystitis, Appendicitis, Perforated bowel, Herniation, Infectious etiology, UTI, Pyelonephritis,  Biliary obstruction, kidney stone, constipation, impaction, ileus, obstruction                        ED Medication(s):  Medications   ondansetron injection 4 mg (4 mg Intravenous Given 7/28/25 2028)   morphine  injection 4 mg (4 mg Intravenous Given 7/28/25 2027)   ciprofloxacin HCl tablet 500 mg (500 mg Oral Given 7/28/25 2209)   metroNIDAZOLE tablet 500 mg (500 mg Oral Given 7/28/25 2209)   ondansetron injection 4 mg (4 mg Intravenous Given 7/28/25 2156)       New Prescriptions    CIPROFLOXACIN HCL (CIPRO) 500 MG TABLET    Take 1 tablet (500 mg total) by mouth 2 (two) times daily. for 10 days    HYDROCODONE-ACETAMINOPHEN (NORCO) 5-325 MG PER TABLET    Take 1 tablet by mouth every 6 (six) hours as needed for Pain.    METRONIDAZOLE (FLAGYL) 500 MG TABLET    Take 1 tablet (500 mg total) by mouth 3 (three) times daily. for 7 days    ONDANSETRON (ZOFRAN) 4 MG TABLET    Take 1 tablet (4 mg total) by mouth every 6 (six) hours.        Follow-up Information       Nafisa Irving, APRN. Schedule an appointment as soon as possible for a visit in 2 days.    Specialty: Family Medicine  Contact information:  [address] [facility] [address]  [phone]                                 Scribe Attestation:   Scribe #1: I performed the above scribed service and the documentation accurately describes the services I performed. I attest to the accuracy of the note.     Attending:   Physician Attestation Statement for Scribe #1: I, Mahesh Reza MD, personally performed the services described in this documentation, as scribed by Yancy Gaines, in my presence, and it is both accurate and complete.           Clinical Impression       ICD-10-CM ICD-9-CM   1. Sigmoiditis  K52.9 558.9       Disposition:   Disposition: Discharged  Condition: Stable         Mahesh Reza MD  07/28/25 4213

## 2025-07-31 LAB — HOLD SPECIMEN: NORMAL

## 2025-08-02 ENCOUNTER — NURSE TRIAGE (OUTPATIENT)
Dept: ADMINISTRATIVE | Facility: CLINIC | Age: 65
End: 2025-08-02
Payer: COMMERCIAL

## 2025-08-02 ENCOUNTER — OCHSNER VIRTUAL EMERGENCY DEPARTMENT (OUTPATIENT)
Facility: CLINIC | Age: 65
End: 2025-08-02
Payer: COMMERCIAL

## 2025-08-03 NOTE — TELEPHONE ENCOUNTER
Pt seen in ED on 7/28 for severe stomach cramps and vomiting. Diagnosed with sigmoiditis. Prescribed cipro and flagyl, as well as norco and zofran. Pt is still constipated. Has not had a BM in 10 or 11 days. She is not having any abd pain, but feels very bloated. No vomiting since Tuesday, but she is nauseated at times. She has not taken any of the norco at all. She has been taking dulcolax for 3 days now.     She also does not like the way the flagyl makes her feel- she's having trouble sleeping, she's lightheaded, and is walking unsteady at times due to dizziness.      Dispo- see pcp within 24 hours. Referred to [facility]. Dr. Dorado advised miralax daily, dose now of 5-7 scoops in 32oz sportsdrink and drink over 1 hour. Advised f/u with pcp. If symptoms ave improved from the ED, pt can stop taking the flagyl, but still needs to f/u with her provider. Pt VU. ED/callback reasons discussed.  Reason for Disposition   Last bowel movement (BM) > 4 days ago    Additional Information   Negative: [1] Vomiting AND [2] contains bile (green color)   Negative: Patient sounds very sick or weak to the triager   Negative: [1] Rectal pain or fullness from fecal impaction (rectum full of stool) AND [2] NOT better after SITZ bath, suppository or enema   Negative: [1] Vomiting AND [2] abdomen looks much more swollen than usual   Negative: [1] Constant abdominal pain AND [2] present > 2 hours   Negative: [1] MILD abdominal pain (e.g., does not interfere with normal activities) AND [2] pain comes and goes (cramps) AND [3] fever    Protocols used: Constipation-A-AH

## 2025-08-03 NOTE — PLAN OF CARE-OVED
[facility] Emergency Department Plan of Care Note  Referral Source: Nurse On-Call                               Chief Complaint   Patient presents with    Constipation     Recent dx of sigmoiditis, still on abx, no longer taking opiates.  No abdominal pain, no vomiting, occasional nausea, normal PO intake, feels bloated.       Recommendation: Treat in place                       Recommendation comment: GI regimen with miralax instructions given

## 2025-08-14 ENCOUNTER — OFFICE VISIT (OUTPATIENT)
Dept: SURGERY | Facility: CLINIC | Age: 65
End: 2025-08-14
Payer: COMMERCIAL

## 2025-08-14 VITALS
SYSTOLIC BLOOD PRESSURE: 122 MMHG | OXYGEN SATURATION: 97 % | WEIGHT: 101 LBS | DIASTOLIC BLOOD PRESSURE: 78 MMHG | BODY MASS INDEX: 18.58 KG/M2 | HEIGHT: 62 IN | HEART RATE: 67 BPM

## 2025-08-14 DIAGNOSIS — K57.32 DIVERTICULITIS LARGE INTESTINE W/O PERFORATION OR ABSCESS W/O BLEEDING: ICD-10-CM

## 2025-08-14 DIAGNOSIS — K59.09 CHRONIC CONSTIPATION: Primary | ICD-10-CM

## 2025-08-14 PROCEDURE — 3078F DIAST BP <80 MM HG: CPT | Mod: CPTII,S$GLB,, | Performed by: STUDENT IN AN ORGANIZED HEALTH CARE EDUCATION/TRAINING PROGRAM

## 2025-08-14 PROCEDURE — 99999 PR PBB SHADOW E&M-EST. PATIENT-LVL V: CPT | Mod: PBBFAC,,, | Performed by: STUDENT IN AN ORGANIZED HEALTH CARE EDUCATION/TRAINING PROGRAM

## 2025-08-14 PROCEDURE — 1159F MED LIST DOCD IN RCRD: CPT | Mod: CPTII,S$GLB,, | Performed by: STUDENT IN AN ORGANIZED HEALTH CARE EDUCATION/TRAINING PROGRAM

## 2025-08-14 PROCEDURE — 3008F BODY MASS INDEX DOCD: CPT | Mod: CPTII,S$GLB,, | Performed by: STUDENT IN AN ORGANIZED HEALTH CARE EDUCATION/TRAINING PROGRAM

## 2025-08-14 PROCEDURE — 99204 OFFICE O/P NEW MOD 45 MIN: CPT | Mod: S$GLB,,, | Performed by: STUDENT IN AN ORGANIZED HEALTH CARE EDUCATION/TRAINING PROGRAM

## 2025-08-14 PROCEDURE — 3074F SYST BP LT 130 MM HG: CPT | Mod: CPTII,S$GLB,, | Performed by: STUDENT IN AN ORGANIZED HEALTH CARE EDUCATION/TRAINING PROGRAM

## 2025-08-14 RX ORDER — LUBIPROSTONE 0.02 MG/1
24 CAPSULE ORAL 2 TIMES DAILY
COMMUNITY
Start: 2025-07-28

## 2025-08-16 ENCOUNTER — TELEPHONE (OUTPATIENT)
Dept: ALLERGY | Facility: CLINIC | Age: 65
End: 2025-08-16
Payer: COMMERCIAL

## 2025-08-29 ENCOUNTER — TELEPHONE (OUTPATIENT)
Dept: ALLERGY | Facility: CLINIC | Age: 65
End: 2025-08-29
Payer: COMMERCIAL

## (undated) DEVICE — SUT VICRYL PLUS 0 CT1 18IN

## (undated) DEVICE — SYR B-D DISP CONTROL 10CC100/C

## (undated) DEVICE — SPONGE LAP 18X18 PREWASHED

## (undated) DEVICE — TRAY SKIN SCRUB WET 4 COMPART

## (undated) DEVICE — DEVICE CAPIO SLIM SUTURE CAPT

## (undated) DEVICE — DRAPE UINDERBUT GRAD PCH

## (undated) DEVICE — COVER LIGHT HANDLE 80/CA

## (undated) DEVICE — SPONGE GAUZE 16PLY 4X4

## (undated) DEVICE — Device

## (undated) DEVICE — GLOVE SURGICAL LATEX SZ 7

## (undated) DEVICE — SUT VICRYL PLUS 2-0 CT1 18

## (undated) DEVICE — TOWEL OR DISP STRL BLUE 4/PK

## (undated) DEVICE — BANDAGE ROLL COTTN 4.5INX4.1YD

## (undated) DEVICE — JELLY SURGILUBE LUBE PKT 3GM

## (undated) DEVICE — TRAY CATH FOL SIL URIMTR 16FR

## (undated) DEVICE — UNDERGLOVES BIOGEL PI SZ 7 LF

## (undated) DEVICE — NDL SPINAL SPINOCAN 22GX3.5

## (undated) DEVICE — CONTAINER SPECIMEN OR STER 4OZ

## (undated) DEVICE — SUT VICRYL PLUS 0 CT1 36IN

## (undated) DEVICE — MANIFOLD 4 PORT

## (undated) DEVICE — SYR LUER LOCK STERILE 10ML

## (undated) DEVICE — PACK DRAPE PERI/GYN TIBURON

## (undated) DEVICE — PACK BASIC SETUP SC BR

## (undated) DEVICE — SOL NORMAL USPCA 0.9%

## (undated) DEVICE — SUT VICRYL 2-0 27 CT-1

## (undated) DEVICE — UNDERGLOVES BIOGEL PI SZ 6 LF

## (undated) DEVICE — ADHESIVE DERMABOND ADVANCED

## (undated) DEVICE — TUBING MEDI-VAC 20FT .25IN

## (undated) DEVICE — GLOVE SURGICAL LATEX SZ 6